# Patient Record
Sex: FEMALE | Race: WHITE | Employment: OTHER | URBAN - NONMETROPOLITAN AREA
[De-identification: names, ages, dates, MRNs, and addresses within clinical notes are randomized per-mention and may not be internally consistent; named-entity substitution may affect disease eponyms.]

---

## 2019-08-21 ENCOUNTER — OFFICE VISIT (OUTPATIENT)
Dept: INTERNAL MEDICINE | Age: 69
End: 2019-08-21
Payer: MEDICARE

## 2019-08-21 VITALS
WEIGHT: 137.4 LBS | OXYGEN SATURATION: 96 % | BODY MASS INDEX: 21.56 KG/M2 | HEART RATE: 71 BPM | SYSTOLIC BLOOD PRESSURE: 162 MMHG | DIASTOLIC BLOOD PRESSURE: 100 MMHG | HEIGHT: 67 IN

## 2019-08-21 DIAGNOSIS — M85.821 OTHER SPECIFIED DISORDERS OF BONE DENSITY AND STRUCTURE, RIGHT UPPER ARM: ICD-10-CM

## 2019-08-21 DIAGNOSIS — Z72.89 OTHER PROBLEMS RELATED TO LIFESTYLE: ICD-10-CM

## 2019-08-21 DIAGNOSIS — N63.10 BREAST MASS, RIGHT: ICD-10-CM

## 2019-08-21 DIAGNOSIS — E55.9 HYPOVITAMINOSIS D: ICD-10-CM

## 2019-08-21 DIAGNOSIS — Z12.31 BREAST CANCER SCREENING BY MAMMOGRAM: ICD-10-CM

## 2019-08-21 DIAGNOSIS — R03.0 ELEVATED BLOOD PRESSURE READING: Primary | ICD-10-CM

## 2019-08-21 DIAGNOSIS — M85.89 OSTEOPENIA OF MULTIPLE SITES: ICD-10-CM

## 2019-08-21 DIAGNOSIS — Z12.11 SCREENING FOR COLON CANCER: ICD-10-CM

## 2019-08-21 DIAGNOSIS — Z78.0 MENOPAUSE: ICD-10-CM

## 2019-08-21 DIAGNOSIS — R53.83 FATIGUE, UNSPECIFIED TYPE: ICD-10-CM

## 2019-08-21 DIAGNOSIS — I45.10 RIGHT BUNDLE BRANCH BLOCK (RBBB): ICD-10-CM

## 2019-08-21 DIAGNOSIS — D64.9 NORMOCYTIC ANEMIA: ICD-10-CM

## 2019-08-21 DIAGNOSIS — Z13.220 SCREENING FOR HYPERLIPIDEMIA: ICD-10-CM

## 2019-08-21 DIAGNOSIS — Z78.0 POST-MENOPAUSAL: ICD-10-CM

## 2019-08-21 PROCEDURE — 93000 ELECTROCARDIOGRAM COMPLETE: CPT | Performed by: INTERNAL MEDICINE

## 2019-08-21 PROCEDURE — 99203 OFFICE O/P NEW LOW 30 MIN: CPT | Performed by: INTERNAL MEDICINE

## 2019-08-21 SDOH — HEALTH STABILITY: MENTAL HEALTH: HOW OFTEN DO YOU HAVE A DRINK CONTAINING ALCOHOL?: NEVER

## 2019-08-21 ASSESSMENT — ENCOUNTER SYMPTOMS
COUGH: 0
SINUS PAIN: 0
CHEST TIGHTNESS: 0
DIARRHEA: 0
VOMITING: 0
ORTHOPNEA: 0
BLOOD IN STOOL: 0
TROUBLE SWALLOWING: 0
SHORTNESS OF BREATH: 0
RHINORRHEA: 0
ABDOMINAL PAIN: 0
BACK PAIN: 0
BLURRED VISION: 0
WHEEZING: 0
CONSTIPATION: 0

## 2019-08-21 ASSESSMENT — PATIENT HEALTH QUESTIONNAIRE - PHQ9
SUM OF ALL RESPONSES TO PHQ QUESTIONS 1-9: 0
SUM OF ALL RESPONSES TO PHQ9 QUESTIONS 1 & 2: 0
SUM OF ALL RESPONSES TO PHQ QUESTIONS 1-9: 0
2. FEELING DOWN, DEPRESSED OR HOPELESS: 0
1. LITTLE INTEREST OR PLEASURE IN DOING THINGS: 0

## 2019-08-21 NOTE — PROGRESS NOTES
unspecified type  CBC    Comprehensive Metabolic Panel    TSH WITH REFLEX TO FT4    Vitamin D 25 Hydroxy   4. Breast cancer screening by mammogram  BRUCE DIGITAL SCREEN W CAD BILATERAL    TSH WITH REFLEX TO FT4    Vitamin D 25 Hydroxy   5. Right bundle branch block (RBBB)  TSH WITH REFLEX TO FT4   EKG, did not show any LVH,  Vitamin D 25 Hydroxy   6. Menopause  TSH WITH REFLEX TO FT4    Vitamin D 25 Hydroxy   7. Other specified disorders of bone density and structure, right upper arm   Vitamin D 25 Hydroxy   8. Post-menopausal  DEXA Bone Density Axial Skeleton   9. Other problems related to lifestyle  Hepatitis C Antibody   10. Screening for hyperlipidemia  Lipid Panel   11. Screening for colon cancer  Cologuard (For External Results Only)       PLAN:        Medications Ordered:  No orders of the defined types were placed in this encounter. Other Orders Placed:  Orders Placed This Encounter   Procedures    Cologuard (For External Results Only)     This test is performed by an external laboratory and is used for result attachment only. It is required that this order requisition be faxed to: [facility] @ [phone]. See [email or web address] for further information.      Standing Status:   Future     Standing Expiration Date:   8/21/2020    BRUCE DIGITAL SCREEN W CAD BILATERAL     Standing Status:   Future     Standing Expiration Date:   2/21/2021     Order Specific Question:   Reason for exam:     Answer:   breast mass    DEXA Bone Density Axial Skeleton     Standing Status:   Future     Standing Expiration Date:   8/21/2020    CBC     Standing Status:   Future     Standing Expiration Date:   2/21/2021    Comprehensive Metabolic Panel     Standing Status:   Future     Standing Expiration Date:   2/21/2021    TSH WITH REFLEX TO FT4     Standing Status:   Future     Standing Expiration Date:   2/21/2021    Vitamin D 25 Hydroxy     Standing Status:   Future     Standing Expiration Date:   8/21/2020   [facility]

## 2019-08-22 DIAGNOSIS — R03.0 ELEVATED BLOOD PRESSURE READING: ICD-10-CM

## 2019-08-22 DIAGNOSIS — Z78.0 MENOPAUSE: ICD-10-CM

## 2019-08-22 DIAGNOSIS — Z13.220 SCREENING FOR HYPERLIPIDEMIA: ICD-10-CM

## 2019-08-22 DIAGNOSIS — M85.821 OTHER SPECIFIED DISORDERS OF BONE DENSITY AND STRUCTURE, RIGHT UPPER ARM: ICD-10-CM

## 2019-08-22 DIAGNOSIS — R53.83 FATIGUE, UNSPECIFIED TYPE: ICD-10-CM

## 2019-08-22 DIAGNOSIS — N63.10 BREAST MASS, RIGHT: ICD-10-CM

## 2019-08-22 DIAGNOSIS — Z12.31 BREAST CANCER SCREENING BY MAMMOGRAM: ICD-10-CM

## 2019-08-22 DIAGNOSIS — Z72.89 OTHER PROBLEMS RELATED TO LIFESTYLE: ICD-10-CM

## 2019-08-22 DIAGNOSIS — I45.10 RIGHT BUNDLE BRANCH BLOCK (RBBB): ICD-10-CM

## 2019-08-22 LAB
ALBUMIN SERPL-MCNC: 4.3 G/DL (ref 3.5–5.2)
ALP BLD-CCNC: 99 U/L (ref 35–104)
ALT SERPL-CCNC: 13 U/L (ref 5–33)
ANION GAP SERPL CALCULATED.3IONS-SCNC: 13 MMOL/L (ref 7–19)
AST SERPL-CCNC: 23 U/L (ref 5–32)
BILIRUB SERPL-MCNC: 0.4 MG/DL (ref 0.2–1.2)
BUN BLDV-MCNC: 14 MG/DL (ref 8–23)
CALCIUM SERPL-MCNC: 9.4 MG/DL (ref 8.8–10.2)
CHLORIDE BLD-SCNC: 106 MMOL/L (ref 98–111)
CHOLESTEROL, TOTAL: 189 MG/DL (ref 160–199)
CO2: 28 MMOL/L (ref 22–29)
CREAT SERPL-MCNC: 0.7 MG/DL (ref 0.5–0.9)
GFR NON-AFRICAN AMERICAN: >60
GLUCOSE BLD-MCNC: 93 MG/DL (ref 74–109)
HCT VFR BLD CALC: 36.7 % (ref 37–47)
HDLC SERPL-MCNC: 50 MG/DL (ref 65–121)
HEMOGLOBIN: 11.9 G/DL (ref 12–16)
HEPATITIS C ANTIBODY INTERPRETATION: NORMAL
LDL CHOLESTEROL CALCULATED: 124 MG/DL
MCH RBC QN AUTO: 32 PG (ref 27–31)
MCHC RBC AUTO-ENTMCNC: 32.4 G/DL (ref 33–37)
MCV RBC AUTO: 98.7 FL (ref 81–99)
PDW BLD-RTO: 12.8 % (ref 11.5–14.5)
PLATELET # BLD: 194 K/UL (ref 130–400)
PMV BLD AUTO: 11.1 FL (ref 9.4–12.3)
POTASSIUM SERPL-SCNC: 3.7 MMOL/L (ref 3.5–5)
RBC # BLD: 3.72 M/UL (ref 4.2–5.4)
SODIUM BLD-SCNC: 147 MMOL/L (ref 136–145)
TOTAL PROTEIN: 7.2 G/DL (ref 6.6–8.7)
TRIGL SERPL-MCNC: 75 MG/DL (ref 0–149)
TSH REFLEX FT4: 2.8 UIU/ML (ref 0.35–5.5)
VITAMIN D 25-HYDROXY: 25.9 NG/ML
WBC # BLD: 6.5 K/UL (ref 4.8–10.8)

## 2019-08-23 ENCOUNTER — HOSPITAL ENCOUNTER (OUTPATIENT)
Dept: WOMENS IMAGING | Age: 69
Discharge: HOME | End: 2019-08-23
Payer: MEDICARE

## 2019-08-23 DIAGNOSIS — N63.11 UNSPECIFIED LUMP IN THE RIGHT BREAST, UPPER OUTER QUADRANT: ICD-10-CM

## 2019-08-23 DIAGNOSIS — N63.10 BREAST MASS, RIGHT: ICD-10-CM

## 2019-08-23 DIAGNOSIS — R92.8 ABNORMAL MAMMOGRAM: ICD-10-CM

## 2019-08-23 DIAGNOSIS — Z12.31 BREAST CANCER SCREENING BY MAMMOGRAM: ICD-10-CM

## 2019-08-23 DIAGNOSIS — Z78.0 POST-MENOPAUSAL: ICD-10-CM

## 2019-08-23 PROCEDURE — 77080 DXA BONE DENSITY AXIAL: CPT

## 2019-08-23 PROCEDURE — 76642 ULTRASOUND BREAST LIMITED: CPT

## 2019-08-23 PROCEDURE — 77066 DX MAMMO INCL CAD BI: CPT

## 2019-08-24 RX ORDER — B-COMPLEX WITH VITAMIN C
1 TABLET ORAL DAILY
Qty: 360 TABLET | Refills: 0 | Status: SHIPPED | OUTPATIENT
Start: 2019-08-24 | End: 2022-05-31 | Stop reason: ALTCHOICE

## 2019-08-24 RX ORDER — ERGOCALCIFEROL 1.25 MG/1
50000 CAPSULE ORAL WEEKLY
Qty: 12 CAPSULE | Refills: 1 | Status: SHIPPED | OUTPATIENT
Start: 2019-08-24 | End: 2020-09-23 | Stop reason: ALTCHOICE

## 2019-08-24 NOTE — RESULT ENCOUNTER NOTE
Spoke to patient at great length about the large tumor and also the images suggesting of malignancy, she will undergo biopsy of her R breast and possible L breast nodule, if malignant , will be referred to Dr Zack Quintero office. She was advised to contact me if she had any questions. Will need care coordination for biopsy, follow up on staging  and possible assistance with oncology appointment. She will contact the office for any problems.

## 2019-08-26 ENCOUNTER — TELEPHONE (OUTPATIENT)
Dept: INTERNAL MEDICINE | Age: 69
End: 2019-08-26

## 2019-08-26 NOTE — TELEPHONE ENCOUNTER
----- Message from Chucky Rod MD sent at 8/24/2019  1:00 PM CDT -----  All labs are in acceptable range except:  Low Vitamin D, I will prescribe high dose Vitamin D oral , once weekly X 12 weeks, recheck Vitamin D level after 3 months  She is anemic, I have to test her occult blood, iron studies and Vitamin B12 and folate. If she has not had the cologuard, it will be a good idea to see the labs first because she may need to see GI for possible colonoscopy.

## 2019-08-27 ENCOUNTER — OFFICE VISIT (OUTPATIENT)
Dept: SURGERY | Age: 69
End: 2019-08-27
Payer: MEDICARE

## 2019-08-27 VITALS — BODY MASS INDEX: 21.21 KG/M2 | WEIGHT: 132 LBS | HEIGHT: 66 IN | TEMPERATURE: 98.6 F

## 2019-08-27 DIAGNOSIS — R59.0 AXILLARY LYMPHADENOPATHY: ICD-10-CM

## 2019-08-27 DIAGNOSIS — N63.11 MASS OF UPPER OUTER QUADRANT OF RIGHT BREAST: Primary | ICD-10-CM

## 2019-08-27 PROCEDURE — 1123F ACP DISCUSS/DSCN MKR DOCD: CPT | Performed by: PHYSICIAN ASSISTANT

## 2019-08-27 PROCEDURE — 99203 OFFICE O/P NEW LOW 30 MIN: CPT | Performed by: PHYSICIAN ASSISTANT

## 2019-08-27 PROCEDURE — G8427 DOCREV CUR MEDS BY ELIG CLIN: HCPCS | Performed by: PHYSICIAN ASSISTANT

## 2019-08-27 PROCEDURE — G8399 PT W/DXA RESULTS DOCUMENT: HCPCS | Performed by: PHYSICIAN ASSISTANT

## 2019-08-27 PROCEDURE — 1090F PRES/ABSN URINE INCON ASSESS: CPT | Performed by: PHYSICIAN ASSISTANT

## 2019-08-27 PROCEDURE — 4040F PNEUMOC VAC/ADMIN/RCVD: CPT | Performed by: PHYSICIAN ASSISTANT

## 2019-08-27 PROCEDURE — G8420 CALC BMI NORM PARAMETERS: HCPCS | Performed by: PHYSICIAN ASSISTANT

## 2019-08-27 PROCEDURE — 1036F TOBACCO NON-USER: CPT | Performed by: PHYSICIAN ASSISTANT

## 2019-08-27 PROCEDURE — 3017F COLORECTAL CA SCREEN DOC REV: CPT | Performed by: PHYSICIAN ASSISTANT

## 2019-09-05 ENCOUNTER — HOSPITAL ENCOUNTER (OUTPATIENT)
Dept: WOMENS IMAGING | Age: 69
Discharge: HOME | End: 2019-09-05
Payer: MEDICARE

## 2019-09-05 DIAGNOSIS — R59.0 AXILLARY LYMPHADENOPATHY: ICD-10-CM

## 2019-09-05 DIAGNOSIS — N63.11 MASS OF UPPER OUTER QUADRANT OF RIGHT BREAST: ICD-10-CM

## 2019-09-05 PROCEDURE — 2709999900 US BREAST BIOPSY W LOC DEVICE 1ST LESION RIGHT

## 2019-09-05 PROCEDURE — 19083 BX BREAST 1ST LESION US IMAG: CPT | Performed by: SURGERY

## 2019-09-05 PROCEDURE — 10005 FNA BX W/US GDN 1ST LES: CPT | Performed by: SURGERY

## 2019-09-05 PROCEDURE — 88374 M/PHMTRC ALYS ISHQUANT/SEMIQ: CPT

## 2019-09-05 PROCEDURE — 88173 CYTOPATH EVAL FNA REPORT: CPT

## 2019-09-05 PROCEDURE — 88361 TUMOR IMMUNOHISTOCHEM/COMPUT: CPT

## 2019-09-05 PROCEDURE — 77065 DX MAMMO INCL CAD UNI: CPT

## 2019-09-05 PROCEDURE — A4648 IMPLANTABLE TISSUE MARKER: HCPCS

## 2019-09-05 PROCEDURE — 88305 TISSUE EXAM BY PATHOLOGIST: CPT

## 2019-09-05 PROCEDURE — 88172 CYTP DX EVAL FNA 1ST EA SITE: CPT

## 2019-09-10 ENCOUNTER — TELEPHONE (OUTPATIENT)
Dept: SURGERY | Age: 69
End: 2019-09-10

## 2019-09-10 DIAGNOSIS — C50.411 MALIGNANT NEOPLASM OF UPPER-OUTER QUADRANT OF RIGHT FEMALE BREAST, UNSPECIFIED ESTROGEN RECEPTOR STATUS (HCC): Primary | ICD-10-CM

## 2019-09-12 ENCOUNTER — TELEPHONE (OUTPATIENT)
Dept: OTHER | Age: 69
End: 2019-09-12

## 2019-09-19 ENCOUNTER — HOSPITAL ENCOUNTER (OUTPATIENT)
Dept: MRI IMAGING | Age: 69
Discharge: HOME | End: 2019-09-19
Payer: MEDICARE

## 2019-09-19 DIAGNOSIS — C50.411 MALIGNANT NEOPLASM OF UPPER-OUTER QUADRANT OF RIGHT FEMALE BREAST, UNSPECIFIED ESTROGEN RECEPTOR STATUS (HCC): ICD-10-CM

## 2019-09-19 PROCEDURE — A9577 INJ MULTIHANCE: HCPCS | Performed by: PHYSICIAN ASSISTANT

## 2019-09-19 PROCEDURE — 77049 MRI BREAST C-+ W/CAD BI: CPT

## 2019-09-19 PROCEDURE — 6360000004 HC RX CONTRAST MEDICATION: Performed by: PHYSICIAN ASSISTANT

## 2019-09-19 RX ADMIN — GADOBENATE DIMEGLUMINE 13 ML: 529 INJECTION, SOLUTION INTRAVENOUS at 12:15

## 2019-09-20 PROBLEM — Z12.11 SCREENING FOR COLON CANCER: Status: RESOLVED | Noted: 2019-08-21 | Resolved: 2019-09-20

## 2019-09-23 ENCOUNTER — HOSPITAL ENCOUNTER (OUTPATIENT)
Dept: WOMENS IMAGING | Age: 69
Discharge: HOME | End: 2019-09-23
Payer: MEDICARE

## 2019-09-23 ENCOUNTER — INITIAL CONSULT (OUTPATIENT)
Dept: SURGERY | Age: 69
End: 2019-09-23
Payer: MEDICARE

## 2019-09-23 DIAGNOSIS — C50.411 MALIGNANT NEOPLASM OF UPPER-OUTER QUADRANT OF RIGHT FEMALE BREAST, UNSPECIFIED ESTROGEN RECEPTOR STATUS (HCC): Primary | ICD-10-CM

## 2019-09-23 DIAGNOSIS — Z01.818 EXAMINATION PRIOR TO CHEMOTHERAPY: ICD-10-CM

## 2019-09-23 DIAGNOSIS — C50.812 MALIGNANT NEOPLASM OF OVERLAPPING SITES OF LEFT FEMALE BREAST, UNSPECIFIED ESTROGEN RECEPTOR STATUS (HCC): ICD-10-CM

## 2019-09-23 DIAGNOSIS — C50.411 MALIGNANT NEOPLASM OF UPPER-OUTER QUADRANT OF RIGHT FEMALE BREAST, UNSPECIFIED ESTROGEN RECEPTOR STATUS (HCC): ICD-10-CM

## 2019-09-23 PROCEDURE — 1036F TOBACCO NON-USER: CPT | Performed by: SURGERY

## 2019-09-23 PROCEDURE — 3017F COLORECTAL CA SCREEN DOC REV: CPT | Performed by: SURGERY

## 2019-09-23 PROCEDURE — 99354 PR PROLONGED SVC OUTPATIENT SETTING 1ST HOUR: CPT | Performed by: SURGERY

## 2019-09-23 PROCEDURE — G8420 CALC BMI NORM PARAMETERS: HCPCS | Performed by: SURGERY

## 2019-09-23 PROCEDURE — 1123F ACP DISCUSS/DSCN MKR DOCD: CPT | Performed by: SURGERY

## 2019-09-23 PROCEDURE — G8428 CUR MEDS NOT DOCUMENT: HCPCS | Performed by: SURGERY

## 2019-09-23 PROCEDURE — 4040F PNEUMOC VAC/ADMIN/RCVD: CPT | Performed by: SURGERY

## 2019-09-23 PROCEDURE — G8399 PT W/DXA RESULTS DOCUMENT: HCPCS | Performed by: SURGERY

## 2019-09-23 PROCEDURE — 99215 OFFICE O/P EST HI 40 MIN: CPT | Performed by: SURGERY

## 2019-09-23 PROCEDURE — 1090F PRES/ABSN URINE INCON ASSESS: CPT | Performed by: SURGERY

## 2019-09-23 NOTE — Clinical Note
Static work-upGet set up for portAppointment with Flor Gen: lying in bed, NAD  resp: breathing comfortably on RA  Cardiac: RRR  GI: softly distended, minimally tender across all 4 quadrants. No rebound or guarding.   Ext: warm , moving all ext

## 2019-09-24 ENCOUNTER — OFFICE VISIT (OUTPATIENT)
Dept: GASTROENTEROLOGY | Age: 69
End: 2019-09-24
Payer: MEDICARE

## 2019-09-24 ENCOUNTER — TELEPHONE (OUTPATIENT)
Dept: GASTROENTEROLOGY | Age: 69
End: 2019-09-24

## 2019-09-24 ENCOUNTER — OFFICE VISIT (OUTPATIENT)
Dept: INTERNAL MEDICINE | Age: 69
End: 2019-09-24
Payer: MEDICARE

## 2019-09-24 ENCOUNTER — TELEPHONE (OUTPATIENT)
Dept: SURGERY | Age: 69
End: 2019-09-24

## 2019-09-24 VITALS
HEIGHT: 67 IN | SYSTOLIC BLOOD PRESSURE: 130 MMHG | OXYGEN SATURATION: 98 % | DIASTOLIC BLOOD PRESSURE: 80 MMHG | HEART RATE: 69 BPM | WEIGHT: 132 LBS | BODY MASS INDEX: 20.72 KG/M2

## 2019-09-24 VITALS
OXYGEN SATURATION: 95 % | SYSTOLIC BLOOD PRESSURE: 130 MMHG | HEIGHT: 66 IN | BODY MASS INDEX: 21.41 KG/M2 | HEART RATE: 61 BPM | DIASTOLIC BLOOD PRESSURE: 84 MMHG | WEIGHT: 133.2 LBS

## 2019-09-24 DIAGNOSIS — M85.821 OTHER SPECIFIED DISORDERS OF BONE DENSITY AND STRUCTURE, RIGHT UPPER ARM: ICD-10-CM

## 2019-09-24 DIAGNOSIS — F32.A FATIGUE DUE TO DEPRESSION: ICD-10-CM

## 2019-09-24 DIAGNOSIS — R53.83 FATIGUE DUE TO DEPRESSION: ICD-10-CM

## 2019-09-24 DIAGNOSIS — N63.10 BREAST MASS, RIGHT: ICD-10-CM

## 2019-09-24 DIAGNOSIS — D64.9 ANEMIA, UNSPECIFIED TYPE: Primary | ICD-10-CM

## 2019-09-24 DIAGNOSIS — I45.10 RIGHT BUNDLE BRANCH BLOCK (RBBB): Primary | ICD-10-CM

## 2019-09-24 DIAGNOSIS — D64.9 ANEMIA, UNSPECIFIED TYPE: ICD-10-CM

## 2019-09-24 PROBLEM — C50.812 MALIGNANT NEOPLASM OF OVERLAPPING SITES OF LEFT FEMALE BREAST (HCC): Status: ACTIVE | Noted: 2019-09-24

## 2019-09-24 LAB
FOLATE: 11 NG/ML (ref 4.8–37.3)
IRON SATURATION: 30 % (ref 14–50)
IRON: 78 UG/DL (ref 37–145)
TOTAL IRON BINDING CAPACITY: 264 UG/DL (ref 250–400)
VITAMIN B-12: 402 PG/ML (ref 211–946)

## 2019-09-24 PROCEDURE — 1036F TOBACCO NON-USER: CPT | Performed by: NURSE PRACTITIONER

## 2019-09-24 PROCEDURE — G8420 CALC BMI NORM PARAMETERS: HCPCS | Performed by: NURSE PRACTITIONER

## 2019-09-24 PROCEDURE — 1090F PRES/ABSN URINE INCON ASSESS: CPT | Performed by: NURSE PRACTITIONER

## 2019-09-24 PROCEDURE — 3017F COLORECTAL CA SCREEN DOC REV: CPT | Performed by: NURSE PRACTITIONER

## 2019-09-24 PROCEDURE — G8399 PT W/DXA RESULTS DOCUMENT: HCPCS | Performed by: NURSE PRACTITIONER

## 2019-09-24 PROCEDURE — 99203 OFFICE O/P NEW LOW 30 MIN: CPT | Performed by: NURSE PRACTITIONER

## 2019-09-24 PROCEDURE — G8427 DOCREV CUR MEDS BY ELIG CLIN: HCPCS | Performed by: NURSE PRACTITIONER

## 2019-09-24 PROCEDURE — 99213 OFFICE O/P EST LOW 20 MIN: CPT | Performed by: INTERNAL MEDICINE

## 2019-09-24 PROCEDURE — 1123F ACP DISCUSS/DSCN MKR DOCD: CPT | Performed by: NURSE PRACTITIONER

## 2019-09-24 PROCEDURE — 4040F PNEUMOC VAC/ADMIN/RCVD: CPT | Performed by: NURSE PRACTITIONER

## 2019-09-24 ASSESSMENT — ENCOUNTER SYMPTOMS
SINUS PAIN: 0
DIARRHEA: 0
ABDOMINAL PAIN: 0
SHORTNESS OF BREATH: 0
ABDOMINAL DISTENTION: 0
CONSTIPATION: 0
VOMITING: 0
VOMITING: 0
RHINORRHEA: 0
DIARRHEA: 0
ANAL BLEEDING: 0
COUGH: 0
TROUBLE SWALLOWING: 0
BACK PAIN: 0
BLOOD IN STOOL: 0
COUGH: 0
TROUBLE SWALLOWING: 0
SORE THROAT: 0
RECTAL PAIN: 0
BLOOD IN STOOL: 0
BACK PAIN: 0
WHEEZING: 0
VOICE CHANGE: 0
CONSTIPATION: 0
ABDOMINAL PAIN: 0
NAUSEA: 0
CHEST TIGHTNESS: 0
SHORTNESS OF BREATH: 0

## 2019-09-24 NOTE — PATIENT INSTRUCTIONS
You are going to have an Endoscopy and here are some basic instructions:    Nothing to eat or drink after midnight EXCEPT:  PLEASE TAKE MEDICATION(S) FOR HIGH BLOOD PRESSURE, SEIZURES, HEART, AND THYROID WITH A SIP OF WATER AT LEAST 2 HOURS PRIOR TO ARRIVAL TIME.   YOU MAY ALSO TAKE ANY INHALERS YOU ARE PRESCRIBED. You will not be able to drive for 24 hours after the procedure due to sedation. Bring a  with you the day of the procedure. No aspirin, ibuprofen, naproxen, fish oil or vitamin E for 5 days before procedure. Continue current medications. If you are on blood thinners, clearance from the prescribing physician will be obtained before your procedure is scheduled. Increased [email or web address] may be associated with discontinuation of your blood thinner and include, but not limited to, stroke, TIA, or cardiac event. If biopsies are taken during the procedure they will be sent to a pathologist for analysis. You will be notified by mail of the pathology results in 2-3 weeks. Your physician may also schedule a follow up appointment with the nurse practitioner to discuss pathology, symptoms or to check if you have had any problems related to your procedure. If you prefer not to return to the office after your procedure please discuss this with your physician on the day of your procedure. You are going to have a colonoscopy and here are some instructions: You will be given specific directions regarding restrictions to diet and bowel prep instructions including laxatives. Please read these instructions one week prior to your scheduled procedure to ensure that you are prepared. Follow prep instructions provided for bowel prep. Take all of the bowel prep as directed. If you are having problems with nausea, stop your prep for 30-45 min to allow the nausea to subside before resuming your prep.      Nothing to eat or drink after midnight the day of the procedure EXCEPT:  PLEASE TAKE

## 2019-09-24 NOTE — PROGRESS NOTES
Other Topics Concern    None   Social History Narrative    None       No Known Allergies    Current Outpatient Medications   Medication Sig Dispense Refill    IRON-FOLIC ACID PO Take 22 mg by mouth daily      vitamin D (ERGOCALCIFEROL) 00433 units CAPS capsule Take 1 capsule by mouth once a week 12 capsule 1    Calcium Carbonate-Vitamin D (OYSTER SHELL CALCIUM/D) 500-200 MG-UNIT TABS Take 1 tablet by mouth daily 360 tablet 0     No current facility-administered medications for this visit. Review of Systems   Constitutional: Negative for appetite change, fatigue, fever and unexpected weight change. HENT: Negative for sore throat, trouble swallowing and voice change. Respiratory: Negative for cough and shortness of breath. Cardiovascular: Negative for chest pain, palpitations and leg swelling. Gastrointestinal: Negative for abdominal distention, abdominal pain, anal bleeding, blood in stool, constipation, diarrhea, nausea, rectal pain and vomiting. Genitourinary: Negative for hematuria. Musculoskeletal: Negative for arthralgias, back pain and neck pain. Neurological: Negative for dizziness, weakness, light-headedness and headaches. Psychiatric/Behavioral: Negative for dysphoric mood and sleep disturbance. The patient is not nervous/anxious. All other systems reviewed and are negative. Objective:     Physical Exam   Constitutional: She is oriented to person, place, and time. She appears well-developed and well-nourished. /80   Pulse 69   Ht 5' 7\" (1.702 m)   Wt 132 lb (59.9 kg)   SpO2 98%   BMI 20.67 kg/m²    Eyes: Pupils are equal, round, and reactive to light. Conjunctivae and EOM are normal. No scleral icterus. Neck: Normal range of motion. Neck supple. No thyromegaly present. Cardiovascular: Normal rate, regular rhythm and normal heart sounds. Exam reveals no gallop and no friction rub. No murmur heard.   Pulmonary/Chest: Effort normal and breath sounds

## 2019-09-27 ENCOUNTER — HOSPITAL ENCOUNTER (OUTPATIENT)
Dept: CT IMAGING | Age: 69
Discharge: HOME | End: 2019-09-27
Payer: MEDICARE

## 2019-09-27 ENCOUNTER — HOSPITAL ENCOUNTER (OUTPATIENT)
Dept: NUCLEAR MEDICINE | Age: 69
Discharge: HOME | End: 2019-09-29
Payer: MEDICARE

## 2019-09-27 ENCOUNTER — HOSPITAL ENCOUNTER (OUTPATIENT)
Dept: NON INVASIVE DIAGNOSTICS | Age: 69
Discharge: HOME | End: 2019-09-27
Payer: MEDICARE

## 2019-09-27 DIAGNOSIS — Z01.818 EXAMINATION PRIOR TO CHEMOTHERAPY: ICD-10-CM

## 2019-09-27 DIAGNOSIS — C50.411 MALIGNANT NEOPLASM OF UPPER-OUTER QUADRANT OF RIGHT FEMALE BREAST, UNSPECIFIED ESTROGEN RECEPTOR STATUS (HCC): ICD-10-CM

## 2019-09-27 LAB
GFR NON-AFRICAN AMERICAN: >60
LV EF: 58 %
LVEF MODALITY: NORMAL
PERFORMED ON: NORMAL
POC CREATININE: 0.8 MG/DL (ref 0.3–1.3)
POC SAMPLE TYPE: NORMAL

## 2019-09-27 PROCEDURE — 74177 CT ABD & PELVIS W/CONTRAST: CPT

## 2019-09-27 PROCEDURE — A9561 TC99M OXIDRONATE: HCPCS | Performed by: SURGERY

## 2019-09-27 PROCEDURE — 6360000004 HC RX CONTRAST MEDICATION: Performed by: SURGERY

## 2019-09-27 PROCEDURE — 3430000000 HC RX DIAGNOSTIC RADIOPHARMACEUTICAL: Performed by: SURGERY

## 2019-09-27 PROCEDURE — 82565 ASSAY OF CREATININE: CPT

## 2019-09-27 PROCEDURE — 93306 TTE W/DOPPLER COMPLETE: CPT

## 2019-09-27 PROCEDURE — 78306 BONE IMAGING WHOLE BODY: CPT

## 2019-09-27 PROCEDURE — 71260 CT THORAX DX C+: CPT

## 2019-09-27 RX ADMIN — TECHNETIUM TC 99M OXIDRONATE 20 MILLICURIE: 3.15 INJECTION, POWDER, LYOPHILIZED, FOR SOLUTION INTRAVENOUS at 13:09

## 2019-09-27 RX ADMIN — IOPAMIDOL 90 ML: 755 INJECTION, SOLUTION INTRAVENOUS at 08:36

## 2019-10-01 ENCOUNTER — HOSPITAL ENCOUNTER (OUTPATIENT)
Age: 69
Setting detail: OUTPATIENT SURGERY
Discharge: HOME | End: 2019-10-01
Attending: INTERNAL MEDICINE | Admitting: INTERNAL MEDICINE
Payer: MEDICARE

## 2019-10-01 ENCOUNTER — ANESTHESIA EVENT (OUTPATIENT)
Dept: OPERATING ROOM | Age: 69
End: 2019-10-01

## 2019-10-01 ENCOUNTER — ANESTHESIA (OUTPATIENT)
Dept: OPERATING ROOM | Age: 69
End: 2019-10-01

## 2019-10-01 ENCOUNTER — HOSPITAL ENCOUNTER (OUTPATIENT)
Age: 69
Setting detail: SPECIMEN
Discharge: HOME | End: 2019-10-01
Payer: MEDICARE

## 2019-10-01 ENCOUNTER — TELEPHONE (OUTPATIENT)
Dept: GASTROENTEROLOGY | Age: 69
End: 2019-10-01

## 2019-10-01 ENCOUNTER — APPOINTMENT (OUTPATIENT)
Dept: OPERATING ROOM | Age: 69
End: 2019-10-01

## 2019-10-01 VITALS — OXYGEN SATURATION: 98 % | SYSTOLIC BLOOD PRESSURE: 119 MMHG | DIASTOLIC BLOOD PRESSURE: 67 MMHG

## 2019-10-01 VITALS
HEIGHT: 67 IN | HEART RATE: 65 BPM | RESPIRATION RATE: 18 BRPM | WEIGHT: 135 LBS | DIASTOLIC BLOOD PRESSURE: 70 MMHG | BODY MASS INDEX: 21.19 KG/M2 | OXYGEN SATURATION: 97 % | SYSTOLIC BLOOD PRESSURE: 115 MMHG

## 2019-10-01 PROCEDURE — 43239 EGD BIOPSY SINGLE/MULTIPLE: CPT

## 2019-10-01 PROCEDURE — 88305 TISSUE EXAM BY PATHOLOGIST: CPT

## 2019-10-01 PROCEDURE — 45385 COLONOSCOPY W/LESION REMOVAL: CPT | Performed by: INTERNAL MEDICINE

## 2019-10-01 PROCEDURE — G8907 PT DOC NO EVENTS ON DISCHARG: HCPCS

## 2019-10-01 PROCEDURE — G8918 PT W/O PREOP ORDER IV AB PRO: HCPCS

## 2019-10-01 PROCEDURE — 43239 EGD BIOPSY SINGLE/MULTIPLE: CPT | Performed by: INTERNAL MEDICINE

## 2019-10-01 PROCEDURE — 88342 IMHCHEM/IMCYTCHM 1ST ANTB: CPT

## 2019-10-01 PROCEDURE — 45385 COLONOSCOPY W/LESION REMOVAL: CPT

## 2019-10-01 RX ORDER — SODIUM CHLORIDE 9 MG/ML
INJECTION, SOLUTION INTRAVENOUS CONTINUOUS
Status: DISCONTINUED | OUTPATIENT
Start: 2019-10-01 | End: 2019-10-01 | Stop reason: HOSPADM

## 2019-10-01 RX ORDER — LIDOCAINE HYDROCHLORIDE 10 MG/ML
INJECTION, SOLUTION INFILTRATION; PERINEURAL PRN
Status: DISCONTINUED | OUTPATIENT
Start: 2019-10-01 | End: 2019-10-01 | Stop reason: SDUPTHER

## 2019-10-01 RX ORDER — PROPOFOL 10 MG/ML
INJECTION, EMULSION INTRAVENOUS PRN
Status: DISCONTINUED | OUTPATIENT
Start: 2019-10-01 | End: 2019-10-01 | Stop reason: SDUPTHER

## 2019-10-01 RX ADMIN — SODIUM CHLORIDE: 9 INJECTION, SOLUTION INTRAVENOUS at 10:18

## 2019-10-01 RX ADMIN — PROPOFOL 300 MG: 10 INJECTION, EMULSION INTRAVENOUS at 11:16

## 2019-10-01 RX ADMIN — LIDOCAINE HYDROCHLORIDE 30 MG: 10 INJECTION, SOLUTION INFILTRATION; PERINEURAL at 11:16

## 2019-10-03 ENCOUNTER — HOSPITAL ENCOUNTER (OUTPATIENT)
Dept: INFUSION THERAPY | Age: 69
Discharge: HOME | End: 2019-10-03
Payer: MEDICARE

## 2019-10-03 ENCOUNTER — OFFICE VISIT (OUTPATIENT)
Dept: HEMATOLOGY | Age: 69
End: 2019-10-03
Payer: MEDICARE

## 2019-10-03 VITALS
SYSTOLIC BLOOD PRESSURE: 134 MMHG | DIASTOLIC BLOOD PRESSURE: 78 MMHG | HEIGHT: 67 IN | HEART RATE: 70 BPM | BODY MASS INDEX: 20.95 KG/M2 | WEIGHT: 133.5 LBS | OXYGEN SATURATION: 98 %

## 2019-10-03 DIAGNOSIS — Z17.0 MALIGNANT NEOPLASM OF OVERLAPPING SITES OF LEFT BREAST IN FEMALE, ESTROGEN RECEPTOR POSITIVE (HCC): ICD-10-CM

## 2019-10-03 DIAGNOSIS — C50.812 MALIGNANT NEOPLASM OF OVERLAPPING SITES OF LEFT BREAST IN FEMALE, ESTROGEN RECEPTOR POSITIVE (HCC): ICD-10-CM

## 2019-10-03 DIAGNOSIS — Z71.89 CARE PLAN DISCUSSED WITH PATIENT: Primary | ICD-10-CM

## 2019-10-03 PROCEDURE — 1036F TOBACCO NON-USER: CPT | Performed by: INTERNAL MEDICINE

## 2019-10-03 PROCEDURE — G8420 CALC BMI NORM PARAMETERS: HCPCS | Performed by: INTERNAL MEDICINE

## 2019-10-03 PROCEDURE — G8399 PT W/DXA RESULTS DOCUMENT: HCPCS | Performed by: INTERNAL MEDICINE

## 2019-10-03 PROCEDURE — 1123F ACP DISCUSS/DSCN MKR DOCD: CPT | Performed by: INTERNAL MEDICINE

## 2019-10-03 PROCEDURE — 85025 COMPLETE CBC W/AUTO DIFF WBC: CPT

## 2019-10-03 PROCEDURE — 99205 OFFICE O/P NEW HI 60 MIN: CPT | Performed by: INTERNAL MEDICINE

## 2019-10-03 PROCEDURE — G8484 FLU IMMUNIZE NO ADMIN: HCPCS | Performed by: INTERNAL MEDICINE

## 2019-10-03 PROCEDURE — 3017F COLORECTAL CA SCREEN DOC REV: CPT | Performed by: INTERNAL MEDICINE

## 2019-10-03 PROCEDURE — G8427 DOCREV CUR MEDS BY ELIG CLIN: HCPCS | Performed by: INTERNAL MEDICINE

## 2019-10-03 PROCEDURE — 1090F PRES/ABSN URINE INCON ASSESS: CPT | Performed by: INTERNAL MEDICINE

## 2019-10-03 PROCEDURE — 4040F PNEUMOC VAC/ADMIN/RCVD: CPT | Performed by: INTERNAL MEDICINE

## 2019-10-04 ENCOUNTER — APPOINTMENT (OUTPATIENT)
Dept: GENERAL RADIOLOGY | Age: 69
End: 2019-10-04
Attending: SURGERY
Payer: MEDICARE

## 2019-10-04 ENCOUNTER — ANESTHESIA (OUTPATIENT)
Dept: OPERATING ROOM | Age: 69
End: 2019-10-04
Payer: MEDICARE

## 2019-10-04 ENCOUNTER — HOSPITAL ENCOUNTER (OUTPATIENT)
Age: 69
Setting detail: OUTPATIENT SURGERY
Discharge: HOME | End: 2019-10-04
Attending: SURGERY | Admitting: SURGERY
Payer: MEDICARE

## 2019-10-04 ENCOUNTER — ANESTHESIA EVENT (OUTPATIENT)
Dept: OPERATING ROOM | Age: 69
End: 2019-10-04
Payer: MEDICARE

## 2019-10-04 VITALS — DIASTOLIC BLOOD PRESSURE: 57 MMHG | OXYGEN SATURATION: 96 % | SYSTOLIC BLOOD PRESSURE: 110 MMHG

## 2019-10-04 VITALS
TEMPERATURE: 98.2 F | BODY MASS INDEX: 20.88 KG/M2 | HEART RATE: 61 BPM | DIASTOLIC BLOOD PRESSURE: 74 MMHG | WEIGHT: 133 LBS | SYSTOLIC BLOOD PRESSURE: 127 MMHG | OXYGEN SATURATION: 98 % | HEIGHT: 67 IN | RESPIRATION RATE: 18 BRPM

## 2019-10-04 DIAGNOSIS — C50.812 MALIGNANT NEOPLASM OF OVERLAPPING SITES OF LEFT FEMALE BREAST, UNSPECIFIED ESTROGEN RECEPTOR STATUS (HCC): Primary | ICD-10-CM

## 2019-10-04 PROCEDURE — 2500000003 HC RX 250 WO HCPCS: Performed by: SURGERY

## 2019-10-04 PROCEDURE — 6360000002 HC RX W HCPCS: Performed by: SURGERY

## 2019-10-04 PROCEDURE — 6360000002 HC RX W HCPCS: Performed by: NURSE ANESTHETIST, CERTIFIED REGISTERED

## 2019-10-04 PROCEDURE — 3700000000 HC ANESTHESIA ATTENDED CARE: Performed by: SURGERY

## 2019-10-04 PROCEDURE — C1788 PORT, INDWELLING, IMP: HCPCS | Performed by: SURGERY

## 2019-10-04 PROCEDURE — 3700000001 HC ADD 15 MINUTES (ANESTHESIA): Performed by: SURGERY

## 2019-10-04 PROCEDURE — 7100000000 HC PACU RECOVERY - FIRST 15 MIN: Performed by: SURGERY

## 2019-10-04 PROCEDURE — 2580000003 HC RX 258: Performed by: ANESTHESIOLOGY

## 2019-10-04 PROCEDURE — 3600000013 HC SURGERY LEVEL 3 ADDTL 15MIN: Performed by: SURGERY

## 2019-10-04 PROCEDURE — 2580000003 HC RX 258: Performed by: SURGERY

## 2019-10-04 PROCEDURE — 77001 FLUOROGUIDE FOR VEIN DEVICE: CPT | Performed by: SURGERY

## 2019-10-04 PROCEDURE — 7100000011 HC PHASE II RECOVERY - ADDTL 15 MIN: Performed by: SURGERY

## 2019-10-04 PROCEDURE — 71045 X-RAY EXAM CHEST 1 VIEW: CPT

## 2019-10-04 PROCEDURE — 7100000010 HC PHASE II RECOVERY - FIRST 15 MIN: Performed by: SURGERY

## 2019-10-04 PROCEDURE — 7100000001 HC PACU RECOVERY - ADDTL 15 MIN: Performed by: SURGERY

## 2019-10-04 PROCEDURE — 36561 INSERT TUNNELED CV CATH: CPT | Performed by: SURGERY

## 2019-10-04 PROCEDURE — 3600000003 HC SURGERY LEVEL 3 BASE: Performed by: SURGERY

## 2019-10-04 PROCEDURE — 3209999900 FLUORO FOR SURGICAL PROCEDURES

## 2019-10-04 PROCEDURE — 2709999900 HC NON-CHARGEABLE SUPPLY: Performed by: SURGERY

## 2019-10-04 DEVICE — PORT INFUS PLAS SGL LUMN W/ 9.6FR SIL CATH AIRGUARD VLV: Type: IMPLANTABLE DEVICE | Site: CHEST  WALL | Status: FUNCTIONAL

## 2019-10-04 RX ORDER — PROMETHAZINE HYDROCHLORIDE 25 MG/ML
6.25 INJECTION, SOLUTION INTRAMUSCULAR; INTRAVENOUS
Status: DISCONTINUED | OUTPATIENT
Start: 2019-10-04 | End: 2019-10-04 | Stop reason: HOSPADM

## 2019-10-04 RX ORDER — SODIUM CHLORIDE 0.9 % (FLUSH) 0.9 %
10 SYRINGE (ML) INJECTION PRN
Status: DISCONTINUED | OUTPATIENT
Start: 2019-10-04 | End: 2019-10-04 | Stop reason: HOSPADM

## 2019-10-04 RX ORDER — DIPHENHYDRAMINE HYDROCHLORIDE 50 MG/ML
12.5 INJECTION INTRAMUSCULAR; INTRAVENOUS
Status: DISCONTINUED | OUTPATIENT
Start: 2019-10-04 | End: 2019-10-04 | Stop reason: HOSPADM

## 2019-10-04 RX ORDER — MIDAZOLAM HYDROCHLORIDE 1 MG/ML
2 INJECTION INTRAMUSCULAR; INTRAVENOUS
Status: DISCONTINUED | OUTPATIENT
Start: 2019-10-04 | End: 2019-10-04 | Stop reason: HOSPADM

## 2019-10-04 RX ORDER — HEPARIN SODIUM,PORCINE 10 UNIT/ML
VIAL (ML) INTRAVENOUS PRN
Status: DISCONTINUED | OUTPATIENT
Start: 2019-10-04 | End: 2019-10-04 | Stop reason: ALTCHOICE

## 2019-10-04 RX ORDER — METOCLOPRAMIDE HYDROCHLORIDE 5 MG/ML
10 INJECTION INTRAMUSCULAR; INTRAVENOUS
Status: DISCONTINUED | OUTPATIENT
Start: 2019-10-04 | End: 2019-10-04 | Stop reason: HOSPADM

## 2019-10-04 RX ORDER — LABETALOL 20 MG/4 ML (5 MG/ML) INTRAVENOUS SYRINGE
5 EVERY 10 MIN PRN
Status: DISCONTINUED | OUTPATIENT
Start: 2019-10-04 | End: 2019-10-04 | Stop reason: HOSPADM

## 2019-10-04 RX ORDER — MORPHINE SULFATE 4 MG/ML
4 INJECTION, SOLUTION INTRAMUSCULAR; INTRAVENOUS EVERY 5 MIN PRN
Status: DISCONTINUED | OUTPATIENT
Start: 2019-10-04 | End: 2019-10-04 | Stop reason: HOSPADM

## 2019-10-04 RX ORDER — PROPOFOL 10 MG/ML
INJECTION, EMULSION INTRAVENOUS CONTINUOUS PRN
Status: DISCONTINUED | OUTPATIENT
Start: 2019-10-04 | End: 2019-10-04 | Stop reason: SDUPTHER

## 2019-10-04 RX ORDER — FENTANYL CITRATE 50 UG/ML
50 INJECTION, SOLUTION INTRAMUSCULAR; INTRAVENOUS
Status: DISCONTINUED | OUTPATIENT
Start: 2019-10-04 | End: 2019-10-04 | Stop reason: HOSPADM

## 2019-10-04 RX ORDER — HYDROCODONE BITARTRATE AND ACETAMINOPHEN 5; 325 MG/1; MG/1
1 TABLET ORAL EVERY 8 HOURS PRN
Qty: 8 TABLET | Refills: 0 | Status: SHIPPED | OUTPATIENT
Start: 2019-10-04 | End: 2020-02-25

## 2019-10-04 RX ORDER — MEPERIDINE HYDROCHLORIDE 50 MG/ML
12.5 INJECTION INTRAMUSCULAR; INTRAVENOUS; SUBCUTANEOUS EVERY 5 MIN PRN
Status: DISCONTINUED | OUTPATIENT
Start: 2019-10-04 | End: 2019-10-04 | Stop reason: HOSPADM

## 2019-10-04 RX ORDER — SCOLOPAMINE TRANSDERMAL SYSTEM 1 MG/1
1 PATCH, EXTENDED RELEASE TRANSDERMAL ONCE
Status: DISCONTINUED | OUTPATIENT
Start: 2019-10-04 | End: 2019-10-04 | Stop reason: HOSPADM

## 2019-10-04 RX ORDER — LIDOCAINE HYDROCHLORIDE 10 MG/ML
1 INJECTION, SOLUTION EPIDURAL; INFILTRATION; INTRACAUDAL; PERINEURAL
Status: DISCONTINUED | OUTPATIENT
Start: 2019-10-04 | End: 2019-10-04 | Stop reason: HOSPADM

## 2019-10-04 RX ORDER — SODIUM CHLORIDE, SODIUM LACTATE, POTASSIUM CHLORIDE, CALCIUM CHLORIDE 600; 310; 30; 20 MG/100ML; MG/100ML; MG/100ML; MG/100ML
INJECTION, SOLUTION INTRAVENOUS CONTINUOUS
Status: DISCONTINUED | OUTPATIENT
Start: 2019-10-04 | End: 2019-10-04 | Stop reason: HOSPADM

## 2019-10-04 RX ORDER — SODIUM CHLORIDE 0.9 % (FLUSH) 0.9 %
10 SYRINGE (ML) INJECTION EVERY 12 HOURS SCHEDULED
Status: DISCONTINUED | OUTPATIENT
Start: 2019-10-04 | End: 2019-10-04 | Stop reason: HOSPADM

## 2019-10-04 RX ORDER — SODIUM CHLORIDE, SODIUM LACTATE, POTASSIUM CHLORIDE, CALCIUM CHLORIDE 600; 310; 30; 20 MG/100ML; MG/100ML; MG/100ML; MG/100ML
INJECTION, SOLUTION INTRAVENOUS CONTINUOUS
Status: DISCONTINUED | OUTPATIENT
Start: 2019-10-04 | End: 2019-10-04 | Stop reason: SDUPTHER

## 2019-10-04 RX ORDER — MORPHINE SULFATE 4 MG/ML
4 INJECTION, SOLUTION INTRAMUSCULAR; INTRAVENOUS
Status: DISCONTINUED | OUTPATIENT
Start: 2019-10-04 | End: 2019-10-04 | Stop reason: HOSPADM

## 2019-10-04 RX ORDER — MORPHINE SULFATE 4 MG/ML
2 INJECTION, SOLUTION INTRAMUSCULAR; INTRAVENOUS EVERY 5 MIN PRN
Status: DISCONTINUED | OUTPATIENT
Start: 2019-10-04 | End: 2019-10-04 | Stop reason: HOSPADM

## 2019-10-04 RX ORDER — HYDRALAZINE HYDROCHLORIDE 20 MG/ML
5 INJECTION INTRAMUSCULAR; INTRAVENOUS EVERY 10 MIN PRN
Status: DISCONTINUED | OUTPATIENT
Start: 2019-10-04 | End: 2019-10-04 | Stop reason: HOSPADM

## 2019-10-04 RX ORDER — FENTANYL CITRATE 50 UG/ML
INJECTION, SOLUTION INTRAMUSCULAR; INTRAVENOUS PRN
Status: DISCONTINUED | OUTPATIENT
Start: 2019-10-04 | End: 2019-10-04 | Stop reason: SDUPTHER

## 2019-10-04 RX ADMIN — FENTANYL CITRATE 50 MCG: 50 INJECTION INTRAMUSCULAR; INTRAVENOUS at 14:16

## 2019-10-04 RX ADMIN — FENTANYL CITRATE 50 MCG: 50 INJECTION INTRAMUSCULAR; INTRAVENOUS at 14:11

## 2019-10-04 RX ADMIN — WATER 1 G: 1 INJECTION INTRAMUSCULAR; INTRAVENOUS; SUBCUTANEOUS at 14:15

## 2019-10-04 RX ADMIN — PROPOFOL 100 MCG/KG/MIN: 10 INJECTION, EMULSION INTRAVENOUS at 14:14

## 2019-10-04 RX ADMIN — SODIUM CHLORIDE, POTASSIUM CHLORIDE, SODIUM LACTATE AND CALCIUM CHLORIDE: 600; 310; 30; 20 INJECTION, SOLUTION INTRAVENOUS at 11:47

## 2019-10-04 ASSESSMENT — PAIN SCALES - GENERAL
PAINLEVEL_OUTOF10: 0

## 2019-10-04 ASSESSMENT — PAIN - FUNCTIONAL ASSESSMENT: PAIN_FUNCTIONAL_ASSESSMENT: 0-10

## 2019-10-04 ASSESSMENT — ENCOUNTER SYMPTOMS: SHORTNESS OF BREATH: 0

## 2019-10-11 ENCOUNTER — TELEPHONE (OUTPATIENT)
Dept: SURGERY | Age: 69
End: 2019-10-11

## 2019-10-16 VITALS — BODY MASS INDEX: 20.88 KG/M2 | WEIGHT: 133 LBS | HEIGHT: 67 IN

## 2019-10-16 DIAGNOSIS — C50.919 MALIGNANT NEOPLASM OF FEMALE BREAST, UNSPECIFIED ESTROGEN RECEPTOR STATUS, UNSPECIFIED LATERALITY, UNSPECIFIED SITE OF BREAST (HCC): ICD-10-CM

## 2019-10-17 ENCOUNTER — HOSPITAL ENCOUNTER (OUTPATIENT)
Dept: INFUSION THERAPY | Age: 69
End: 2019-10-17
Payer: MEDICARE

## 2019-10-17 ENCOUNTER — HOSPITAL ENCOUNTER (OUTPATIENT)
Dept: INFUSION THERAPY | Age: 69
Setting detail: INFUSION SERIES
Discharge: HOME | End: 2019-10-17
Payer: MEDICARE

## 2019-10-17 ENCOUNTER — CLINICAL DOCUMENTATION (OUTPATIENT)
Dept: HEMATOLOGY | Age: 69
End: 2019-10-17

## 2019-10-17 VITALS
TEMPERATURE: 98.2 F | WEIGHT: 133 LBS | HEIGHT: 67 IN | SYSTOLIC BLOOD PRESSURE: 132 MMHG | DIASTOLIC BLOOD PRESSURE: 83 MMHG | RESPIRATION RATE: 16 BRPM | BODY MASS INDEX: 20.88 KG/M2 | HEART RATE: 75 BPM

## 2019-10-17 PROCEDURE — 99211 OFF/OP EST MAY X REQ PHY/QHP: CPT

## 2019-10-17 NOTE — PROGRESS NOTES
Patient arrived 10/17 for her first chemotherapy treatment for breast CA. Prior to arrival, patient had discussed chemo changes with Dr. George Nye. A new regimen was built and patient decided to begin chemo on 10/18/2019. Patient's port was accessed and excellent blood return noted. Port was NS locked and capped. Anti-microbial patch and transparent dressing placed. Patient wishes for port to remain accessed until time of chem on 10/18/2019. Patient was given literature regarding chemotherapy. Education/discussion/questions answered. Patient present with her two daughters.   Electronically signed by Sky Villanueva RN on 10/17/2019 at 12:28 PM

## 2019-10-18 ENCOUNTER — HOSPITAL ENCOUNTER (OUTPATIENT)
Dept: INFUSION THERAPY | Age: 69
Setting detail: INFUSION SERIES
Discharge: HOME | End: 2019-10-18
Payer: MEDICARE

## 2019-10-18 VITALS
HEART RATE: 69 BPM | SYSTOLIC BLOOD PRESSURE: 163 MMHG | OXYGEN SATURATION: 98 % | TEMPERATURE: 97.3 F | RESPIRATION RATE: 18 BRPM | DIASTOLIC BLOOD PRESSURE: 86 MMHG

## 2019-10-18 DIAGNOSIS — C50.812 MALIGNANT NEOPLASM OF OVERLAPPING SITES OF LEFT FEMALE BREAST, UNSPECIFIED ESTROGEN RECEPTOR STATUS (HCC): Primary | ICD-10-CM

## 2019-10-18 DIAGNOSIS — Z51.11 ENCOUNTER FOR ANTINEOPLASTIC CHEMOTHERAPY: ICD-10-CM

## 2019-10-18 PROCEDURE — 2580000003 HC RX 258: Performed by: INTERNAL MEDICINE

## 2019-10-18 PROCEDURE — 6360000002 HC RX W HCPCS: Performed by: INTERNAL MEDICINE

## 2019-10-18 PROCEDURE — 96413 CHEMO IV INFUSION 1 HR: CPT

## 2019-10-18 PROCEDURE — 96415 CHEMO IV INFUSION ADDL HR: CPT

## 2019-10-18 PROCEDURE — 96377 APPLICATON ON-BODY INJECTOR: CPT

## 2019-10-18 PROCEDURE — 96417 CHEMO IV INFUS EACH ADDL SEQ: CPT

## 2019-10-18 RX ORDER — HEPARIN SODIUM (PORCINE) LOCK FLUSH IV SOLN 100 UNIT/ML 100 UNIT/ML
300 SOLUTION INTRAVENOUS PRN
Status: DISCONTINUED | OUTPATIENT
Start: 2019-10-18 | End: 2019-10-20 | Stop reason: HOSPADM

## 2019-10-18 RX ORDER — DIPHENHYDRAMINE HYDROCHLORIDE 50 MG/ML
50 INJECTION INTRAMUSCULAR; INTRAVENOUS PRN
Status: DISCONTINUED | OUTPATIENT
Start: 2019-10-18 | End: 2019-10-20 | Stop reason: HOSPADM

## 2019-10-18 RX ORDER — EPINEPHRINE 1 MG/ML
0.3 INJECTION, SOLUTION, CONCENTRATE INTRAVENOUS PRN
Status: DISCONTINUED | OUTPATIENT
Start: 2019-10-18 | End: 2019-10-20 | Stop reason: HOSPADM

## 2019-10-18 RX ORDER — PALONOSETRON 0.05 MG/ML
0.25 INJECTION, SOLUTION INTRAVENOUS ONCE
Status: COMPLETED | OUTPATIENT
Start: 2019-10-18 | End: 2019-10-18

## 2019-10-18 RX ORDER — DEXAMETHASONE 4 MG/1
TABLET ORAL
Qty: 24 TABLET | Refills: 3 | Status: SHIPPED | OUTPATIENT
Start: 2019-10-18 | End: 2020-02-25

## 2019-10-18 RX ORDER — METHYLPREDNISOLONE SODIUM SUCCINATE 125 MG/2ML
125 INJECTION, POWDER, LYOPHILIZED, FOR SOLUTION INTRAMUSCULAR; INTRAVENOUS PRN
Status: DISCONTINUED | OUTPATIENT
Start: 2019-10-18 | End: 2019-10-20 | Stop reason: HOSPADM

## 2019-10-18 RX ADMIN — DEXAMETHASONE SODIUM PHOSPHATE 20 MG: 10 INJECTION, SOLUTION INTRAMUSCULAR; INTRAVENOUS at 09:25

## 2019-10-18 RX ADMIN — CARBOPLATIN 571 MG: 10 INJECTION, SOLUTION INTRAVENOUS at 13:58

## 2019-10-18 RX ADMIN — PALONOSETRON HYDROCHLORIDE 0.25 MG: 0.05 INJECTION, SOLUTION INTRAVENOUS at 09:57

## 2019-10-18 RX ADMIN — DIPHENHYDRAMINE HYDROCHLORIDE 50 MG: 50 INJECTION, SOLUTION INTRAMUSCULAR; INTRAVENOUS at 08:51

## 2019-10-18 RX ADMIN — HEPARIN 300 UNITS: 100 SYRINGE at 16:22

## 2019-10-18 RX ADMIN — FOSAPREPITANT 150 MG: 150 INJECTION, POWDER, LYOPHILIZED, FOR SOLUTION INTRAVENOUS at 08:52

## 2019-10-18 RX ADMIN — PERTUZUMAB 840 MG: 30 INJECTION, SOLUTION, CONCENTRATE INTRAVENOUS at 09:59

## 2019-10-18 RX ADMIN — PEGFILGRASTIM 6 MG: KIT SUBCUTANEOUS at 15:15

## 2019-10-18 RX ADMIN — DOCETAXEL 128 MG: 20 INJECTION, SOLUTION, CONCENTRATE INTRAVENOUS at 12:57

## 2019-10-18 RX ADMIN — TRASTUZUMAB 483 MG: 150 INJECTION, POWDER, LYOPHILIZED, FOR SOLUTION INTRAVENOUS at 11:00

## 2019-10-18 NOTE — PROGRESS NOTES
Patient tolerated taxotere without s/s of reaction. Port flushed with NS. Brisk blood return noted. Carboplatin infusion started.  Electronically signed by Tiffany Wang RN on 10/18/2019 at 2:13 PM

## 2019-10-18 NOTE — PROGRESS NOTES
Patient tolerated perjeta very well. No s/s of reaction. Port flushed. Excellent blood return noted. Herceptin infusion started. Patient resting quietly. Daughter present.   Electronically signed by Allan Garcia RN on 10/18/2019 at 11:15 AM

## 2019-10-18 NOTE — DISCHARGE INSTR - COC
Continuity of Care Form    Patient Name: Jim Khan   :  1950  MRN:  [id number]    Admit date:  10/18/2019  Discharge date:  ***    Code Status Order: No Order   Advance Directives:     Admitting Physician:  No admitting provider for patient encounter. PCP: Kavita Villarreal MD    Discharging Nurse: [facility] Unit/Room#: No information available for this encounter. Discharging Unit Phone Number: ***    Emergency Contact:   Extended Emergency Contact Information  Primary Emergency Contact: Vesta Maurer Phone: [phone]  Mobile Phone: [phone]  Relation: Child   needed? No  Secondary Emergency Contact: Dennise Nava  Mobile Phone: [phone]  Relation: Child   needed? No    Past Surgical History:  Past Surgical History:   Procedure Laterality Date    BREAST BIOPSY Right 2019    COLONOSCOPY N/A 10/1/2019    Dr Locke Freeze, internal hemorrhoids-Grade 2 without bleeding stigmata and external hemorrhoids-TV x 1, AP x 1, 3 yr recall    PORT SURGERY N/A 10/4/2019    PORT INSERTION WITH FLUORO performed by Iris Webb MD at [address] [place] [address] N/A 10/1/2019    Dr Romina Zhu hernia, HP gastric polyps       Immunization History: There is no immunization history on file for this patient.     Active Problems:  Patient Active Problem List   Diagnosis Code    Breast mass, right N63.10    Fatigue R53.83    Right bundle branch block (RBBB) I45.10    Menopause Z78.0    Other specified disorders of bone density and structure, right upper arm  M85.821    Anemia D64.9    Malignant neoplasm of overlapping sites of left female breast ([address] C50.812    Malignant neoplasm of unspecified site of unspecified female breast ([address] C50.919    Encounter for antineoplastic chemotherapy  Z51.11       Isolation/Infection:   Isolation          No Isolation            Nurse Assessment:  Last Vital Signs: BP (!) 163/86   Pulse 69   Temp 97.3 °F (36.3 °C) (Temporal)   Resp 18   SpO2 98%     Last documented pain score (0-10 scale):    Last Weight:   Wt Readings from Last 1 Encounters:   10/17/19 133 lb (60.3 kg)     Mental Status:  {IP PT MENTAL STATUS:}    IV Access:  { DAVE IV ACCESS:468478645}    Nursing Mobility/ADLs:  Walking   {CHP DME BNKY:984848169}  Transfer  {CHP DME QUIP:833240244}  Bathing  {CHP DME UIKS:131108320}  Dressing  {CHP DME NIAI:519733696}  Toileting  {CHP DME GJJP:960186044}  Feeding  {CHP DME FCJM:989584310}  Med Admin  {CHP DME KVVK:666411062}  Med Delivery   { DAVE MED Delivery:842045150}    Wound Care Documentation and Therapy:        Elimination:  Continence:   · Bowel: {YES / IZ:29345}  · Bladder: {YES / YQ:33276}  Urinary Catheter: {Urinary Catheter:01948}   Colostomy/Ileostomy/Ileal Conduit: {YES / [place]:71868}       Date of Last BM: ***  No intake or output data in the 24 hours ending 10/18/19 1545  No intake/output data recorded.     Safety Concerns:     [address] CIQUAL Safety Concerns:118942405}    Impairments/Disabilities:      [address] [employer]QUAL Impairments/Disabilities:816907888}    Nutrition Therapy:  Current Nutrition Therapy:   508 CIQUAL Diet List:688881231}    Routes of Feeding: {P DME Other Feedings:693284833}  Liquids: {Slp liquid thickness:40225}  Daily Fluid Restriction: {CHP DME Yes amt example:777011714}  Last Modified Barium Swallow with Video (Video Swallowing Test): {Done Not Done ZFPV:943668738}    Treatments at the Time of Hospital Discharge:   Respiratory Treatments: ***  Oxygen Therapy:  {Therapy; copd oxygen:15480}  Ventilator:    { CC Vent AJIC:975818153}    Rehab Therapies: {THERAPEUTIC INTERVENTION:7910132486}  Weight Bearing Status/Restrictions: [address] Unruly  Weight Bearin}  Other Medical Equipment (for information only, NOT a DME order):  {EQUIPMENT:249170660}  Other Treatments: ***    Patient's personal belongings (please select all that are sent with patient):  {P DME Belongings:791860192}    RN SIGNATURE:  {Esignature:156763022}    CASE MANAGEMENT/SOCIAL WORK SECTION    Inpatient Status Date: ***    Readmission Risk Assessment Score:  Readmission Risk              Risk of Unplanned Readmission:        0           Discharging to Facility/ Agency   · Name:   · Address:  · Phone:  · Fax:    Dialysis Facility (if applicable)   · Name:  · Address:  · Dialysis Schedule:  · Phone:  · Fax:    / signature: {Esignature:560140752}    PHYSICIAN SECTION    Prognosis: {Prognosis:1879830006}    Condition at Discharge: [facility] Patient Condition:709750198}    Rehab Potential (if transferring to Rehab): {Prognosis:5412019178}    Recommended Labs or Other Treatments After Discharge: ***    Physician Certification: I certify the above information and transfer of Chantale Arevalo  is necessary for the continuing treatment of the diagnosis listed and that she requires {Admit to Appropriate Level of Care:51677} for {GREATER/LESS:428438308} 30 days.      Update Admission H&P: {CHP DME Changes in YKQB:637020593}    PHYSICIAN SIGNATURE:  {Esignature:016862429}

## 2019-10-18 NOTE — PROGRESS NOTES
Perjeta infusing in L subclavian port. Patient tolerating well without s/s of reaction. Patient is resting quietly, daughter present.  Electronically signed by Rohan Zaragoza RN on 10/18/2019 at 10:07 AM

## 2019-10-18 NOTE — PROGRESS NOTES
Patient present for C1D1 of chemotherapy/cold cap therapy. Patient vitals WNL. Pt denies questions. Port flushed and excellent blood return noted. Orders faxed to pharmacy.  Electronically signed by Allan Garcia RN on 10/18/2019 at 9:13 AM

## 2019-10-18 NOTE — PROGRESS NOTES
Herceptin complete at 1250. Patient tolerated very well. No s/s of reaction. Patient port flushed. Brisk blood return noted. Taxotere infusion started. Patient resting quietly, daughter present at bedside.  Electronically signed by Jose C Lorenzo RN on 10/18/2019 at 1:05 PM

## 2019-10-25 RX ORDER — PROMETHAZINE HYDROCHLORIDE 25 MG/1
25 TABLET ORAL EVERY 8 HOURS PRN
Qty: 30 TABLET | Refills: 1 | Status: ON HOLD
Start: 2019-10-25 | End: 2020-03-04 | Stop reason: HOSPADM

## 2019-10-25 RX ORDER — ONDANSETRON HYDROCHLORIDE 8 MG/1
8 TABLET, FILM COATED ORAL EVERY 8 HOURS PRN
Qty: 30 TABLET | Refills: 1 | Status: SHIPPED | OUTPATIENT
Start: 2019-10-25 | End: 2020-04-10 | Stop reason: ALTCHOICE

## 2019-10-31 ENCOUNTER — APPOINTMENT (OUTPATIENT)
Dept: INFUSION THERAPY | Age: 69
End: 2019-10-31
Payer: MEDICARE

## 2019-11-07 RX ORDER — PALONOSETRON 0.05 MG/ML
0.25 INJECTION, SOLUTION INTRAVENOUS ONCE
Status: CANCELLED | OUTPATIENT
Start: 2019-11-08

## 2019-11-07 RX ORDER — METHYLPREDNISOLONE SODIUM SUCCINATE 125 MG/2ML
125 INJECTION, POWDER, LYOPHILIZED, FOR SOLUTION INTRAMUSCULAR; INTRAVENOUS PRN
Status: CANCELLED | OUTPATIENT
Start: 2019-11-08

## 2019-11-07 RX ORDER — EPINEPHRINE 1 MG/ML
0.3 INJECTION, SOLUTION, CONCENTRATE INTRAVENOUS PRN
Status: CANCELLED | OUTPATIENT
Start: 2019-11-08

## 2019-11-07 RX ORDER — DIPHENHYDRAMINE HYDROCHLORIDE 50 MG/ML
50 INJECTION INTRAMUSCULAR; INTRAVENOUS PRN
Status: CANCELLED | OUTPATIENT
Start: 2019-11-08

## 2019-11-08 ENCOUNTER — HOSPITAL ENCOUNTER (OUTPATIENT)
Dept: INFUSION THERAPY | Age: 69
Setting detail: INFUSION SERIES
Discharge: HOME | End: 2019-11-08
Payer: MEDICARE

## 2019-11-08 ENCOUNTER — HOSPITAL ENCOUNTER (OUTPATIENT)
Dept: INFUSION THERAPY | Age: 69
Discharge: HOME | End: 2019-11-08
Payer: MEDICARE

## 2019-11-08 VITALS
RESPIRATION RATE: 18 BRPM | HEIGHT: 67 IN | BODY MASS INDEX: 21.19 KG/M2 | WEIGHT: 135 LBS | SYSTOLIC BLOOD PRESSURE: 156 MMHG | OXYGEN SATURATION: 99 % | TEMPERATURE: 98.9 F | DIASTOLIC BLOOD PRESSURE: 72 MMHG | HEART RATE: 55 BPM

## 2019-11-08 VITALS
HEIGHT: 67 IN | HEART RATE: 79 BPM | BODY MASS INDEX: 20.86 KG/M2 | SYSTOLIC BLOOD PRESSURE: 144 MMHG | WEIGHT: 132.9 LBS | OXYGEN SATURATION: 99 % | DIASTOLIC BLOOD PRESSURE: 82 MMHG

## 2019-11-08 DIAGNOSIS — Z51.11 ENCOUNTER FOR ANTINEOPLASTIC CHEMOTHERAPY: Primary | ICD-10-CM

## 2019-11-08 DIAGNOSIS — C50.812 MALIGNANT NEOPLASM OF OVERLAPPING SITES OF LEFT BREAST IN FEMALE, ESTROGEN RECEPTOR POSITIVE (HCC): Primary | ICD-10-CM

## 2019-11-08 DIAGNOSIS — Z17.0 MALIGNANT NEOPLASM OF OVERLAPPING SITES OF LEFT BREAST IN FEMALE, ESTROGEN RECEPTOR POSITIVE (HCC): ICD-10-CM

## 2019-11-08 DIAGNOSIS — C50.812 MALIGNANT NEOPLASM OF OVERLAPPING SITES OF LEFT FEMALE BREAST, UNSPECIFIED ESTROGEN RECEPTOR STATUS (HCC): ICD-10-CM

## 2019-11-08 DIAGNOSIS — Z17.0 MALIGNANT NEOPLASM OF OVERLAPPING SITES OF LEFT BREAST IN FEMALE, ESTROGEN RECEPTOR POSITIVE (HCC): Primary | ICD-10-CM

## 2019-11-08 DIAGNOSIS — C50.812 MALIGNANT NEOPLASM OF OVERLAPPING SITES OF LEFT BREAST IN FEMALE, ESTROGEN RECEPTOR POSITIVE (HCC): ICD-10-CM

## 2019-11-08 LAB
ALBUMIN SERPL-MCNC: 4.4 G/DL (ref 3.5–5.2)
ALP BLD-CCNC: 110 U/L (ref 35–104)
ALT SERPL-CCNC: 16 U/L (ref 5–33)
ANION GAP SERPL CALCULATED.3IONS-SCNC: 13 MMOL/L (ref 7–19)
AST SERPL-CCNC: 24 U/L (ref 5–32)
BASOPHILS ABSOLUTE: 0 K/UL (ref 0–0.2)
BASOPHILS RELATIVE PERCENT: 0.1 % (ref 0–1)
BILIRUB SERPL-MCNC: <0.2 MG/DL (ref 0.2–1.2)
BUN BLDV-MCNC: 15 MG/DL (ref 8–23)
CALCIUM SERPL-MCNC: 9.6 MG/DL (ref 8.8–10.2)
CHLORIDE BLD-SCNC: 103 MMOL/L (ref 98–111)
CO2: 26 MMOL/L (ref 22–29)
CREAT SERPL-MCNC: 0.6 MG/DL (ref 0.5–0.9)
EOSINOPHILS ABSOLUTE: 0 K/UL (ref 0–0.6)
EOSINOPHILS RELATIVE PERCENT: 0 % (ref 0–5)
GFR NON-AFRICAN AMERICAN: >60
GLUCOSE BLD-MCNC: 127 MG/DL (ref 74–109)
HCT VFR BLD CALC: 31.9 % (ref 37–47)
HEMOGLOBIN: 10.6 G/DL (ref 12–16)
IMMATURE GRANULOCYTES #: 0.1 K/UL
LYMPHOCYTES ABSOLUTE: 0.7 K/UL (ref 1.1–4.5)
LYMPHOCYTES RELATIVE PERCENT: 6.6 % (ref 20–40)
MCH RBC QN AUTO: 32.8 PG (ref 27–31)
MCHC RBC AUTO-ENTMCNC: 33.2 G/DL (ref 33–37)
MCV RBC AUTO: 98.8 FL (ref 81–99)
MONOCYTES ABSOLUTE: 0.1 K/UL (ref 0–0.9)
MONOCYTES RELATIVE PERCENT: 1.4 % (ref 0–10)
NEUTROPHILS ABSOLUTE: 9.1 K/UL (ref 1.5–7.5)
NEUTROPHILS RELATIVE PERCENT: 91.1 % (ref 50–65)
PDW BLD-RTO: 13.2 % (ref 11.5–14.5)
PLATELET # BLD: 259 K/UL (ref 130–400)
PMV BLD AUTO: 10.7 FL (ref 9.4–12.3)
POTASSIUM SERPL-SCNC: 3.9 MMOL/L (ref 3.5–5)
RBC # BLD: 3.23 M/UL (ref 4.2–5.4)
SODIUM BLD-SCNC: 142 MMOL/L (ref 136–145)
TOTAL PROTEIN: 7.1 G/DL (ref 6.6–8.7)
WBC # BLD: 10 K/UL (ref 4.8–10.8)

## 2019-11-08 PROCEDURE — 96377 APPLICATON ON-BODY INJECTOR: CPT

## 2019-11-08 PROCEDURE — 85025 COMPLETE CBC W/AUTO DIFF WBC: CPT

## 2019-11-08 PROCEDURE — 96417 CHEMO IV INFUS EACH ADDL SEQ: CPT

## 2019-11-08 PROCEDURE — 96413 CHEMO IV INFUSION 1 HR: CPT

## 2019-11-08 PROCEDURE — 36591 DRAW BLOOD OFF VENOUS DEVICE: CPT

## 2019-11-08 PROCEDURE — 6360000002 HC RX W HCPCS: Performed by: INTERNAL MEDICINE

## 2019-11-08 PROCEDURE — 80053 COMPREHEN METABOLIC PANEL: CPT

## 2019-11-08 PROCEDURE — 2580000003 HC RX 258: Performed by: INTERNAL MEDICINE

## 2019-11-08 RX ORDER — PALONOSETRON 0.05 MG/ML
0.25 INJECTION, SOLUTION INTRAVENOUS ONCE
Status: COMPLETED | OUTPATIENT
Start: 2019-11-08 | End: 2019-11-08

## 2019-11-08 RX ORDER — METHYLPREDNISOLONE SODIUM SUCCINATE 125 MG/2ML
125 INJECTION, POWDER, LYOPHILIZED, FOR SOLUTION INTRAMUSCULAR; INTRAVENOUS PRN
Status: DISCONTINUED | OUTPATIENT
Start: 2019-11-08 | End: 2019-11-10 | Stop reason: HOSPADM

## 2019-11-08 RX ORDER — HEPARIN SODIUM (PORCINE) LOCK FLUSH IV SOLN 100 UNIT/ML 100 UNIT/ML
300 SOLUTION INTRAVENOUS PRN
Status: DISCONTINUED | OUTPATIENT
Start: 2019-11-08 | End: 2019-11-10 | Stop reason: HOSPADM

## 2019-11-08 RX ORDER — DIPHENHYDRAMINE HYDROCHLORIDE 50 MG/ML
50 INJECTION INTRAMUSCULAR; INTRAVENOUS PRN
Status: DISCONTINUED | OUTPATIENT
Start: 2019-11-08 | End: 2019-11-10 | Stop reason: HOSPADM

## 2019-11-08 RX ORDER — EPINEPHRINE 1 MG/ML
0.3 INJECTION, SOLUTION, CONCENTRATE INTRAVENOUS PRN
Status: DISCONTINUED | OUTPATIENT
Start: 2019-11-08 | End: 2019-11-10 | Stop reason: HOSPADM

## 2019-11-08 RX ADMIN — CARBOPLATIN 648 MG: 10 INJECTION INTRAVENOUS at 12:57

## 2019-11-08 RX ADMIN — PALONOSETRON HYDROCHLORIDE 0.25 MG: 0.05 INJECTION, SOLUTION INTRAVENOUS at 11:35

## 2019-11-08 RX ADMIN — PEGFILGRASTIM 6 MG: KIT SUBCUTANEOUS at 15:29

## 2019-11-08 RX ADMIN — DOCETAXEL 128 MG: 20 INJECTION, SOLUTION, CONCENTRATE INTRAVENOUS at 11:45

## 2019-11-08 RX ADMIN — PERTUZUMAB 420 MG: 30 INJECTION, SOLUTION, CONCENTRATE INTRAVENOUS at 13:56

## 2019-11-08 RX ADMIN — HEPARIN 300 UNITS: 100 SYRINGE at 15:30

## 2019-11-08 RX ADMIN — FOSAPREPITANT 150 MG: 150 INJECTION, POWDER, LYOPHILIZED, FOR SOLUTION INTRAVENOUS at 10:32

## 2019-11-08 RX ADMIN — TRASTUZUMAB 360 MG: 150 INJECTION, POWDER, LYOPHILIZED, FOR SOLUTION INTRAVENOUS at 14:37

## 2019-11-08 RX ADMIN — DIPHENHYDRAMINE HYDROCHLORIDE 50 MG: 50 INJECTION, SOLUTION INTRAMUSCULAR; INTRAVENOUS at 11:24

## 2019-11-08 NOTE — PROGRESS NOTES
CBC and CMP complete. Labs WNL to proceed with chemo infusion.  Electronically signed by Tila Haynes RN on 11/8/2019 at 11:12 AM

## 2019-11-08 NOTE — PROGRESS NOTES
Patient L subclavian port accessed using sterile technique. Excellent blood return noted. Labs drawn. Transparent dressing with antimicrobial patch placed. Patient tolerated very well.  Robin Odell RN

## 2019-11-14 ENCOUNTER — OFFICE VISIT (OUTPATIENT)
Dept: HEMATOLOGY | Age: 69
End: 2019-11-14
Payer: MEDICARE

## 2019-11-14 ENCOUNTER — HOSPITAL ENCOUNTER (OUTPATIENT)
Dept: INFUSION THERAPY | Age: 69
Discharge: HOME | End: 2019-11-14
Payer: MEDICARE

## 2019-11-14 VITALS
DIASTOLIC BLOOD PRESSURE: 68 MMHG | OXYGEN SATURATION: 94 % | SYSTOLIC BLOOD PRESSURE: 110 MMHG | HEIGHT: 67 IN | BODY MASS INDEX: 19.54 KG/M2 | HEART RATE: 92 BPM | WEIGHT: 124.5 LBS

## 2019-11-14 DIAGNOSIS — Z71.89 CARE PLAN DISCUSSED WITH PATIENT: ICD-10-CM

## 2019-11-14 DIAGNOSIS — T45.1X5D ADVERSE EFFECT OF CHEMOTHERAPY, SUBSEQUENT ENCOUNTER: ICD-10-CM

## 2019-11-14 DIAGNOSIS — Z51.11 CHEMOTHERAPY MANAGEMENT, ENCOUNTER FOR: ICD-10-CM

## 2019-11-14 DIAGNOSIS — C50.812 MALIGNANT NEOPLASM OF OVERLAPPING SITES OF LEFT BREAST IN FEMALE, ESTROGEN RECEPTOR POSITIVE (HCC): Primary | ICD-10-CM

## 2019-11-14 DIAGNOSIS — Z17.0 MALIGNANT NEOPLASM OF OVERLAPPING SITES OF LEFT BREAST IN FEMALE, ESTROGEN RECEPTOR POSITIVE (HCC): Primary | ICD-10-CM

## 2019-11-14 DIAGNOSIS — Z51.12 ENCOUNTER FOR ANTINEOPLASTIC IMMUNOTHERAPY: ICD-10-CM

## 2019-11-14 PROCEDURE — G8399 PT W/DXA RESULTS DOCUMENT: HCPCS | Performed by: INTERNAL MEDICINE

## 2019-11-14 PROCEDURE — 85025 COMPLETE CBC W/AUTO DIFF WBC: CPT

## 2019-11-14 PROCEDURE — 1123F ACP DISCUSS/DSCN MKR DOCD: CPT | Performed by: INTERNAL MEDICINE

## 2019-11-14 PROCEDURE — 4040F PNEUMOC VAC/ADMIN/RCVD: CPT | Performed by: INTERNAL MEDICINE

## 2019-11-14 PROCEDURE — 1090F PRES/ABSN URINE INCON ASSESS: CPT | Performed by: INTERNAL MEDICINE

## 2019-11-14 PROCEDURE — 1036F TOBACCO NON-USER: CPT | Performed by: INTERNAL MEDICINE

## 2019-11-14 PROCEDURE — G8420 CALC BMI NORM PARAMETERS: HCPCS | Performed by: INTERNAL MEDICINE

## 2019-11-14 PROCEDURE — G8427 DOCREV CUR MEDS BY ELIG CLIN: HCPCS | Performed by: INTERNAL MEDICINE

## 2019-11-14 PROCEDURE — G8484 FLU IMMUNIZE NO ADMIN: HCPCS | Performed by: INTERNAL MEDICINE

## 2019-11-14 PROCEDURE — 3017F COLORECTAL CA SCREEN DOC REV: CPT | Performed by: INTERNAL MEDICINE

## 2019-11-14 PROCEDURE — 99214 OFFICE O/P EST MOD 30 MIN: CPT | Performed by: INTERNAL MEDICINE

## 2019-11-14 RX ORDER — FERROUS SULFATE 325(65) MG
325 TABLET ORAL
Status: ON HOLD | COMMUNITY
End: 2021-05-13

## 2019-12-02 ENCOUNTER — HOSPITAL ENCOUNTER (OUTPATIENT)
Dept: INFUSION THERAPY | Age: 69
Discharge: HOME | End: 2019-12-02
Payer: MEDICARE

## 2019-12-02 ENCOUNTER — HOSPITAL ENCOUNTER (OUTPATIENT)
Dept: INFUSION THERAPY | Age: 69
Setting detail: INFUSION SERIES
Discharge: HOME | End: 2019-12-02
Payer: MEDICARE

## 2019-12-02 VITALS
HEART RATE: 51 BPM | SYSTOLIC BLOOD PRESSURE: 144 MMHG | BODY MASS INDEX: 20.05 KG/M2 | RESPIRATION RATE: 18 BRPM | OXYGEN SATURATION: 99 % | WEIGHT: 128 LBS | DIASTOLIC BLOOD PRESSURE: 80 MMHG | TEMPERATURE: 98.6 F

## 2019-12-02 VITALS
HEIGHT: 67 IN | SYSTOLIC BLOOD PRESSURE: 122 MMHG | DIASTOLIC BLOOD PRESSURE: 80 MMHG | BODY MASS INDEX: 20.14 KG/M2 | OXYGEN SATURATION: 97 % | HEART RATE: 79 BPM | WEIGHT: 128.3 LBS

## 2019-12-02 DIAGNOSIS — Z51.11 ENCOUNTER FOR ANTINEOPLASTIC CHEMOTHERAPY: Primary | ICD-10-CM

## 2019-12-02 DIAGNOSIS — C50.812 MALIGNANT NEOPLASM OF OVERLAPPING SITES OF LEFT BREAST IN FEMALE, ESTROGEN RECEPTOR POSITIVE (HCC): Primary | ICD-10-CM

## 2019-12-02 DIAGNOSIS — C50.812 MALIGNANT NEOPLASM OF OVERLAPPING SITES OF LEFT FEMALE BREAST, UNSPECIFIED ESTROGEN RECEPTOR STATUS (HCC): ICD-10-CM

## 2019-12-02 DIAGNOSIS — Z17.0 MALIGNANT NEOPLASM OF OVERLAPPING SITES OF LEFT BREAST IN FEMALE, ESTROGEN RECEPTOR POSITIVE (HCC): ICD-10-CM

## 2019-12-02 DIAGNOSIS — Z17.0 MALIGNANT NEOPLASM OF OVERLAPPING SITES OF LEFT BREAST IN FEMALE, ESTROGEN RECEPTOR POSITIVE (HCC): Primary | ICD-10-CM

## 2019-12-02 DIAGNOSIS — C50.812 MALIGNANT NEOPLASM OF OVERLAPPING SITES OF LEFT BREAST IN FEMALE, ESTROGEN RECEPTOR POSITIVE (HCC): ICD-10-CM

## 2019-12-02 LAB
ALBUMIN SERPL-MCNC: 4.1 G/DL (ref 3.5–5.2)
ALP BLD-CCNC: 105 U/L (ref 35–104)
ALT SERPL-CCNC: 14 U/L (ref 5–33)
ANION GAP SERPL CALCULATED.3IONS-SCNC: 13 MMOL/L (ref 7–19)
AST SERPL-CCNC: 22 U/L (ref 5–32)
BILIRUB SERPL-MCNC: <0.2 MG/DL (ref 0.2–1.2)
BUN BLDV-MCNC: 12 MG/DL (ref 8–23)
CALCIUM SERPL-MCNC: 9.2 MG/DL (ref 8.8–10.2)
CHLORIDE BLD-SCNC: 102 MMOL/L (ref 98–111)
CO2: 25 MMOL/L (ref 22–29)
CREAT SERPL-MCNC: 0.7 MG/DL (ref 0.5–0.9)
GFR NON-AFRICAN AMERICAN: >60
GLUCOSE BLD-MCNC: 147 MG/DL (ref 74–109)
POTASSIUM SERPL-SCNC: 3.7 MMOL/L (ref 3.5–5)
SODIUM BLD-SCNC: 140 MMOL/L (ref 136–145)
TOTAL PROTEIN: 6.4 G/DL (ref 6.6–8.7)

## 2019-12-02 PROCEDURE — 96377 APPLICATON ON-BODY INJECTOR: CPT

## 2019-12-02 PROCEDURE — 96417 CHEMO IV INFUS EACH ADDL SEQ: CPT

## 2019-12-02 PROCEDURE — 96367 TX/PROPH/DG ADDL SEQ IV INF: CPT

## 2019-12-02 PROCEDURE — 85025 COMPLETE CBC W/AUTO DIFF WBC: CPT

## 2019-12-02 PROCEDURE — 2580000003 HC RX 258: Performed by: INTERNAL MEDICINE

## 2019-12-02 PROCEDURE — 6360000002 HC RX W HCPCS: Performed by: INTERNAL MEDICINE

## 2019-12-02 PROCEDURE — 96368 THER/DIAG CONCURRENT INF: CPT

## 2019-12-02 PROCEDURE — 36591 DRAW BLOOD OFF VENOUS DEVICE: CPT

## 2019-12-02 PROCEDURE — 96413 CHEMO IV INFUSION 1 HR: CPT

## 2019-12-02 PROCEDURE — 96375 TX/PRO/DX INJ NEW DRUG ADDON: CPT

## 2019-12-02 PROCEDURE — 80053 COMPREHEN METABOLIC PANEL: CPT

## 2019-12-02 PROCEDURE — 96415 CHEMO IV INFUSION ADDL HR: CPT

## 2019-12-02 RX ORDER — DIPHENHYDRAMINE HYDROCHLORIDE 50 MG/ML
50 INJECTION INTRAMUSCULAR; INTRAVENOUS ONCE
Status: COMPLETED | OUTPATIENT
Start: 2019-12-02 | End: 2019-12-02

## 2019-12-02 RX ORDER — EPINEPHRINE 1 MG/ML
0.3 INJECTION, SOLUTION, CONCENTRATE INTRAVENOUS PRN
Status: CANCELLED | OUTPATIENT
Start: 2019-12-02

## 2019-12-02 RX ORDER — DIPHENHYDRAMINE HYDROCHLORIDE 50 MG/ML
50 INJECTION INTRAMUSCULAR; INTRAVENOUS PRN
Status: CANCELLED | OUTPATIENT
Start: 2019-12-02

## 2019-12-02 RX ORDER — PALONOSETRON 0.05 MG/ML
0.25 INJECTION, SOLUTION INTRAVENOUS ONCE
Status: COMPLETED | OUTPATIENT
Start: 2019-12-02 | End: 2019-12-02

## 2019-12-02 RX ORDER — METHYLPREDNISOLONE SODIUM SUCCINATE 125 MG/2ML
125 INJECTION, POWDER, LYOPHILIZED, FOR SOLUTION INTRAMUSCULAR; INTRAVENOUS PRN
Status: DISCONTINUED | OUTPATIENT
Start: 2019-12-02 | End: 2019-12-04 | Stop reason: HOSPADM

## 2019-12-02 RX ORDER — PALONOSETRON 0.05 MG/ML
0.25 INJECTION, SOLUTION INTRAVENOUS ONCE
Status: CANCELLED | OUTPATIENT
Start: 2019-12-02

## 2019-12-02 RX ORDER — METHYLPREDNISOLONE SODIUM SUCCINATE 125 MG/2ML
125 INJECTION, POWDER, LYOPHILIZED, FOR SOLUTION INTRAMUSCULAR; INTRAVENOUS PRN
Status: CANCELLED | OUTPATIENT
Start: 2019-12-02

## 2019-12-02 RX ORDER — DIPHENHYDRAMINE HYDROCHLORIDE 50 MG/ML
50 INJECTION INTRAMUSCULAR; INTRAVENOUS PRN
Status: DISCONTINUED | OUTPATIENT
Start: 2019-12-02 | End: 2019-12-04 | Stop reason: HOSPADM

## 2019-12-02 RX ORDER — EPINEPHRINE 1 MG/ML
0.3 INJECTION, SOLUTION, CONCENTRATE INTRAVENOUS PRN
Status: DISCONTINUED | OUTPATIENT
Start: 2019-12-02 | End: 2019-12-04 | Stop reason: HOSPADM

## 2019-12-02 RX ADMIN — HEPARIN 500 UNITS: 100 SYRINGE at 17:02

## 2019-12-02 RX ADMIN — SODIUM CHLORIDE 150 MG: 900 INJECTION, SOLUTION INTRAVENOUS at 10:16

## 2019-12-02 RX ADMIN — DIPHENHYDRAMINE HYDROCHLORIDE 50 MG: 50 INJECTION, SOLUTION INTRAMUSCULAR; INTRAVENOUS at 13:15

## 2019-12-02 RX ADMIN — PALONOSETRON HYDROCHLORIDE 0.25 MG: 0.25 INJECTION, SOLUTION INTRAVENOUS at 10:14

## 2019-12-02 RX ADMIN — PERTUZUMAB 420 MG: 30 INJECTION, SOLUTION, CONCENTRATE INTRAVENOUS at 10:51

## 2019-12-02 RX ADMIN — DEXAMETHASONE SODIUM PHOSPHATE 20 MG: 10 INJECTION, SOLUTION INTRAMUSCULAR; INTRAVENOUS at 13:40

## 2019-12-02 RX ADMIN — CARBOPLATIN 558 MG: 10 INJECTION, SOLUTION INTRAVENOUS at 15:41

## 2019-12-02 RX ADMIN — TRASTUZUMAB 360 MG: 150 INJECTION, POWDER, LYOPHILIZED, FOR SOLUTION INTRAVENOUS at 11:34

## 2019-12-02 RX ADMIN — PEGFILGRASTIM 6 MG: KIT SUBCUTANEOUS at 16:35

## 2019-12-02 NOTE — PROGRESS NOTES
All Chemo complete. Patient tolerated very well after additional premedications. No s/s of reaction. Patient is alert and oriented x4. Family is present. Post infusion cold cap cooking in progress.  Electronically signed by John Horn RN on 12/2/2019 at 4:36 PM

## 2019-12-02 NOTE — PROGRESS NOTES
Spoke with Dr. Matheus Carreon. Orders received to give benadryl and decadron IV. Orders placed per telephone with readback. When decadron infusion is complete, taxotere to be administered.  Electronically signed by Tila Haynes RN on 12/2/2019 at 1:59 PM

## 2019-12-02 NOTE — PROGRESS NOTES
Patient received Perjeta followed by Herceptin per MD orders. Taxotere infusion started at 1220. Patient experienced an immediate burning/tearing chest pain and lower back pain. Infusion stopped immediately. Normal saline infusion started 250ml/hr. Patient vitals WNL. SatO2 99% on RA. Patient reports zero pain at 1240.   Awaiting callback from MD.  Electronically signed by Oliver Tellez RN on 12/2/2019 at 1:02 PM

## 2019-12-19 DIAGNOSIS — Z17.0 MALIGNANT NEOPLASM OF OVERLAPPING SITES OF LEFT BREAST IN FEMALE, ESTROGEN RECEPTOR POSITIVE (HCC): Primary | ICD-10-CM

## 2019-12-19 DIAGNOSIS — C50.812 MALIGNANT NEOPLASM OF OVERLAPPING SITES OF LEFT BREAST IN FEMALE, ESTROGEN RECEPTOR POSITIVE (HCC): Primary | ICD-10-CM

## 2019-12-19 RX ORDER — PALONOSETRON 0.05 MG/ML
0.25 INJECTION, SOLUTION INTRAVENOUS ONCE
Status: CANCELLED | OUTPATIENT
Start: 2019-12-20

## 2019-12-19 RX ORDER — DIPHENHYDRAMINE HYDROCHLORIDE 50 MG/ML
50 INJECTION INTRAMUSCULAR; INTRAVENOUS PRN
Status: CANCELLED | OUTPATIENT
Start: 2019-12-20

## 2019-12-19 RX ORDER — METHYLPREDNISOLONE SODIUM SUCCINATE 125 MG/2ML
125 INJECTION, POWDER, LYOPHILIZED, FOR SOLUTION INTRAMUSCULAR; INTRAVENOUS PRN
Status: CANCELLED | OUTPATIENT
Start: 2019-12-20

## 2019-12-19 RX ORDER — EPINEPHRINE 1 MG/ML
0.3 INJECTION, SOLUTION, CONCENTRATE INTRAVENOUS PRN
Status: CANCELLED | OUTPATIENT
Start: 2019-12-20

## 2019-12-20 ENCOUNTER — HOSPITAL ENCOUNTER (OUTPATIENT)
Dept: INFUSION THERAPY | Age: 69
Discharge: HOME | End: 2019-12-20
Payer: MEDICARE

## 2019-12-20 ENCOUNTER — OFFICE VISIT (OUTPATIENT)
Dept: HEMATOLOGY | Age: 69
End: 2019-12-20
Payer: MEDICARE

## 2019-12-20 ENCOUNTER — HOSPITAL ENCOUNTER (OUTPATIENT)
Dept: INFUSION THERAPY | Age: 69
Setting detail: INFUSION SERIES
Discharge: HOME | End: 2019-12-20
Payer: MEDICARE

## 2019-12-20 VITALS
HEART RATE: 89 BPM | DIASTOLIC BLOOD PRESSURE: 76 MMHG | OXYGEN SATURATION: 97 % | SYSTOLIC BLOOD PRESSURE: 125 MMHG | RESPIRATION RATE: 18 BRPM | TEMPERATURE: 98.6 F

## 2019-12-20 VITALS
HEIGHT: 67 IN | SYSTOLIC BLOOD PRESSURE: 122 MMHG | HEART RATE: 90 BPM | WEIGHT: 124.8 LBS | DIASTOLIC BLOOD PRESSURE: 74 MMHG | OXYGEN SATURATION: 96 % | BODY MASS INDEX: 19.59 KG/M2

## 2019-12-20 DIAGNOSIS — C50.812 MALIGNANT NEOPLASM OF OVERLAPPING SITES OF LEFT BREAST IN FEMALE, ESTROGEN RECEPTOR POSITIVE (HCC): Primary | ICD-10-CM

## 2019-12-20 DIAGNOSIS — C50.812 MALIGNANT NEOPLASM OF OVERLAPPING SITES OF LEFT BREAST IN FEMALE, ESTROGEN RECEPTOR POSITIVE (HCC): ICD-10-CM

## 2019-12-20 DIAGNOSIS — Z51.11 ENCOUNTER FOR ANTINEOPLASTIC CHEMOTHERAPY: Primary | ICD-10-CM

## 2019-12-20 DIAGNOSIS — T45.1X5D ADVERSE EFFECT OF CHEMOTHERAPY, SUBSEQUENT ENCOUNTER: ICD-10-CM

## 2019-12-20 DIAGNOSIS — C50.812 MALIGNANT NEOPLASM OF OVERLAPPING SITES OF LEFT FEMALE BREAST, UNSPECIFIED ESTROGEN RECEPTOR STATUS (HCC): ICD-10-CM

## 2019-12-20 DIAGNOSIS — Z17.0 MALIGNANT NEOPLASM OF OVERLAPPING SITES OF LEFT BREAST IN FEMALE, ESTROGEN RECEPTOR POSITIVE (HCC): Primary | ICD-10-CM

## 2019-12-20 DIAGNOSIS — Z51.11 CHEMOTHERAPY MANAGEMENT, ENCOUNTER FOR: ICD-10-CM

## 2019-12-20 DIAGNOSIS — Z51.12 ENCOUNTER FOR ANTINEOPLASTIC IMMUNOTHERAPY: ICD-10-CM

## 2019-12-20 DIAGNOSIS — Z71.89 CARE PLAN DISCUSSED WITH PATIENT: ICD-10-CM

## 2019-12-20 DIAGNOSIS — Z17.0 MALIGNANT NEOPLASM OF OVERLAPPING SITES OF LEFT BREAST IN FEMALE, ESTROGEN RECEPTOR POSITIVE (HCC): ICD-10-CM

## 2019-12-20 LAB
ALBUMIN SERPL-MCNC: 4.2 G/DL (ref 3.5–5.2)
ALP BLD-CCNC: 120 U/L (ref 35–104)
ALT SERPL-CCNC: 11 U/L (ref 5–33)
ANION GAP SERPL CALCULATED.3IONS-SCNC: 13 MMOL/L (ref 7–19)
AST SERPL-CCNC: 18 U/L (ref 5–32)
BILIRUB SERPL-MCNC: <0.2 MG/DL (ref 0.2–1.2)
BUN BLDV-MCNC: 20 MG/DL (ref 8–23)
CALCIUM SERPL-MCNC: 9.4 MG/DL (ref 8.8–10.2)
CHLORIDE BLD-SCNC: 105 MMOL/L (ref 98–111)
CO2: 26 MMOL/L (ref 22–29)
CREAT SERPL-MCNC: 0.7 MG/DL (ref 0.5–0.9)
GFR NON-AFRICAN AMERICAN: >60
GLUCOSE BLD-MCNC: 137 MG/DL (ref 74–109)
POTASSIUM SERPL-SCNC: 3.9 MMOL/L (ref 3.5–5)
SODIUM BLD-SCNC: 144 MMOL/L (ref 136–145)
TOTAL PROTEIN: 6.4 G/DL (ref 6.6–8.7)

## 2019-12-20 PROCEDURE — G8420 CALC BMI NORM PARAMETERS: HCPCS | Performed by: INTERNAL MEDICINE

## 2019-12-20 PROCEDURE — 96417 CHEMO IV INFUS EACH ADDL SEQ: CPT

## 2019-12-20 PROCEDURE — G8399 PT W/DXA RESULTS DOCUMENT: HCPCS | Performed by: INTERNAL MEDICINE

## 2019-12-20 PROCEDURE — 1123F ACP DISCUSS/DSCN MKR DOCD: CPT | Performed by: INTERNAL MEDICINE

## 2019-12-20 PROCEDURE — 6360000002 HC RX W HCPCS: Performed by: INTERNAL MEDICINE

## 2019-12-20 PROCEDURE — 1036F TOBACCO NON-USER: CPT | Performed by: INTERNAL MEDICINE

## 2019-12-20 PROCEDURE — 36591 DRAW BLOOD OFF VENOUS DEVICE: CPT

## 2019-12-20 PROCEDURE — 3017F COLORECTAL CA SCREEN DOC REV: CPT | Performed by: INTERNAL MEDICINE

## 2019-12-20 PROCEDURE — 96413 CHEMO IV INFUSION 1 HR: CPT

## 2019-12-20 PROCEDURE — 80053 COMPREHEN METABOLIC PANEL: CPT

## 2019-12-20 PROCEDURE — 99214 OFFICE O/P EST MOD 30 MIN: CPT | Performed by: INTERNAL MEDICINE

## 2019-12-20 PROCEDURE — 6360000002 HC RX W HCPCS

## 2019-12-20 PROCEDURE — G8427 DOCREV CUR MEDS BY ELIG CLIN: HCPCS | Performed by: INTERNAL MEDICINE

## 2019-12-20 PROCEDURE — 96377 APPLICATON ON-BODY INJECTOR: CPT

## 2019-12-20 PROCEDURE — 4040F PNEUMOC VAC/ADMIN/RCVD: CPT | Performed by: INTERNAL MEDICINE

## 2019-12-20 PROCEDURE — 1090F PRES/ABSN URINE INCON ASSESS: CPT | Performed by: INTERNAL MEDICINE

## 2019-12-20 PROCEDURE — 2580000003 HC RX 258: Performed by: INTERNAL MEDICINE

## 2019-12-20 PROCEDURE — G8484 FLU IMMUNIZE NO ADMIN: HCPCS | Performed by: INTERNAL MEDICINE

## 2019-12-20 PROCEDURE — 85025 COMPLETE CBC W/AUTO DIFF WBC: CPT

## 2019-12-20 PROCEDURE — 99211 OFF/OP EST MAY X REQ PHY/QHP: CPT

## 2019-12-20 RX ORDER — HEPARIN SODIUM (PORCINE) LOCK FLUSH IV SOLN 100 UNIT/ML 100 UNIT/ML
500 SOLUTION INTRAVENOUS PRN
Status: DISCONTINUED | OUTPATIENT
Start: 2019-12-20 | End: 2019-12-22 | Stop reason: HOSPADM

## 2019-12-20 RX ORDER — PALONOSETRON 0.05 MG/ML
0.25 INJECTION, SOLUTION INTRAVENOUS ONCE
Status: COMPLETED | OUTPATIENT
Start: 2019-12-20 | End: 2019-12-20

## 2019-12-20 RX ORDER — DIPHENHYDRAMINE HYDROCHLORIDE 50 MG/ML
50 INJECTION INTRAMUSCULAR; INTRAVENOUS PRN
Status: DISCONTINUED | OUTPATIENT
Start: 2019-12-20 | End: 2019-12-22 | Stop reason: HOSPADM

## 2019-12-20 RX ADMIN — PALONOSETRON HYDROCHLORIDE 0.25 MG: 0.05 INJECTION, SOLUTION INTRAVENOUS at 11:12

## 2019-12-20 RX ADMIN — SODIUM CHLORIDE 150 MG: 900 INJECTION, SOLUTION INTRAVENOUS at 10:45

## 2019-12-20 RX ADMIN — DOCETAXEL 128 MG: 20 INJECTION, SOLUTION, CONCENTRATE INTRAVENOUS at 13:18

## 2019-12-20 RX ADMIN — DIPHENHYDRAMINE HYDROCHLORIDE 50 MG: 50 INJECTION, SOLUTION INTRAMUSCULAR; INTRAVENOUS at 11:12

## 2019-12-20 RX ADMIN — HEPARIN 500 UNITS: 100 SYRINGE at 16:58

## 2019-12-20 RX ADMIN — TRASTUZUMAB 360 MG: 150 INJECTION, POWDER, LYOPHILIZED, FOR SOLUTION INTRAVENOUS at 11:30

## 2019-12-20 RX ADMIN — CARBOPLATIN 546 MG: 10 INJECTION, SOLUTION INTRAVENOUS at 14:23

## 2019-12-20 RX ADMIN — PERTUZUMAB 420 MG: 30 INJECTION, SOLUTION, CONCENTRATE INTRAVENOUS at 12:05

## 2019-12-20 RX ADMIN — PEGFILGRASTIM 6 MG: KIT SUBCUTANEOUS at 16:30

## 2019-12-20 RX ADMIN — HEPARIN SODIUM (PORCINE) LOCK FLUSH IV SOLN 100 UNIT/ML 500 UNITS: 100 SOLUTION at 16:58

## 2019-12-30 ENCOUNTER — TELEPHONE (OUTPATIENT)
Dept: SURGERY | Age: 69
End: 2019-12-30

## 2020-01-09 NOTE — PROGRESS NOTES
the right axilla with the largest measuring 2 x 1.6 cm. A small lymph node is seen along the right internal mammary chain. No suspicious left axillary adenopathy. · 9/27/2019-CT chest abdomen pelvis and bone scan showed no evidence of metastatic disease. · 10/3/2019- she was first seen by me. Recommended neoadjuvant chemotherapy approach with dose dense Adriamycin/cyclophosphamide x4 cycles followed by 12 weekly cycles of Taxol 80 mg/m² with Herceptin and Perjeta. · 10/14/2019- consultation at [facility]. Recommended Taxotere, carboplatin Herceptin Perjeta. · 10/17/2019- initiation of neoadjuvant chemotherapy with Taxotere carboplatin Herceptin Perjeta.       PAST MEDICAL HISTORY:   Past Medical History:   Diagnosis Date    Malignant neoplasm of unspecified site of unspecified female breast [facility])     breast          PAST SURGICAL HISTORY:  Past Surgical History:   Procedure Laterality Date    BREAST BIOPSY Right 09/05/2019    COLONOSCOPY N/A 10/1/2019    Dr Stanton Villa, internal hemorrhoids-Grade 2 without bleeding stigmata and external hemorrhoids-TV x 1, AP x 1, 3 yr recall    PORT SURGERY N/A 10/4/2019    PORT INSERTION WITH FLUORO performed by Ian Pierson MD at [address] N/A 10/1/2019    Dr Koki Coreas hernia, HP gastric polyps        SOCIAL HISTORY:  Social History     Socioeconomic History    Marital status:      Spouse name: None    Number of children: None    Years of education: None    Highest education level: None   Occupational History    None   Social Needs    Financial resource strain: None    Food insecurity:     Worry: None     Inability: None    Transportation needs:     Medical: None     Non-medical: None   Tobacco Use    Smoking status: Never Smoker    Smokeless tobacco: Never Used   Substance and Sexual Activity    Alcohol use: Never     Frequency: Never    Drug use: Never    Sexual activity: Yes   Lifestyle    Physical activity:     Days per week: None     Minutes per session: None    Stress: None   Relationships    Social connections:     Talks on phone: None     Gets together: None     Attends [employer] service: None     Active member of club or organization: None     Attends meetings of clubs or organizations: None     Relationship status: None    Intimate partner violence:     Fear of current or ex partner: None     Emotionally abused: None     Physically abused: None     Forced sexual activity: None   Other Topics Concern    None   Social History Narrative    None       FAMILY HISTORY:  Family History   Problem Relation Age of Onset    Breast Cancer Mother 39    Colon Cancer Neg Hx     Colon Polyps Neg Hx         Current Outpatient Medications   Medication Sig Dispense Refill    ferrous sulfate 325 (65 Fe) MG tablet Take 325 mg by mouth daily (with breakfast)      ondansetron (ZOFRAN) 8 MG tablet Take 1 tablet by mouth every 8 hours as needed for Nausea or Vomiting 30 tablet 1    promethazine (PHENERGAN) 25 MG tablet Take 1 tablet by mouth every 8 hours as needed for Nausea 30 tablet 1    dexamethasone (DECADRON) 4 MG tablet Take 2 tabs twice daily on the day before, the day of, and the day after chemo 24 tablet 3    IRON-FOLIC ACID PO Take 22 mg by mouth daily      vitamin D (ERGOCALCIFEROL) 48290 units CAPS capsule Take 1 capsule by mouth once a week 12 capsule 1    Calcium Carbonate-Vitamin D (OYSTER SHELL CALCIUM/D) 500-200 MG-UNIT TABS Take 1 tablet by mouth daily 360 tablet 0    HYDROcodone-acetaminophen (NORCO) 5-325 MG per tablet Take 1 tablet by mouth every 8 hours as needed for Pain. (Patient not taking: Reported on 12/20/2019) 8 tablet 0     No current facility-administered medications for this visit.       Facility-Administered Medications Ordered in Other Visits   Medication Dose Route Frequency Provider Last Rate Last Dose    diphenhydrAMINE (BENADRYL) in the upper outer quadrant. Interval improvement of palpable right axillary adenopathy. CARDIOVASCULAR: RRR, no murmurs. No lower extremity edema   ABDOMEN: soft non-tender, active bowel sounds, no hepatosplenomegaly. No palpable masses. EXTREMITIES: warm, Full ROM of all fours extremities. No focal weakness. SKIN: warm, dry with no rashes or lesions  LYMPH: No cervical, clavicular, axillary, or inguinal lymphadenopathy  NEUROLOGIC: follows commands, non focal.   PSYCH: mood and affect appropriate. Alert and oriented to time and place and person. Relevant Lab findings/reviewed by me:  CBC:01/09/2020  WBC-6.25  HGB-8.6  PLT-123,000  Neut-4.91    Relevant Imaging studies/reviewed by me:  No results found. ASSESSMENT    Orders Placed This Encounter   Procedures    CBC Auto Differential     5 part     Standing Status:   Future     Number of Occurrences:   1     Standing Expiration Date:   1/10/2021    Comprehensive Metabolic Panel     Standing Status:   Future     Number of Occurrences:   1     Standing Expiration Date:   1/10/2021      Palak Mendez was seen today for breast cancer. Diagnoses and all orders for this visit:    Malignant neoplasm of overlapping sites of left breast in female, estrogen receptor positive (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 She will continue to complete 6 cycles of chemotherapy to be followed by maintenance with Herceptin Perjeta. After chemotherapy she will proceed with surgery with Dr. Aby Zhang. She is tolerating treatments complains of fatigue grade 1.   She has no diarrhea. Continue cycle #5 TCH-P    Treatment related toxicity- mild diarrhea and fatigue    PLAN:  · RTC 3 weeks  · Proceed cycle #5 today. · CMP today  · CBC today  · RTC 6 weeks with MD    Follow-up:    Return in about 6 weeks (around 2/21/2020). 3 wks for RN for CBC and Orders     Murali MONTANEZ am scribing for Jett Alexandra MD. Electronically signed by Murali Zhang on 1/11/2020 at 10:00 AM.     I, Dr Aneta Ernandez, personally performed the services described in this documentation as scribed by Murali Zhang MA in my presence and is both accurate and complete. Over 50% of the total visit time of 25 minutes in face to face encounter with the patient, out of which more than 50% of the time was spent in counseling patient or family and coordination of care. Counseling included but was not limited to time spent reviewing labs, imaging studies/ treatment plan and answering questions.

## 2020-01-10 ENCOUNTER — HOSPITAL ENCOUNTER (OUTPATIENT)
Dept: INFUSION THERAPY | Age: 70
Discharge: HOME | End: 2020-01-10
Payer: MEDICARE

## 2020-01-10 ENCOUNTER — OFFICE VISIT (OUTPATIENT)
Dept: HEMATOLOGY | Age: 70
End: 2020-01-10
Payer: MEDICARE

## 2020-01-10 ENCOUNTER — HOSPITAL ENCOUNTER (OUTPATIENT)
Dept: INFUSION THERAPY | Age: 70
Setting detail: INFUSION SERIES
Discharge: HOME | End: 2020-01-10
Payer: MEDICARE

## 2020-01-10 VITALS
HEART RATE: 83 BPM | BODY MASS INDEX: 18.87 KG/M2 | SYSTOLIC BLOOD PRESSURE: 112 MMHG | DIASTOLIC BLOOD PRESSURE: 64 MMHG | OXYGEN SATURATION: 97 % | WEIGHT: 120.2 LBS | HEIGHT: 67 IN

## 2020-01-10 VITALS
TEMPERATURE: 98.7 F | SYSTOLIC BLOOD PRESSURE: 136 MMHG | RESPIRATION RATE: 18 BRPM | HEART RATE: 65 BPM | DIASTOLIC BLOOD PRESSURE: 70 MMHG | OXYGEN SATURATION: 99 %

## 2020-01-10 DIAGNOSIS — Z17.0 MALIGNANT NEOPLASM OF OVERLAPPING SITES OF LEFT BREAST IN FEMALE, ESTROGEN RECEPTOR POSITIVE (HCC): ICD-10-CM

## 2020-01-10 DIAGNOSIS — C50.812 MALIGNANT NEOPLASM OF OVERLAPPING SITES OF LEFT BREAST IN FEMALE, ESTROGEN RECEPTOR POSITIVE (HCC): ICD-10-CM

## 2020-01-10 DIAGNOSIS — Z51.11 ENCOUNTER FOR ANTINEOPLASTIC CHEMOTHERAPY: Primary | ICD-10-CM

## 2020-01-10 DIAGNOSIS — C50.812 MALIGNANT NEOPLASM OF OVERLAPPING SITES OF LEFT FEMALE BREAST, UNSPECIFIED ESTROGEN RECEPTOR STATUS (HCC): ICD-10-CM

## 2020-01-10 PROCEDURE — G8399 PT W/DXA RESULTS DOCUMENT: HCPCS | Performed by: INTERNAL MEDICINE

## 2020-01-10 PROCEDURE — 96377 APPLICATON ON-BODY INJECTOR: CPT

## 2020-01-10 PROCEDURE — G8427 DOCREV CUR MEDS BY ELIG CLIN: HCPCS | Performed by: INTERNAL MEDICINE

## 2020-01-10 PROCEDURE — 2580000003 HC RX 258: Performed by: INTERNAL MEDICINE

## 2020-01-10 PROCEDURE — 6360000002 HC RX W HCPCS

## 2020-01-10 PROCEDURE — 1036F TOBACCO NON-USER: CPT | Performed by: INTERNAL MEDICINE

## 2020-01-10 PROCEDURE — 96413 CHEMO IV INFUSION 1 HR: CPT

## 2020-01-10 PROCEDURE — 96415 CHEMO IV INFUSION ADDL HR: CPT

## 2020-01-10 PROCEDURE — 3017F COLORECTAL CA SCREEN DOC REV: CPT | Performed by: INTERNAL MEDICINE

## 2020-01-10 PROCEDURE — G8420 CALC BMI NORM PARAMETERS: HCPCS | Performed by: INTERNAL MEDICINE

## 2020-01-10 PROCEDURE — 99211 OFF/OP EST MAY X REQ PHY/QHP: CPT

## 2020-01-10 PROCEDURE — 6360000002 HC RX W HCPCS: Performed by: INTERNAL MEDICINE

## 2020-01-10 PROCEDURE — 1123F ACP DISCUSS/DSCN MKR DOCD: CPT | Performed by: INTERNAL MEDICINE

## 2020-01-10 PROCEDURE — 4040F PNEUMOC VAC/ADMIN/RCVD: CPT | Performed by: INTERNAL MEDICINE

## 2020-01-10 PROCEDURE — G8484 FLU IMMUNIZE NO ADMIN: HCPCS | Performed by: INTERNAL MEDICINE

## 2020-01-10 PROCEDURE — 99214 OFFICE O/P EST MOD 30 MIN: CPT | Performed by: INTERNAL MEDICINE

## 2020-01-10 PROCEDURE — 1090F PRES/ABSN URINE INCON ASSESS: CPT | Performed by: INTERNAL MEDICINE

## 2020-01-10 PROCEDURE — 85025 COMPLETE CBC W/AUTO DIFF WBC: CPT

## 2020-01-10 RX ORDER — METHYLPREDNISOLONE SODIUM SUCCINATE 125 MG/2ML
125 INJECTION, POWDER, LYOPHILIZED, FOR SOLUTION INTRAMUSCULAR; INTRAVENOUS PRN
Status: CANCELLED | OUTPATIENT
Start: 2020-01-10

## 2020-01-10 RX ORDER — EPINEPHRINE 1 MG/ML
0.3 INJECTION, SOLUTION, CONCENTRATE INTRAVENOUS PRN
Status: CANCELLED | OUTPATIENT
Start: 2020-01-10

## 2020-01-10 RX ORDER — DIPHENHYDRAMINE HYDROCHLORIDE 50 MG/ML
50 INJECTION INTRAMUSCULAR; INTRAVENOUS PRN
Status: CANCELLED | OUTPATIENT
Start: 2020-01-10

## 2020-01-10 RX ORDER — PALONOSETRON 0.05 MG/ML
0.25 INJECTION, SOLUTION INTRAVENOUS ONCE
Status: COMPLETED | OUTPATIENT
Start: 2020-01-10 | End: 2020-01-10

## 2020-01-10 RX ORDER — METHYLPREDNISOLONE SODIUM SUCCINATE 125 MG/2ML
INJECTION, POWDER, LYOPHILIZED, FOR SOLUTION INTRAMUSCULAR; INTRAVENOUS
Status: COMPLETED
Start: 2020-01-10 | End: 2020-01-10

## 2020-01-10 RX ORDER — DIPHENHYDRAMINE HYDROCHLORIDE 50 MG/ML
50 INJECTION INTRAMUSCULAR; INTRAVENOUS PRN
Status: DISCONTINUED | OUTPATIENT
Start: 2020-01-10 | End: 2020-01-12 | Stop reason: HOSPADM

## 2020-01-10 RX ORDER — METHYLPREDNISOLONE SODIUM SUCCINATE 125 MG/2ML
125 INJECTION, POWDER, LYOPHILIZED, FOR SOLUTION INTRAMUSCULAR; INTRAVENOUS PRN
Status: DISCONTINUED | OUTPATIENT
Start: 2020-01-10 | End: 2020-01-12 | Stop reason: HOSPADM

## 2020-01-10 RX ORDER — PALONOSETRON 0.05 MG/ML
0.25 INJECTION, SOLUTION INTRAVENOUS ONCE
Status: CANCELLED | OUTPATIENT
Start: 2020-01-10

## 2020-01-10 RX ORDER — HEPARIN SODIUM (PORCINE) LOCK FLUSH IV SOLN 100 UNIT/ML 100 UNIT/ML
500 SOLUTION INTRAVENOUS PRN
Status: DISCONTINUED | OUTPATIENT
Start: 2020-01-10 | End: 2020-01-12 | Stop reason: HOSPADM

## 2020-01-10 RX ADMIN — PALONOSETRON HYDROCHLORIDE 0.25 MG: 0.25 INJECTION, SOLUTION INTRAVENOUS at 10:20

## 2020-01-10 RX ADMIN — SODIUM CHLORIDE 150 MG: 900 INJECTION, SOLUTION INTRAVENOUS at 11:09

## 2020-01-10 RX ADMIN — DOCETAXEL 128 MG: 20 INJECTION, SOLUTION, CONCENTRATE INTRAVENOUS at 12:47

## 2020-01-10 RX ADMIN — DIPHENHYDRAMINE HYDROCHLORIDE 50 MG: 50 INJECTION, SOLUTION INTRAMUSCULAR; INTRAVENOUS at 10:20

## 2020-01-10 RX ADMIN — TRASTUZUMAB 360 MG: 150 INJECTION, POWDER, LYOPHILIZED, FOR SOLUTION INTRAVENOUS at 11:16

## 2020-01-10 RX ADMIN — PERTUZUMAB 420 MG: 30 INJECTION, SOLUTION, CONCENTRATE INTRAVENOUS at 10:43

## 2020-01-10 RX ADMIN — HEPARIN 500 UNITS: 100 SYRINGE at 15:30

## 2020-01-10 RX ADMIN — METHYLPREDNISOLONE SODIUM SUCCINATE 125 MG: 125 INJECTION, POWDER, FOR SOLUTION INTRAMUSCULAR; INTRAVENOUS at 12:59

## 2020-01-10 RX ADMIN — CARBOPLATIN 528 MG: 10 INJECTION, SOLUTION INTRAVENOUS at 14:49

## 2020-01-10 RX ADMIN — METHYLPREDNISOLONE SODIUM SUCCINATE 125 MG: 125 INJECTION, POWDER, LYOPHILIZED, FOR SOLUTION INTRAMUSCULAR; INTRAVENOUS at 12:59

## 2020-01-10 RX ADMIN — PEGFILGRASTIM 6 MG: KIT SUBCUTANEOUS at 15:31

## 2020-01-10 NOTE — FLOWSHEET NOTE
Pt removed cool cap. No complications. Pt tolerated without complaint. D/c ambulatory, stable, with family.

## 2020-01-10 NOTE — PROGRESS NOTES
Patient complained of burning in her chest approx. 3 min. After taxotere infusion started. Taxotere stopped immediately. Solumedrol given per PRN orders. Patient states pain is completely gone at this time. Will notify MD.  Patient did receive benadryl prior to infusion d/t previous reaction. Patient reports taking her pre-chemo steriod.   Electronically signed by Jesika Burns RN on 1/10/2020 at 1:06 PM

## 2020-01-10 NOTE — PROGRESS NOTES
Patient reports no chest pain. Vitals WNL. Additional premeds given per orders. Will restart infusion at a reduced rate to determine how patient tolerates. Dr. Mary Grace Jolly notified. No additional orders at this time.  Electronically signed by Jerel Peralta RN on 1/10/2020 at 1:08 PM

## 2020-01-17 ENCOUNTER — HOSPITAL ENCOUNTER (OUTPATIENT)
Dept: INFUSION THERAPY | Age: 70
Setting detail: INFUSION SERIES
Discharge: HOME | End: 2020-01-17
Payer: MEDICARE

## 2020-01-17 VITALS
HEART RATE: 76 BPM | SYSTOLIC BLOOD PRESSURE: 103 MMHG | DIASTOLIC BLOOD PRESSURE: 59 MMHG | OXYGEN SATURATION: 98 % | RESPIRATION RATE: 18 BRPM | TEMPERATURE: 98.4 F

## 2020-01-17 DIAGNOSIS — E87.6 HYPOKALEMIA: Primary | ICD-10-CM

## 2020-01-17 LAB
ALBUMIN SERPL-MCNC: 3.9 G/DL (ref 3.5–5.2)
ALP BLD-CCNC: 180 U/L (ref 35–104)
ALT SERPL-CCNC: 13 U/L (ref 5–33)
ANION GAP SERPL CALCULATED.3IONS-SCNC: 14 MMOL/L (ref 7–19)
ANISOCYTOSIS: ABNORMAL
AST SERPL-CCNC: 18 U/L (ref 5–32)
BANDED NEUTROPHILS RELATIVE PERCENT: 18 % (ref 0–5)
BASOPHILS ABSOLUTE: 0 K/UL (ref 0–0.2)
BASOPHILS RELATIVE PERCENT: 0 % (ref 0–1)
BILIRUB SERPL-MCNC: 0.5 MG/DL (ref 0.2–1.2)
BUN BLDV-MCNC: 13 MG/DL (ref 8–23)
CALCIUM SERPL-MCNC: 8.7 MG/DL (ref 8.8–10.2)
CHLORIDE BLD-SCNC: 102 MMOL/L (ref 98–111)
CO2: 27 MMOL/L (ref 22–29)
CREAT SERPL-MCNC: 0.7 MG/DL (ref 0.5–0.9)
DOHLE BODIES: ABNORMAL
EOSINOPHILS ABSOLUTE: 0 K/UL (ref 0–0.6)
EOSINOPHILS RELATIVE PERCENT: 0 % (ref 0–5)
GFR NON-AFRICAN AMERICAN: >60
GLUCOSE BLD-MCNC: 114 MG/DL (ref 74–109)
HCT VFR BLD CALC: 26.7 % (ref 37–47)
HEMOGLOBIN: 8.6 G/DL (ref 12–16)
HYPOCHROMIA: ABNORMAL
IMMATURE GRANULOCYTES #: 0.4 K/UL
LYMPHOCYTES ABSOLUTE: 0.1 K/UL (ref 1.1–4.5)
LYMPHOCYTES RELATIVE PERCENT: 1 % (ref 20–40)
MACROCYTES: ABNORMAL
MAGNESIUM: 1.4 MG/DL (ref 1.6–2.4)
MCH RBC QN AUTO: 35.8 PG (ref 27–31)
MCHC RBC AUTO-ENTMCNC: 32.2 G/DL (ref 33–37)
MCV RBC AUTO: 111.3 FL (ref 81–99)
MONOCYTES ABSOLUTE: 0.9 K/UL (ref 0–0.9)
MONOCYTES RELATIVE PERCENT: 7 % (ref 0–10)
NEUTROPHILS ABSOLUTE: 11.4 K/UL (ref 1.5–7.5)
NEUTROPHILS RELATIVE PERCENT: 74 % (ref 50–65)
PDW BLD-RTO: 16.5 % (ref 11.5–14.5)
PLATELET # BLD: 50 K/UL (ref 130–400)
PLATELET SLIDE REVIEW: ABNORMAL
PMV BLD AUTO: 12.4 FL (ref 9.4–12.3)
POTASSIUM SERPL-SCNC: 2.9 MMOL/L (ref 3.5–5)
RBC # BLD: 2.4 M/UL (ref 4.2–5.4)
SODIUM BLD-SCNC: 143 MMOL/L (ref 136–145)
TOTAL PROTEIN: 6 G/DL (ref 6.6–8.7)
TOXIC GRANULATION: ABNORMAL
WBC # BLD: 12.4 K/UL (ref 4.8–10.8)

## 2020-01-17 PROCEDURE — 96360 HYDRATION IV INFUSION INIT: CPT

## 2020-01-17 PROCEDURE — 85025 COMPLETE CBC W/AUTO DIFF WBC: CPT

## 2020-01-17 PROCEDURE — 2580000003 HC RX 258: Performed by: INTERNAL MEDICINE

## 2020-01-17 PROCEDURE — 96361 HYDRATE IV INFUSION ADD-ON: CPT

## 2020-01-17 PROCEDURE — 83735 ASSAY OF MAGNESIUM: CPT

## 2020-01-17 PROCEDURE — 80053 COMPREHEN METABOLIC PANEL: CPT

## 2020-01-17 PROCEDURE — 96366 THER/PROPH/DIAG IV INF ADDON: CPT

## 2020-01-17 PROCEDURE — 96365 THER/PROPH/DIAG IV INF INIT: CPT

## 2020-01-17 PROCEDURE — 6360000002 HC RX W HCPCS: Performed by: INTERNAL MEDICINE

## 2020-01-17 RX ORDER — POTASSIUM CHLORIDE 29.8 MG/ML
20 INJECTION INTRAVENOUS
Status: COMPLETED | OUTPATIENT
Start: 2020-01-17 | End: 2020-01-17

## 2020-01-17 RX ORDER — MAGNESIUM SULFATE IN WATER 40 MG/ML
2 INJECTION, SOLUTION INTRAVENOUS ONCE
Status: COMPLETED | OUTPATIENT
Start: 2020-01-17 | End: 2020-01-17

## 2020-01-17 RX ORDER — 0.9 % SODIUM CHLORIDE 0.9 %
500 INTRAVENOUS SOLUTION INTRAVENOUS ONCE
Status: DISCONTINUED | OUTPATIENT
Start: 2020-01-17 | End: 2020-01-17

## 2020-01-17 RX ORDER — POTASSIUM CHLORIDE 20 MEQ/1
20 TABLET, EXTENDED RELEASE ORAL 2 TIMES DAILY
Qty: 20 TABLET | Refills: 0 | Status: SHIPPED | OUTPATIENT
Start: 2020-01-17 | End: 2020-02-25

## 2020-01-17 RX ORDER — HEPARIN SODIUM (PORCINE) LOCK FLUSH IV SOLN 100 UNIT/ML 100 UNIT/ML
500 SOLUTION INTRAVENOUS PRN
Status: DISCONTINUED | OUTPATIENT
Start: 2020-01-17 | End: 2020-01-19 | Stop reason: HOSPADM

## 2020-01-17 RX ORDER — 0.9 % SODIUM CHLORIDE 0.9 %
1000 INTRAVENOUS SOLUTION INTRAVENOUS ONCE
Status: COMPLETED | OUTPATIENT
Start: 2020-01-17 | End: 2020-01-17

## 2020-01-17 RX ADMIN — POTASSIUM CHLORIDE 20 MEQ: 29.8 INJECTION, SOLUTION INTRAVENOUS at 12:49

## 2020-01-17 RX ADMIN — MAGNESIUM SULFATE HEPTAHYDRATE 2 G: 40 INJECTION, SOLUTION INTRAVENOUS at 14:20

## 2020-01-17 RX ADMIN — SODIUM CHLORIDE 1000 ML: 9 INJECTION, SOLUTION INTRAVENOUS at 10:30

## 2020-01-17 RX ADMIN — POTASSIUM CHLORIDE 20 MEQ: 29.8 INJECTION, SOLUTION INTRAVENOUS at 13:45

## 2020-01-17 RX ADMIN — HEPARIN 500 UNITS: 100 SYRINGE at 16:34

## 2020-01-17 NOTE — PROGRESS NOTES
K+ 2.9. Dr. Francisco Payton notified. Orders placed verbal with readback.   Electronically signed by Allan Garcia RN on 1/17/2020 at 12:54 PM

## 2020-01-27 ENCOUNTER — HOSPITAL ENCOUNTER (OUTPATIENT)
Dept: WOMENS IMAGING | Age: 70
Discharge: HOME | End: 2020-01-27
Payer: MEDICARE

## 2020-01-27 ENCOUNTER — OFFICE VISIT (OUTPATIENT)
Dept: SURGERY | Age: 70
End: 2020-01-27
Payer: MEDICARE

## 2020-01-27 VITALS — HEART RATE: 80 BPM | SYSTOLIC BLOOD PRESSURE: 130 MMHG | DIASTOLIC BLOOD PRESSURE: 80 MMHG

## 2020-01-27 PROCEDURE — G8428 CUR MEDS NOT DOCUMENT: HCPCS | Performed by: SURGERY

## 2020-01-27 PROCEDURE — G8420 CALC BMI NORM PARAMETERS: HCPCS | Performed by: SURGERY

## 2020-01-27 PROCEDURE — G8484 FLU IMMUNIZE NO ADMIN: HCPCS | Performed by: SURGERY

## 2020-01-27 PROCEDURE — 1090F PRES/ABSN URINE INCON ASSESS: CPT | Performed by: SURGERY

## 2020-01-27 PROCEDURE — 3017F COLORECTAL CA SCREEN DOC REV: CPT | Performed by: SURGERY

## 2020-01-27 PROCEDURE — 76642 ULTRASOUND BREAST LIMITED: CPT

## 2020-01-27 PROCEDURE — 4040F PNEUMOC VAC/ADMIN/RCVD: CPT | Performed by: SURGERY

## 2020-01-27 PROCEDURE — 99214 OFFICE O/P EST MOD 30 MIN: CPT | Performed by: SURGERY

## 2020-01-27 PROCEDURE — 1123F ACP DISCUSS/DSCN MKR DOCD: CPT | Performed by: SURGERY

## 2020-01-27 PROCEDURE — G8399 PT W/DXA RESULTS DOCUMENT: HCPCS | Performed by: SURGERY

## 2020-01-27 PROCEDURE — 1036F TOBACCO NON-USER: CPT | Performed by: SURGERY

## 2020-01-27 RX ORDER — MONTELUKAST SODIUM 10 MG/1
10 TABLET ORAL DAILY
Qty: 30 TABLET | Refills: 3 | Status: SHIPPED | OUTPATIENT
Start: 2020-01-27 | End: 2020-06-22 | Stop reason: SDUPTHER

## 2020-01-27 NOTE — PROGRESS NOTES
HISTORY OF PRESENT ILLNESS:    Ms. Merissa Tapia  is status post ultrasound guided breast biopsy  on the right which revealed a 3.5  cm high grade  invasive ductal carcinoma. Fine-needle biopsy of axillary node was cytologically conclusive for malignancy. It is ER positive at 3%. [place] is negative. HER-2 is positive at 3+. Ki-67 is high at 22%. MammaPrint demonstrates a high risk basal phenotype    She has been receiving neoadjuvant chemotherapy with Herceptin, Perjeta, Taxotere, and carboplatin. She has had an excellent clinical response. Her last treatment will be later this week. Ultrasound-1/27/2020  Narrative   FINDINGS:    In the 10:00 position approximately 6 cm from the nipple, in the area   of malignancy, there is been dramatic decrease the size of the   neoplastic mass. The mass currently measures 1.36 x 1.11 x 0.347 cm. The mass previously measured 2.78 x 1.13 x 2.1 cm. The enlarged right axillary lymph node is also decreased in size   currently measuring 1.94 x 0.748 x 1.2 cm. The lymph node previously   measured 2.56 x 1.23 x 2.12 cm. Biopsy clips in the axillary node and region of the right breast mass   incidentally noted.       Impression   1. Positive response to adjuvant chemotherapy as described above in   detail.           EXAM:    On examination, she has fibrocystic changes throughout both breasts, no dominant masses, no skin or nipple changes and no axillary adenopathy. I see nothing suspicious for breast cancer. The lesion of interest is no longer palpable     IMPRESSION:    Excellent response to chemotherapy. PLAN:    Return in 3 weeks. We will discuss lumpectomy versus mastectomy with or without reconstruction and schedule another MRI, Mammogram, ultrasound     I have seen, examined and reviewed this patient medication list, appropriate labs and imaging studies. I reviewed relevant medical records and others physicians notes. I discussed the plans of care with the patient. I answered all the questions to the patients satisfaction. I, Dr Jose Bailey, personally performed the services described in this documentation as scribed by Amado Dugan MA in my presence and is both accurate and complete. (Please note that portions of this note were completed with a voice recognition program. Efforts were made to edit the dictations but occasionally words are mis-transcribed.)  Over 50% of the total visit time of 25 minutes in face to face encounter with the patient, out of which more than 50% of the time was spent in counseling patient or family and coordination of care. Counseling included but was not limited to time spent reviewing labs, imaging studies/ treatment plan and answering questions.

## 2020-01-28 ENCOUNTER — TELEPHONE (OUTPATIENT)
Dept: SURGERY | Age: 70
End: 2020-01-28

## 2020-01-28 NOTE — TELEPHONE ENCOUNTER
Confirmed with patient/Left message    Mri scheduled at the [facility] on [email or web address]  All instructions were given to patient     BRUCE/US scheduled at [facility] on [email or web address]    Will see Dr. Jas Garza on 2/19/2020 @ 1:30Pm

## 2020-01-31 ENCOUNTER — HOSPITAL ENCOUNTER (OUTPATIENT)
Dept: INFUSION THERAPY | Age: 70
Setting detail: INFUSION SERIES
Discharge: HOME | End: 2020-01-31
Payer: MEDICARE

## 2020-01-31 ENCOUNTER — HOSPITAL ENCOUNTER (OUTPATIENT)
Dept: INFUSION THERAPY | Age: 70
Discharge: HOME | End: 2020-01-31
Payer: MEDICARE

## 2020-01-31 VITALS
BODY MASS INDEX: 19.42 KG/M2 | HEART RATE: 75 BPM | TEMPERATURE: 97.1 F | WEIGHT: 124 LBS | RESPIRATION RATE: 18 BRPM | DIASTOLIC BLOOD PRESSURE: 65 MMHG | OXYGEN SATURATION: 97 % | SYSTOLIC BLOOD PRESSURE: 125 MMHG

## 2020-01-31 VITALS
SYSTOLIC BLOOD PRESSURE: 122 MMHG | HEIGHT: 67 IN | BODY MASS INDEX: 19.49 KG/M2 | OXYGEN SATURATION: 98 % | WEIGHT: 124.2 LBS | HEART RATE: 79 BPM | DIASTOLIC BLOOD PRESSURE: 56 MMHG

## 2020-01-31 DIAGNOSIS — C50.812 MALIGNANT NEOPLASM OF OVERLAPPING SITES OF LEFT FEMALE BREAST, UNSPECIFIED ESTROGEN RECEPTOR STATUS (HCC): ICD-10-CM

## 2020-01-31 DIAGNOSIS — Z51.11 ENCOUNTER FOR ANTINEOPLASTIC CHEMOTHERAPY: Primary | ICD-10-CM

## 2020-01-31 DIAGNOSIS — C50.812 MALIGNANT NEOPLASM OF OVERLAPPING SITES OF LEFT BREAST IN FEMALE, ESTROGEN RECEPTOR POSITIVE (HCC): ICD-10-CM

## 2020-01-31 DIAGNOSIS — Z17.0 MALIGNANT NEOPLASM OF OVERLAPPING SITES OF LEFT BREAST IN FEMALE, ESTROGEN RECEPTOR POSITIVE (HCC): ICD-10-CM

## 2020-01-31 LAB
ALBUMIN SERPL-MCNC: 3.9 G/DL (ref 3.5–5.2)
ALP BLD-CCNC: 120 U/L (ref 35–104)
ALT SERPL-CCNC: 17 U/L (ref 5–33)
ANION GAP SERPL CALCULATED.3IONS-SCNC: 14 MMOL/L (ref 7–19)
AST SERPL-CCNC: 24 U/L (ref 5–32)
BILIRUB SERPL-MCNC: <0.2 MG/DL (ref 0.2–1.2)
BUN BLDV-MCNC: 20 MG/DL (ref 8–23)
CALCIUM SERPL-MCNC: 9.1 MG/DL (ref 8.8–10.2)
CHLORIDE BLD-SCNC: 103 MMOL/L (ref 98–111)
CO2: 25 MMOL/L (ref 22–29)
CREAT SERPL-MCNC: 0.6 MG/DL (ref 0.5–0.9)
FERRITIN: 304.8 NG/ML (ref 13–150)
GFR NON-AFRICAN AMERICAN: >60
GLUCOSE BLD-MCNC: 135 MG/DL (ref 74–109)
IRON SATURATION: 33 % (ref 14–50)
IRON: 80 UG/DL (ref 37–145)
MAGNESIUM: 1.5 MG/DL (ref 1.6–2.4)
PHOSPHORUS: 3 MG/DL (ref 2.5–4.5)
POTASSIUM SERPL-SCNC: 3.9 MMOL/L (ref 3.5–5)
SODIUM BLD-SCNC: 142 MMOL/L (ref 136–145)
TOTAL IRON BINDING CAPACITY: 239 UG/DL (ref 250–400)
TOTAL PROTEIN: 6.2 G/DL (ref 6.6–8.7)

## 2020-01-31 PROCEDURE — 80053 COMPREHEN METABOLIC PANEL: CPT

## 2020-01-31 PROCEDURE — 2580000003 HC RX 258: Performed by: NURSE PRACTITIONER

## 2020-01-31 PROCEDURE — 82728 ASSAY OF FERRITIN: CPT

## 2020-01-31 PROCEDURE — 6360000002 HC RX W HCPCS: Performed by: NURSE PRACTITIONER

## 2020-01-31 PROCEDURE — 96377 APPLICATON ON-BODY INJECTOR: CPT

## 2020-01-31 PROCEDURE — 96417 CHEMO IV INFUS EACH ADDL SEQ: CPT

## 2020-01-31 PROCEDURE — 83550 IRON BINDING TEST: CPT

## 2020-01-31 PROCEDURE — 85025 COMPLETE CBC W/AUTO DIFF WBC: CPT

## 2020-01-31 PROCEDURE — 84100 ASSAY OF PHOSPHORUS: CPT

## 2020-01-31 PROCEDURE — 83735 ASSAY OF MAGNESIUM: CPT

## 2020-01-31 PROCEDURE — 83540 ASSAY OF IRON: CPT

## 2020-01-31 PROCEDURE — 96413 CHEMO IV INFUSION 1 HR: CPT

## 2020-01-31 PROCEDURE — 36591 DRAW BLOOD OFF VENOUS DEVICE: CPT

## 2020-01-31 PROCEDURE — 6360000002 HC RX W HCPCS: Performed by: INTERNAL MEDICINE

## 2020-01-31 RX ORDER — METHYLPREDNISOLONE SODIUM SUCCINATE 125 MG/2ML
125 INJECTION, POWDER, LYOPHILIZED, FOR SOLUTION INTRAMUSCULAR; INTRAVENOUS PRN
Status: DISCONTINUED | OUTPATIENT
Start: 2020-01-31 | End: 2020-02-02 | Stop reason: HOSPADM

## 2020-01-31 RX ORDER — DIPHENHYDRAMINE HYDROCHLORIDE 50 MG/ML
50 INJECTION INTRAMUSCULAR; INTRAVENOUS PRN
Status: DISCONTINUED | OUTPATIENT
Start: 2020-01-31 | End: 2020-02-02 | Stop reason: HOSPADM

## 2020-01-31 RX ORDER — METHYLPREDNISOLONE SODIUM SUCCINATE 125 MG/2ML
125 INJECTION, POWDER, LYOPHILIZED, FOR SOLUTION INTRAMUSCULAR; INTRAVENOUS PRN
Status: CANCELLED | OUTPATIENT
Start: 2020-01-31

## 2020-01-31 RX ORDER — PALONOSETRON 0.05 MG/ML
0.25 INJECTION, SOLUTION INTRAVENOUS ONCE
Status: COMPLETED | OUTPATIENT
Start: 2020-01-31 | End: 2020-01-31

## 2020-01-31 RX ORDER — HEPARIN SODIUM (PORCINE) LOCK FLUSH IV SOLN 100 UNIT/ML 100 UNIT/ML
500 SOLUTION INTRAVENOUS PRN
Status: DISCONTINUED | OUTPATIENT
Start: 2020-01-31 | End: 2020-02-02 | Stop reason: HOSPADM

## 2020-01-31 RX ORDER — DIPHENHYDRAMINE HYDROCHLORIDE 50 MG/ML
50 INJECTION INTRAMUSCULAR; INTRAVENOUS PRN
Status: CANCELLED | OUTPATIENT
Start: 2020-01-31

## 2020-01-31 RX ORDER — EPINEPHRINE 1 MG/ML
0.3 INJECTION, SOLUTION, CONCENTRATE INTRAVENOUS PRN
Status: CANCELLED | OUTPATIENT
Start: 2020-01-31

## 2020-01-31 RX ORDER — PALONOSETRON 0.05 MG/ML
0.25 INJECTION, SOLUTION INTRAVENOUS ONCE
Status: CANCELLED | OUTPATIENT
Start: 2020-01-31

## 2020-01-31 RX ADMIN — PALONOSETRON HYDROCHLORIDE 0.25 MG: 0.25 INJECTION, SOLUTION INTRAVENOUS at 12:58

## 2020-01-31 RX ADMIN — FOSAPREPITANT 150 MG: 150 INJECTION, POWDER, LYOPHILIZED, FOR SOLUTION INTRAVENOUS at 10:54

## 2020-01-31 RX ADMIN — PEGFILGRASTIM 6 MG: KIT SUBCUTANEOUS at 15:24

## 2020-01-31 RX ADMIN — CARBOPLATIN 600 MG: 10 INJECTION, SOLUTION INTRAVENOUS at 14:34

## 2020-01-31 RX ADMIN — PERTUZUMAB 420 MG: 30 INJECTION, SOLUTION, CONCENTRATE INTRAVENOUS at 11:54

## 2020-01-31 RX ADMIN — DIPHENHYDRAMINE HYDROCHLORIDE 50 MG: 50 INJECTION, SOLUTION INTRAMUSCULAR; INTRAVENOUS at 13:00

## 2020-01-31 RX ADMIN — METHYLPREDNISOLONE SODIUM SUCCINATE 125 MG: 125 INJECTION, POWDER, FOR SOLUTION INTRAMUSCULAR; INTRAVENOUS at 12:58

## 2020-01-31 RX ADMIN — DOCETAXEL 128 MG: 20 INJECTION, SOLUTION, CONCENTRATE INTRAVENOUS at 13:32

## 2020-01-31 RX ADMIN — HEPARIN 500 UNITS: 100 SYRINGE at 16:56

## 2020-01-31 RX ADMIN — TRASTUZUMAB 338 MG: 150 INJECTION, POWDER, LYOPHILIZED, FOR SOLUTION INTRAVENOUS at 11:20

## 2020-02-14 ENCOUNTER — HOSPITAL ENCOUNTER (OUTPATIENT)
Dept: WOMENS IMAGING | Age: 70
Discharge: HOME | End: 2020-02-14
Payer: MEDICARE

## 2020-02-14 ENCOUNTER — HOSPITAL ENCOUNTER (OUTPATIENT)
Dept: MRI IMAGING | Age: 70
Discharge: HOME | End: 2020-02-14
Payer: MEDICARE

## 2020-02-14 PROCEDURE — A9577 INJ MULTIHANCE: HCPCS | Performed by: SURGERY

## 2020-02-14 PROCEDURE — 6360000004 HC RX CONTRAST MEDICATION: Performed by: SURGERY

## 2020-02-14 PROCEDURE — 76642 ULTRASOUND BREAST LIMITED: CPT

## 2020-02-14 PROCEDURE — 77049 MRI BREAST C-+ W/CAD BI: CPT

## 2020-02-14 PROCEDURE — G0279 TOMOSYNTHESIS, MAMMO: HCPCS

## 2020-02-14 RX ADMIN — GADOBENATE DIMEGLUMINE 15 ML: 529 INJECTION, SOLUTION INTRAVENOUS at 09:15

## 2020-02-14 NOTE — PROGRESS NOTES
HISTORY OF PRESENT ILLNESS:    Ms. Nicole Velasquez  is status post ultrasound guided breast biopsy  on the right which revealed a 3.5  cm high grade  invasive ductal carcinoma. Fine-needle biopsy of axillary node was cytologically conclusive for malignancy. It is ER positive at 3%. [place] is negative. HER-2 is positive at 3+. Ki-67 is high at 22%. MammaPrint demonstrates a high risk basal phenotype    She has been receiving neoadjuvant chemotherapy with Herceptin, Perjeta, Taxotere, and carboplatin. She has had an excellent clinical response. Her last treatment will be later this week. MRI-2/14/2020  FINDINGS:   Amount of Fibroglandular Tissue: Scattered fibroglandular tissue. Background Parenchymal Enhancement:  Minimal.   Right upper outer breast with decreased size of biopsy-proven   malignancy. No residual measurable enhancing mass. There is   susceptibility artifact at the right slightly upper outer breast,   middle to posterior third, likely a biopsy marker. No suspicious enhancement of the left breast.   Pectoralis musculature appears symmetric and nonenhancing. Right axillary lymph node measures 0.9 cm in short axis on axial   series 3, image 58. While this is not enlarged by cross-sectional size   criteria, it is abnormally rounded and asymmetric to the left side,   concerning for residual disease. There is a T1 hypointensity within   the lymph node, which was more evident on prior exam 9/19/2019,   favored to represent a biopsy marker. There is a second prominent   right axillary lymph node, just lateral to the above described noted   with biopsy marker. No enlarged internal mammary lymph node. Left axillary lymph nodes   appear symmetric. Port at the left chest wall. Cardiomegaly. Minimal right-sided pleural   fluid. Heterogeneous appearance of the liver compared to prior exam.   Consider further evaluation with CT abdomen and pelvis with contrast.       Impression   1.  Findings consistent with response to therapy. No residual   enhancement of the right breast. Normal appearance of the right   pectoralis musculature. 2. Residual prominent right axillary lymph node, which is concerning   for residual disease. There appears to be a biopsy marker in this   lymph node. There is also a lymph node slightly lateral which is also   prominent. 3. Heterogeneous appearance of the liver compared to the prior. Consider CT abdomen and pelvis with contrast for further evaluation. 4. Cardiomegaly and small right pleural fluid. BI-RADS Final Assessment Category 6: Known Biopsy-Proven Malignancy         MAMMOGRAM/ULTRASOUND 2/14/2020  FINDINGS: Biopsy marker in the right upper outer breast. The   previously identified spiculated high density right upper outer   quadrant breast mass in the far posterior third is not definitely   demonstrated on today's exam. There are some small residual   pleomorphic calcifications. Targeted physical exam of the patient's right upper outer breast. No   convincing operable abnormality. Targeted ultrasound performed of the right upper outer breast, 6 cm   from the nipple demonstrates a 1.1 x 0.9 x 0.3 cm oval, parallel,   hypoechoic mass with indistinct margins. There appear to be some   internal echogenic foci, suspect residual calcifications. There is an   adjacent biopsy marker. Right axilla demonstrates a 1.2 x 1.6 x 0.9 cm lymph node containing   biopsy marker. This previously measured 1.2 x 1.9 x 0.7 cm on   1/27/2020. Right axilla also with an adjacent 0.7 cm lymph node, difficult to   directly compare to prior due to differences in measurement technique.       Impression   1. Findings consistent with response to therapy. Small residual   hypoechoic mass with calcifications adjacent to the biopsy marker. 2. Equivocal change of the previously biopsied right axillary lymph   node.  There is a closely adjacent round right axillary lymph node,   which could also be

## 2020-02-17 ENCOUNTER — OFFICE VISIT (OUTPATIENT)
Dept: SURGERY | Age: 70
End: 2020-02-17
Payer: MEDICARE

## 2020-02-17 VITALS — SYSTOLIC BLOOD PRESSURE: 108 MMHG | DIASTOLIC BLOOD PRESSURE: 70 MMHG | HEART RATE: 72 BPM

## 2020-02-17 PROCEDURE — G8399 PT W/DXA RESULTS DOCUMENT: HCPCS | Performed by: SURGERY

## 2020-02-17 PROCEDURE — 1123F ACP DISCUSS/DSCN MKR DOCD: CPT | Performed by: SURGERY

## 2020-02-17 PROCEDURE — G8428 CUR MEDS NOT DOCUMENT: HCPCS | Performed by: SURGERY

## 2020-02-17 PROCEDURE — G8420 CALC BMI NORM PARAMETERS: HCPCS | Performed by: SURGERY

## 2020-02-17 PROCEDURE — 4040F PNEUMOC VAC/ADMIN/RCVD: CPT | Performed by: SURGERY

## 2020-02-17 PROCEDURE — 99213 OFFICE O/P EST LOW 20 MIN: CPT | Performed by: SURGERY

## 2020-02-17 PROCEDURE — 3017F COLORECTAL CA SCREEN DOC REV: CPT | Performed by: SURGERY

## 2020-02-17 PROCEDURE — G8484 FLU IMMUNIZE NO ADMIN: HCPCS | Performed by: SURGERY

## 2020-02-17 PROCEDURE — 1090F PRES/ABSN URINE INCON ASSESS: CPT | Performed by: SURGERY

## 2020-02-17 PROCEDURE — 1036F TOBACCO NON-USER: CPT | Performed by: SURGERY

## 2020-02-20 NOTE — PROGRESS NOTES
the right axilla with the largest measuring 2 x 1.6 cm. A small lymph node is seen along the right internal mammary chain. No suspicious left axillary adenopathy. · 9/27/2019-CT chest abdomen pelvis and bone scan showed no evidence of metastatic disease. · 10/3/2019- she was first seen by me. Recommended neoadjuvant chemotherapy approach with dose dense Adriamycin/cyclophosphamide x4 cycles followed by 12 weekly cycles of Taxol 80 mg/m² with Herceptin and Perjeta. · 10/14/2019- consultation at [facility]. Recommended Taxotere, carboplatin Herceptin Perjeta. · 10/17/2019- 1/31/2020 completion of 6 cycles of of neoadjuvant chemotherapy with Taxotere carboplatin Herceptin Perjeta. · 2/14/2020-MRI breast.  Showed findings consistent with response to therapy. No residual enhancement of the right breast.  Residual right axillary lymph node concerning for residual disease. · 2/21/2020- maintenance Herceptin/Perjeta to complete 1 year of treatment.       PAST MEDICAL HISTORY:   Past Medical History:   Diagnosis Date    Malignant neoplasm of unspecified site of unspecified female breast [facility])     breast          PAST SURGICAL HISTORY:  Past Surgical History:   Procedure Laterality Date    BREAST BIOPSY Right 09/05/2019    COLONOSCOPY N/A 10/1/2019    Dr Jovani Brown, internal hemorrhoids-Grade 2 without bleeding stigmata and external hemorrhoids-TV x 1, AP x 1, 3 yr recall    PORT SURGERY N/A 10/4/2019    PORT INSERTION WITH FLUORO performed by Rama Pulido MD at [address] [facility] N/A 10/1/2019    Dr Oakes Shove hernia, HP gastric polyps        SOCIAL HISTORY:  Social History     Socioeconomic History    Marital status:      Spouse name: None    Number of children: None    Years of education: None    Highest education level: None   Occupational History    None   Social Needs    Financial resource strain: None    Food insecurity: Worry: None     Inability: None    Transportation needs:     Medical: None     Non-medical: None   Tobacco Use    Smoking status: Never Smoker    Smokeless tobacco: Never Used   Substance and Sexual Activity    Alcohol use: Never     Frequency: Never    Drug use: Never    Sexual activity: Yes   Lifestyle    Physical activity:     Days per week: None     Minutes per session: None    Stress: None   Relationships    Social connections:     Talks on phone: None     Gets together: None     Attends [employer] service: None     Active member of club or organization: None     Attends meetings of clubs or organizations: None     Relationship status: None    Intimate partner violence:     Fear of current or ex partner: None     Emotionally abused: None     Physically abused: None     Forced sexual activity: None   Other Topics Concern    None   Social History Narrative    None       FAMILY HISTORY:  Family History   Problem Relation Age of Onset    Breast Cancer Mother 39    Colon Cancer Neg Hx     Colon Polyps Neg Hx         Current Outpatient Medications   Medication Sig Dispense Refill    mupirocin (BACTROBAN) 2 % ointment Apply to each nostril BID x 5 days prior to surgery. 1 Tube 0    montelukast (SINGULAIR) 10 MG tablet Take 1 tablet by mouth daily 30 tablet 3    ferrous sulfate 325 (65 Fe) MG tablet Take 325 mg by mouth daily (with breakfast)      ondansetron (ZOFRAN) 8 MG tablet Take 1 tablet by mouth every 8 hours as needed for Nausea or Vomiting 30 tablet 1    promethazine (PHENERGAN) 25 MG tablet Take 1 tablet by mouth every 8 hours as needed for Nausea 30 tablet 1    dexamethasone (DECADRON) 4 MG tablet Take 2 tabs twice daily on the day before, the day of, and the day after chemo 24 tablet 3    HYDROcodone-acetaminophen (NORCO) 5-325 MG per tablet Take 1 tablet by mouth every 8 hours as needed for Pain.  8 tablet 0    IRON-FOLIC ACID PO Take 22 mg by mouth daily      vitamin D

## 2020-02-21 ENCOUNTER — HOSPITAL ENCOUNTER (OUTPATIENT)
Dept: INFUSION THERAPY | Age: 70
Setting detail: INFUSION SERIES
Discharge: HOME | End: 2020-02-21
Payer: MEDICARE

## 2020-02-21 ENCOUNTER — HOSPITAL ENCOUNTER (OUTPATIENT)
Dept: INFUSION THERAPY | Age: 70
Discharge: HOME | End: 2020-02-21
Payer: MEDICARE

## 2020-02-21 ENCOUNTER — OFFICE VISIT (OUTPATIENT)
Dept: HEMATOLOGY | Age: 70
End: 2020-02-21
Payer: MEDICARE

## 2020-02-21 VITALS
HEART RATE: 70 BPM | BODY MASS INDEX: 18.48 KG/M2 | SYSTOLIC BLOOD PRESSURE: 126 MMHG | OXYGEN SATURATION: 98 % | WEIGHT: 118 LBS | DIASTOLIC BLOOD PRESSURE: 76 MMHG

## 2020-02-21 VITALS
BODY MASS INDEX: 18.54 KG/M2 | HEIGHT: 67 IN | DIASTOLIC BLOOD PRESSURE: 66 MMHG | SYSTOLIC BLOOD PRESSURE: 110 MMHG | HEART RATE: 84 BPM | OXYGEN SATURATION: 97 % | WEIGHT: 118.1 LBS

## 2020-02-21 DIAGNOSIS — Z51.11 ENCOUNTER FOR ANTINEOPLASTIC CHEMOTHERAPY: Primary | ICD-10-CM

## 2020-02-21 DIAGNOSIS — C50.812 MALIGNANT NEOPLASM OF OVERLAPPING SITES OF LEFT BREAST IN FEMALE, ESTROGEN RECEPTOR POSITIVE (HCC): ICD-10-CM

## 2020-02-21 DIAGNOSIS — Z17.0 MALIGNANT NEOPLASM OF OVERLAPPING SITES OF LEFT BREAST IN FEMALE, ESTROGEN RECEPTOR POSITIVE (HCC): ICD-10-CM

## 2020-02-21 DIAGNOSIS — C50.812 MALIGNANT NEOPLASM OF OVERLAPPING SITES OF LEFT FEMALE BREAST, UNSPECIFIED ESTROGEN RECEPTOR STATUS (HCC): ICD-10-CM

## 2020-02-21 PROCEDURE — 2580000003 HC RX 258: Performed by: INTERNAL MEDICINE

## 2020-02-21 PROCEDURE — 1123F ACP DISCUSS/DSCN MKR DOCD: CPT | Performed by: INTERNAL MEDICINE

## 2020-02-21 PROCEDURE — 3017F COLORECTAL CA SCREEN DOC REV: CPT | Performed by: INTERNAL MEDICINE

## 2020-02-21 PROCEDURE — 1036F TOBACCO NON-USER: CPT | Performed by: INTERNAL MEDICINE

## 2020-02-21 PROCEDURE — 99214 OFFICE O/P EST MOD 30 MIN: CPT | Performed by: INTERNAL MEDICINE

## 2020-02-21 PROCEDURE — G8484 FLU IMMUNIZE NO ADMIN: HCPCS | Performed by: INTERNAL MEDICINE

## 2020-02-21 PROCEDURE — 96417 CHEMO IV INFUS EACH ADDL SEQ: CPT

## 2020-02-21 PROCEDURE — G8399 PT W/DXA RESULTS DOCUMENT: HCPCS | Performed by: INTERNAL MEDICINE

## 2020-02-21 PROCEDURE — G8427 DOCREV CUR MEDS BY ELIG CLIN: HCPCS | Performed by: INTERNAL MEDICINE

## 2020-02-21 PROCEDURE — G8420 CALC BMI NORM PARAMETERS: HCPCS | Performed by: INTERNAL MEDICINE

## 2020-02-21 PROCEDURE — 96413 CHEMO IV INFUSION 1 HR: CPT

## 2020-02-21 PROCEDURE — 6360000002 HC RX W HCPCS: Performed by: INTERNAL MEDICINE

## 2020-02-21 PROCEDURE — 99212 OFFICE O/P EST SF 10 MIN: CPT

## 2020-02-21 PROCEDURE — 1090F PRES/ABSN URINE INCON ASSESS: CPT | Performed by: INTERNAL MEDICINE

## 2020-02-21 PROCEDURE — 85025 COMPLETE CBC W/AUTO DIFF WBC: CPT

## 2020-02-21 PROCEDURE — 4040F PNEUMOC VAC/ADMIN/RCVD: CPT | Performed by: INTERNAL MEDICINE

## 2020-02-21 RX ORDER — HEPARIN SODIUM (PORCINE) LOCK FLUSH IV SOLN 100 UNIT/ML 100 UNIT/ML
500 SOLUTION INTRAVENOUS PRN
Status: DISCONTINUED | OUTPATIENT
Start: 2020-02-21 | End: 2020-02-23 | Stop reason: HOSPADM

## 2020-02-21 RX ORDER — METHYLPREDNISOLONE SODIUM SUCCINATE 125 MG/2ML
125 INJECTION, POWDER, LYOPHILIZED, FOR SOLUTION INTRAMUSCULAR; INTRAVENOUS PRN
Status: CANCELLED | OUTPATIENT
Start: 2020-02-21

## 2020-02-21 RX ORDER — DIPHENHYDRAMINE HYDROCHLORIDE 50 MG/ML
50 INJECTION INTRAMUSCULAR; INTRAVENOUS PRN
Status: CANCELLED | OUTPATIENT
Start: 2020-02-21

## 2020-02-21 RX ORDER — EPINEPHRINE 1 MG/ML
0.3 INJECTION, SOLUTION, CONCENTRATE INTRAVENOUS PRN
Status: CANCELLED | OUTPATIENT
Start: 2020-02-21

## 2020-02-21 RX ORDER — ONDANSETRON 2 MG/ML
8 INJECTION INTRAMUSCULAR; INTRAVENOUS ONCE
Status: CANCELLED | OUTPATIENT
Start: 2020-02-21

## 2020-02-21 RX ORDER — ONDANSETRON 2 MG/ML
8 INJECTION INTRAMUSCULAR; INTRAVENOUS ONCE
Status: COMPLETED | OUTPATIENT
Start: 2020-02-21 | End: 2020-02-21

## 2020-02-21 RX ADMIN — HEPARIN 500 UNITS: 100 SYRINGE at 11:48

## 2020-02-21 RX ADMIN — PERTUZUMAB 420 MG: 30 INJECTION, SOLUTION, CONCENTRATE INTRAVENOUS at 10:23

## 2020-02-21 RX ADMIN — ONDANSETRON 8 MG: 2 INJECTION INTRAMUSCULAR; INTRAVENOUS at 09:56

## 2020-02-21 RX ADMIN — TRASTUZUMAB 321 MG: 150 INJECTION, POWDER, LYOPHILIZED, FOR SOLUTION INTRAVENOUS at 11:01

## 2020-02-25 ENCOUNTER — HOSPITAL ENCOUNTER (OUTPATIENT)
Dept: PREADMISSION TESTING | Age: 70
Discharge: HOME | End: 2020-02-29
Payer: MEDICARE

## 2020-02-25 VITALS — WEIGHT: 120 LBS | HEIGHT: 67 IN | BODY MASS INDEX: 18.83 KG/M2

## 2020-02-25 LAB
EKG P AXIS: 67 DEGREES
EKG P-R INTERVAL: 156 MS
EKG Q-T INTERVAL: 376 MS
EKG QRS DURATION: 90 MS
EKG QTC CALCULATION (BAZETT): 407 MS
EKG T AXIS: 44 DEGREES

## 2020-02-25 PROCEDURE — 93010 ELECTROCARDIOGRAM REPORT: CPT | Performed by: INTERNAL MEDICINE

## 2020-02-25 PROCEDURE — 93005 ELECTROCARDIOGRAM TRACING: CPT | Performed by: ANESTHESIOLOGY

## 2020-03-02 ENCOUNTER — HOSPITAL ENCOUNTER (OUTPATIENT)
Dept: WOMENS IMAGING | Age: 70
Discharge: HOME | End: 2020-03-02
Payer: MEDICARE

## 2020-03-02 PROCEDURE — 76642 ULTRASOUND BREAST LIMITED: CPT

## 2020-03-03 ENCOUNTER — HOSPITAL ENCOUNTER (OUTPATIENT)
Age: 70
Setting detail: OBSERVATION
Discharge: HOME | End: 2020-03-04
Attending: SURGERY | Admitting: SURGERY
Payer: MEDICARE

## 2020-03-03 ENCOUNTER — HOSPITAL ENCOUNTER (OUTPATIENT)
Dept: NUCLEAR MEDICINE | Age: 70
Discharge: HOME | End: 2020-03-05
Payer: MEDICARE

## 2020-03-03 ENCOUNTER — ANESTHESIA (OUTPATIENT)
Dept: OPERATING ROOM | Age: 70
End: 2020-03-03
Payer: MEDICARE

## 2020-03-03 ENCOUNTER — ANESTHESIA EVENT (OUTPATIENT)
Dept: OPERATING ROOM | Age: 70
End: 2020-03-03
Payer: MEDICARE

## 2020-03-03 VITALS
DIASTOLIC BLOOD PRESSURE: 80 MMHG | TEMPERATURE: 96.8 F | OXYGEN SATURATION: 88 % | RESPIRATION RATE: 7 BRPM | SYSTOLIC BLOOD PRESSURE: 141 MMHG

## 2020-03-03 PROBLEM — Z90.11 S/P RIGHT MASTECTOMY: Status: ACTIVE | Noted: 2020-03-03

## 2020-03-03 PROCEDURE — 88329 PATH CONSLTJ DRG SURG: CPT

## 2020-03-03 PROCEDURE — 88309 TISSUE EXAM BY PATHOLOGIST: CPT

## 2020-03-03 PROCEDURE — 2500000003 HC RX 250 WO HCPCS: Performed by: NURSE ANESTHETIST, CERTIFIED REGISTERED

## 2020-03-03 PROCEDURE — 3600000015 HC SURGERY LEVEL 5 ADDTL 15MIN: Performed by: SURGERY

## 2020-03-03 PROCEDURE — 7100000001 HC PACU RECOVERY - ADDTL 15 MIN: Performed by: SURGERY

## 2020-03-03 PROCEDURE — 19307 MAST MOD RAD: CPT | Performed by: PHYSICIAN ASSISTANT

## 2020-03-03 PROCEDURE — 38900 IO MAP OF SENT LYMPH NODE: CPT | Performed by: SURGERY

## 2020-03-03 PROCEDURE — 2720000010 HC SURG SUPPLY STERILE: Performed by: SURGERY

## 2020-03-03 PROCEDURE — 2580000003 HC RX 258: Performed by: SURGERY

## 2020-03-03 PROCEDURE — 3700000000 HC ANESTHESIA ATTENDED CARE: Performed by: SURGERY

## 2020-03-03 PROCEDURE — 2700000000 HC OXYGEN THERAPY PER DAY

## 2020-03-03 PROCEDURE — 7100000000 HC PACU RECOVERY - FIRST 15 MIN: Performed by: SURGERY

## 2020-03-03 PROCEDURE — 2500000003 HC RX 250 WO HCPCS: Performed by: SURGERY

## 2020-03-03 PROCEDURE — A9520 TC99 TILMANOCEPT DIAG 0.5MCI: HCPCS | Performed by: SURGERY

## 2020-03-03 PROCEDURE — G0378 HOSPITAL OBSERVATION PER HR: HCPCS

## 2020-03-03 PROCEDURE — 38900 IO MAP OF SENT LYMPH NODE: CPT | Performed by: PHYSICIAN ASSISTANT

## 2020-03-03 PROCEDURE — 6360000002 HC RX W HCPCS: Performed by: SURGERY

## 2020-03-03 PROCEDURE — 3700000001 HC ADD 15 MINUTES (ANESTHESIA): Performed by: SURGERY

## 2020-03-03 PROCEDURE — 6360000002 HC RX W HCPCS: Performed by: NURSE ANESTHETIST, CERTIFIED REGISTERED

## 2020-03-03 PROCEDURE — 93017 CV STRESS TEST TRACING ONLY: CPT

## 2020-03-03 PROCEDURE — 3430000000 HC RX DIAGNOSTIC RADIOPHARMACEUTICAL: Performed by: SURGERY

## 2020-03-03 PROCEDURE — 6370000000 HC RX 637 (ALT 250 FOR IP): Performed by: SURGERY

## 2020-03-03 PROCEDURE — 19307 MAST MOD RAD: CPT | Performed by: SURGERY

## 2020-03-03 PROCEDURE — 3600000005 HC SURGERY LEVEL 5 BASE: Performed by: SURGERY

## 2020-03-03 PROCEDURE — 2709999900 HC NON-CHARGEABLE SUPPLY: Performed by: SURGERY

## 2020-03-03 RX ORDER — ROCURONIUM BROMIDE 10 MG/ML
INJECTION, SOLUTION INTRAVENOUS PRN
Status: DISCONTINUED | OUTPATIENT
Start: 2020-03-03 | End: 2020-03-03 | Stop reason: SDUPTHER

## 2020-03-03 RX ORDER — ONDANSETRON 2 MG/ML
INJECTION INTRAMUSCULAR; INTRAVENOUS PRN
Status: DISCONTINUED | OUTPATIENT
Start: 2020-03-03 | End: 2020-03-03 | Stop reason: SDUPTHER

## 2020-03-03 RX ORDER — METOCLOPRAMIDE HYDROCHLORIDE 5 MG/ML
10 INJECTION INTRAMUSCULAR; INTRAVENOUS
Status: DISCONTINUED | OUTPATIENT
Start: 2020-03-03 | End: 2020-03-03 | Stop reason: HOSPADM

## 2020-03-03 RX ORDER — SODIUM CHLORIDE 0.9 % (FLUSH) 0.9 %
10 SYRINGE (ML) INJECTION PRN
Status: DISCONTINUED | OUTPATIENT
Start: 2020-03-03 | End: 2020-03-03 | Stop reason: HOSPADM

## 2020-03-03 RX ORDER — MORPHINE SULFATE 4 MG/ML
2 INJECTION, SOLUTION INTRAMUSCULAR; INTRAVENOUS
Status: DISCONTINUED | OUTPATIENT
Start: 2020-03-03 | End: 2020-03-04 | Stop reason: HOSPADM

## 2020-03-03 RX ORDER — ROPIVACAINE HYDROCHLORIDE 2 MG/ML
INJECTION, SOLUTION EPIDURAL; INFILTRATION; PERINEURAL PRN
Status: DISCONTINUED | OUTPATIENT
Start: 2020-03-03 | End: 2020-03-03 | Stop reason: ALTCHOICE

## 2020-03-03 RX ORDER — HYDRALAZINE HYDROCHLORIDE 20 MG/ML
5 INJECTION INTRAMUSCULAR; INTRAVENOUS EVERY 10 MIN PRN
Status: DISCONTINUED | OUTPATIENT
Start: 2020-03-03 | End: 2020-03-03 | Stop reason: HOSPADM

## 2020-03-03 RX ORDER — FENTANYL CITRATE 50 UG/ML
50 INJECTION, SOLUTION INTRAMUSCULAR; INTRAVENOUS
Status: DISCONTINUED | OUTPATIENT
Start: 2020-03-03 | End: 2020-03-03 | Stop reason: HOSPADM

## 2020-03-03 RX ORDER — SODIUM CHLORIDE, SODIUM LACTATE, POTASSIUM CHLORIDE, CALCIUM CHLORIDE 600; 310; 30; 20 MG/100ML; MG/100ML; MG/100ML; MG/100ML
INJECTION, SOLUTION INTRAVENOUS CONTINUOUS
Status: DISCONTINUED | OUTPATIENT
Start: 2020-03-03 | End: 2020-03-03

## 2020-03-03 RX ORDER — SODIUM CHLORIDE 0.9 % (FLUSH) 0.9 %
10 SYRINGE (ML) INJECTION PRN
Status: DISCONTINUED | OUTPATIENT
Start: 2020-03-03 | End: 2020-03-04 | Stop reason: HOSPADM

## 2020-03-03 RX ORDER — SUCCINYLCHOLINE CHLORIDE 20 MG/ML
INJECTION INTRAMUSCULAR; INTRAVENOUS PRN
Status: DISCONTINUED | OUTPATIENT
Start: 2020-03-03 | End: 2020-03-03 | Stop reason: SDUPTHER

## 2020-03-03 RX ORDER — CEFAZOLIN SODIUM 1 G/50ML
1 INJECTION, SOLUTION INTRAVENOUS EVERY 8 HOURS
Status: COMPLETED | OUTPATIENT
Start: 2020-03-03 | End: 2020-03-04

## 2020-03-03 RX ORDER — LABETALOL 20 MG/4 ML (5 MG/ML) INTRAVENOUS SYRINGE
5 EVERY 10 MIN PRN
Status: DISCONTINUED | OUTPATIENT
Start: 2020-03-03 | End: 2020-03-03 | Stop reason: HOSPADM

## 2020-03-03 RX ORDER — DEXAMETHASONE SODIUM PHOSPHATE 4 MG/ML
INJECTION, SOLUTION INTRA-ARTICULAR; INTRALESIONAL; INTRAMUSCULAR; INTRAVENOUS; SOFT TISSUE PRN
Status: DISCONTINUED | OUTPATIENT
Start: 2020-03-03 | End: 2020-03-03 | Stop reason: ALTCHOICE

## 2020-03-03 RX ORDER — MEPERIDINE HYDROCHLORIDE 50 MG/ML
12.5 INJECTION INTRAMUSCULAR; INTRAVENOUS; SUBCUTANEOUS EVERY 5 MIN PRN
Status: DISCONTINUED | OUTPATIENT
Start: 2020-03-03 | End: 2020-03-03 | Stop reason: HOSPADM

## 2020-03-03 RX ORDER — PROMETHAZINE HYDROCHLORIDE 25 MG/ML
6.25 INJECTION, SOLUTION INTRAMUSCULAR; INTRAVENOUS
Status: DISCONTINUED | OUTPATIENT
Start: 2020-03-03 | End: 2020-03-03 | Stop reason: HOSPADM

## 2020-03-03 RX ORDER — ISOSULFAN BLUE 50 MG/5ML
INJECTION, SOLUTION SUBCUTANEOUS PRN
Status: DISCONTINUED | OUTPATIENT
Start: 2020-03-03 | End: 2020-03-03 | Stop reason: ALTCHOICE

## 2020-03-03 RX ORDER — FENTANYL CITRATE 50 UG/ML
25 INJECTION, SOLUTION INTRAMUSCULAR; INTRAVENOUS
Status: DISCONTINUED | OUTPATIENT
Start: 2020-03-03 | End: 2020-03-03 | Stop reason: HOSPADM

## 2020-03-03 RX ORDER — DIPHENHYDRAMINE HYDROCHLORIDE 50 MG/ML
12.5 INJECTION INTRAMUSCULAR; INTRAVENOUS
Status: COMPLETED | OUTPATIENT
Start: 2020-03-03 | End: 2020-03-03

## 2020-03-03 RX ORDER — SODIUM CHLORIDE 0.9 % (FLUSH) 0.9 %
10 SYRINGE (ML) INJECTION EVERY 12 HOURS SCHEDULED
Status: DISCONTINUED | OUTPATIENT
Start: 2020-03-03 | End: 2020-03-04 | Stop reason: HOSPADM

## 2020-03-03 RX ORDER — FENTANYL CITRATE 50 UG/ML
INJECTION, SOLUTION INTRAMUSCULAR; INTRAVENOUS PRN
Status: DISCONTINUED | OUTPATIENT
Start: 2020-03-03 | End: 2020-03-03 | Stop reason: SDUPTHER

## 2020-03-03 RX ORDER — MORPHINE SULFATE 4 MG/ML
4 INJECTION, SOLUTION INTRAMUSCULAR; INTRAVENOUS
Status: DISCONTINUED | OUTPATIENT
Start: 2020-03-03 | End: 2020-03-04 | Stop reason: HOSPADM

## 2020-03-03 RX ORDER — PROMETHAZINE HYDROCHLORIDE 25 MG/1
25 TABLET ORAL EVERY 8 HOURS PRN
Status: DISCONTINUED | OUTPATIENT
Start: 2020-03-03 | End: 2020-03-04 | Stop reason: HOSPADM

## 2020-03-03 RX ORDER — SODIUM CHLORIDE, SODIUM LACTATE, POTASSIUM CHLORIDE, CALCIUM CHLORIDE 600; 310; 30; 20 MG/100ML; MG/100ML; MG/100ML; MG/100ML
INJECTION, SOLUTION INTRAVENOUS CONTINUOUS
Status: DISCONTINUED | OUTPATIENT
Start: 2020-03-03 | End: 2020-03-04 | Stop reason: HOSPADM

## 2020-03-03 RX ORDER — MONTELUKAST SODIUM 10 MG/1
10 TABLET ORAL DAILY
Status: DISCONTINUED | OUTPATIENT
Start: 2020-03-03 | End: 2020-03-04 | Stop reason: HOSPADM

## 2020-03-03 RX ORDER — SODIUM CHLORIDE 0.9 % (FLUSH) 0.9 %
10 SYRINGE (ML) INJECTION EVERY 12 HOURS SCHEDULED
Status: DISCONTINUED | OUTPATIENT
Start: 2020-03-03 | End: 2020-03-03 | Stop reason: HOSPADM

## 2020-03-03 RX ORDER — LIDOCAINE HYDROCHLORIDE 10 MG/ML
INJECTION, SOLUTION EPIDURAL; INFILTRATION; INTRACAUDAL; PERINEURAL PRN
Status: DISCONTINUED | OUTPATIENT
Start: 2020-03-03 | End: 2020-03-03 | Stop reason: SDUPTHER

## 2020-03-03 RX ORDER — SODIUM CHLORIDE 9 MG/ML
INJECTION, SOLUTION INTRAVENOUS CONTINUOUS
Status: DISCONTINUED | OUTPATIENT
Start: 2020-03-03 | End: 2020-03-03

## 2020-03-03 RX ORDER — LIDOCAINE HYDROCHLORIDE 10 MG/ML
1 INJECTION, SOLUTION EPIDURAL; INFILTRATION; INTRACAUDAL; PERINEURAL
Status: DISCONTINUED | OUTPATIENT
Start: 2020-03-03 | End: 2020-03-03 | Stop reason: HOSPADM

## 2020-03-03 RX ORDER — MORPHINE SULFATE 4 MG/ML
4 INJECTION, SOLUTION INTRAMUSCULAR; INTRAVENOUS EVERY 5 MIN PRN
Status: DISCONTINUED | OUTPATIENT
Start: 2020-03-03 | End: 2020-03-03 | Stop reason: HOSPADM

## 2020-03-03 RX ORDER — MIDAZOLAM HYDROCHLORIDE 1 MG/ML
2 INJECTION INTRAMUSCULAR; INTRAVENOUS
Status: DISCONTINUED | OUTPATIENT
Start: 2020-03-03 | End: 2020-03-03 | Stop reason: HOSPADM

## 2020-03-03 RX ORDER — ONDANSETRON 4 MG/1
8 TABLET, FILM COATED ORAL EVERY 8 HOURS PRN
Status: DISCONTINUED | OUTPATIENT
Start: 2020-03-03 | End: 2020-03-04 | Stop reason: HOSPADM

## 2020-03-03 RX ORDER — KETOROLAC TROMETHAMINE 30 MG/ML
INJECTION, SOLUTION INTRAMUSCULAR; INTRAVENOUS PRN
Status: DISCONTINUED | OUTPATIENT
Start: 2020-03-03 | End: 2020-03-03 | Stop reason: SDUPTHER

## 2020-03-03 RX ORDER — MORPHINE SULFATE 4 MG/ML
2 INJECTION, SOLUTION INTRAMUSCULAR; INTRAVENOUS EVERY 5 MIN PRN
Status: DISCONTINUED | OUTPATIENT
Start: 2020-03-03 | End: 2020-03-03 | Stop reason: HOSPADM

## 2020-03-03 RX ORDER — PROPOFOL 10 MG/ML
INJECTION, EMULSION INTRAVENOUS PRN
Status: DISCONTINUED | OUTPATIENT
Start: 2020-03-03 | End: 2020-03-03 | Stop reason: SDUPTHER

## 2020-03-03 RX ADMIN — TILMANOCEPT 0.5 MILLICURIE: KIT at 13:45

## 2020-03-03 RX ADMIN — SODIUM CHLORIDE, SODIUM LACTATE, POTASSIUM CHLORIDE, AND CALCIUM CHLORIDE: 600; 310; 30; 20 INJECTION, SOLUTION INTRAVENOUS at 13:02

## 2020-03-03 RX ADMIN — DIPHENHYDRAMINE HYDROCHLORIDE 25 MG: 50 INJECTION, SOLUTION INTRAMUSCULAR; INTRAVENOUS at 13:46

## 2020-03-03 RX ADMIN — SODIUM CHLORIDE, POTASSIUM CHLORIDE, SODIUM LACTATE AND CALCIUM CHLORIDE: 600; 310; 30; 20 INJECTION, SOLUTION INTRAVENOUS at 16:13

## 2020-03-03 RX ADMIN — WATER 1 G: 1 INJECTION INTRAMUSCULAR; INTRAVENOUS; SUBCUTANEOUS at 13:03

## 2020-03-03 RX ADMIN — KETOROLAC TROMETHAMINE 30 MG: 30 INJECTION, SOLUTION INTRAMUSCULAR; INTRAVENOUS at 13:12

## 2020-03-03 RX ADMIN — ROCURONIUM BROMIDE 10 MG: 10 SOLUTION INTRAVENOUS at 13:12

## 2020-03-03 RX ADMIN — SODIUM CHLORIDE, SODIUM LACTATE, POTASSIUM CHLORIDE, AND CALCIUM CHLORIDE: 600; 310; 30; 20 INJECTION, SOLUTION INTRAVENOUS at 10:32

## 2020-03-03 RX ADMIN — FENTANYL CITRATE 100 MCG: 50 INJECTION INTRAMUSCULAR; INTRAVENOUS at 13:12

## 2020-03-03 RX ADMIN — PROPOFOL 150 MG: 10 INJECTION, EMULSION INTRAVENOUS at 13:12

## 2020-03-03 RX ADMIN — HYDROCORTISONE SODIUM SUCCINATE 100 MG: 100 INJECTION, POWDER, FOR SOLUTION INTRAMUSCULAR; INTRAVENOUS at 13:46

## 2020-03-03 RX ADMIN — CEFAZOLIN SODIUM 1 G: 1 INJECTION, SOLUTION INTRAVENOUS at 20:49

## 2020-03-03 RX ADMIN — LIDOCAINE HYDROCHLORIDE 5 ML: 10 INJECTION, SOLUTION EPIDURAL; INFILTRATION; INTRACAUDAL; PERINEURAL at 13:12

## 2020-03-03 RX ADMIN — SUCCINYLCHOLINE CHLORIDE 120 MG: 20 INJECTION, SOLUTION INTRAMUSCULAR; INTRAVENOUS at 13:12

## 2020-03-03 RX ADMIN — MONTELUKAST SODIUM 10 MG: 10 TABLET, FILM COATED ORAL at 16:13

## 2020-03-03 RX ADMIN — FAMOTIDINE 20 MG: 10 INJECTION INTRAVENOUS at 13:53

## 2020-03-03 RX ADMIN — ONDANSETRON HYDROCHLORIDE 4 MG: 2 INJECTION, SOLUTION INTRAMUSCULAR; INTRAVENOUS at 13:12

## 2020-03-03 RX ADMIN — SODIUM CHLORIDE, POTASSIUM CHLORIDE, SODIUM LACTATE AND CALCIUM CHLORIDE: 600; 310; 30; 20 INJECTION, SOLUTION INTRAVENOUS at 20:49

## 2020-03-03 ASSESSMENT — PAIN SCALES - GENERAL
PAINLEVEL_OUTOF10: 0
PAINLEVEL_OUTOF10: 0

## 2020-03-03 ASSESSMENT — ENCOUNTER SYMPTOMS: SHORTNESS OF BREATH: 0

## 2020-03-03 NOTE — H&P
with response to therapy. No residual   enhancement of the right breast. Normal appearance of the right   pectoralis musculature. 2. Residual prominent right axillary lymph node, which is concerning   for residual disease. There appears to be a biopsy marker in this   lymph node. There is also a lymph node slightly lateral which is also   prominent. 3. Heterogeneous appearance of the liver compared to the prior. Consider CT abdomen and pelvis with contrast for further evaluation. 4. Cardiomegaly and small right pleural fluid. BI-RADS Final Assessment Category 6: Known Biopsy-Proven Malignancy         MAMMOGRAM/ULTRASOUND 2/14/2020  FINDINGS: Biopsy marker in the right upper outer breast. The   previously identified spiculated high density right upper outer   quadrant breast mass in the far posterior third is not definitely   demonstrated on today's exam. There are some small residual   pleomorphic calcifications. Targeted physical exam of the patient's right upper outer breast. No   convincing operable abnormality. Targeted ultrasound performed of the right upper outer breast, 6 cm   from the nipple demonstrates a 1.1 x 0.9 x 0.3 cm oval, parallel,   hypoechoic mass with indistinct margins. There appear to be some   internal echogenic foci, suspect residual calcifications. There is an   adjacent biopsy marker. Right axilla demonstrates a 1.2 x 1.6 x 0.9 cm lymph node containing   biopsy marker. This previously measured 1.2 x 1.9 x 0.7 cm on   1/27/2020. Right axilla also with an adjacent 0.7 cm lymph node, difficult to   directly compare to prior due to differences in measurement technique.       Impression   1. Findings consistent with response to therapy. Small residual   hypoechoic mass with calcifications adjacent to the biopsy marker. 2. Equivocal change of the previously biopsied right axillary lymph   node.  There is a closely adjacent round right axillary lymph node,   which could also be (LYMPHOSEEK) injection 0.5 millicurie  611 micro curie Subcutaneous ONCE PRN Rama Pulido MD         Allergies: Patient has no known allergies. Past Medical History:   Diagnosis Date    Malignant neoplasm of unspecified site of unspecified female breast [facility])     breast     Past Surgical History:   Procedure Laterality Date    BREAST BIOPSY Right 09/05/2019    COLONOSCOPY N/A 10/1/2019    Dr Jovani Brown, internal hemorrhoids-Grade 2 without bleeding stigmata and external hemorrhoids-TV x 1, AP x 1, 3 yr recall    PORT SURGERY N/A 10/4/2019    PORT INSERTION WITH FLUORO performed by Rama Pulido MD at [address] N/A 10/1/2019    Dr Oakes Shove hernia, HP gastric polyps     Family History   Problem Relation Age of Onset    Breast Cancer Mother 39    Colon Cancer Neg Hx     Colon Polyps Neg Hx      Social History     Tobacco Use    Smoking status: Never Smoker    Smokeless tobacco: Never Used   Substance Use Topics    Alcohol use: Never     Frequency: Never       ROS:  14 point review of systems is negative except for the above. PHYSICAL EXAM:    The patient is a 68 y.o. female  in no acute distress. She is alert oriented and cooperative. Mood and affect are appropriate. Skin is warm and dry without rashes. BP (!) 142/80   Pulse 75   Temp 97.6 °F (36.4 °C) (Tympanic)   Resp 14   Ht 5' 7\" (1.702 m)   Wt 120 lb (54.4 kg)   SpO2 100%   BMI 18.79 kg/m²       HEENT: Normocephalic and atraumatic. EOMs intact. Pupils equal and round and reactive to light and accommodation. External ears and nose are normal.  Sclera nonicteric. Conjunctiva normal  Oropharynx without masses or lesions. Neck: Neck is supple without masses or thyromegaly    Chest: Lungs are clear to auscultation. Respiratory effort normal    Cardiac: Regular rate and rhythm without rubs, murmurs, or gallops    Breasts: On examination, she has fibrocystic changes

## 2020-03-03 NOTE — ANESTHESIA PRE PROCEDURE
C50.812    Malignant neoplasm of unspecified site of unspecified female breast ([facility] [address] C50.919    Encounter for antineoplastic chemotherapy  Z51.11       Past Medical History:        Diagnosis Date    Malignant neoplasm of unspecified site of unspecified female breast [facility])     breast       Past Surgical History:        Procedure Laterality Date    BREAST BIOPSY Right 09/05/2019    COLONOSCOPY N/A 10/1/2019    Dr Maritza Pedro, internal hemorrhoids-Grade 2 without bleeding stigmata and external hemorrhoids-TV x 1, AP x 1, 3 yr recall    PORT SURGERY N/A 10/4/2019    PORT INSERTION WITH FLUORO performed by Osmin Carreon MD at [address] N/A 10/1/2019    Dr Garcia Lescarey hernia, HP gastric polyps       Social History:    Social History     Tobacco Use    Smoking status: Never Smoker    Smokeless tobacco: Never Used   Substance Use Topics    Alcohol use: Never     Frequency: Never                                Counseling given: Not Answered      Vital Signs (Current): There were no vitals filed for this visit.                                            BP Readings from Last 3 Encounters:   03/03/20 (!) 142/80   02/21/20 126/76   02/21/20 110/66       NPO Status:                                                                                 BMI:   Wt Readings from Last 3 Encounters:   03/03/20 120 lb (54.4 kg)   02/25/20 120 lb (54.4 kg)   02/21/20 118 lb (53.5 kg)     There is no height or weight on file to calculate BMI.    CBC:   Lab Results   Component Value Date    WBC 12.4 01/17/2020    RBC 2.40 01/17/2020    HGB 8.6 01/17/2020    HCT 26.7 01/17/2020    .3 01/17/2020    RDW 16.5 01/17/2020    PLT 50 01/17/2020       CMP:   Lab Results   Component Value Date     01/31/2020    K 3.9 01/31/2020     01/31/2020    CO2 25 01/31/2020    BUN 20 01/31/2020    CREATININE 0.6 01/31/2020    LABGLOM >60 01/31/2020    GLUCOSE 135 01/31/2020 Anesthesia Plan      general     ASA 3       Induction: intravenous. BIS  MIPS: Postoperative opioids intended and Prophylactic antiemetics administered. Anesthetic plan and risks discussed with patient. Use of blood products discussed with patient whom. Plan discussed with CRNA.     Attending anesthesiologist reviewed and agrees with Pre Eval content              Juli Manning DO   3/3/2020

## 2020-03-04 VITALS
WEIGHT: 120 LBS | OXYGEN SATURATION: 94 % | SYSTOLIC BLOOD PRESSURE: 114 MMHG | DIASTOLIC BLOOD PRESSURE: 69 MMHG | TEMPERATURE: 98.3 F | HEIGHT: 67 IN | BODY MASS INDEX: 18.83 KG/M2 | HEART RATE: 94 BPM | RESPIRATION RATE: 20 BRPM

## 2020-03-04 PROCEDURE — 99024 POSTOP FOLLOW-UP VISIT: CPT | Performed by: SURGERY

## 2020-03-04 PROCEDURE — 6370000000 HC RX 637 (ALT 250 FOR IP): Performed by: SURGERY

## 2020-03-04 PROCEDURE — 96372 THER/PROPH/DIAG INJ SC/IM: CPT

## 2020-03-04 PROCEDURE — G0378 HOSPITAL OBSERVATION PER HR: HCPCS

## 2020-03-04 PROCEDURE — 6360000002 HC RX W HCPCS: Performed by: SURGERY

## 2020-03-04 RX ADMIN — MONTELUKAST SODIUM 10 MG: 10 TABLET, FILM COATED ORAL at 08:44

## 2020-03-04 RX ADMIN — CEFAZOLIN SODIUM 1 G: 1 INJECTION, SOLUTION INTRAVENOUS at 04:33

## 2020-03-04 RX ADMIN — ENOXAPARIN SODIUM 40 MG: 40 INJECTION SUBCUTANEOUS at 08:44

## 2020-03-04 NOTE — PROGRESS NOTES
Pt and pts family voiced understanding of discharge instructions and fer instructions, discharged via wc per transportation

## 2020-03-04 NOTE — OP NOTE
ABHIANA [facility] JENNIE Haskins [address] [facility]                                OPERATIVE REPORT    PATIENT NAME: Effie Sandra                       :        1950  MED REC NO:   [id number]                              ROOM:       [facility]  ACCOUNT NO:   [de-identified]                           ADMIT DATE: 2020  PROVIDER:     Wilbert Martinez MD    DATE OF PROCEDURE:  2020    PREOPERATIVE DIAGNOSIS:  Right-sided breast cancer, status post  neoadjuvant chemotherapy. POSTOPERATIVE DIAGNOSIS:  Right-sided breast cancer, status post  neoadjuvant chemotherapy. OPERATION PERFORMED:  1. Injection of radionuclide. 2.  Lymphatic mapping. 3.  Right-sided pectoral block. 4.  Right modified radical mastectomy. SURGEON:  Wilbert Martinez MD    ASSISTANT:  Shannan Santiago PA-C    ANESTHESIA:  General.    INDICATIONS:  The patient is a 17-year-old lady who is status post  neoadjuvant chemotherapy for a basal-like cancer, it was positive for  HER2 _____. She received chemotherapy and did well. The lesion on her  breast improved dramatically. She still has some axillary adenopathy. We discussed the risks and benefits of lumpectomy versus mastectomy. She has relatively minimal breast tissue, so I felt a lumpectomy would  remove essentially her whole breast anyway. We discussed the risks and  benefits of mastectomy, and she understands and is agreeable. She also  understands we will try to excise the lymph nodes appropriately. OPERATIVE PROCEDURE:  Today, she was brought to the operating room,  underwent adequate general anesthesia. She then underwent injection of  radionuclide with 500 microcuries of Lymphoseek, and this was injected  on the periareolar dermis. Once this was done, we prepped with  chlorhexidine and did a right-sided pectoral block. We utilized a  solution of 50 mL of 0.2% ropivacaine, 8 mg of Decadron, and 50 mL of  saline.   This

## 2020-03-05 ENCOUNTER — TELEPHONE (OUTPATIENT)
Dept: INTERNAL MEDICINE | Age: 70
End: 2020-03-05

## 2020-03-05 NOTE — TELEPHONE ENCOUNTER
[facility] Transitions Initial Follow Up Call    Outreach made within 2 business days of discharge: Yes    Patient: Ruchi Garcia Patient : 1950   MRN: [id number]  Reason for Admission: Patient was admitted on 2020 right-sided breast cancer, status post neoadjuvant chemotherapy. PREOPERATIVE DIAGNOSIS:  Right-sided breast cancer, status post  neoadjuvant chemotherapy.     POSTOPERATIVE DIAGNOSIS:  Right-sided breast cancer, status post  neoadjuvant chemotherapy.     OPERATION PERFORMED:  1. Injection of radionuclide. 2.  Lymphatic mapping. 3.  Right-sided pectoral block. 4.  Right modified radical mastectomy. Discharge Date: 3/4/20       Spoke with: patient    Discharge department/facility: [address] Interactive Patient Contact:  Was patient able to fill all prescriptions: No: No new Rx given  Was patient instructed to bring all medications to the follow-up visit: Yes  Is patient taking all medications as directed in the discharge summary? Yes  Does patient understand their discharge instructions: Yes  Does patient have questions or concerns that need addressed prior to 7-14 day follow up office visit: Per patient she states she is doing pretty well. She states she is a little weak but has no pain. Dressings intact. Patient has two drains that are working with less serosanguineous drainage today. Bowels and bladder working OK. Appetite is OK. Sleeping OK.   Patient to bring all medications to HFU and will contact office with any

## 2020-03-06 NOTE — DISCHARGE SUMMARY
Physician Discharge Summary         Patient ID:  Moose Gao  [id number]  68 y.o. Admit date: 3/3/2020    Discharge date and time: 3/4/2020 10:53 AM     Admitting Physician: Roxana Aranda MD     Discharge Diagnoses:    1.  Primary tumor site identified with extensive fibrosis and nests of  residual high-grade carcinoma present in lymphatic spaces.   2.  Tumor is present in a lymphatic space at the deep surgical excision  margin.   3.  Sections of nipple identified, negative for evidence of malignancy.   4.  Sections of benign skin with benign seborrheic keratoses.   5.  3 out of 11 lymph nodes, positive for metastatic carcinoma.   6.  Multiple discrete nest of cells identified within the nodes.   7.  Largest focus of metastasis measures 0.5 cm in greatest dimension.   8.  Negative for extracapsular spread. Patient Active Problem List   Diagnosis    Breast mass, right    Fatigue    Right bundle branch block (RBBB)    Menopause    Other specified disorders of bone density and structure, right upper arm     Anemia    Malignant neoplasm of overlapping sites of left female breast ([address]    Malignant neoplasm of unspecified site of unspecified female breast ([address]    Encounter for antineoplastic chemotherapy     S/P right mastectomy       Procedure:  1. Injection of radionuclide. 2.  Lymphatic mapping. 3.  Right-sided pectoral block. 4.  Right modified radical mastectomy. Discharged Condition: good    Hospital Course:   Per hospital chart    Consults: none      Disposition: home    Patient Instructions:    Activity and wound care: per mastectomy instruction  Diet: regular diet    Follow-up:  Sweetie Saunders MD  [id number] [address]    Go on 3/10/2020  Hospital Follow up  @ 11:00 am     [address] [facility]  [phone]    Go on 3/11/2020  at 1:00 pm for hospital follow up         Medication List CONTINUE taking these medications    ferrous sulfate 325 (65 Fe) MG tablet     montelukast 10 MG tablet  Commonly known as:  SINGULAIR  Take 1 tablet by mouth daily     ondansetron 8 MG tablet  Commonly known as:  ZOFRAN  Take 1 tablet by mouth every 8 hours as needed for Nausea or Vomiting     Oyster Shell Calcium/D 500-200 MG-UNIT Tabs  Take 1 tablet by mouth daily     vitamin D 1.25 MG (92190 UT) Caps capsule  Commonly known as:  ERGOCALCIFEROL  Take 1 capsule by mouth once a week        STOP taking these medications    promethazine 25 MG tablet  Commonly known as:  PHENERGAN            Signed:  Electronically signed by Alan Esquivel PA-C on 3/6/20 at 9:36 AM

## 2020-03-09 ENCOUNTER — TELEPHONE (OUTPATIENT)
Dept: SURGERY | Age: 70
End: 2020-03-09

## 2020-03-09 NOTE — TELEPHONE ENCOUNTER
Spoke with pt daughter and answered her questions. Offered to see her tomorrow, but they will wait until Wednesday unless something changes.

## 2020-03-10 NOTE — PROGRESS NOTES
with response to therapy. No residual   enhancement of the right breast. Normal appearance of the right   pectoralis musculature. 2. Residual prominent right axillary lymph node, which is concerning   for residual disease. There appears to be a biopsy marker in this   lymph node. There is also a lymph node slightly lateral which is also   prominent. 3. Heterogeneous appearance of the liver compared to the prior. Consider CT abdomen and pelvis with contrast for further evaluation. 4. Cardiomegaly and small right pleural fluid. BI-RADS Final Assessment Category 6: Known Biopsy-Proven Malignancy         MAMMOGRAM/ULTRASOUND 2/14/2020  FINDINGS: Biopsy marker in the right upper outer breast. The   previously identified spiculated high density right upper outer   quadrant breast mass in the far posterior third is not definitely   demonstrated on today's exam. There are some small residual   pleomorphic calcifications. Targeted physical exam of the patient's right upper outer breast. No   convincing operable abnormality. Targeted ultrasound performed of the right upper outer breast, 6 cm   from the nipple demonstrates a 1.1 x 0.9 x 0.3 cm oval, parallel,   hypoechoic mass with indistinct margins. There appear to be some   internal echogenic foci, suspect residual calcifications. There is an   adjacent biopsy marker. Right axilla demonstrates a 1.2 x 1.6 x 0.9 cm lymph node containing   biopsy marker. This previously measured 1.2 x 1.9 x 0.7 cm on   1/27/2020. Right axilla also with an adjacent 0.7 cm lymph node, difficult to   directly compare to prior due to differences in measurement technique.       Impression   1. Findings consistent with response to therapy. Small residual   hypoechoic mass with calcifications adjacent to the biopsy marker. 2. Equivocal change of the previously biopsied right axillary lymph   node.  There is a closely adjacent round right axillary lymph node,   which could also be evaluated at time of surgery. Results and recommendations discussed with the patient and her   daughter in person at the conclusion of the exam.  A result letter   will be sent by mail. BI-RADS CATEGORY 6: KNOWN BIOPSY WITH PROVEN MALIGNANCY. She is now status post Right modified radical mastectomy       PATHOLOGY REVEALS:  FINAL DIAGNOSIS:    Breast, right modified radical mastectomy:   1.  Primary tumor site identified with extensive fibrosis and nests of  residual high-grade carcinoma present in lymphatic spaces.   2.  Tumor is present in a lymphatic space at the deep surgical excision  margin.   3.  Sections of nipple identified, negative for evidence of malignancy.   4.  Sections of benign skin with benign seborrheic keratoses.   5.  3 out of 11 lymph nodes, positive for metastatic carcinoma.   6.  Multiple discrete nest of cells identified within the nodes.   7.  Largest focus of metastasis measures 0.5 cm in greatest dimension.   8.  Negative for extracapsular spread. AJCC STAGE: ypT0, pN1a, pMx  INVASIVE CARCINOMA OF THE BREAST      PHYSICAL EXAM:  The  wounds look good with no evidence of infection, fluid accumulation, or skin necrosis. DENA drain was removed without incident and one DENA Drain remains. IMPRESSION:    Doing well s/p right mastectomy and axillary node dissection 3/3/2020    PLAN: See Fransisca More in one week to pull additional drain if ready. I will see her back in 1 month. She will call if anything changes. I have seen, examined and reviewed this patient medication list, appropriate labs and imaging studies. I reviewed relevant medical records and others physicians notes. I discussed the plans of care with the patient. I answered all the questions to the patients satisfaction. I, Dr Marco Gandara, personally performed the services described in this documentation as scribed by Trevor Blizzard, MA in my presence and is both accurate and complete.      (Please note that portions of this note were completed with a voice recognition program. Efforts were made to edit the dictations but occasionally words are mis-transcribed.)  Over 50% of the total visit time of 15 minutes in face to face encounter with the patient, out of which more than 50% of the time was spent in counseling patient or family and coordination of care. Counseling included but was not limited to time spent reviewing labs, imaging studies/ treatment plan and answering questions.

## 2020-03-11 ENCOUNTER — OFFICE VISIT (OUTPATIENT)
Dept: SURGERY | Age: 70
End: 2020-03-11

## 2020-03-11 VITALS — HEART RATE: 80 BPM | SYSTOLIC BLOOD PRESSURE: 110 MMHG | DIASTOLIC BLOOD PRESSURE: 80 MMHG

## 2020-03-11 PROCEDURE — 99024 POSTOP FOLLOW-UP VISIT: CPT | Performed by: SURGERY

## 2020-03-12 NOTE — PROGRESS NOTES
Errol Mora   1950   3/13/2020       INTERVAL HISTORY/HISTORY OF PRESENT ILLNESS:    Diagnosis   · HER-2 IDC right breast, 2019  · [id number]  · ER 3%, MS 0%, HER-2/lea IHC 3+  · Mammaprint high risk Basal-like  · HER-2 FISH Positive    Treatment summary  · 10/18/2019 through  2020- 6 cycles of neoadjuvant Taxotere, Carboplatin and Herceptin and Perjeta  · 3/3/2020- right mastectomy/ lymph node dissection  · 3/20/2020- anticipated 14 cycles of adjuvant TDM 1  · Anticipated adjuvant radiation  · Anticipated adjuvant endocrine therapy      The patient is a pleasant 68years old female who has been diagnosed with locally advanced HER-2 positive breast cancer. She has received neoadjuvant chemotherapy with Taxotere, carboplatin Herceptin Perjeta. She underwent surgery by Dr. Rk Trivedi earlier this month revealing residual lorena disease. She had a complete pathologic response in the breast.  She is here to discuss further treatment recommendations. She is recovering well from surgery. Cancer history  Ms Phoebe Meehan was first seen by me on 10/03/2019. She felt a mass in the right upper breast and therefore contact her primary care provider. A diagnostic mammogram was performed. Her mother  of breast cancer at the age of 50. She has no other history of breast cancer or any other cancer in her family. · 2019- diagnostic mammogram showed a suspicious 2.2 cm hypoechoic spiculated right breast mass in the axillary tail within the internal calcifications had suggest malignancy. Ultrasound of the breast showed the lesion as well as a pathologic appearing right axillary adenopathy. · 2019-core needle biopsy consistent with high grade invasive ductal carcinoma. MammaPrint high risk basal type. ER 3%, MS 0.2%, HER-2/lea IHC 3+ Ki-67 22%.   HER-2/lea FISH positive  · 2019- MRI breast showed a large right breast mass compatible with primary malignancy measuring 2.2 x 1.2 x 3.5 cm in size. Abnormal enhancement in the pectoralis major muscle suggesting extension into the muscle. Multiple abnormal lymph nodes are seen in the right axilla with the largest measuring 2 x 1.6 cm. A small lymph node is seen along the right internal mammary chain. No suspicious left axillary adenopathy. · 9/27/2019-CT chest abdomen pelvis and bone scan showed no evidence of metastatic disease. · 10/3/2019- she was first seen by me. Recommended neoadjuvant chemotherapy approach with dose dense Adriamycin/cyclophosphamide x4 cycles followed by 12 weekly cycles of Taxol 80 mg/m² with Herceptin and Perjeta. · 10/14/2019- consultation at [facility]. Recommended Taxotere, carboplatin Herceptin Perjeta. · 10/17/2019- 1/31/2020 completion of 6 cycles of of neoadjuvant chemotherapy with Taxotere carboplatin Herceptin Perjeta. · 2/14/2020-MRI breast.  Showed findings consistent with response to therapy. No residual enhancement of the right breast.  Residual right axillary lymph node concerning for residual disease. · 2/21/2020- maintenance Herceptin/Perjeta to complete 1 year of treatment. · 3/03/2020-she underwent a right mastectomy/axillary dissection by Dr. Nazia Thompson at [facility].  Primary tumor site identified with extensive fibrosis and nests of residual high-grade carcinoma present in lymphatic spaces. Tumor is present in a lymphatic space at the deep surgical excision margin. 3 out of 11 lymph nodes, positive for metastatic carcinoma, at least 1 metastasis greater than 2.0 mm . Largest focus of metastasis measures 0.5 cm in greatest dimension.  AJCC STAGE: ypT0, pN1a, pMx      PAST MEDICAL HISTORY:   Past Medical History:   Diagnosis Date    Malignant neoplasm of unspecified site of unspecified female breast ([address]     breast          PAST SURGICAL HISTORY:  Past Surgical History:   Procedure Laterality Date    BREAST BIOPSY Right 09/05/2019    COLONOSCOPY N/A 10/1/2019    Dr Barbie Reynaga, internal hemorrhoids-Grade 2 without bleeding stigmata and external hemorrhoids-TV x 1, AP x 1, 3 yr recall    MASTECTOMY Right 3/3/2020    RIGHT SIMPLE MASTECTOMY WITH SENTINEL NODE BIOPSY, FLAPS, PEC BLOCK performed by Ana Stearns MD at [address] [place] [address] N/A 10/4/2019    PORT INSERTION WITH FLUORO performed by Ana Stearns MD at [facility] N/A 10/1/2019    Dr Mai Escudero hernia, HP gastric polyps        SOCIAL HISTORY:  Social History     Socioeconomic History    Marital status:      Spouse name: None    Number of children: None    Years of education: None    Highest education level: None   Occupational History    None   Social Needs    Financial resource strain: None    Food insecurity     Worry: None     Inability: None    Transportation needs     Medical: None     Non-medical: None   Tobacco Use    Smoking status: Never Smoker    Smokeless tobacco: Never Used   Substance and Sexual Activity    Alcohol use: Never     Frequency: Never    Drug use: Never    Sexual activity: Yes   Lifestyle    Physical activity     Days per week: None     Minutes per session: None    Stress: None   Relationships    Social connections     Talks on phone: None     Gets together: None     Attends Temple service: None     Active member of club or organization: None     Attends meetings of clubs or organizations: None     Relationship status: None    Intimate partner violence     Fear of current or ex partner: None     Emotionally abused: None     Physically abused: None     Forced sexual activity: None   Other Topics Concern    None   Social History Narrative    None       FAMILY HISTORY:  Family History   Problem Relation Age of Onset    Breast Cancer Mother 39    Colon Cancer Neg Hx     Colon Polyps Neg Hx         Current Outpatient Medications   Medication Sig Dispense Refill    montelukast (SINGULAIR) 10 MG tablet Take 1 tablet by mouth daily 30 tablet 3    ferrous sulfate 325 (65 Fe) MG tablet Take 325 mg by mouth daily (with breakfast)      ondansetron (ZOFRAN) 8 MG tablet Take 1 tablet by mouth every 8 hours as needed for Nausea or Vomiting 30 tablet 1    vitamin D (ERGOCALCIFEROL) 91260 units CAPS capsule Take 1 capsule by mouth once a week 12 capsule 1    Calcium Carbonate-Vitamin D (OYSTER SHELL CALCIUM/D) 500-200 MG-UNIT TABS Take 1 tablet by mouth daily 360 tablet 0     No current facility-administered medications for this visit. REVIEW OF SYSTEMS:    Constitutional: no fever, no night sweats,  fatigue;   HEENT: no blurring of vision, no double vision, no hearing difficulty, no tinnitus,no ulceration, no dysphagia  Lungs: no cough, no shortness of breath, no wheeze;   CVS: no palpitation, no chest pain, no shortness of breath;  GI: no abdominal pain, no nausea , no vomiting, no constipation; intermittent diarrhea after treatment relieved by Imodium   Breast: Right breast mass  BRYNN: no dysuria, frequency and urgency, no hematuria, no kidney stones;   Musculoskeletal: no joint pain, swelling , stiffness;   Endocrine: no polyuria, polydypsia, no cold or heat intolerence; Hematology/lymphatic: no easy brusing or bleeding, no hx of clotting disorder; no peripheral adenopathy. Dermatology: no skin rash, no eczema, no pruritis; mild alopecia  Psychiatry: no depression, no anxiety,no panic attacks, no suicide ideation; Neurology: no syncope, no seizures, no numbness or tingling of hands, no numbness or tingling of feet, no paresis;     PHYSICAL EXAM:    Vitals signs:  /62   Pulse 98   Ht 5' 7\" (1.702 m)   Wt 122 lb 14.4 oz (55.7 kg)   SpO2 98%   BMI 19.25 kg/m²    Pain scale:  Pain Score:   0 - No pain     CONSTITUTIONAL: Alert, appropriate, no acute distress,   EYES: Non icteric, EOM intact, pupils equal round and reactive to light and accommodation.     ENT: Oral mucus

## 2020-03-13 ENCOUNTER — OFFICE VISIT (OUTPATIENT)
Dept: HEMATOLOGY | Age: 70
End: 2020-03-13
Payer: MEDICARE

## 2020-03-13 ENCOUNTER — HOSPITAL ENCOUNTER (OUTPATIENT)
Dept: INFUSION THERAPY | Age: 70
Discharge: HOME | End: 2020-03-13
Payer: MEDICARE

## 2020-03-13 ENCOUNTER — APPOINTMENT (OUTPATIENT)
Dept: INFUSION THERAPY | Age: 70
End: 2020-03-13
Payer: MEDICARE

## 2020-03-13 VITALS
HEIGHT: 67 IN | OXYGEN SATURATION: 98 % | SYSTOLIC BLOOD PRESSURE: 100 MMHG | HEART RATE: 98 BPM | DIASTOLIC BLOOD PRESSURE: 62 MMHG | WEIGHT: 122.9 LBS | BODY MASS INDEX: 19.29 KG/M2

## 2020-03-13 DIAGNOSIS — C50.812 MALIGNANT NEOPLASM OF OVERLAPPING SITES OF LEFT BREAST IN FEMALE, ESTROGEN RECEPTOR POSITIVE (HCC): ICD-10-CM

## 2020-03-13 DIAGNOSIS — Z17.0 MALIGNANT NEOPLASM OF OVERLAPPING SITES OF LEFT BREAST IN FEMALE, ESTROGEN RECEPTOR POSITIVE (HCC): ICD-10-CM

## 2020-03-13 PROCEDURE — 1123F ACP DISCUSS/DSCN MKR DOCD: CPT | Performed by: INTERNAL MEDICINE

## 2020-03-13 PROCEDURE — 99213 OFFICE O/P EST LOW 20 MIN: CPT

## 2020-03-13 PROCEDURE — 3017F COLORECTAL CA SCREEN DOC REV: CPT | Performed by: INTERNAL MEDICINE

## 2020-03-13 PROCEDURE — G8399 PT W/DXA RESULTS DOCUMENT: HCPCS | Performed by: INTERNAL MEDICINE

## 2020-03-13 PROCEDURE — G8420 CALC BMI NORM PARAMETERS: HCPCS | Performed by: INTERNAL MEDICINE

## 2020-03-13 PROCEDURE — G8427 DOCREV CUR MEDS BY ELIG CLIN: HCPCS | Performed by: INTERNAL MEDICINE

## 2020-03-13 PROCEDURE — 1036F TOBACCO NON-USER: CPT | Performed by: INTERNAL MEDICINE

## 2020-03-13 PROCEDURE — 99214 OFFICE O/P EST MOD 30 MIN: CPT | Performed by: INTERNAL MEDICINE

## 2020-03-13 PROCEDURE — G8484 FLU IMMUNIZE NO ADMIN: HCPCS | Performed by: INTERNAL MEDICINE

## 2020-03-13 PROCEDURE — 1090F PRES/ABSN URINE INCON ASSESS: CPT | Performed by: INTERNAL MEDICINE

## 2020-03-13 PROCEDURE — 85025 COMPLETE CBC W/AUTO DIFF WBC: CPT

## 2020-03-13 PROCEDURE — 4040F PNEUMOC VAC/ADMIN/RCVD: CPT | Performed by: INTERNAL MEDICINE

## 2020-03-16 ENCOUNTER — HOSPITAL ENCOUNTER (OUTPATIENT)
Dept: NON INVASIVE DIAGNOSTICS | Age: 70
Discharge: HOME | End: 2020-03-16
Payer: MEDICARE

## 2020-03-16 LAB
LV EF: 60 %
LVEF MODALITY: NORMAL

## 2020-03-16 PROCEDURE — 93308 TTE F-UP OR LMTD: CPT

## 2020-03-17 PROBLEM — Z17.0 MALIGNANT NEOPLASM OF UPPER-OUTER QUADRANT OF RIGHT BREAST IN FEMALE, ESTROGEN RECEPTOR POSITIVE (HCC): Status: ACTIVE | Noted: 2020-03-17

## 2020-03-17 PROBLEM — C50.919 MALIGNANT NEOPLASM OF UNSPECIFIED SITE OF UNSPECIFIED FEMALE BREAST (HCC): Status: ACTIVE | Noted: 2020-03-17

## 2020-03-17 PROBLEM — C50.411 MALIGNANT NEOPLASM OF UPPER-OUTER QUADRANT OF RIGHT BREAST IN FEMALE, ESTROGEN RECEPTOR POSITIVE (HCC): Status: ACTIVE | Noted: 2020-03-17

## 2020-03-17 RX ORDER — ERGOCALCIFEROL 1.25 MG/1
50000 CAPSULE ORAL WEEKLY
Status: ON HOLD | COMMUNITY
Start: 2019-08-24 | End: 2021-06-29

## 2020-03-17 RX ORDER — MONTELUKAST SODIUM 10 MG/1
10 TABLET ORAL DAILY
Status: ON HOLD | COMMUNITY
Start: 2020-01-27 | End: 2021-06-29

## 2020-03-17 RX ORDER — ONDANSETRON HYDROCHLORIDE 8 MG/1
8 TABLET, FILM COATED ORAL EVERY 8 HOURS PRN
COMMUNITY
Start: 2019-10-25

## 2020-03-17 RX ORDER — FERROUS SULFATE 325(65) MG
325 TABLET ORAL DAILY
Status: ON HOLD | COMMUNITY
End: 2021-06-29

## 2020-03-18 ENCOUNTER — OFFICE VISIT (OUTPATIENT)
Dept: SURGERY | Age: 70
End: 2020-03-18

## 2020-03-18 VITALS
BODY MASS INDEX: 19.15 KG/M2 | WEIGHT: 122 LBS | OXYGEN SATURATION: 98 % | HEART RATE: 90 BPM | TEMPERATURE: 98.4 F | HEIGHT: 67 IN

## 2020-03-18 PROBLEM — Z90.11 S/P MASTECTOMY, RIGHT: Status: ACTIVE | Noted: 2020-03-18

## 2020-03-18 PROBLEM — Z98.890 S/P LYMPH NODE BIOPSY: Status: ACTIVE | Noted: 2020-03-18

## 2020-03-18 PROCEDURE — 99024 POSTOP FOLLOW-UP VISIT: CPT | Performed by: PHYSICIAN ASSISTANT

## 2020-03-19 ENCOUNTER — CONSULT (OUTPATIENT)
Dept: RADIATION ONCOLOGY | Facility: HOSPITAL | Age: 70
End: 2020-03-19

## 2020-03-19 ENCOUNTER — HOSPITAL ENCOUNTER (OUTPATIENT)
Dept: RADIATION ONCOLOGY | Facility: HOSPITAL | Age: 70
Setting detail: RADIATION/ONCOLOGY SERIES
End: 2020-03-19

## 2020-03-19 VITALS
SYSTOLIC BLOOD PRESSURE: 117 MMHG | HEIGHT: 67 IN | WEIGHT: 124 LBS | DIASTOLIC BLOOD PRESSURE: 56 MMHG | BODY MASS INDEX: 19.46 KG/M2

## 2020-03-19 DIAGNOSIS — Z51.11 MAINTENANCE CHEMOTHERAPY: ICD-10-CM

## 2020-03-19 DIAGNOSIS — Z98.890 S/P LYMPH NODE BIOPSY: ICD-10-CM

## 2020-03-19 DIAGNOSIS — Z90.11 S/P MASTECTOMY, RIGHT: ICD-10-CM

## 2020-03-19 DIAGNOSIS — C50.911 HER2-POSITIVE CARCINOMA OF RIGHT BREAST (HCC): Primary | ICD-10-CM

## 2020-03-19 PROBLEM — C50.812 MALIGNANT NEOPLASM OF OVERLAPPING SITES OF LEFT FEMALE BREAST (HCC): Status: ACTIVE | Noted: 2019-09-24

## 2020-03-19 PROBLEM — C50.611 MALIGNANT NEOPLASM OF AXILLARY TAIL OF RIGHT FEMALE BREAST (HCC): Status: ACTIVE | Noted: 2019-09-05

## 2020-03-19 PROCEDURE — G0463 HOSPITAL OUTPT CLINIC VISIT: HCPCS | Performed by: RADIOLOGY

## 2020-03-20 ENCOUNTER — HOSPITAL ENCOUNTER (OUTPATIENT)
Dept: INFUSION THERAPY | Age: 70
Setting detail: INFUSION SERIES
Discharge: HOME | End: 2020-03-20
Payer: MEDICARE

## 2020-03-20 VITALS
TEMPERATURE: 97.9 F | OXYGEN SATURATION: 99 % | HEART RATE: 72 BPM | DIASTOLIC BLOOD PRESSURE: 70 MMHG | SYSTOLIC BLOOD PRESSURE: 119 MMHG | RESPIRATION RATE: 17 BRPM

## 2020-03-20 DIAGNOSIS — C50.812 MALIGNANT NEOPLASM OF OVERLAPPING SITES OF LEFT FEMALE BREAST, UNSPECIFIED ESTROGEN RECEPTOR STATUS (HCC): ICD-10-CM

## 2020-03-20 DIAGNOSIS — C50.911 MALIGNANT NEOPLASM OF RIGHT FEMALE BREAST, UNSPECIFIED ESTROGEN RECEPTOR STATUS, UNSPECIFIED SITE OF BREAST (HCC): ICD-10-CM

## 2020-03-20 DIAGNOSIS — Z51.11 ENCOUNTER FOR ANTINEOPLASTIC CHEMOTHERAPY: Primary | ICD-10-CM

## 2020-03-20 LAB
ALBUMIN SERPL-MCNC: 3.8 G/DL (ref 3.5–5.2)
ALP BLD-CCNC: 84 U/L (ref 35–104)
ALT SERPL-CCNC: 16 U/L (ref 5–33)
ANION GAP SERPL CALCULATED.3IONS-SCNC: 10 MMOL/L (ref 7–19)
AST SERPL-CCNC: 24 U/L (ref 5–32)
BASOPHILS ABSOLUTE: 0 K/UL (ref 0–0.2)
BASOPHILS RELATIVE PERCENT: 0.3 % (ref 0–1)
BILIRUB SERPL-MCNC: <0.2 MG/DL (ref 0.2–1.2)
BUN BLDV-MCNC: 25 MG/DL (ref 8–23)
CALCIUM SERPL-MCNC: 8.8 MG/DL (ref 8.8–10.2)
CHLORIDE BLD-SCNC: 108 MMOL/L (ref 98–111)
CO2: 27 MMOL/L (ref 22–29)
CREAT SERPL-MCNC: 0.6 MG/DL (ref 0.5–0.9)
EOSINOPHILS ABSOLUTE: 0.1 K/UL (ref 0–0.6)
EOSINOPHILS RELATIVE PERCENT: 1.8 % (ref 0–5)
GFR NON-AFRICAN AMERICAN: >60
GLUCOSE BLD-MCNC: 107 MG/DL (ref 74–109)
HCT VFR BLD CALC: 26.2 % (ref 37–47)
HEMOGLOBIN: 8.3 G/DL (ref 12–16)
IMMATURE GRANULOCYTES #: 0 K/UL
LYMPHOCYTES ABSOLUTE: 1.2 K/UL (ref 1.1–4.5)
LYMPHOCYTES RELATIVE PERCENT: 15.8 % (ref 20–40)
MCH RBC QN AUTO: 38.2 PG (ref 27–31)
MCHC RBC AUTO-ENTMCNC: 31.7 G/DL (ref 33–37)
MCV RBC AUTO: 120.7 FL (ref 81–99)
MONOCYTES ABSOLUTE: 0.4 K/UL (ref 0–0.9)
MONOCYTES RELATIVE PERCENT: 4.8 % (ref 0–10)
NEUTROPHILS ABSOLUTE: 5.7 K/UL (ref 1.5–7.5)
NEUTROPHILS RELATIVE PERCENT: 77 % (ref 50–65)
PDW BLD-RTO: 14.5 % (ref 11.5–14.5)
PLATELET # BLD: 140 K/UL (ref 130–400)
PMV BLD AUTO: 10.9 FL (ref 9.4–12.3)
POTASSIUM SERPL-SCNC: 3.5 MMOL/L (ref 3.5–5)
RBC # BLD: 2.17 M/UL (ref 4.2–5.4)
SODIUM BLD-SCNC: 145 MMOL/L (ref 136–145)
TOTAL PROTEIN: 6 G/DL (ref 6.6–8.7)
WBC # BLD: 7.4 K/UL (ref 4.8–10.8)

## 2020-03-20 PROCEDURE — 6360000002 HC RX W HCPCS: Performed by: INTERNAL MEDICINE

## 2020-03-20 PROCEDURE — 36591 DRAW BLOOD OFF VENOUS DEVICE: CPT

## 2020-03-20 PROCEDURE — 96413 CHEMO IV INFUSION 1 HR: CPT

## 2020-03-20 PROCEDURE — 96415 CHEMO IV INFUSION ADDL HR: CPT

## 2020-03-20 PROCEDURE — 2580000003 HC RX 258: Performed by: INTERNAL MEDICINE

## 2020-03-20 PROCEDURE — 80053 COMPREHEN METABOLIC PANEL: CPT

## 2020-03-20 PROCEDURE — 85025 COMPLETE CBC W/AUTO DIFF WBC: CPT

## 2020-03-20 RX ORDER — HEPARIN SODIUM (PORCINE) LOCK FLUSH IV SOLN 100 UNIT/ML 100 UNIT/ML
500 SOLUTION INTRAVENOUS PRN
Status: DISCONTINUED | OUTPATIENT
Start: 2020-03-20 | End: 2020-03-21 | Stop reason: HOSPADM

## 2020-03-20 RX ORDER — SODIUM CHLORIDE 9 MG/ML
20 INJECTION, SOLUTION INTRAVENOUS ONCE
Status: CANCELLED | OUTPATIENT
Start: 2020-03-20

## 2020-03-20 RX ORDER — METHYLPREDNISOLONE SODIUM SUCCINATE 125 MG/2ML
125 INJECTION, POWDER, LYOPHILIZED, FOR SOLUTION INTRAMUSCULAR; INTRAVENOUS ONCE
Status: CANCELLED | OUTPATIENT
Start: 2020-03-20

## 2020-03-20 RX ORDER — DEXAMETHASONE SODIUM PHOSPHATE 10 MG/ML
10 INJECTION, SOLUTION INTRAMUSCULAR; INTRAVENOUS ONCE
Status: COMPLETED | OUTPATIENT
Start: 2020-03-20 | End: 2020-03-20

## 2020-03-20 RX ORDER — MEPERIDINE HYDROCHLORIDE 50 MG/ML
12.5 INJECTION INTRAMUSCULAR; INTRAVENOUS; SUBCUTANEOUS ONCE
Status: CANCELLED | OUTPATIENT
Start: 2020-03-20

## 2020-03-20 RX ORDER — SODIUM CHLORIDE 0.9 % (FLUSH) 0.9 %
10 SYRINGE (ML) INJECTION PRN
Status: CANCELLED | OUTPATIENT
Start: 2020-03-20

## 2020-03-20 RX ORDER — SODIUM CHLORIDE 9 MG/ML
INJECTION, SOLUTION INTRAVENOUS CONTINUOUS
Status: CANCELLED | OUTPATIENT
Start: 2020-03-20

## 2020-03-20 RX ORDER — SODIUM CHLORIDE 0.9 % (FLUSH) 0.9 %
5 SYRINGE (ML) INJECTION PRN
Status: CANCELLED | OUTPATIENT
Start: 2020-03-20

## 2020-03-20 RX ORDER — HEPARIN SODIUM (PORCINE) LOCK FLUSH IV SOLN 100 UNIT/ML 100 UNIT/ML
500 SOLUTION INTRAVENOUS PRN
Status: CANCELLED | OUTPATIENT
Start: 2020-03-20

## 2020-03-20 RX ORDER — SODIUM CHLORIDE 0.9 % (FLUSH) 0.9 %
10 SYRINGE (ML) INJECTION PRN
Status: DISCONTINUED | OUTPATIENT
Start: 2020-03-20 | End: 2020-03-21 | Stop reason: HOSPADM

## 2020-03-20 RX ORDER — EPINEPHRINE 1 MG/ML
0.3 INJECTION, SOLUTION, CONCENTRATE INTRAVENOUS PRN
Status: CANCELLED | OUTPATIENT
Start: 2020-03-20

## 2020-03-20 RX ORDER — SODIUM CHLORIDE 9 MG/ML
20 INJECTION, SOLUTION INTRAVENOUS ONCE
Status: COMPLETED | OUTPATIENT
Start: 2020-03-20 | End: 2020-03-21

## 2020-03-20 RX ORDER — DIPHENHYDRAMINE HYDROCHLORIDE 50 MG/ML
50 INJECTION INTRAMUSCULAR; INTRAVENOUS ONCE
Status: CANCELLED | OUTPATIENT
Start: 2020-03-20

## 2020-03-20 RX ADMIN — DEXAMETHASONE SODIUM PHOSPHATE 10 MG: 10 INJECTION, SOLUTION INTRAMUSCULAR; INTRAVENOUS at 09:50

## 2020-03-20 RX ADMIN — HEPARIN 500 UNITS: 100 SYRINGE at 12:27

## 2020-03-20 RX ADMIN — ADO-TRASTUZUMAB EMTANSINE 200.6 MG: 20 INJECTION, POWDER, LYOPHILIZED, FOR SOLUTION INTRAVENOUS at 10:32

## 2020-03-20 RX ADMIN — SODIUM CHLORIDE 20 ML/HR: 9 INJECTION, SOLUTION INTRAVENOUS at 09:46

## 2020-03-23 PROBLEM — C50.811 MALIGNANT NEOPLASM OF OVERLAPPING SITES OF RIGHT FEMALE BREAST (HCC): Status: ACTIVE | Noted: 2019-09-24

## 2020-03-25 ENCOUNTER — OFFICE VISIT (OUTPATIENT)
Dept: SURGERY | Age: 70
End: 2020-03-25

## 2020-03-25 ENCOUNTER — TELEMEDICINE (OUTPATIENT)
Dept: INTERNAL MEDICINE | Age: 70
End: 2020-03-25
Payer: MEDICARE

## 2020-03-25 VITALS
HEART RATE: 83 BPM | BODY MASS INDEX: 18.99 KG/M2 | TEMPERATURE: 98.1 F | OXYGEN SATURATION: 98 % | WEIGHT: 121 LBS | HEIGHT: 67 IN

## 2020-03-25 PROBLEM — D53.9 MACROCYTIC ANEMIA: Status: ACTIVE | Noted: 2019-09-24

## 2020-03-25 PROBLEM — E55.9 HYPOVITAMINOSIS D: Status: ACTIVE | Noted: 2020-03-25

## 2020-03-25 PROBLEM — N63.10 BREAST MASS, RIGHT: Status: RESOLVED | Noted: 2019-08-21 | Resolved: 2020-03-25

## 2020-03-25 PROCEDURE — G8399 PT W/DXA RESULTS DOCUMENT: HCPCS | Performed by: INTERNAL MEDICINE

## 2020-03-25 PROCEDURE — 1090F PRES/ABSN URINE INCON ASSESS: CPT | Performed by: INTERNAL MEDICINE

## 2020-03-25 PROCEDURE — G8484 FLU IMMUNIZE NO ADMIN: HCPCS | Performed by: INTERNAL MEDICINE

## 2020-03-25 PROCEDURE — 1036F TOBACCO NON-USER: CPT | Performed by: INTERNAL MEDICINE

## 2020-03-25 PROCEDURE — 4040F PNEUMOC VAC/ADMIN/RCVD: CPT | Performed by: INTERNAL MEDICINE

## 2020-03-25 PROCEDURE — G8420 CALC BMI NORM PARAMETERS: HCPCS | Performed by: INTERNAL MEDICINE

## 2020-03-25 PROCEDURE — 3017F COLORECTAL CA SCREEN DOC REV: CPT | Performed by: INTERNAL MEDICINE

## 2020-03-25 PROCEDURE — 99214 OFFICE O/P EST MOD 30 MIN: CPT | Performed by: INTERNAL MEDICINE

## 2020-03-25 PROCEDURE — 99024 POSTOP FOLLOW-UP VISIT: CPT | Performed by: PHYSICIAN ASSISTANT

## 2020-03-25 PROCEDURE — 1123F ACP DISCUSS/DSCN MKR DOCD: CPT | Performed by: INTERNAL MEDICINE

## 2020-03-25 PROCEDURE — G8427 DOCREV CUR MEDS BY ELIG CLIN: HCPCS | Performed by: INTERNAL MEDICINE

## 2020-03-25 ASSESSMENT — ENCOUNTER SYMPTOMS
SHORTNESS OF BREATH: 0
SINUS PAIN: 0
BLOOD IN STOOL: 0
DIARRHEA: 0
RHINORRHEA: 0
BACK PAIN: 0
WHEEZING: 0
VOMITING: 0
CHEST TIGHTNESS: 0
COUGH: 0
CONSTIPATION: 0
ABDOMINAL PAIN: 0
TROUBLE SWALLOWING: 0

## 2020-03-25 NOTE — PROGRESS NOTES
Subjective:      Patient ID: Crista Kumar is a 68 y.o. female. HPI  Crista Kumar presents today in follow-up from right modified radical mastectomy. She is doing well. Patient Active Problem List    Diagnosis Date Noted    Hypovitaminosis D 03/25/2020    S/P right mastectomy and AND 3/3/2020 03/03/2020    Encounter for antineoplastic chemotherapy      Malignant neoplasm of unspecified site of unspecified female breast ([facility] [address]     Macrocytic anemia 09/24/2019    Malignant neoplasm of overlapping sites of right female breast ([facility] [address] 09/24/2019    Fatigue 08/21/2019    Right bundle branch block (RBBB) 08/21/2019    Menopause 08/21/2019    Other specified disorders of bone density and structure, right upper arm  08/21/2019     Current Outpatient Medications   Medication Sig Dispense Refill    montelukast (SINGULAIR) 10 MG tablet Take 1 tablet by mouth daily 30 tablet 3    ferrous sulfate 325 (65 Fe) MG tablet Take 325 mg by mouth daily (with breakfast)      ondansetron (ZOFRAN) 8 MG tablet Take 1 tablet by mouth every 8 hours as needed for Nausea or Vomiting 30 tablet 1    vitamin D (ERGOCALCIFEROL) 85092 units CAPS capsule Take 1 capsule by mouth once a week 12 capsule 1    Calcium Carbonate-Vitamin D (OYSTER SHELL CALCIUM/D) 500-200 MG-UNIT TABS Take 1 tablet by mouth daily 360 tablet 0     No current facility-administered medications for this visit. Allergies: Patient has no known allergies.      Past Medical History:   Diagnosis Date    Malignant neoplasm of unspecified site of unspecified female breast [facility])     breast     Past Surgical History:   Procedure Laterality Date    BREAST BIOPSY Right 09/05/2019    COLONOSCOPY N/A 10/1/2019    Dr Flaco Lizarraga, internal hemorrhoids-Grade 2 without bleeding stigmata and external hemorrhoids-TV x 1, AP x 1, 3 yr recall    MASTECTOMY Right 3/3/2020    RIGHT SIMPLE MASTECTOMY WITH SENTINEL NODE BIOPSY, FLAPS, PEC BLOCK performed by

## 2020-03-25 NOTE — PROGRESS NOTES
3/25/2020    TELEHEALTH EVALUATION -- Audio/Visual (During - public health emergency)    HPI:  71year-old female FUV  Elevated BP, now controlled  R Breast Cancer, ff Claudino, will be getting RT  She has chronic fatigue, sleeps well  No fever  She does not leave her house      Right breast, 11:00, core biopsies:  Invasive ductal carcinoma, grade 3. Largest contiguous area of tumor measures 12 mm. High-grade ductal carcinoma in situ, solid type. Héctor Matthew (:  1950) has requested an audio/video evaluation for the following concern(s):  FUV      Review of Systems   Constitutional: Positive for fatigue. Negative for activity change, chills and fever. HENT: Negative for hearing loss, postnasal drip, rhinorrhea, sinus pain and trouble swallowing. Eyes: Negative for visual disturbance. Respiratory: Negative for cough, chest tightness, shortness of breath and wheezing. Cardiovascular: Negative for chest pain, palpitations and leg swelling. Gastrointestinal: Negative for abdominal pain, blood in stool, constipation, diarrhea and vomiting. Endocrine: Negative for cold intolerance. Genitourinary: Negative for frequency and urgency. Musculoskeletal: Negative for arthralgias, back pain and myalgias. Allergic/Immunologic: Negative for environmental allergies. Neurological: Negative for light-headedness, numbness and headaches. Psychiatric/Behavioral: Negative for sleep disturbance. Prior to Visit Medications    Medication Sig Taking?  Authorizing Provider   montelukast (SINGULAIR) 10 MG tablet Take 1 tablet by mouth daily  Eduarda Rutledge MD   ferrous sulfate 325 (65 Fe) MG tablet Take 325 mg by mouth daily (with breakfast)  Historical Provider, MD   ondansetron (ZOFRAN) 8 MG tablet Take 1 tablet by mouth every 8 hours as needed for Nausea or Vomiting  Maria De Jesus Her MD   vitamin D (ERGOCALCIFEROL) 60270 units CAPS capsule Take 1 capsule by mouth once a week  Ekaterina Hi Dr. Lackey, I saw this patient yesterday for skin check. He has a history of melanoma in situ.  I noted Imuran on his med list (which increases risk of skin cancers) and he denies that he has taken it for a long time.  Wanted to make you aware.  He says he is only on Humira for his Crohn's at this time.  Thanks, Makenna Barragan (derm) MD Mouna   Calcium Carbonate-Vitamin D (OYSTER SHELL CALCIUM/D) 500-200 MG-UNIT TABS Take 1 tablet by mouth daily  Nolvia Zaidi MD       Social History     Tobacco Use    Smoking status: Never Smoker    Smokeless tobacco: Never Used   Substance Use Topics    Alcohol use: Never     Frequency: Never    Drug use: Never        No Known Allergies,   Past Medical History:   Diagnosis Date    Malignant neoplasm of unspecified site of unspecified female breast ([facility] [address]     breast   ,   Past Surgical History:   Procedure Laterality Date    BREAST BIOPSY Right 09/05/2019    COLONOSCOPY N/A 10/1/2019    Dr Denia Mclaughlin, internal hemorrhoids-Grade 2 without bleeding stigmata and external hemorrhoids-TV x 1, AP x 1, 3 yr recall    MASTECTOMY Right 3/3/2020    RIGHT SIMPLE MASTECTOMY WITH SENTINEL NODE BIOPSY, FLAPS, PEC BLOCK performed by Mariluz Sanderson MD at [address] [place] [address] N/A 10/4/2019    PORT INSERTION WITH FLUORO performed by Mariluz Sanderson MD at [facility] N/A 10/1/2019    Dr Benavidez Grain hernia, HP gastric polyps   ,   Social History     Tobacco Use    Smoking status: Never Smoker    Smokeless tobacco: Never Used   Substance Use Topics    Alcohol use: Never     Frequency: Never    Drug use: Never   ,   Family History   Problem Relation Age of Onset    Breast Cancer Mother 39    Colon Cancer Neg Hx     Colon Polyps Neg Hx    ,   There is no immunization history on file for this patient.,   Health Maintenance   Topic Date Due    Pneumococcal 65+ yrs at Risk Vaccine (1 of 2 - PCV13) 07/22/2013    Annual Wellness Visit (AWV)  08/21/2019    DTaP/Tdap/Td vaccine (1 - Tdap) 08/21/2020 (Originally 7/22/1969)    Shingles Vaccine (1 of 2) 08/21/2020 (Originally 7/22/1998)    Flu vaccine (1) 09/24/2020 (Originally 9/1/2019)    Breast cancer screen  02/14/2021    Colon cancer screen colonoscopy  10/01/2022    Lipid screen 08/22/2024    DEXA (modify frequency per FRAX score)  Completed    Hepatitis C screen  Completed    Hepatitis A vaccine  Aged Out    Hepatitis B vaccine  Aged Out    Hib vaccine  Aged Out    Meningococcal (ACWY) vaccine  Aged Out         Lab Results   Component Value Date    WBC 7.4 03/20/2020    HGB 8.3 (L) 03/20/2020    HCT 26.2 (L) 03/20/2020    .7 (H) 03/20/2020     03/20/2020     Lab Results   Component Value Date     03/20/2020    K 3.5 03/20/2020     03/20/2020    CO2 27 03/20/2020    BUN 25 (H) 03/20/2020    CREATININE 0.6 03/20/2020    GLUCOSE 107 03/20/2020    CALCIUM 8.8 03/20/2020    PROT 6.0 (L) 03/20/2020    LABALBU 3.8 03/20/2020    BILITOT <0.2 03/20/2020    ALKPHOS 84 03/20/2020    AST 24 03/20/2020    ALT 16 03/20/2020    LABGLOM >60 03/20/2020     Lab Results   Component Value Date    CHOL 189 08/22/2019     Lab Results   Component Value Date    TRIG 75 08/22/2019     Lab Results   Component Value Date    HDL 50 (L) 08/22/2019     Lab Results   Component Value Date    LDLCALC 124 08/22/2019     No results found for: LABVLDL, VLDL  No results found for: CHOLHDLRATIO      Due to this being a TeleHealth encounter, evaluation of the following organ systems is limited: Vitals/Constitutional/EENT/Resp/CV/GI//MS/Neuro/Skin/Heme-Lymph-Imm. ASSESSMENT/PLAN:  1. Malignant neoplasm of overlapping sites of right breast in female, estrogen receptor positive ([facility] [address]  Managed by Dr. Rocio PEREZ no    2. Macrocytic anemia  Patient is currently on iron replacement therapy will be monitoring her blood count    3. Fatigue due to patient may be having fatigue due to anemia plus chemo plus radiation patient will not have any lab work since we will be monitoring lab work has been ordered by her oncologist      4. Other specified disorders of bone density and structure, right upper arm       5. Right bundle branch block (RBBB)        Return in about 6 months (around 9/25/2020).     An

## 2020-03-26 NOTE — PROGRESS NOTES
Mrs. Thi Cook is a pleasant 69 y/o, w/f that presents s/p right modified radical mastectomy for breast CA. She has been managing her JPs that have been draining about 60 ml per 24 hrs. She denies any specific problems or issues. PE: Her incisional site appears to be healing well. Her JPs are noted to drain bloody fluid that appears appropriate. x 2. After review of her DENA output sheet, she is noted with > 60 ml per DENA for 24 hrs. IMPRESSION: Excessive DENA output s/p mastectomy     PLAN: Will leave the JPs in place for another week until her output has decreased to < 30 ml for 24 hrs. She was reminded to call if she has any questions or problems.        Electronically signed by Sapna Beckett PA-C on 3/26/20 at 2:59 PM CDT

## 2020-04-01 ENCOUNTER — TELEPHONE (OUTPATIENT)
Dept: SURGERY | Age: 70
End: 2020-04-01

## 2020-04-01 ENCOUNTER — TELEMEDICINE (OUTPATIENT)
Dept: SURGERY | Age: 70
End: 2020-04-01

## 2020-04-01 VITALS — WEIGHT: 122 LBS | BODY MASS INDEX: 19.15 KG/M2 | HEIGHT: 67 IN

## 2020-04-01 PROCEDURE — 99024 POSTOP FOLLOW-UP VISIT: CPT | Performed by: PHYSICIAN ASSISTANT

## 2020-04-02 NOTE — PROGRESS NOTES
This is a video visit for Johnie Lagos while patient was at home in [facility]. Subjective:      Patient ID: Johnie Lagos is a 68 y.o. female. HPI   Patient is currently status post right mastectomy with axillary node dissection. She is doing well. She reports no fever or fluid accumulation    Of note:  Ms. Alba Virgenpool  is status post ultrasound guided breast biopsy  on the right which revealed a 3.5  cm high grade  invasive ductal carcinoma. Fine-needle biopsy of axillary node was cytologically conclusive for malignancy. It is ER positive at 3%. NJ is negative. HER-2 is positive at 3+. Ki-67 is high at 22%.     MammaPrint demonstrates a high risk basal phenotype     She has been receiving neoadjuvant chemotherapy with Herceptin, Perjeta, Taxotere, and carboplatin. She has had an excellent clinical response. Review of Systems  Denies fevers or chills    Objective:   Physical Exam  Ms. Afsaneh Casas appears to be doing well she is in no acute distress. Her breathing appears nonlabored. There are no appreciable acute findings. Assessment:      Status post right modified radical, doing well      Plan:      She is scheduled to see her medical oncologist and Dr. Tarah Ortiz back within the month. She will call us if she has any problems.         Rabia Chamorro PA-C

## 2020-04-07 PROBLEM — Z78.9 NON-SMOKER: Status: ACTIVE | Noted: 2020-04-07

## 2020-04-08 NOTE — PROGRESS NOTES
[facility] Dolores Ambrocio, DIMAN, PA-C  _______________________________________________  [facility]  Department of Radiation Oncology  [address] [place] [id number]  Office: [phone]  Fax: [phone]    DATE:  04/09/2020  PATIENT: Maria Isabel Real  1950                         MEDICAL RECORD #:  [id number]                                                       Reason for Visit: Maria Isabel Real is a very pleasant 69 y.o. 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 Patient was seen in clinic for consultation on 03/19/2020 where radiation therapy was recommended to the right breast. Patient had undergone right breast mastectomy on 03/03/2020 per . KB drain remained in place and she was asked to return for CT simulation in 3 weeks if healed from recent surgery. Upon exam patient reports KB drain was removed, stitches intact, she is doing well.     HISTORY OF PRESENT ILLNESS  Diagnosed in September 2019 with Stage IIB ([id number]) HER-2 IDC right breast, ER 3%, IL 0%, HER-2/brielle IHC 3+/FISH positive. Mammaprint high risk Basal-like. Treated with neoadjuvant chemotherapy 10/18/2019 through March 2020- 6 cycles of neoadjuvant Taxotere, Carboplatin and Herceptin and Perjeta, Underwent right mastectomy/ lymph node dissection 3/3/2020, pathology revealed primary tumor site identified with extensive fibrosis and nests of residual high-grade carcinoma present in lymphatic spaces. Tumor is present in a lymphatic space at the deep margin, 3 out of 11 lymph nodes, positive for metastatic carcinoma with multiple discrete nest of cells identified within the nodes, largest focus 0.5 cm. Negative for extracapsular spread. AJCC STAGE: ypT0, pN1a, pMx.  Follows Dr. Forte who has discontinued Herceptin/Perjeta due to residual disease on  03/20/20 and anticipates 14 cycles of adjuvant TDM 1, adjuvant radiation followed by adjuvant endocrine therapy.     08/23/2019 - Bilateral Diagnostic Mammogram d/t palatable right breast nodule:  • Suspicious 2.2 cm hyperechoic spiculated right breast mass in the axillary tail with internal calcifications highly suggestive of malignancy. Ultrasound of the right breast demonstrates this lesion as well as pathologic-appearing right axillary lymphadenopathy.  • 4 mm nodule in the superior left breast, anterior depth, corresponds to a benign cyst on ultrasound. No suspicious mammographic abnormality of the left breast.  • BI-RADS CATEGORY 5: HIGHLY SUGGESTIVE OF MALIGNANCY. APPROPRIATE ACTION SHOULD BE TAKEN.    08/23/2019 - Left Breast Ultrasound:  • In the left breast at the 12 to 1:00 position, 2 cm from the nipple, there is benign 5 x 5 x 4 mm cyst which corresponds to the finding on the mammogram. This is consistent with a benign process.    08/23/2019 - Right Breast Ultrasound:  • 2.6 x 1.1 x 2.0 cm hypoechoic mass within the upper outer right breast at 10-11 o'clock, 6 cm from the nipple containing internal calcification is highly suggestive of malignancy.  • Enlarged right axillary lymph node with measurements provided above suggestive for axillary lymphatic metastasis.  • BI-RADS CATEGORY 5: HIGHLY SUGGESTIVE OF MALIGNANCY. APPROPRIATE ACTION SHOULD BE TAKEN.    09/05/2019 - Right breast, 11:00, core biopsies:  • Invasive ductal carcinoma, grade 3.  • Largest contiguous area of tumor measures 12 mm.  • High-grade ductal carcinoma in situ, solid type.  • ER 3%, CA 0%, Her-2/brielle IHC 3+  • Mammaprint high risk basal-like  • Her-2 FISH positive    09/19/2019 - MRI Bilateral Breasts:  • Large right breast mass compatible with known primary malignancy. Abnormal enhancement appears to extend posteriorly to the pectoralis major muscle, suggesting direct extension into/involvement of the muscle.  • Suspicious right  axillary lymphadenopathy. Small right internal mammary lymph node also noted.  • No MRI evidence of left breast malignancy.  • BI-RADS Category 6-known biopsy-proven malignancy.    09/27/2019 - CT Chest:  • Right breast mass and right axillary lymphadenopathy.  • No evidence of metastatic disease within the lungs are mediastinum.  • Small lucent foci within the lower cervical and upper thoracic vertebra are nonspecific. Close follow up of these findings will be Necessary.    09/27/2019 - CT Abdomen/Pelvis:  • No evidence of soft tissue metastases within the abdomen or pelvis. Indeterminant small bony lucencies within the lower thoracic and upper lumbar spine will require close follow-up. A bone scan is scheduled for later today though since these are small lucent lesions even if there is no bone scan abnormality continued close follow-up will be needed.    09/27/2019 - Bone Scan:  • No evidence of bony metastatic disease.  • The small lucent lesions within the spine noted on CT will require CT follow-up.    10/03/2019 - Consult with :  • Recommended neoadjuvant chemotherapy approach with dose dense Adriamycin/cyclophosphamide x4 cycles followed by 12 weekly cycles of Taxol 80 mg/m² with Herceptin and Perjeta.    10/04/2019 - Port placement per .    10/08/2019 - Bilateral Diagnostic Mammogram - [facility]:  • Focal asymmetry in the right breast at 11 o'clock, axillary tail located 5 centimeters from the nipple is known biopsy-proven malignancy. Ultrasound is recommended for staging.  • Axillary adenopathy and post biopsy clip in the right breast are known biopsy-proven malignancy. Ultrasound is recommended for staging.  • Mass in the left breast upper outer quadrant at 1 to 2 o'clock located 2 centimeters from the nipple is benign.  • BI-RADS Category 6: Known Biopsy Proven Malignancy    10/08/2019 - Right Breast Ultrasound:  • Irregular mass with associated corkscrew-shaped clip in the right  breast at the 10-11 o'clock represents the index breast cancer. Mass abuts the skin and the pectoralis muscle. Associated right axillary adenopathy with index node containing a corkscrew-shaped clip. Correlation with breast MRI is recommended to evaluate the underlying chest wall and to better delineate the extent of disease given the discrepancy between ultrasound and sonographic measurements. BI-RADS 6  • Indeterminate subcentimeter mass in the right breast 12 o'clock. Ultrasound-guided biopsy with clip placement performed today. BI-RADS 4    10/09/2019 - Consultation at [facility] [place]:   ASSESSMENT:  • Maria Isabel Real is a 69 y.o. female who presents with right ER/MS negative Her2 positive IDC in the axillary tail with likely node positive disease for evaluation of treatment plan and recommendations  PLAN:  • We discussed referral to genetic counseling given her family history of breast cancer. She would be interested in pursuing this. She will do this at home  • Will follow up pathology results of breast biopsy of mass 2 cm form known malignancy  • Will request pathology slides of outside LN biopsy to confirm LN disease  • We reviewed with her and her family her treatment plan. Given her Her2 positive disease, we agree that neoadjuvant therapy is appropriate. She will be seeing medical oncology to discuss this further today. We discussed mastectomy and lumpectomy as treatment options. She states she has discussed lumpectomy with her surgeon at home and is comfortable with this plan. She describes that her surgeon has discussed sentinel lymph node biopsy for axillary staging after neoadjuvant therapy. Dr. James would like to speak to the surgeon once her chemotherapy has been completed to discuss details of surgical approach to the axilla given that there is a clip in the lymph node which was read outside as being positive. She will discuss adjuvant therapy with medical oncology, and she has a plan to  see radiation oncology postoperatively.   • She will follow up as needed    10/18/2019 - 03/2020 - Chemotherapy course:  · 6 cycles of neoadjuvant Taxotere, Carboplatin and Herceptin and Perjeta    01/27/2020 - Right Breast Ultrasound:  • In the 10:00 position approximately 6 cm from the nipple, in the area of malignancy, there is been dramatic decrease the size of the neoplastic mass. The mass currently measures 1.36 x 1.11 x 0.347 cm. The mass previously measured 2.78 x 1.13 x 2.1 cm.  • The enlarged right axillary lymph node is also decreased in size currently measuring 1.94 x 0.748 x 1.2 cm. The lymph node previously measured 2.56 x 1.23 x 2.12 cm.  • Biopsy clips in the axillary node and region of the right breast mass incidentally noted.    02/14/2020 - MRI Bilateral Breasts:  • Right upper outer breast with decreased size of biopsy-proven malignancy.   • No residual measurable enhancing mass.   • There is susceptibility artifact at the right slightly upper outer breast, middle to posterior third, likely a biopsy marker.   • No suspicious enhancement of the left breast.  • Pectoralis musculature appears symmetric and nonenhancing.  • Right axillary lymph node measures 0.9 cm in short axis on axial series 3, image 58.   • While this is not enlarged by cross-sectional size criteria, it is abnormally rounded and asymmetric to the left side, concerning for residual disease.   • There is a T1 hypointensity within the lymph node, which was more evident on prior exam 9/19/2019, favored to represent a biopsy marker.   • There is a second prominent right axillary lymph node, just lateral to the above described noted with biopsy marker.  • No enlarged internal mammary lymph node. Left axillary lymph nodes appear symmetric.  • Port at the left chest wall.   • Cardiomegaly.   • Minimal right-sided pleural fluid.   • Heterogeneous appearance of the liver compared to prior exam.  • Consider further evaluation with CT  abdomen and pelvis with contrast.    02/14/2020 - Right Breast Mammogram:  • Findings consistent with response to therapy. Small residual hypoechoic mass with calcifications adjacent to the biopsy marker.  • Equivocal change of the previously biopsied right axillary lymph node. There is a closely adjacent round right axillary lymph node, which could also be evaluated at time of surgery. Results and recommendations discussed with the patient and her daughter in person at the conclusion of the exam.  A result letter will be sent by mail.   • BI-RADS CATEGORY 6: KNOWN BIOPSY WITH PROVEN MALIGNANCY. Management Recommendation: Surgical excision when clinically appropriate.    02/14/2020 - US Right breast:  • Targeted ultrasound performed of the right upper outer breast, 6 cm from the nipple demonstrates a 1.1 x 0.9 x 0.3 cm oval, parallel, hypoechoic mass with indistinct margins.   • There appear to be some internal echogenic foci, suspect residual calcifications.   • There is an adjacent biopsy marker.  • Right axilla demonstrates a 1.2 x 1.6 x 0.9 cm lymph node containing biopsy marker. This previously measured 1.2 x 1.9 x 0.7 cm on 1/27/2020.  • Right axilla also with an adjacent 0.7 cm lymph node, difficult to directly compare to prior due to differences in measurement technique.    03/03/2020 - Breast, right modified radical mastectomy per :  • Primary tumor site identified with extensive fibrosis and nests of  • residual high-grade carcinoma present in lymphatic spaces.  • Tumor is present in a lymphatic space at the deep surgical excision  • margin.  • Sections of nipple identified, negative for evidence of malignancy.  • Sections of benign skin with benign seborrheic keratoses.  • 3 out of 11 lymph nodes, positive for metastatic carcinoma.  • Multiple discrete nest of cells identified within the nodes.  • Largest focus of metastasis measures 0.5 cm in greatest dimension.  • Negative for extracapsular  spread.  • AJCC STAGE: ypT0, pN1a, pMx    INVASIVE CARCINOMA OF THE BREAST:  SPECIMEN:  • Procedure: Total mastectomy (including nipple and skin)  • Lymph Node Sampling: Axillary dissection (partial or complete dissection)  • Specimen Laterality: Right  • Tumor Site: Invasive Carcinoma: Not specified  TUMOR:  • Ductal Carcinoma In Situ (DCIS): No DCIS is present.  • Lobular Carcinoma In Situ (LCIS): Not identified  • Presence of Invasive Carcinoma:  o Histologic Type of Invasive Carcinoma: Invasive ductal carcinoma (no special type or not otherwise specified).  • Histologic Grade: [place] Histologic Score:  o Glandular (Acinar) / Tubular Differentiation:  Score 3: < 10% of tumor area forming glandular / tubular structures.  o Nuclear Pleomorphism:  Score 3: Vesicular nuclei, often with prominent nucleoli, exhibiting marked variation in size and shape, occasionally with very large and bizarre forms.  o Mitotic Rate: Score cannot be determined  o Overall Grade: Score cannot be determined  EXTENT:  • Tumor Size / Focality: No residual invasive carcinoma after presurgical (neoadjuvant) therapy.  • Macroscopic and Microscopic Extent of Tumor:  • Skin Invasion: Invasive carcinoma does not invade into the dermis or epidermis.  • Satellite foci not identified  • Nipple DCIS: DCIS does not involve the nipple epidermis.  • Skeletal Muscle: No skeletal muscle is present.  MARGINS:  • Posterior:  Focal  • Ductal Carcinoma In Situ (DCIS)  • DCIS not present in specimen  ACCESSORY FINDINGS:  • Lymph-Vascular Invasion:  Present  • Dermal Lymph-Vascular Invasion:  Not identified  • Treatment Effect: Response to Presurgical (Neoadjuvant) Therapy:  o In the Breast: No residual invasive carcinoma is present in the breast after presurgical therapy  o In the Lymph Nodes: Probable or definite response to presurgical therapy in metastatic carcinoma  LYMPH NODES:  • [place] and Non-[place]mlay Nodes:  o Total Number of Lymph Nodes  Examined (sentinel and nonsentinel):  11.  o Number of Lymph Nodes without Tumor Cells Identified:  8 .  o Number of Lymph Nodes with Isolated Tumor Cells (<= 0.2 mm and <= 200 cells):  0.  • Micro / Macro Metastases: Present  • Number of Lymph Nodes with Macrometastases (> 2 mm):  3.  • Number of Lymph Nodes with Micrometastases (> 0.2 mm to 2 mm and / or > 200 cells): 0.  • Size of Largest Metastatic Deposit:  (cm) 0.5  • Extranodal Extension: Not identified  • [place] Nodes:  o [place] lymph node biopsy not performed.    03/11/2020 - Appointment with :  • Follow up in one month     03/13/2020 - Appointment with :  • HER-2 positive, node positive invasive ductal carcinoma right breast, MammaPrint basal like type  • She had regional residual disease  • [id number]  • Recommended adjuvant TDM 1-due to residual disease. As per NCCN guidelines.  • Recommended adjuvant radiation  • The patient was counseled today about diagnosis, staging, prognosis, diagnostic tests, medications, side effects and disease management. The method of counseling included verbal explanation. The patient verbalized understanding.  • Treatment related toxicity- mild diarrhea and fatigue  PLAN:  • RTC 4 weeks  • Discontinue Herceptin/Perjeta  • Start TDM 1×14 cycles every 3 weeks  • CBC today  • Referral lymphedema clinic  • Referral to radiation oncology  Follow-up:  • Return in about 4 weeks (around 4/10/2020) for an appointment with Dr. Forte before day of cycle 2 of tx.   • Dr. Ramires referral  • Echo to be scheduled next available     03/19/2020 - Consult with :  • I have seen, examined and reviewed this patient's medication list, appropriate labs and imaging studies as well as other physician notes. We discussed the goals and plans of care with the patient and family and answered all questions.   • The patient has verbalized understanding of the risk of therapy and agrees to the treatment recommendations  for postoperative radiation therapy to the right chest wall and nodes.    • Surgery was 2 weeks ago, she still has KB drain in.   • Will see her back in 3 weeks and if healed, will simulate treatment fields with dose and final number of treatment fractions to be determined during the treatment planning process, I anticipate a dose of 5040 cGy with 1000 cGy boost to the tumor bed for a total of 6040 cGy/33 fractions with plans to start in the near future.    History obtained from  PATIENT and CHART    PAST MEDICAL HISTORY  History reviewed. No pertinent past medical history.     PAST SURGICAL HISTORY  Past Surgical History:   Procedure Laterality Date   • BREAST BIOPSY Right 09/05/2019   • COLONOSCOPY  10/01/2019   • ENDOSCOPY  10/01/2019   • MEDIPORT INSERTION, SINGLE  10/04/2019   • SIMPLE MASTECTOMY W/ SENTINEL NODE BIOPSY Right       FAMILY HISTORY  family history includes Breast cancer in her mother.     SOCIAL HISTORY  Social History     Tobacco Use   • Smoking status: Never Smoker   • Smokeless tobacco: Never Used   Substance Use Topics   • Alcohol use: Never     Frequency: Never   • Drug use: Never      ALLERGIES  Patient has no known allergies.     MEDICATIONS  Current Outpatient Medications   Medication Sig Dispense Refill   • Calcium Carbonate-Vitamin D (CALCIUM-VITAMIN D) 500-200 MG-UNIT tablet per tablet Take 1 tablet by mouth Daily.     • ferrous sulfate 325 (65 FE) MG tablet Take 325 mg by mouth Daily.     • montelukast (SINGULAIR) 10 MG tablet Take 10 mg by mouth Daily.     • ondansetron (ZOFRAN) 8 MG tablet Take 8 mg by mouth Every 8 (Eight) Hours As Needed.     • vitamin D (ERGOCALCIFEROL) 1.25 MG (11439 UT) capsule capsule Take 50,000 Units by mouth 1 (One) Time Per Week.       No current facility-administered medications for this visit.       The following portions of the patient's history were reviewed and updated as appropriate: allergies, current medications, past family history, past medical  "history, past social history, past surgical history and problem list.    REVIEW OF SYSTEMS  Review of Systems   Constitutional: Positive for fatigue. Negative for appetite change and unexpected weight change.   HENT: Negative.    Eyes:        Glasses   Respiratory: Negative.    Cardiovascular: Negative.    Gastrointestinal: Negative.    Endocrine: Negative.    Genitourinary: Negative.    Musculoskeletal: Negative.    Skin: Positive for rash (patient states she \"broke out\" on right chest x 3 days ago, rash improved per patient).   Allergic/Immunologic: Negative.    Neurological: Negative.    Hematological: Negative.    Psychiatric/Behavioral: Negative.         PHYSICAL EXAM  VITAL SIGNS:   Vitals:    04/09/20 0920   BP: 137/86   Weight: 54.4 kg (120 lb)   Height: 170.2 cm (67\")   PainSc: 0-No pain     General:  Alert and oriented, Vitals reviewed.  Head:  Normocephalic, without obvious abnormality    Nose/Sinuses:  Nares normal externally, no sinus tenderness.  Mouth/Throat:  Mucosa moist, pharynx without erythema  Neck:  supple, No evidence of adenopathy in the cervical or supraclavicular areas.  Eyes: No gross abnormalities   Ears: Ears intact with no external abnormalities noted  Chest:  Respiratory efforts are normal and unlabored  Cardiovascular: Regular rate and rhythm without murmurs, rubs, or gallops.   Abdomen:  Soft, non-tender, normal bowel sounds; no CVA tenderness   Breast: left breast normal without mass, skin or nipple changes or axillary nodes, post-mastectomy site healing - surgery 2 weeks ago - KB drain has been removed, sutures in place. surgical scars noted left chest wall, and bruising has improved  Extremities:  BRUNO well, warm to touch, no evidence of cyanosis or edema.  Skin: No suspicious lesions or rashes of concern  Neurologic:  Alert and oriented, non focal exam, strength and sensation grossly normal  Psych: Mood and affect are appropriate    Performance Status: ECOG (1) Restricted in " physically strenuous activity, ambulatory and able to do work of light nature    Clinical Quality Measures  -Pain Documented by Standardized Tool, FPS Maria Isabel Real reports a pain score of 0/10.  Given her pain assessment as noted, treatment options were discussed and the following options were decided upon as a follow-up plan to address the patient's pain: continuation of current treatment plan for pain and no pain, no plan given.   -Advanced Care Planning Advance Care Planning    ACP discussion was held with the patient during this visit. Patient does not have an advance directive, information provided.  -Body Mass Index Screening and Follow-Up Plan Patient's Body mass index is 19.42 kg/m². BMI is within normal parameters. No follow-up required..  -Tobacco Use: Screening and Cessation Intervention Social History    Tobacco Use      Smoking status: Never Smoker      Smokeless tobacco: Never Used    -HER-2 nue Results: stain 3+ by IHC    ASSESSMENT AND PLAN  1. HER2-positive carcinoma of right breast (CMS/HCC)    2. Regional lymph node metastasis present (CMS/HCC)    3. Maintenance chemotherapy    4. S/P right mastectomy    5. S/P lymph node biopsy    6. Non-smoker      RECOMMENDATIONS:   Maria Isabel Real returns to the clinic today for possible CT simulation regarding Stage IIB ([id number]) HER-2 IDC right breast, ER 3%, IL 0%, HER-2/brielle IHC 3+/FISH positive. Patient was seen in clinic for consultation on 03/19/2020 where radiation therapy was recommended to the right breast. Patient had undergone right breast mastectomy on 03/03/2020 per . KB drain remained in place and she was asked to return for CT simulation in 3 weeks if healed from recent surgery.     Since the patient is at risk for lymphedema post surgery and radiation, I will ask physical therapy for lymphedema baseline evaluation.     The patient has verbalized understanding of the risk of therapy and agrees to the treatment recommendations for  postoperative radiation therapy to the right breast and nodes.  We will simulate treatment fields today, I anticipate a dose of 5040 cGy with 1000 cGy boost to the tumor bed for a total of 6040 cGy/33 fractions. All questions answered. We will notify her when plan is complete.    Todays appointment time was spent in counseling, coordination of care and surveillance related to patients diagnosis as well as radiation therapy possible and probable after effects.   Hussain Ramires III, MD  04/09/2020

## 2020-04-09 ENCOUNTER — HOSPITAL ENCOUNTER (OUTPATIENT)
Dept: RADIATION ONCOLOGY | Facility: HOSPITAL | Age: 70
Setting detail: RADIATION/ONCOLOGY SERIES
End: 2020-04-09

## 2020-04-09 ENCOUNTER — HOSPITAL ENCOUNTER (OUTPATIENT)
Dept: RADIATION ONCOLOGY | Facility: HOSPITAL | Age: 70
Setting detail: RADIATION/ONCOLOGY SERIES
Discharge: HOME | End: 2020-04-09

## 2020-04-09 ENCOUNTER — OFFICE VISIT (OUTPATIENT)
Dept: RADIATION ONCOLOGY | Facility: HOSPITAL | Age: 70
End: 2020-04-09

## 2020-04-09 VITALS
HEIGHT: 67 IN | BODY MASS INDEX: 18.83 KG/M2 | WEIGHT: 120 LBS | SYSTOLIC BLOOD PRESSURE: 137 MMHG | DIASTOLIC BLOOD PRESSURE: 86 MMHG

## 2020-04-09 DIAGNOSIS — C50.911 HER2-POSITIVE CARCINOMA OF RIGHT BREAST (HCC): Primary | ICD-10-CM

## 2020-04-09 DIAGNOSIS — Z98.890 S/P LYMPH NODE BIOPSY: ICD-10-CM

## 2020-04-09 DIAGNOSIS — C77.9 REGIONAL LYMPH NODE METASTASIS PRESENT (HCC): ICD-10-CM

## 2020-04-09 DIAGNOSIS — Z78.9 NON-SMOKER: ICD-10-CM

## 2020-04-09 DIAGNOSIS — Z90.11 S/P RIGHT MASTECTOMY: ICD-10-CM

## 2020-04-09 DIAGNOSIS — Z51.11 MAINTENANCE CHEMOTHERAPY: ICD-10-CM

## 2020-04-09 PROBLEM — C50.811 MALIGNANT NEOPLASM OF OVERLAPPING SITES OF RIGHT FEMALE BREAST (HCC): Status: ACTIVE | Noted: 2019-09-24

## 2020-04-09 PROCEDURE — 77334 RADIATION TREATMENT AID(S): CPT | Performed by: RADIOLOGY

## 2020-04-09 PROCEDURE — 77290 THER RAD SIMULAJ FIELD CPLX: CPT | Performed by: RADIOLOGY

## 2020-04-09 NOTE — PROGRESS NOTES
Melburn Kanner   1950   4/10/2020     INTERVAL HISTORY/HISTORY OF PRESENT ILLNESS:    Diagnosis   · HER-2 IDC right breast, 2019  · [id number]  · ER 3%, [place] 0%, HER-2/lea IHC 3+  · Mammaprint high risk Basal-like  · HER-2 FISH Positive    Treatment summary  · 10/18/2019 through  2020- 6 cycles of neoadjuvant Taxotere, Carboplatin and Herceptin and Perjeta  · 3/3/2020- right mastectomy/ lymph node dissection  · 3/20/2020- anticipated 14 cycles of adjuvant TDM-1  · Anticipated adjuvant radiation  · 4/10/2020-adjuvant endocrine therapy with letrozole x10 years. The patient is a pleasant 68years old female who has been diagnosed with locally advanced HER-2 positive breast cancer. She has received neoadjuvant chemotherapy with Taxotere, carboplatin Herceptin Perjeta. She underwent surgery by Dr. Sedrick Vidales revealing residual lorena disease. She had a complete pathologic response in the breast.  She was recommended to switch her adjuvant treatment from Herceptin/Perjeta to TDM 1. She received first cycle TDM 1 with complaints of mild sensorial neuropathy. This has now resolved. She also had a repeat 2D echo that showed preserved EF. Cancer history  Ms Manohar Massey was first seen by me on 10/03/2019. She felt a mass in the right upper breast and therefore contact her primary care provider. A diagnostic mammogram was performed. Her mother  of breast cancer at the age of 50. She has no other history of breast cancer or any other cancer in her family. · 2019- diagnostic mammogram showed a suspicious 2.2 cm hypoechoic spiculated right breast mass in the axillary tail within the internal calcifications had suggest malignancy. Ultrasound of the breast showed the lesion as well as a pathologic appearing right axillary adenopathy. · 2019-core needle biopsy consistent with high grade invasive ductal carcinoma. MammaPrint high risk basal type.   ER 3%, [place] 0.2%, HER-2/lea of Onset    Breast Cancer Mother 39    Colon Cancer Neg Hx     Colon Polyps Neg Hx         Current Outpatient Medications   Medication Sig Dispense Refill    letrozole (FEMARA) 2.5 MG tablet Take 1 tablet by mouth daily 30 tablet 3    montelukast (SINGULAIR) 10 MG tablet Take 1 tablet by mouth daily 30 tablet 3    ferrous sulfate 325 (65 Fe) MG tablet Take 325 mg by mouth daily (with breakfast)      vitamin D (ERGOCALCIFEROL) 64700 units CAPS capsule Take 1 capsule by mouth once a week 12 capsule 1    Calcium Carbonate-Vitamin D (OYSTER SHELL CALCIUM/D) 500-200 MG-UNIT TABS Take 1 tablet by mouth daily 360 tablet 0     No current facility-administered medications for this visit. REVIEW OF SYSTEMS:    Constitutional: no fever, no night sweats,  fatigue;   HEENT: no blurring of vision, no double vision, no hearing difficulty, no tinnitus,no ulceration, no dysphagia  Lungs: no cough, no shortness of breath, no wheeze;   CVS: no palpitation, no chest pain, no shortness of breath;  GI: no abdominal pain, no nausea , no vomiting, no constipation;   BRYNN: no dysuria, frequency and urgency, no hematuria, no kidney stones;   Musculoskeletal: no joint pain, swelling , stiffness;   Endocrine: no polyuria, polydypsia, no cold or heat intolerence; Hematology/lymphatic: no easy brusing or bleeding, no hx of clotting disorder; no peripheral adenopathy. Dermatology: no skin rash, no eczema, no pruritis; mild alopecia  Psychiatry: no depression, no anxiety,no panic attacks, no suicide ideation;    Neurology: no syncope, no seizures, no numbness or tingling of hands, no numbness or tingling of feet, no paresis; mild sensory neuropathy    PHYSICAL EXAM:    Vitals signs:  /74   Pulse 79   Temp 98.7 °F (37.1 °C)   Ht 5' 7\" (1.702 m)   Wt 121 lb 9.6 oz (55.2 kg)   SpO2 97%   BMI 19.05 kg/m²    Pain scale:  Pain Score:   0 - No pain     CONSTITUTIONAL: Alert, appropriate, no acute distress,   EYES: Non icteric, EOM intact, pupils equal round and reactive to light and accommodation. ENT: Oral mucus membranes moist, no oral pharyngeal lesions. External inspection of ears and nose are normal.   NECK: Supple, no masses. No palpable thyroid mass    CHEST/LUNGS: CTA bilaterally, normal respiratory effort   CARDIOVASCULAR: RRR, no murmurs. No lower extremity edema   ABDOMEN: soft non-tender, active bowel sounds, no hepatosplenomegaly. No palpable masses. EXTREMITIES: warm, Full ROM of all fours extremities. No focal weakness. SKIN: warm, dry with no rashes or lesions  LYMPH: No cervical, clavicular, axillary, or inguinal lymphadenopathy  NEUROLOGIC: follows commands, non focal.   PSYCH: mood and affect appropriate. Alert and oriented to time and place and person. Relevant Lab findings/reviewed by me:  CBC:  WBC- CBC 6.19  HGB-10.9  PLT-118,000  Neut-3.9      Relevant Imaging studies/reviewed by me:  3/16/2020-2D echo  Summary   Limited echo study. LV appears normal in size with preserved LV systolic function. LV ejection   fraction estimated At 60%. Mild Concentric LVH. IVC Appears Dilated. Aortic Valve Is Trileaflet with Normal Leaflet Mobility. Letter sent here  ASSESSMENT    No orders of the defined types were placed in this encounter. Trinidad was seen today for breast cancer. Diagnoses and all orders for this visit:    Malignant neoplasm of overlapping sites of right breast in female, estrogen receptor positive ([facility] [address]  -     letrozole ([address] [facility]) 2.5 MG tablet;  Take 1 tablet by mouth daily    Chemotherapy management, encounter for    Care plan discussed with patient    At risk for cardiomyopathy    Encounter for monitoring aromatase inhibitor therapy    Encounter for antineoplastic immunotherapy       HER-2 positive, node positive invasive ductal carcinoma right breast, MammaPrint basal like type  She had regional residual disease  [id number]  Recommended adjuvant TDM 1-due to residual

## 2020-04-10 ENCOUNTER — HOSPITAL ENCOUNTER (OUTPATIENT)
Dept: INFUSION THERAPY | Age: 70
Discharge: HOME | End: 2020-04-10
Payer: MEDICARE

## 2020-04-10 ENCOUNTER — HOSPITAL ENCOUNTER (OUTPATIENT)
Dept: INFUSION THERAPY | Age: 70
Setting detail: INFUSION SERIES
Discharge: HOME | End: 2020-04-10
Payer: MEDICARE

## 2020-04-10 ENCOUNTER — OFFICE VISIT (OUTPATIENT)
Dept: HEMATOLOGY | Age: 70
End: 2020-04-10
Payer: MEDICARE

## 2020-04-10 VITALS
WEIGHT: 121.6 LBS | HEART RATE: 79 BPM | OXYGEN SATURATION: 97 % | DIASTOLIC BLOOD PRESSURE: 74 MMHG | SYSTOLIC BLOOD PRESSURE: 120 MMHG | TEMPERATURE: 98.7 F | HEIGHT: 67 IN | BODY MASS INDEX: 19.09 KG/M2

## 2020-04-10 VITALS
DIASTOLIC BLOOD PRESSURE: 64 MMHG | RESPIRATION RATE: 16 BRPM | SYSTOLIC BLOOD PRESSURE: 96 MMHG | HEART RATE: 82 BPM | TEMPERATURE: 99 F | OXYGEN SATURATION: 96 %

## 2020-04-10 DIAGNOSIS — C50.911 MALIGNANT NEOPLASM OF RIGHT FEMALE BREAST, UNSPECIFIED ESTROGEN RECEPTOR STATUS, UNSPECIFIED SITE OF BREAST (HCC): ICD-10-CM

## 2020-04-10 DIAGNOSIS — C50.811 MALIGNANT NEOPLASM OF OVERLAPPING SITES OF RIGHT BREAST IN FEMALE, ESTROGEN RECEPTOR POSITIVE (HCC): ICD-10-CM

## 2020-04-10 DIAGNOSIS — Z51.11 ENCOUNTER FOR ANTINEOPLASTIC CHEMOTHERAPY: Primary | ICD-10-CM

## 2020-04-10 DIAGNOSIS — Z17.0 MALIGNANT NEOPLASM OF OVERLAPPING SITES OF RIGHT BREAST IN FEMALE, ESTROGEN RECEPTOR POSITIVE (HCC): ICD-10-CM

## 2020-04-10 PROCEDURE — G8420 CALC BMI NORM PARAMETERS: HCPCS | Performed by: INTERNAL MEDICINE

## 2020-04-10 PROCEDURE — 6360000002 HC RX W HCPCS: Performed by: INTERNAL MEDICINE

## 2020-04-10 PROCEDURE — 1123F ACP DISCUSS/DSCN MKR DOCD: CPT | Performed by: INTERNAL MEDICINE

## 2020-04-10 PROCEDURE — 1090F PRES/ABSN URINE INCON ASSESS: CPT | Performed by: INTERNAL MEDICINE

## 2020-04-10 PROCEDURE — 96413 CHEMO IV INFUSION 1 HR: CPT

## 2020-04-10 PROCEDURE — G8427 DOCREV CUR MEDS BY ELIG CLIN: HCPCS | Performed by: INTERNAL MEDICINE

## 2020-04-10 PROCEDURE — 1036F TOBACCO NON-USER: CPT | Performed by: INTERNAL MEDICINE

## 2020-04-10 PROCEDURE — G8399 PT W/DXA RESULTS DOCUMENT: HCPCS | Performed by: INTERNAL MEDICINE

## 2020-04-10 PROCEDURE — 3017F COLORECTAL CA SCREEN DOC REV: CPT | Performed by: INTERNAL MEDICINE

## 2020-04-10 PROCEDURE — 2580000003 HC RX 258: Performed by: INTERNAL MEDICINE

## 2020-04-10 PROCEDURE — 99212 OFFICE O/P EST SF 10 MIN: CPT

## 2020-04-10 PROCEDURE — 99214 OFFICE O/P EST MOD 30 MIN: CPT | Performed by: INTERNAL MEDICINE

## 2020-04-10 PROCEDURE — 4040F PNEUMOC VAC/ADMIN/RCVD: CPT | Performed by: INTERNAL MEDICINE

## 2020-04-10 PROCEDURE — 85025 COMPLETE CBC W/AUTO DIFF WBC: CPT

## 2020-04-10 RX ORDER — SODIUM CHLORIDE 9 MG/ML
20 INJECTION, SOLUTION INTRAVENOUS ONCE
Status: COMPLETED | OUTPATIENT
Start: 2020-04-10 | End: 2020-04-10

## 2020-04-10 RX ORDER — DIPHENHYDRAMINE HYDROCHLORIDE 50 MG/ML
50 INJECTION INTRAMUSCULAR; INTRAVENOUS ONCE
Status: CANCELLED | OUTPATIENT
Start: 2020-04-10

## 2020-04-10 RX ORDER — DEXAMETHASONE SODIUM PHOSPHATE 10 MG/ML
10 INJECTION, SOLUTION INTRAMUSCULAR; INTRAVENOUS ONCE
Status: COMPLETED | OUTPATIENT
Start: 2020-04-10 | End: 2020-04-10

## 2020-04-10 RX ORDER — SODIUM CHLORIDE 0.9 % (FLUSH) 0.9 %
5 SYRINGE (ML) INJECTION PRN
Status: CANCELLED | OUTPATIENT
Start: 2020-04-10

## 2020-04-10 RX ORDER — SODIUM CHLORIDE 0.9 % (FLUSH) 0.9 %
10 SYRINGE (ML) INJECTION PRN
Status: DISCONTINUED | OUTPATIENT
Start: 2020-04-10 | End: 2020-04-11 | Stop reason: HOSPADM

## 2020-04-10 RX ORDER — MEPERIDINE HYDROCHLORIDE 50 MG/ML
12.5 INJECTION INTRAMUSCULAR; INTRAVENOUS; SUBCUTANEOUS ONCE
Status: CANCELLED | OUTPATIENT
Start: 2020-04-10

## 2020-04-10 RX ORDER — HEPARIN SODIUM (PORCINE) LOCK FLUSH IV SOLN 100 UNIT/ML 100 UNIT/ML
500 SOLUTION INTRAVENOUS PRN
Status: CANCELLED | OUTPATIENT
Start: 2020-04-10

## 2020-04-10 RX ORDER — HEPARIN SODIUM (PORCINE) LOCK FLUSH IV SOLN 100 UNIT/ML 100 UNIT/ML
500 SOLUTION INTRAVENOUS PRN
Status: DISCONTINUED | OUTPATIENT
Start: 2020-04-10 | End: 2020-04-11 | Stop reason: HOSPADM

## 2020-04-10 RX ORDER — EPINEPHRINE 1 MG/ML
0.3 INJECTION, SOLUTION, CONCENTRATE INTRAVENOUS PRN
Status: CANCELLED | OUTPATIENT
Start: 2020-04-10

## 2020-04-10 RX ORDER — LETROZOLE 2.5 MG/1
2.5 TABLET, FILM COATED ORAL DAILY
Qty: 30 TABLET | Refills: 3 | Status: SHIPPED | OUTPATIENT
Start: 2020-04-10 | End: 2020-08-28 | Stop reason: SDUPTHER

## 2020-04-10 RX ORDER — SODIUM CHLORIDE 0.9 % (FLUSH) 0.9 %
10 SYRINGE (ML) INJECTION PRN
Status: CANCELLED | OUTPATIENT
Start: 2020-04-10

## 2020-04-10 RX ORDER — SODIUM CHLORIDE 9 MG/ML
20 INJECTION, SOLUTION INTRAVENOUS ONCE
Status: CANCELLED | OUTPATIENT
Start: 2020-04-10

## 2020-04-10 RX ORDER — METHYLPREDNISOLONE SODIUM SUCCINATE 125 MG/2ML
125 INJECTION, POWDER, LYOPHILIZED, FOR SOLUTION INTRAMUSCULAR; INTRAVENOUS ONCE
Status: CANCELLED | OUTPATIENT
Start: 2020-04-10

## 2020-04-10 RX ORDER — SODIUM CHLORIDE 9 MG/ML
INJECTION, SOLUTION INTRAVENOUS CONTINUOUS
Status: CANCELLED | OUTPATIENT
Start: 2020-04-10

## 2020-04-10 RX ADMIN — ADO-TRASTUZUMAB EMTANSINE 200 MG: 20 INJECTION, POWDER, LYOPHILIZED, FOR SOLUTION INTRAVENOUS at 10:21

## 2020-04-10 RX ADMIN — SODIUM CHLORIDE 20 ML/HR: 9 INJECTION, SOLUTION INTRAVENOUS at 09:48

## 2020-04-10 RX ADMIN — DEXAMETHASONE SODIUM PHOSPHATE 10 MG: 10 INJECTION, SOLUTION INTRAMUSCULAR; INTRAVENOUS at 09:48

## 2020-04-16 PROCEDURE — 77295 3-D RADIOTHERAPY PLAN: CPT | Performed by: RADIOLOGY

## 2020-04-16 PROCEDURE — 77334 RADIATION TREATMENT AID(S): CPT | Performed by: RADIOLOGY

## 2020-04-16 PROCEDURE — 77300 RADIATION THERAPY DOSE PLAN: CPT | Performed by: RADIOLOGY

## 2020-04-20 ENCOUNTER — HOSPITAL ENCOUNTER (OUTPATIENT)
Dept: RADIATION ONCOLOGY | Facility: HOSPITAL | Age: 70
Setting detail: RADIATION/ONCOLOGY SERIES
Discharge: HOME | End: 2020-04-20

## 2020-04-20 PROCEDURE — 77417 THER RADIOLOGY PORT IMAGE(S): CPT | Performed by: RADIOLOGY

## 2020-04-20 PROCEDURE — 77412 RADIATION TX DELIVERY LVL 3: CPT | Performed by: RADIOLOGY

## 2020-04-20 PROCEDURE — 77332 RADIATION TREATMENT AID(S): CPT | Performed by: RADIOLOGY

## 2020-04-20 NOTE — PROGRESS NOTES
with the patient. I answered all the questions to the patients satisfaction. I, Dr Hyacinth Art, personally performed the services described in this documentation as scribed by Kiah Carr MA in my presence and is both accurate and complete. (Please note that portions of this note were completed with a voice recognition program. Efforts were made to edit the dictations but occasionally words are mis-transcribed.)  Over 50% of the total visit time of 15 minutes in face to face encounter with the patient, out of which more than 50% of the time was spent in counseling patient or family and coordination of care. Counseling included but was not limited to time spent reviewing labs, imaging studies/ treatment plan and answering questions.

## 2020-04-21 ENCOUNTER — HOSPITAL ENCOUNTER (OUTPATIENT)
Dept: RADIATION ONCOLOGY | Facility: HOSPITAL | Age: 70
Setting detail: RADIATION/ONCOLOGY SERIES
Discharge: HOME | End: 2020-04-21

## 2020-04-21 PROCEDURE — 77417 THER RADIOLOGY PORT IMAGE(S): CPT | Performed by: RADIOLOGY

## 2020-04-21 PROCEDURE — 77412 RADIATION TX DELIVERY LVL 3: CPT | Performed by: RADIOLOGY

## 2020-04-22 ENCOUNTER — HOSPITAL ENCOUNTER (OUTPATIENT)
Dept: RADIATION ONCOLOGY | Facility: HOSPITAL | Age: 70
Setting detail: RADIATION/ONCOLOGY SERIES
Discharge: HOME | End: 2020-04-22

## 2020-04-22 ENCOUNTER — OFFICE VISIT (OUTPATIENT)
Dept: SURGERY | Age: 70
End: 2020-04-22

## 2020-04-22 VITALS — DIASTOLIC BLOOD PRESSURE: 80 MMHG | SYSTOLIC BLOOD PRESSURE: 120 MMHG | HEART RATE: 80 BPM

## 2020-04-22 PROCEDURE — 99024 POSTOP FOLLOW-UP VISIT: CPT | Performed by: SURGERY

## 2020-04-22 PROCEDURE — 77412 RADIATION TX DELIVERY LVL 3: CPT | Performed by: RADIOLOGY

## 2020-04-23 ENCOUNTER — VIRTUAL VISIT (OUTPATIENT)
Dept: INTERNAL MEDICINE | Age: 70
End: 2020-04-23
Payer: MEDICARE

## 2020-04-23 ENCOUNTER — HOSPITAL ENCOUNTER (OUTPATIENT)
Dept: RADIATION ONCOLOGY | Facility: HOSPITAL | Age: 70
Setting detail: RADIATION/ONCOLOGY SERIES
Discharge: HOME | End: 2020-04-23

## 2020-04-23 PROCEDURE — 77412 RADIATION TX DELIVERY LVL 3: CPT | Performed by: RADIOLOGY

## 2020-04-23 PROCEDURE — 77336 RADIATION PHYSICS CONSULT: CPT | Performed by: RADIOLOGY

## 2020-04-23 PROCEDURE — 3017F COLORECTAL CA SCREEN DOC REV: CPT | Performed by: INTERNAL MEDICINE

## 2020-04-23 PROCEDURE — 1123F ACP DISCUSS/DSCN MKR DOCD: CPT | Performed by: INTERNAL MEDICINE

## 2020-04-23 PROCEDURE — 4040F PNEUMOC VAC/ADMIN/RCVD: CPT | Performed by: INTERNAL MEDICINE

## 2020-04-23 PROCEDURE — G0438 PPPS, INITIAL VISIT: HCPCS | Performed by: INTERNAL MEDICINE

## 2020-04-23 ASSESSMENT — PATIENT HEALTH QUESTIONNAIRE - PHQ9
SUM OF ALL RESPONSES TO PHQ QUESTIONS 1-9: 0
SUM OF ALL RESPONSES TO PHQ QUESTIONS 1-9: 0

## 2020-04-23 ASSESSMENT — LIFESTYLE VARIABLES: HOW OFTEN DO YOU HAVE A DRINK CONTAINING ALCOHOL: 0

## 2020-04-23 NOTE — PATIENT INSTRUCTIONS
Personalized Preventive Plan for Luz Elena Dave - 4/23/2020  Medicare offers a range of preventive health benefits. Some of the tests and screenings are paid in full while other may be subject to a deductible, co-insurance, and/or copay. Some of these benefits include a comprehensive review of your medical history including lifestyle, illnesses that may run in your family, and various assessments and screenings as appropriate. After reviewing your medical record and screening and assessments performed today your provider may have ordered immunizations, labs, imaging, and/or referrals for you. A list of these orders (if applicable) as well as your Preventive Care list are included within your After Visit Summary for your review. Other Preventive Recommendations:    · A preventive eye exam performed by an eye specialist is recommended every 1-2 years to screen for glaucoma; cataracts, macular degeneration, and other eye disorders. · A preventive dental visit is recommended every 6 months. · Try to get at least 150 minutes of exercise per week or 10,000 steps per day on a pedometer . · Order or download the FREE \"Exercise & Physical Activity: Your Everyday Guide\" from [employer]. Call 6-848.891.9522 or search [employer] online. · You need 3529-6328 mg of calcium and 5198-8192 IU of vitamin D per day. It is possible to meet your calcium requirement with diet alone, but a vitamin D supplement is usually necessary to meet this goal.  · When exposed to the sun, use a sunscreen that protects against both UVA and UVB radiation with an SPF of 30 or greater. Reapply every 2 to 3 hours or after sweating, drying off with a towel, or swimming. · Always wear a seat belt when traveling in a car. Always wear a helmet when riding a bicycle or motorcycle.

## 2020-04-24 ENCOUNTER — HOSPITAL ENCOUNTER (OUTPATIENT)
Dept: RADIATION ONCOLOGY | Facility: HOSPITAL | Age: 70
Setting detail: RADIATION/ONCOLOGY SERIES
Discharge: HOME | End: 2020-04-24

## 2020-04-24 PROCEDURE — 77412 RADIATION TX DELIVERY LVL 3: CPT | Performed by: RADIOLOGY

## 2020-04-27 ENCOUNTER — HOSPITAL ENCOUNTER (OUTPATIENT)
Dept: RADIATION ONCOLOGY | Facility: HOSPITAL | Age: 70
Setting detail: RADIATION/ONCOLOGY SERIES
Discharge: HOME | End: 2020-04-27

## 2020-04-27 ENCOUNTER — APPOINTMENT (OUTPATIENT)
Dept: RADIATION ONCOLOGY | Facility: HOSPITAL | Age: 70
End: 2020-04-27

## 2020-04-27 PROCEDURE — 77290 THER RAD SIMULAJ FIELD CPLX: CPT | Performed by: RADIOLOGY

## 2020-04-27 PROCEDURE — 77412 RADIATION TX DELIVERY LVL 3: CPT | Performed by: RADIOLOGY

## 2020-04-28 ENCOUNTER — HOSPITAL ENCOUNTER (OUTPATIENT)
Dept: RADIATION ONCOLOGY | Facility: HOSPITAL | Age: 70
Setting detail: RADIATION/ONCOLOGY SERIES
Discharge: HOME | End: 2020-04-28

## 2020-04-28 PROCEDURE — 77412 RADIATION TX DELIVERY LVL 3: CPT | Performed by: RADIOLOGY

## 2020-04-29 ENCOUNTER — HOSPITAL ENCOUNTER (OUTPATIENT)
Dept: RADIATION ONCOLOGY | Facility: HOSPITAL | Age: 70
Setting detail: RADIATION/ONCOLOGY SERIES
Discharge: HOME | End: 2020-04-29

## 2020-04-29 PROCEDURE — 77412 RADIATION TX DELIVERY LVL 3: CPT | Performed by: RADIOLOGY

## 2020-04-30 ENCOUNTER — HOSPITAL ENCOUNTER (OUTPATIENT)
Dept: RADIATION ONCOLOGY | Facility: HOSPITAL | Age: 70
Setting detail: RADIATION/ONCOLOGY SERIES
Discharge: HOME | End: 2020-04-30

## 2020-04-30 PROCEDURE — 77336 RADIATION PHYSICS CONSULT: CPT | Performed by: RADIOLOGY

## 2020-04-30 PROCEDURE — 77412 RADIATION TX DELIVERY LVL 3: CPT | Performed by: RADIOLOGY

## 2020-05-01 ENCOUNTER — HOSPITAL ENCOUNTER (OUTPATIENT)
Dept: RADIATION ONCOLOGY | Facility: HOSPITAL | Age: 70
Setting detail: RADIATION/ONCOLOGY SERIES
Discharge: HOME | End: 2020-05-01

## 2020-05-01 ENCOUNTER — HOSPITAL ENCOUNTER (OUTPATIENT)
Dept: INFUSION THERAPY | Age: 70
Discharge: HOME | End: 2020-05-01
Payer: MEDICARE

## 2020-05-01 ENCOUNTER — HOSPITAL ENCOUNTER (OUTPATIENT)
Dept: RADIATION ONCOLOGY | Facility: HOSPITAL | Age: 70
Setting detail: RADIATION/ONCOLOGY SERIES
End: 2020-05-01

## 2020-05-01 ENCOUNTER — HOSPITAL ENCOUNTER (OUTPATIENT)
Dept: INFUSION THERAPY | Age: 70
Setting detail: INFUSION SERIES
Discharge: HOME | End: 2020-05-01
Payer: MEDICARE

## 2020-05-01 VITALS
SYSTOLIC BLOOD PRESSURE: 120 MMHG | BODY MASS INDEX: 19.42 KG/M2 | TEMPERATURE: 98.2 F | WEIGHT: 123.7 LBS | OXYGEN SATURATION: 97 % | HEIGHT: 67 IN | DIASTOLIC BLOOD PRESSURE: 80 MMHG | HEART RATE: 92 BPM

## 2020-05-01 VITALS
TEMPERATURE: 97.8 F | OXYGEN SATURATION: 96 % | SYSTOLIC BLOOD PRESSURE: 127 MMHG | RESPIRATION RATE: 16 BRPM | DIASTOLIC BLOOD PRESSURE: 80 MMHG | HEART RATE: 85 BPM

## 2020-05-01 DIAGNOSIS — C50.911 MALIGNANT NEOPLASM OF RIGHT FEMALE BREAST, UNSPECIFIED ESTROGEN RECEPTOR STATUS, UNSPECIFIED SITE OF BREAST (HCC): ICD-10-CM

## 2020-05-01 DIAGNOSIS — C50.811 MALIGNANT NEOPLASM OF OVERLAPPING SITES OF RIGHT BREAST IN FEMALE, ESTROGEN RECEPTOR POSITIVE (HCC): ICD-10-CM

## 2020-05-01 DIAGNOSIS — C50.812 MALIGNANT NEOPLASM OF OVERLAPPING SITES OF LEFT BREAST IN FEMALE, ESTROGEN RECEPTOR POSITIVE (HCC): ICD-10-CM

## 2020-05-01 DIAGNOSIS — Z51.11 ENCOUNTER FOR ANTINEOPLASTIC CHEMOTHERAPY: Primary | ICD-10-CM

## 2020-05-01 DIAGNOSIS — Z17.0 MALIGNANT NEOPLASM OF OVERLAPPING SITES OF LEFT BREAST IN FEMALE, ESTROGEN RECEPTOR POSITIVE (HCC): ICD-10-CM

## 2020-05-01 DIAGNOSIS — Z17.0 MALIGNANT NEOPLASM OF OVERLAPPING SITES OF RIGHT BREAST IN FEMALE, ESTROGEN RECEPTOR POSITIVE (HCC): ICD-10-CM

## 2020-05-01 LAB
ALBUMIN SERPL-MCNC: 3.9 G/DL (ref 3.5–5.2)
ALP BLD-CCNC: 93 U/L (ref 35–104)
ALT SERPL-CCNC: 19 U/L (ref 5–33)
ANION GAP SERPL CALCULATED.3IONS-SCNC: 10 MMOL/L (ref 7–19)
AST SERPL-CCNC: 31 U/L (ref 5–32)
BILIRUB SERPL-MCNC: <0.2 MG/DL (ref 0.2–1.2)
BUN BLDV-MCNC: 24 MG/DL (ref 8–23)
CALCIUM SERPL-MCNC: 8.9 MG/DL (ref 8.8–10.2)
CHLORIDE BLD-SCNC: 106 MMOL/L (ref 98–111)
CO2: 27 MMOL/L (ref 22–29)
CREAT SERPL-MCNC: 0.7 MG/DL (ref 0.5–0.9)
GFR NON-AFRICAN AMERICAN: >60
GLUCOSE BLD-MCNC: 92 MG/DL (ref 74–109)
POTASSIUM SERPL-SCNC: 3.6 MMOL/L (ref 3.5–5)
SODIUM BLD-SCNC: 143 MMOL/L (ref 136–145)
TOTAL PROTEIN: 6.3 G/DL (ref 6.6–8.7)

## 2020-05-01 PROCEDURE — 96375 TX/PRO/DX INJ NEW DRUG ADDON: CPT

## 2020-05-01 PROCEDURE — 6360000002 HC RX W HCPCS: Performed by: INTERNAL MEDICINE

## 2020-05-01 PROCEDURE — 80053 COMPREHEN METABOLIC PANEL: CPT

## 2020-05-01 PROCEDURE — 96413 CHEMO IV INFUSION 1 HR: CPT

## 2020-05-01 PROCEDURE — 96523 IRRIG DRUG DELIVERY DEVICE: CPT

## 2020-05-01 PROCEDURE — 2580000003 HC RX 258: Performed by: INTERNAL MEDICINE

## 2020-05-01 PROCEDURE — 96374 THER/PROPH/DIAG INJ IV PUSH: CPT

## 2020-05-01 PROCEDURE — 85025 COMPLETE CBC W/AUTO DIFF WBC: CPT

## 2020-05-01 PROCEDURE — 77412 RADIATION TX DELIVERY LVL 3: CPT | Performed by: RADIOLOGY

## 2020-05-01 RX ORDER — SODIUM CHLORIDE 0.9 % (FLUSH) 0.9 %
10 SYRINGE (ML) INJECTION PRN
Status: DISCONTINUED | OUTPATIENT
Start: 2020-05-01 | End: 2020-05-02 | Stop reason: HOSPADM

## 2020-05-01 RX ORDER — SODIUM CHLORIDE 0.9 % (FLUSH) 0.9 %
10 SYRINGE (ML) INJECTION PRN
Status: CANCELLED | OUTPATIENT
Start: 2020-05-01

## 2020-05-01 RX ORDER — DEXAMETHASONE SODIUM PHOSPHATE 10 MG/ML
10 INJECTION, SOLUTION INTRAMUSCULAR; INTRAVENOUS ONCE
Status: COMPLETED | OUTPATIENT
Start: 2020-05-01 | End: 2020-05-01

## 2020-05-01 RX ORDER — SODIUM CHLORIDE 9 MG/ML
20 INJECTION, SOLUTION INTRAVENOUS ONCE
Status: COMPLETED | OUTPATIENT
Start: 2020-05-01 | End: 2020-05-02

## 2020-05-01 RX ORDER — EPINEPHRINE 1 MG/ML
0.3 INJECTION, SOLUTION, CONCENTRATE INTRAVENOUS PRN
Status: CANCELLED | OUTPATIENT
Start: 2020-05-01

## 2020-05-01 RX ORDER — MEPERIDINE HYDROCHLORIDE 50 MG/ML
12.5 INJECTION INTRAMUSCULAR; INTRAVENOUS; SUBCUTANEOUS ONCE
Status: CANCELLED | OUTPATIENT
Start: 2020-05-01

## 2020-05-01 RX ORDER — DIPHENHYDRAMINE HYDROCHLORIDE 50 MG/ML
50 INJECTION INTRAMUSCULAR; INTRAVENOUS ONCE
Status: CANCELLED | OUTPATIENT
Start: 2020-05-01

## 2020-05-01 RX ORDER — SODIUM CHLORIDE 0.9 % (FLUSH) 0.9 %
5 SYRINGE (ML) INJECTION PRN
Status: CANCELLED | OUTPATIENT
Start: 2020-05-01

## 2020-05-01 RX ORDER — HEPARIN SODIUM (PORCINE) LOCK FLUSH IV SOLN 100 UNIT/ML 100 UNIT/ML
500 SOLUTION INTRAVENOUS PRN
Status: DISCONTINUED | OUTPATIENT
Start: 2020-05-01 | End: 2020-05-02 | Stop reason: HOSPADM

## 2020-05-01 RX ORDER — SODIUM CHLORIDE 9 MG/ML
20 INJECTION, SOLUTION INTRAVENOUS ONCE
Status: CANCELLED | OUTPATIENT
Start: 2020-05-01

## 2020-05-01 RX ORDER — METHYLPREDNISOLONE SODIUM SUCCINATE 125 MG/2ML
125 INJECTION, POWDER, LYOPHILIZED, FOR SOLUTION INTRAMUSCULAR; INTRAVENOUS ONCE
Status: CANCELLED | OUTPATIENT
Start: 2020-05-01

## 2020-05-01 RX ORDER — HEPARIN SODIUM (PORCINE) LOCK FLUSH IV SOLN 100 UNIT/ML 100 UNIT/ML
500 SOLUTION INTRAVENOUS PRN
Status: CANCELLED | OUTPATIENT
Start: 2020-05-01

## 2020-05-01 RX ORDER — SODIUM CHLORIDE 9 MG/ML
INJECTION, SOLUTION INTRAVENOUS CONTINUOUS
Status: CANCELLED | OUTPATIENT
Start: 2020-05-01

## 2020-05-01 RX ADMIN — SODIUM CHLORIDE 20 ML/HR: 9 INJECTION, SOLUTION INTRAVENOUS at 09:42

## 2020-05-01 RX ADMIN — DEXAMETHASONE SODIUM PHOSPHATE 10 MG: 10 INJECTION, SOLUTION INTRAMUSCULAR; INTRAVENOUS at 09:44

## 2020-05-01 RX ADMIN — ADO-TRASTUZUMAB EMTANSINE 200 MG: 20 INJECTION, POWDER, LYOPHILIZED, FOR SOLUTION INTRAVENOUS at 10:35

## 2020-05-04 ENCOUNTER — HOSPITAL ENCOUNTER (OUTPATIENT)
Dept: RADIATION ONCOLOGY | Facility: HOSPITAL | Age: 70
Setting detail: RADIATION/ONCOLOGY SERIES
Discharge: HOME | End: 2020-05-04

## 2020-05-04 PROCEDURE — 77417 THER RADIOLOGY PORT IMAGE(S): CPT | Performed by: RADIOLOGY

## 2020-05-04 PROCEDURE — 77412 RADIATION TX DELIVERY LVL 3: CPT | Performed by: RADIOLOGY

## 2020-05-05 ENCOUNTER — HOSPITAL ENCOUNTER (OUTPATIENT)
Dept: RADIATION ONCOLOGY | Facility: HOSPITAL | Age: 70
Setting detail: RADIATION/ONCOLOGY SERIES
Discharge: HOME | End: 2020-05-05

## 2020-05-05 PROCEDURE — 77412 RADIATION TX DELIVERY LVL 3: CPT | Performed by: RADIOLOGY

## 2020-05-06 ENCOUNTER — HOSPITAL ENCOUNTER (OUTPATIENT)
Dept: RADIATION ONCOLOGY | Facility: HOSPITAL | Age: 70
Setting detail: RADIATION/ONCOLOGY SERIES
Discharge: HOME | End: 2020-05-06

## 2020-05-06 PROCEDURE — 77336 RADIATION PHYSICS CONSULT: CPT | Performed by: RADIOLOGY

## 2020-05-06 PROCEDURE — 77412 RADIATION TX DELIVERY LVL 3: CPT | Performed by: RADIOLOGY

## 2020-05-07 ENCOUNTER — HOSPITAL ENCOUNTER (OUTPATIENT)
Dept: RADIATION ONCOLOGY | Facility: HOSPITAL | Age: 70
Setting detail: RADIATION/ONCOLOGY SERIES
Discharge: HOME | End: 2020-05-07

## 2020-05-07 PROCEDURE — 77412 RADIATION TX DELIVERY LVL 3: CPT | Performed by: RADIOLOGY

## 2020-05-07 PROCEDURE — 77334 RADIATION TREATMENT AID(S): CPT | Performed by: RADIOLOGY

## 2020-05-07 PROCEDURE — 77307 TELETHX ISODOSE PLAN CPLX: CPT | Performed by: RADIOLOGY

## 2020-05-08 ENCOUNTER — HOSPITAL ENCOUNTER (OUTPATIENT)
Dept: RADIATION ONCOLOGY | Facility: HOSPITAL | Age: 70
Setting detail: RADIATION/ONCOLOGY SERIES
Discharge: HOME | End: 2020-05-08

## 2020-05-08 PROCEDURE — 77412 RADIATION TX DELIVERY LVL 3: CPT | Performed by: RADIOLOGY

## 2020-05-11 ENCOUNTER — HOSPITAL ENCOUNTER (OUTPATIENT)
Dept: RADIATION ONCOLOGY | Facility: HOSPITAL | Age: 70
Setting detail: RADIATION/ONCOLOGY SERIES
Discharge: HOME | End: 2020-05-11

## 2020-05-11 PROCEDURE — 77417 THER RADIOLOGY PORT IMAGE(S): CPT | Performed by: RADIOLOGY

## 2020-05-11 PROCEDURE — 77412 RADIATION TX DELIVERY LVL 3: CPT | Performed by: RADIOLOGY

## 2020-05-12 ENCOUNTER — HOSPITAL ENCOUNTER (OUTPATIENT)
Dept: RADIATION ONCOLOGY | Facility: HOSPITAL | Age: 70
Setting detail: RADIATION/ONCOLOGY SERIES
Discharge: HOME | End: 2020-05-12

## 2020-05-12 PROCEDURE — 77412 RADIATION TX DELIVERY LVL 3: CPT | Performed by: RADIOLOGY

## 2020-05-13 ENCOUNTER — HOSPITAL ENCOUNTER (OUTPATIENT)
Dept: RADIATION ONCOLOGY | Facility: HOSPITAL | Age: 70
Setting detail: RADIATION/ONCOLOGY SERIES
Discharge: HOME | End: 2020-05-13

## 2020-05-13 PROCEDURE — 77336 RADIATION PHYSICS CONSULT: CPT | Performed by: RADIOLOGY

## 2020-05-13 PROCEDURE — 77412 RADIATION TX DELIVERY LVL 3: CPT | Performed by: RADIOLOGY

## 2020-05-14 ENCOUNTER — HOSPITAL ENCOUNTER (OUTPATIENT)
Dept: RADIATION ONCOLOGY | Facility: HOSPITAL | Age: 70
Setting detail: RADIATION/ONCOLOGY SERIES
Discharge: HOME | End: 2020-05-14

## 2020-05-14 PROCEDURE — 77412 RADIATION TX DELIVERY LVL 3: CPT | Performed by: RADIOLOGY

## 2020-05-15 ENCOUNTER — HOSPITAL ENCOUNTER (OUTPATIENT)
Dept: RADIATION ONCOLOGY | Facility: HOSPITAL | Age: 70
Setting detail: RADIATION/ONCOLOGY SERIES
Discharge: HOME | End: 2020-05-15

## 2020-05-15 PROCEDURE — 77412 RADIATION TX DELIVERY LVL 3: CPT | Performed by: RADIOLOGY

## 2020-05-18 ENCOUNTER — HOSPITAL ENCOUNTER (OUTPATIENT)
Dept: RADIATION ONCOLOGY | Facility: HOSPITAL | Age: 70
Setting detail: RADIATION/ONCOLOGY SERIES
Discharge: HOME | End: 2020-05-18

## 2020-05-18 PROCEDURE — 77417 THER RADIOLOGY PORT IMAGE(S): CPT | Performed by: RADIOLOGY

## 2020-05-18 PROCEDURE — 77412 RADIATION TX DELIVERY LVL 3: CPT | Performed by: RADIOLOGY

## 2020-05-19 ENCOUNTER — HOSPITAL ENCOUNTER (OUTPATIENT)
Dept: RADIATION ONCOLOGY | Facility: HOSPITAL | Age: 70
Setting detail: RADIATION/ONCOLOGY SERIES
Discharge: HOME | End: 2020-05-19

## 2020-05-19 PROCEDURE — 77412 RADIATION TX DELIVERY LVL 3: CPT | Performed by: RADIOLOGY

## 2020-05-20 ENCOUNTER — HOSPITAL ENCOUNTER (OUTPATIENT)
Dept: RADIATION ONCOLOGY | Facility: HOSPITAL | Age: 70
Setting detail: RADIATION/ONCOLOGY SERIES
Discharge: HOME | End: 2020-05-20

## 2020-05-20 PROCEDURE — 77336 RADIATION PHYSICS CONSULT: CPT | Performed by: RADIOLOGY

## 2020-05-20 PROCEDURE — 77412 RADIATION TX DELIVERY LVL 3: CPT | Performed by: RADIOLOGY

## 2020-05-21 ENCOUNTER — HOSPITAL ENCOUNTER (OUTPATIENT)
Dept: RADIATION ONCOLOGY | Facility: HOSPITAL | Age: 70
Setting detail: RADIATION/ONCOLOGY SERIES
Discharge: HOME | End: 2020-05-21

## 2020-05-21 PROCEDURE — 77412 RADIATION TX DELIVERY LVL 3: CPT | Performed by: RADIOLOGY

## 2020-05-21 NOTE — PROGRESS NOTES
FAMILY HISTORY:  Family History   Problem Relation Age of Onset    Breast Cancer Mother 39    Colon Cancer Neg Hx     Colon Polyps Neg Hx         Current Outpatient Medications   Medication Sig Dispense Refill    ibandronate (BONIVA) 150 MG tablet Take 1 tablet by mouth every 30 days Take one (1) tablet once per month in the morning with a full glass of water, on an empty stomach, and do not take anything else by mouth or lie down for the next 30 minutes. 30 tablet 6    ferrous sulfate 325 (65 Fe) MG tablet Take 325 mg by mouth daily (with breakfast)      vitamin D (ERGOCALCIFEROL) 12094 units CAPS capsule Take 1 capsule by mouth once a week 12 capsule 1    Calcium Carbonate-Vitamin D (OYSTER SHELL CALCIUM/D) 500-200 MG-UNIT TABS Take 1 tablet by mouth daily 360 tablet 0    letrozole (FEMARA) 2.5 MG tablet Take 1 tablet by mouth daily (Patient not taking: Reported on 4/22/2020) 30 tablet 3    montelukast (SINGULAIR) 10 MG tablet Take 1 tablet by mouth daily (Patient not taking: Reported on 5/22/2020) 30 tablet 3     No current facility-administered medications for this visit. REVIEW OF SYSTEMS:    Constitutional: no fever, no night sweats,  fatigue;   HEENT: no blurring of vision, no double vision, no hearing difficulty, no tinnitus,no ulceration, no dysphagia  Lungs: no cough, no shortness of breath, no wheeze;   CVS: no palpitation, no chest pain, no shortness of breath;  GI: no abdominal pain, no nausea , no vomiting, no constipation;   BRYNN: no dysuria, frequency and urgency, no hematuria, no kidney stones;   Musculoskeletal: no joint pain, swelling , stiffness;   Endocrine: no polyuria, polydypsia, no cold or heat intolerence; Hematology/lymphatic: no easy brusing or bleeding, no hx of clotting disorder; no peripheral adenopathy. Dermatology: no skin rash, no eczema, no pruritis; mild alopecia  Psychiatry: no depression, no anxiety,no panic attacks, no suicide ideation;    Neurology: no (BONIVA) 150 MG tablet; Take 1 tablet by mouth every 30 days Take one (1) tablet once per month in the morning with a full glass of water, on an empty stomach, and do not take anything else by mouth or lie down for the next 30 minutes. Chemotherapy management, encounter for    Care plan discussed with patient    Encounter for monitoring aromatase inhibitor therapy    Adverse effect of chemotherapy, subsequent encounter       HER-2 positive, node positive invasive ductal carcinoma right breast, MammaPrint basal like type  She had regional residual disease  [id number]  Recommended adjuvant TDM 1-due to residual disease. As per NCCN guidelines. Recommended adjuvant radiation  The patient was counseled today about diagnosis, staging, prognosis, diagnostic tests, medications, side effects and disease management. The method of counseling included verbal explanation. The patient verbalized understanding. Proceed cycle 5 out of 14 TDM 1 adjuvant setting. Treatment related toxicity- mild intermittent neuropathy    PLAN:  · RTC every 3 weeks for treatment  · RTC MD 6 weeks  · Proceed cycle #5/14  TDM 1 every 3 weeks      Follow-up:    Return in about 6 weeks (around 7/3/2020) for an appointment with Dr. Klarissa Villela before tx at [facility],  tx at [facility] every 3 weeks. Data Hang Lance MA am scribing for Claudio Bedolla MD. Electronically sign Mila Arias MA on 5/23/2020 at 10:40 AM    I, Dr Kenn Casey, personally performed the services described in this documentation as scribed by Mila Arias MA in my presence and is both accurate and complete. Over 50% of the total visit time of 25 minutes in face to face encounter with the patient, out of which more than 50% of the time was spent in counseling patient or family and coordination of care. Counseling included but was not limited to time spent reviewing labs, imaging studies/ treatment plan and answering questions.

## 2020-05-22 ENCOUNTER — HOSPITAL ENCOUNTER (OUTPATIENT)
Dept: INFUSION THERAPY | Age: 70
Discharge: HOME | End: 2020-05-22
Payer: MEDICARE

## 2020-05-22 ENCOUNTER — OFFICE VISIT (OUTPATIENT)
Dept: HEMATOLOGY | Age: 70
End: 2020-05-22
Payer: MEDICARE

## 2020-05-22 ENCOUNTER — HOSPITAL ENCOUNTER (OUTPATIENT)
Dept: RADIATION ONCOLOGY | Facility: HOSPITAL | Age: 70
Setting detail: RADIATION/ONCOLOGY SERIES
Discharge: HOME | End: 2020-05-22

## 2020-05-22 ENCOUNTER — HOSPITAL ENCOUNTER (OUTPATIENT)
Dept: INFUSION THERAPY | Age: 70
Setting detail: INFUSION SERIES
Discharge: HOME | End: 2020-05-22
Payer: MEDICARE

## 2020-05-22 VITALS
TEMPERATURE: 99 F | DIASTOLIC BLOOD PRESSURE: 70 MMHG | HEART RATE: 87 BPM | SYSTOLIC BLOOD PRESSURE: 126 MMHG | BODY MASS INDEX: 18.83 KG/M2 | WEIGHT: 120 LBS | HEIGHT: 67 IN | OXYGEN SATURATION: 95 %

## 2020-05-22 VITALS
SYSTOLIC BLOOD PRESSURE: 122 MMHG | TEMPERATURE: 98.8 F | RESPIRATION RATE: 17 BRPM | OXYGEN SATURATION: 96 % | HEART RATE: 84 BPM | DIASTOLIC BLOOD PRESSURE: 68 MMHG

## 2020-05-22 DIAGNOSIS — Z17.0 MALIGNANT NEOPLASM OF OVERLAPPING SITES OF RIGHT BREAST IN FEMALE, ESTROGEN RECEPTOR POSITIVE (HCC): ICD-10-CM

## 2020-05-22 DIAGNOSIS — C50.811 MALIGNANT NEOPLASM OF OVERLAPPING SITES OF RIGHT BREAST IN FEMALE, ESTROGEN RECEPTOR POSITIVE (HCC): ICD-10-CM

## 2020-05-22 DIAGNOSIS — Z51.11 ENCOUNTER FOR ANTINEOPLASTIC CHEMOTHERAPY: Primary | ICD-10-CM

## 2020-05-22 DIAGNOSIS — C50.911 MALIGNANT NEOPLASM OF RIGHT FEMALE BREAST, UNSPECIFIED ESTROGEN RECEPTOR STATUS, UNSPECIFIED SITE OF BREAST (HCC): ICD-10-CM

## 2020-05-22 LAB
ALBUMIN SERPL-MCNC: 3.7 G/DL (ref 3.5–5.2)
ALP BLD-CCNC: 101 U/L (ref 35–104)
ALT SERPL-CCNC: 19 U/L (ref 5–33)
ANION GAP SERPL CALCULATED.3IONS-SCNC: 11 MMOL/L (ref 7–19)
AST SERPL-CCNC: 32 U/L (ref 5–32)
BASOPHILS ABSOLUTE: 0 K/UL (ref 0–0.2)
BASOPHILS ABSOLUTE: 0.18 K/UL (ref 0.01–0.08)
BASOPHILS RELATIVE PERCENT: 0.4 % (ref 0–1)
BASOPHILS RELATIVE PERCENT: 2.3 % (ref 0.1–1.2)
BILIRUB SERPL-MCNC: 0.3 MG/DL (ref 0.2–1.2)
BUN BLDV-MCNC: 28 MG/DL (ref 8–23)
CALCIUM SERPL-MCNC: 9.4 MG/DL (ref 8.8–10.2)
CHLORIDE BLD-SCNC: 103 MMOL/L (ref 98–111)
CO2: 28 MMOL/L (ref 22–29)
CREAT SERPL-MCNC: 1 MG/DL (ref 0.5–0.9)
EOSINOPHILS ABSOLUTE: 0.1 K/UL (ref 0–0.6)
EOSINOPHILS ABSOLUTE: 0.12 K/UL (ref 0.04–0.54)
EOSINOPHILS RELATIVE PERCENT: 1.5 % (ref 0.7–7)
EOSINOPHILS RELATIVE PERCENT: 1.7 % (ref 0–5)
GFR NON-AFRICAN AMERICAN: 55
GLUCOSE BLD-MCNC: 130 MG/DL (ref 74–109)
HCT VFR BLD CALC: 28.7 % (ref 34.1–44.9)
HCT VFR BLD CALC: 30.6 % (ref 37–47)
HEMOGLOBIN: 10.1 G/DL (ref 11.2–15.7)
HEMOGLOBIN: 9.9 G/DL (ref 12–16)
IMMATURE GRANULOCYTES #: 0 K/UL
LYMPHOCYTES ABSOLUTE: 0.49 K/UL (ref 1.18–3.74)
LYMPHOCYTES ABSOLUTE: 0.6 K/UL (ref 1.1–4.5)
LYMPHOCYTES RELATIVE PERCENT: 6.2 % (ref 19.3–53.1)
LYMPHOCYTES RELATIVE PERCENT: 7.6 % (ref 20–40)
MCH RBC QN AUTO: 34.5 PG (ref 27–31)
MCH RBC QN AUTO: 40.6 PG (ref 25.6–32.2)
MCHC RBC AUTO-ENTMCNC: 32.4 G/DL (ref 33–37)
MCHC RBC AUTO-ENTMCNC: 35.2 G/DL (ref 32.3–35.5)
MCV RBC AUTO: 106.6 FL (ref 81–99)
MCV RBC AUTO: 115.3 FL (ref 79.4–94.8)
MONOCYTES ABSOLUTE: 0.5 K/UL (ref 0–0.9)
MONOCYTES ABSOLUTE: 0.61 K/UL (ref 0.24–0.82)
MONOCYTES RELATIVE PERCENT: 6.5 % (ref 0–10)
MONOCYTES RELATIVE PERCENT: 7.7 % (ref 4.7–12.5)
NEUTROPHILS ABSOLUTE: 6.1 K/UL (ref 1.5–7.5)
NEUTROPHILS ABSOLUTE: 6.5 K/UL (ref 1.56–6.13)
NEUTROPHILS RELATIVE PERCENT: 82.3 % (ref 34–71.1)
NEUTROPHILS RELATIVE PERCENT: 83.5 % (ref 50–65)
PDW BLD-RTO: 13.5 % (ref 11.5–14.5)
PDW BLD-RTO: 20.9 % (ref 11.7–14.4)
PLATELET # BLD: 132 K/UL (ref 130–400)
PLATELET # BLD: 30 K/UL (ref 182–369)
PMV BLD AUTO: 10.7 FL (ref 9.4–12.3)
PMV BLD AUTO: 11.8 FL (ref 7.4–10.4)
POTASSIUM SERPL-SCNC: 3.7 MMOL/L (ref 3.5–5)
RBC # BLD: 2.49 M/UL (ref 3.93–5.22)
RBC # BLD: 2.87 M/UL (ref 4.2–5.4)
SODIUM BLD-SCNC: 142 MMOL/L (ref 136–145)
TOTAL PROTEIN: 6.5 G/DL (ref 6.6–8.7)
WBC # BLD: 7.3 K/UL (ref 4.8–10.8)
WBC # BLD: 7.9 K/UL (ref 3.98–10.04)

## 2020-05-22 PROCEDURE — 2580000003 HC RX 258: Performed by: INTERNAL MEDICINE

## 2020-05-22 PROCEDURE — 1090F PRES/ABSN URINE INCON ASSESS: CPT | Performed by: INTERNAL MEDICINE

## 2020-05-22 PROCEDURE — 6360000002 HC RX W HCPCS: Performed by: INTERNAL MEDICINE

## 2020-05-22 PROCEDURE — 4040F PNEUMOC VAC/ADMIN/RCVD: CPT | Performed by: INTERNAL MEDICINE

## 2020-05-22 PROCEDURE — 36591 DRAW BLOOD OFF VENOUS DEVICE: CPT

## 2020-05-22 PROCEDURE — 96413 CHEMO IV INFUSION 1 HR: CPT

## 2020-05-22 PROCEDURE — 1036F TOBACCO NON-USER: CPT | Performed by: INTERNAL MEDICINE

## 2020-05-22 PROCEDURE — 85025 COMPLETE CBC W/AUTO DIFF WBC: CPT

## 2020-05-22 PROCEDURE — 80053 COMPREHEN METABOLIC PANEL: CPT

## 2020-05-22 PROCEDURE — 99214 OFFICE O/P EST MOD 30 MIN: CPT | Performed by: INTERNAL MEDICINE

## 2020-05-22 PROCEDURE — 99212 OFFICE O/P EST SF 10 MIN: CPT

## 2020-05-22 PROCEDURE — 3017F COLORECTAL CA SCREEN DOC REV: CPT | Performed by: INTERNAL MEDICINE

## 2020-05-22 PROCEDURE — G8420 CALC BMI NORM PARAMETERS: HCPCS | Performed by: INTERNAL MEDICINE

## 2020-05-22 PROCEDURE — 77412 RADIATION TX DELIVERY LVL 3: CPT | Performed by: RADIOLOGY

## 2020-05-22 PROCEDURE — 1123F ACP DISCUSS/DSCN MKR DOCD: CPT | Performed by: INTERNAL MEDICINE

## 2020-05-22 PROCEDURE — G8399 PT W/DXA RESULTS DOCUMENT: HCPCS | Performed by: INTERNAL MEDICINE

## 2020-05-22 PROCEDURE — G8427 DOCREV CUR MEDS BY ELIG CLIN: HCPCS | Performed by: INTERNAL MEDICINE

## 2020-05-22 RX ORDER — IBANDRONATE SODIUM 150 MG/1
150 TABLET, FILM COATED ORAL
Qty: 30 TABLET | Refills: 6 | Status: SHIPPED | OUTPATIENT
Start: 2020-05-22 | End: 2021-06-14 | Stop reason: SDUPTHER

## 2020-05-22 RX ORDER — DEXAMETHASONE SODIUM PHOSPHATE 10 MG/ML
10 INJECTION, SOLUTION INTRAMUSCULAR; INTRAVENOUS ONCE
Status: COMPLETED | OUTPATIENT
Start: 2020-05-22 | End: 2020-05-22

## 2020-05-22 RX ORDER — SODIUM CHLORIDE 0.9 % (FLUSH) 0.9 %
10 SYRINGE (ML) INJECTION PRN
Status: CANCELLED | OUTPATIENT
Start: 2020-05-22

## 2020-05-22 RX ORDER — MEPERIDINE HYDROCHLORIDE 50 MG/ML
12.5 INJECTION INTRAMUSCULAR; INTRAVENOUS; SUBCUTANEOUS ONCE
Status: CANCELLED | OUTPATIENT
Start: 2020-05-22

## 2020-05-22 RX ORDER — DIPHENHYDRAMINE HYDROCHLORIDE 50 MG/ML
50 INJECTION INTRAMUSCULAR; INTRAVENOUS ONCE
Status: CANCELLED | OUTPATIENT
Start: 2020-05-22

## 2020-05-22 RX ORDER — SODIUM CHLORIDE 0.9 % (FLUSH) 0.9 %
10 SYRINGE (ML) INJECTION PRN
Status: DISCONTINUED | OUTPATIENT
Start: 2020-05-22 | End: 2020-05-23 | Stop reason: HOSPADM

## 2020-05-22 RX ORDER — EPINEPHRINE 1 MG/ML
0.3 INJECTION, SOLUTION, CONCENTRATE INTRAVENOUS PRN
Status: CANCELLED | OUTPATIENT
Start: 2020-05-22

## 2020-05-22 RX ORDER — SODIUM CHLORIDE 9 MG/ML
INJECTION, SOLUTION INTRAVENOUS CONTINUOUS
Status: CANCELLED | OUTPATIENT
Start: 2020-05-22

## 2020-05-22 RX ORDER — SODIUM CHLORIDE 0.9 % (FLUSH) 0.9 %
5 SYRINGE (ML) INJECTION PRN
Status: CANCELLED | OUTPATIENT
Start: 2020-05-22

## 2020-05-22 RX ORDER — SODIUM CHLORIDE 9 MG/ML
20 INJECTION, SOLUTION INTRAVENOUS ONCE
Status: COMPLETED | OUTPATIENT
Start: 2020-05-22 | End: 2020-05-22

## 2020-05-22 RX ORDER — HEPARIN SODIUM (PORCINE) LOCK FLUSH IV SOLN 100 UNIT/ML 100 UNIT/ML
500 SOLUTION INTRAVENOUS PRN
Status: CANCELLED | OUTPATIENT
Start: 2020-05-22

## 2020-05-22 RX ORDER — HEPARIN SODIUM (PORCINE) LOCK FLUSH IV SOLN 100 UNIT/ML 100 UNIT/ML
500 SOLUTION INTRAVENOUS PRN
Status: DISCONTINUED | OUTPATIENT
Start: 2020-05-22 | End: 2020-05-23 | Stop reason: HOSPADM

## 2020-05-22 RX ORDER — SODIUM CHLORIDE 9 MG/ML
20 INJECTION, SOLUTION INTRAVENOUS ONCE
Status: CANCELLED | OUTPATIENT
Start: 2020-05-22

## 2020-05-22 RX ORDER — METHYLPREDNISOLONE SODIUM SUCCINATE 125 MG/2ML
125 INJECTION, POWDER, LYOPHILIZED, FOR SOLUTION INTRAMUSCULAR; INTRAVENOUS ONCE
Status: CANCELLED | OUTPATIENT
Start: 2020-05-22

## 2020-05-22 RX ADMIN — SODIUM CHLORIDE 20 ML/HR: 9 INJECTION, SOLUTION INTRAVENOUS at 12:36

## 2020-05-22 RX ADMIN — DEXAMETHASONE SODIUM PHOSPHATE 10 MG: 10 INJECTION, SOLUTION INTRAMUSCULAR; INTRAVENOUS at 12:36

## 2020-05-22 RX ADMIN — Medication 10 ML: at 14:06

## 2020-05-22 RX ADMIN — HEPARIN 300 UNITS: 100 SYRINGE at 14:06

## 2020-05-22 RX ADMIN — ADO-TRASTUZUMAB EMTANSINE 200 MG: 20 INJECTION, POWDER, LYOPHILIZED, FOR SOLUTION INTRAVENOUS at 13:04

## 2020-05-26 ENCOUNTER — HOSPITAL ENCOUNTER (OUTPATIENT)
Dept: RADIATION ONCOLOGY | Facility: HOSPITAL | Age: 70
Setting detail: RADIATION/ONCOLOGY SERIES
Discharge: HOME | End: 2020-05-26

## 2020-05-26 PROCEDURE — 77412 RADIATION TX DELIVERY LVL 3: CPT | Performed by: RADIOLOGY

## 2020-05-26 PROCEDURE — 77417 THER RADIOLOGY PORT IMAGE(S): CPT | Performed by: RADIOLOGY

## 2020-05-27 ENCOUNTER — HOSPITAL ENCOUNTER (OUTPATIENT)
Dept: RADIATION ONCOLOGY | Facility: HOSPITAL | Age: 70
Setting detail: RADIATION/ONCOLOGY SERIES
Discharge: HOME | End: 2020-05-27

## 2020-05-27 PROCEDURE — 77412 RADIATION TX DELIVERY LVL 3: CPT | Performed by: RADIOLOGY

## 2020-05-28 ENCOUNTER — HOSPITAL ENCOUNTER (OUTPATIENT)
Dept: RADIATION ONCOLOGY | Facility: HOSPITAL | Age: 70
Setting detail: RADIATION/ONCOLOGY SERIES
Discharge: HOME | End: 2020-05-28

## 2020-05-28 PROCEDURE — 77336 RADIATION PHYSICS CONSULT: CPT | Performed by: RADIOLOGY

## 2020-05-28 PROCEDURE — 77412 RADIATION TX DELIVERY LVL 3: CPT | Performed by: RADIOLOGY

## 2020-05-29 ENCOUNTER — HOSPITAL ENCOUNTER (OUTPATIENT)
Dept: RADIATION ONCOLOGY | Facility: HOSPITAL | Age: 70
Setting detail: RADIATION/ONCOLOGY SERIES
Discharge: HOME | End: 2020-05-29

## 2020-05-29 PROCEDURE — 77280 THER RAD SIMULAJ FIELD SMPL: CPT | Performed by: RADIOLOGY

## 2020-05-29 PROCEDURE — 77412 RADIATION TX DELIVERY LVL 3: CPT | Performed by: RADIOLOGY

## 2020-06-01 ENCOUNTER — HOSPITAL ENCOUNTER (OUTPATIENT)
Dept: RADIATION ONCOLOGY | Facility: HOSPITAL | Age: 70
Setting detail: RADIATION/ONCOLOGY SERIES
End: 2020-06-01

## 2020-06-01 ENCOUNTER — HOSPITAL ENCOUNTER (OUTPATIENT)
Dept: RADIATION ONCOLOGY | Facility: HOSPITAL | Age: 70
Setting detail: RADIATION/ONCOLOGY SERIES
Discharge: HOME | End: 2020-06-01

## 2020-06-01 PROCEDURE — 77412 RADIATION TX DELIVERY LVL 3: CPT | Performed by: RADIOLOGY

## 2020-06-02 ENCOUNTER — HOSPITAL ENCOUNTER (OUTPATIENT)
Dept: RADIATION ONCOLOGY | Facility: HOSPITAL | Age: 70
Setting detail: RADIATION/ONCOLOGY SERIES
Discharge: HOME | End: 2020-06-02

## 2020-06-02 PROCEDURE — 77412 RADIATION TX DELIVERY LVL 3: CPT | Performed by: RADIOLOGY

## 2020-06-03 ENCOUNTER — HOSPITAL ENCOUNTER (OUTPATIENT)
Dept: RADIATION ONCOLOGY | Facility: HOSPITAL | Age: 70
Setting detail: RADIATION/ONCOLOGY SERIES
Discharge: HOME | End: 2020-06-03

## 2020-06-03 PROCEDURE — 77412 RADIATION TX DELIVERY LVL 3: CPT | Performed by: RADIOLOGY

## 2020-06-04 ENCOUNTER — HOSPITAL ENCOUNTER (OUTPATIENT)
Dept: RADIATION ONCOLOGY | Facility: HOSPITAL | Age: 70
Setting detail: RADIATION/ONCOLOGY SERIES
Discharge: HOME | End: 2020-06-04

## 2020-06-04 PROCEDURE — 77336 RADIATION PHYSICS CONSULT: CPT | Performed by: RADIOLOGY

## 2020-06-04 PROCEDURE — 77412 RADIATION TX DELIVERY LVL 3: CPT | Performed by: RADIOLOGY

## 2020-06-12 ENCOUNTER — APPOINTMENT (OUTPATIENT)
Dept: INFUSION THERAPY | Age: 70
End: 2020-06-12
Payer: MEDICARE

## 2020-06-12 ENCOUNTER — HOSPITAL ENCOUNTER (OUTPATIENT)
Dept: INFUSION THERAPY | Age: 70
Setting detail: INFUSION SERIES
Discharge: HOME | End: 2020-06-12
Payer: MEDICARE

## 2020-06-12 VITALS
HEART RATE: 73 BPM | TEMPERATURE: 98.1 F | DIASTOLIC BLOOD PRESSURE: 74 MMHG | WEIGHT: 120 LBS | SYSTOLIC BLOOD PRESSURE: 125 MMHG | BODY MASS INDEX: 18.79 KG/M2 | RESPIRATION RATE: 18 BRPM | OXYGEN SATURATION: 97 %

## 2020-06-12 DIAGNOSIS — C50.911 MALIGNANT NEOPLASM OF RIGHT FEMALE BREAST, UNSPECIFIED ESTROGEN RECEPTOR STATUS, UNSPECIFIED SITE OF BREAST (HCC): ICD-10-CM

## 2020-06-12 DIAGNOSIS — C50.811 MALIGNANT NEOPLASM OF OVERLAPPING SITES OF RIGHT BREAST IN FEMALE, ESTROGEN RECEPTOR POSITIVE (HCC): ICD-10-CM

## 2020-06-12 DIAGNOSIS — Z17.0 MALIGNANT NEOPLASM OF OVERLAPPING SITES OF RIGHT BREAST IN FEMALE, ESTROGEN RECEPTOR POSITIVE (HCC): ICD-10-CM

## 2020-06-12 DIAGNOSIS — Z51.11 ENCOUNTER FOR ANTINEOPLASTIC CHEMOTHERAPY: Primary | ICD-10-CM

## 2020-06-12 LAB
ALBUMIN SERPL-MCNC: 3.5 G/DL (ref 3.5–5.2)
ALP BLD-CCNC: 94 U/L (ref 35–104)
ALT SERPL-CCNC: 24 U/L (ref 5–33)
ANION GAP SERPL CALCULATED.3IONS-SCNC: 9 MMOL/L (ref 7–19)
AST SERPL-CCNC: 41 U/L (ref 5–32)
BASOPHILS ABSOLUTE: 0 K/UL (ref 0–0.2)
BASOPHILS RELATIVE PERCENT: 0.6 % (ref 0–1)
BILIRUB SERPL-MCNC: <0.2 MG/DL (ref 0.2–1.2)
BUN BLDV-MCNC: 25 MG/DL (ref 8–23)
CALCIUM SERPL-MCNC: 9 MG/DL (ref 8.8–10.2)
CHLORIDE BLD-SCNC: 101 MMOL/L (ref 98–111)
CO2: 30 MMOL/L (ref 22–29)
CREAT SERPL-MCNC: 0.8 MG/DL (ref 0.5–0.9)
EOSINOPHILS ABSOLUTE: 0.1 K/UL (ref 0–0.6)
EOSINOPHILS RELATIVE PERCENT: 2.6 % (ref 0–5)
GFR NON-AFRICAN AMERICAN: >60
GLUCOSE BLD-MCNC: 180 MG/DL (ref 74–109)
HCT VFR BLD CALC: 29.9 % (ref 37–47)
HEMOGLOBIN: 9.6 G/DL (ref 12–16)
IMMATURE GRANULOCYTES #: 0 K/UL
LYMPHOCYTES ABSOLUTE: 0.6 K/UL (ref 1.1–4.5)
LYMPHOCYTES RELATIVE PERCENT: 13.8 % (ref 20–40)
MCH RBC QN AUTO: 34.2 PG (ref 27–31)
MCHC RBC AUTO-ENTMCNC: 32.1 G/DL (ref 33–37)
MCV RBC AUTO: 106.4 FL (ref 81–99)
MONOCYTES ABSOLUTE: 0.4 K/UL (ref 0–0.9)
MONOCYTES RELATIVE PERCENT: 8.2 % (ref 0–10)
NEUTROPHILS ABSOLUTE: 3.5 K/UL (ref 1.5–7.5)
NEUTROPHILS RELATIVE PERCENT: 74.6 % (ref 50–65)
PDW BLD-RTO: 14.6 % (ref 11.5–14.5)
PLATELET # BLD: 104 K/UL (ref 130–400)
PMV BLD AUTO: 11.5 FL (ref 9.4–12.3)
POTASSIUM SERPL-SCNC: 3.4 MMOL/L (ref 3.5–5)
RBC # BLD: 2.81 M/UL (ref 4.2–5.4)
SODIUM BLD-SCNC: 140 MMOL/L (ref 136–145)
TOTAL PROTEIN: 6.3 G/DL (ref 6.6–8.7)
WBC # BLD: 4.7 K/UL (ref 4.8–10.8)

## 2020-06-12 PROCEDURE — 96374 THER/PROPH/DIAG INJ IV PUSH: CPT

## 2020-06-12 PROCEDURE — 85025 COMPLETE CBC W/AUTO DIFF WBC: CPT

## 2020-06-12 PROCEDURE — 2580000003 HC RX 258: Performed by: INTERNAL MEDICINE

## 2020-06-12 PROCEDURE — 96365 THER/PROPH/DIAG IV INF INIT: CPT

## 2020-06-12 PROCEDURE — 80053 COMPREHEN METABOLIC PANEL: CPT

## 2020-06-12 PROCEDURE — 6360000002 HC RX W HCPCS: Performed by: INTERNAL MEDICINE

## 2020-06-12 PROCEDURE — 96413 CHEMO IV INFUSION 1 HR: CPT

## 2020-06-12 RX ORDER — POTASSIUM CHLORIDE 20 MEQ/1
20 TABLET, EXTENDED RELEASE ORAL DAILY
Qty: 10 TABLET | Refills: 0 | Status: SHIPPED | OUTPATIENT
Start: 2020-06-12 | End: 2020-07-02 | Stop reason: ALTCHOICE

## 2020-06-12 RX ORDER — MEPERIDINE HYDROCHLORIDE 25 MG/ML
12.5 INJECTION INTRAMUSCULAR; INTRAVENOUS; SUBCUTANEOUS ONCE
Status: CANCELLED | OUTPATIENT
Start: 2020-06-12

## 2020-06-12 RX ORDER — METHYLPREDNISOLONE SODIUM SUCCINATE 125 MG/2ML
125 INJECTION, POWDER, LYOPHILIZED, FOR SOLUTION INTRAMUSCULAR; INTRAVENOUS ONCE
Status: CANCELLED | OUTPATIENT
Start: 2020-06-12

## 2020-06-12 RX ORDER — DEXAMETHASONE SODIUM PHOSPHATE 10 MG/ML
10 INJECTION, SOLUTION INTRAMUSCULAR; INTRAVENOUS ONCE
Status: CANCELLED | OUTPATIENT
Start: 2020-06-12

## 2020-06-12 RX ORDER — HEPARIN SODIUM (PORCINE) LOCK FLUSH IV SOLN 100 UNIT/ML 100 UNIT/ML
500 SOLUTION INTRAVENOUS PRN
Status: CANCELLED | OUTPATIENT
Start: 2020-06-12

## 2020-06-12 RX ORDER — EPINEPHRINE 1 MG/ML
0.3 INJECTION, SOLUTION, CONCENTRATE INTRAVENOUS PRN
Status: CANCELLED | OUTPATIENT
Start: 2020-06-12

## 2020-06-12 RX ORDER — HEPARIN SODIUM (PORCINE) LOCK FLUSH IV SOLN 100 UNIT/ML 100 UNIT/ML
500 SOLUTION INTRAVENOUS PRN
Status: DISCONTINUED | OUTPATIENT
Start: 2020-06-12 | End: 2020-06-13 | Stop reason: HOSPADM

## 2020-06-12 RX ORDER — SODIUM CHLORIDE 9 MG/ML
20 INJECTION, SOLUTION INTRAVENOUS ONCE
Status: CANCELLED | OUTPATIENT
Start: 2020-06-12

## 2020-06-12 RX ORDER — SODIUM CHLORIDE 0.9 % (FLUSH) 0.9 %
10 SYRINGE (ML) INJECTION PRN
Status: CANCELLED | OUTPATIENT
Start: 2020-06-12

## 2020-06-12 RX ORDER — DIPHENHYDRAMINE HYDROCHLORIDE 50 MG/ML
50 INJECTION INTRAMUSCULAR; INTRAVENOUS ONCE
Status: CANCELLED | OUTPATIENT
Start: 2020-06-12

## 2020-06-12 RX ORDER — SODIUM CHLORIDE 9 MG/ML
20 INJECTION, SOLUTION INTRAVENOUS ONCE
Status: COMPLETED | OUTPATIENT
Start: 2020-06-12 | End: 2020-06-13

## 2020-06-12 RX ORDER — SODIUM CHLORIDE 0.9 % (FLUSH) 0.9 %
5 SYRINGE (ML) INJECTION PRN
Status: CANCELLED | OUTPATIENT
Start: 2020-06-12

## 2020-06-12 RX ORDER — SODIUM CHLORIDE 9 MG/ML
INJECTION, SOLUTION INTRAVENOUS CONTINUOUS
Status: CANCELLED | OUTPATIENT
Start: 2020-06-12

## 2020-06-12 RX ORDER — DEXAMETHASONE SODIUM PHOSPHATE 10 MG/ML
10 INJECTION, SOLUTION INTRAMUSCULAR; INTRAVENOUS ONCE
Status: COMPLETED | OUTPATIENT
Start: 2020-06-12 | End: 2020-06-12

## 2020-06-12 RX ORDER — SODIUM CHLORIDE 0.9 % (FLUSH) 0.9 %
10 SYRINGE (ML) INJECTION PRN
Status: DISCONTINUED | OUTPATIENT
Start: 2020-06-12 | End: 2020-06-13 | Stop reason: HOSPADM

## 2020-06-12 RX ADMIN — ADO-TRASTUZUMAB EMTANSINE 200 MG: 20 INJECTION, POWDER, LYOPHILIZED, FOR SOLUTION INTRAVENOUS at 14:55

## 2020-06-12 RX ADMIN — HEPARIN 500 UNITS: 100 SYRINGE at 15:30

## 2020-06-12 RX ADMIN — DEXAMETHASONE SODIUM PHOSPHATE 10 MG: 10 INJECTION, SOLUTION INTRAMUSCULAR; INTRAVENOUS at 14:46

## 2020-06-12 RX ADMIN — SODIUM CHLORIDE 20 ML/HR: 9 INJECTION, SOLUTION INTRAVENOUS at 14:46

## 2020-06-19 NOTE — PROGRESS NOTES
with response to therapy. No residual   enhancement of the right breast. Normal appearance of the right   pectoralis musculature. 2. Residual prominent right axillary lymph node, which is concerning   for residual disease. There appears to be a biopsy marker in this   lymph node. There is also a lymph node slightly lateral which is also   prominent. 3. Heterogeneous appearance of the liver compared to the prior. Consider CT abdomen and pelvis with contrast for further evaluation. 4. Cardiomegaly and small right pleural fluid. BI-RADS Final Assessment Category 6: Known Biopsy-Proven Malignancy       She is now status post Right modified radical mastectomy       PATHOLOGY REVEALS:  FINAL DIAGNOSIS:    Breast, right modified radical mastectomy:   1.  Primary tumor site identified with extensive fibrosis and nests of  residual high-grade carcinoma present in lymphatic spaces.   2.  Tumor is present in a lymphatic space at the deep surgical excision  margin.   3.  Sections of nipple identified, negative for evidence of malignancy.   4.  Sections of benign skin with benign seborrheic keratoses.   5.  3 out of 11 lymph nodes, positive for metastatic carcinoma.   6.  Multiple discrete nest of cells identified within the nodes.   7.  Largest focus of metastasis measures 0.5 cm in greatest dimension.   8.  Negative for extracapsular spread. She has just started her radiation. PHYSICAL EXAM:  The  wounds look good with no evidence of infection, fluid accumulation, or skin necrosis. IMPRESSION:    Doing well s/p right mastectomy and axillary node dissection 3/3/2020    She completed Radiation 6/4/2020 and is doing well. PLAN: Return with a unilateral Left Breast Ultrasound in Aug 2020. I have seen, examined and reviewed this patient medication list, appropriate labs and imaging studies. I reviewed relevant medical records and others physicians notes. I discussed the plans of care with the patient.  I

## 2020-06-22 ENCOUNTER — OFFICE VISIT (OUTPATIENT)
Dept: SURGERY | Age: 70
End: 2020-06-22
Payer: MEDICARE

## 2020-06-22 VITALS — SYSTOLIC BLOOD PRESSURE: 128 MMHG | HEART RATE: 80 BPM | DIASTOLIC BLOOD PRESSURE: 70 MMHG

## 2020-06-22 PROCEDURE — G8420 CALC BMI NORM PARAMETERS: HCPCS | Performed by: SURGERY

## 2020-06-22 PROCEDURE — G8399 PT W/DXA RESULTS DOCUMENT: HCPCS | Performed by: SURGERY

## 2020-06-22 PROCEDURE — 4040F PNEUMOC VAC/ADMIN/RCVD: CPT | Performed by: SURGERY

## 2020-06-22 PROCEDURE — G8428 CUR MEDS NOT DOCUMENT: HCPCS | Performed by: SURGERY

## 2020-06-22 PROCEDURE — 1036F TOBACCO NON-USER: CPT | Performed by: SURGERY

## 2020-06-22 PROCEDURE — 1090F PRES/ABSN URINE INCON ASSESS: CPT | Performed by: SURGERY

## 2020-06-22 PROCEDURE — 1123F ACP DISCUSS/DSCN MKR DOCD: CPT | Performed by: SURGERY

## 2020-06-22 PROCEDURE — 99213 OFFICE O/P EST LOW 20 MIN: CPT | Performed by: SURGERY

## 2020-06-22 PROCEDURE — 3017F COLORECTAL CA SCREEN DOC REV: CPT | Performed by: SURGERY

## 2020-06-22 RX ORDER — MONTELUKAST SODIUM 10 MG/1
10 TABLET ORAL DAILY
Qty: 30 TABLET | Refills: 5 | Status: SHIPPED | OUTPATIENT
Start: 2020-06-22 | End: 2021-02-24 | Stop reason: SDUPTHER

## 2020-07-01 ENCOUNTER — HOSPITAL ENCOUNTER (OUTPATIENT)
Dept: RADIATION ONCOLOGY | Facility: HOSPITAL | Age: 70
Setting detail: RADIATION/ONCOLOGY SERIES
End: 2020-07-01

## 2020-07-01 NOTE — PROGRESS NOTES
Yessi [facility]   1950     INTERVAL HISTORY/HISTORY OF PRESENT ILLNESS:    Diagnosis   · HER-2 IDC right breast, 2019  · [id number]  · ER 3%, MS 0%, HER-2/lea IHC 3+  · Mammaprint high risk Basal-like  · HER-2 FISH Positive    Treatment summary  · 10/18/2019 through  2020- 6 cycles of neoadjuvant Taxotere, Carboplatin and Herceptin and Perjeta  · 3/3/2020- right mastectomy/ lymph node dissection  · 3/20/2020-14 cycles of adjuvant TDM-1.   · Anticipated adjuvant radiation. · 4/10/2020-adjuvant endocrine therapy with letrozole x10 years. Patient is a pleasant 68years old female who has a diagnosis of locally advanced HER-2 positive breast cancer. She was started on chemotherapy with Taxotere carboplatin Herceptin Perjeta with a partial response. She is currently status post right mastectomy and lymph node dissection showing complete pathologic response in the breast but with residual lorena axillary disease. She was therefore recommended to switch her adjuvant treatment from Herceptin/Perjeta to [facility]. 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 She also had a repeat 2D echo recently that showed preserved EF. CBC today showed thrombocytopenia. Cancer history  Ms Robert Jordan was first seen by me on 10/03/2019. She felt a mass in the right upper breast and therefore contact her primary care provider. A diagnostic mammogram was performed. Her mother  of breast cancer at the age of 50. She has no other history of breast cancer or any other cancer in her family. · 2019- diagnostic mammogram showed a suspicious 2.2 cm hypoechoic spiculated right breast mass in the axillary tail within the internal calcifications had suggest malignancy. Ultrasound of the breast showed the lesion as well as a pathologic appearing right axillary adenopathy.   · 2019-core needle biopsy consistent with high grade invasive ductal carcinoma. MammaPrint high risk basal type. ER 3%, TN 0.2%, HER-2/lea IHC 3+ Ki-67 22%. HER-2/lea FISH positive  · 9/19/2019- MRI breast showed a large right breast mass compatible with primary malignancy measuring 2.2 x 1.2 x 3.5 cm in size. Abnormal enhancement in the pectoralis major muscle suggesting extension into the muscle. Multiple abnormal lymph nodes are seen in the right axilla with the largest measuring 2 x 1.6 cm. A small lymph node is seen along the right internal mammary chain. No suspicious left axillary adenopathy. · 9/27/2019-CT chest abdomen pelvis and bone scan showed no evidence of metastatic disease. · 10/3/2019- she was first seen by me. Recommended neoadjuvant chemotherapy approach with dose dense Adriamycin/cyclophosphamide x4 cycles followed by 12 weekly cycles of Taxol 80 mg/m² with Herceptin and Perjeta. · 10/14/2019- consultation at [facility]. Recommended Taxotere, carboplatin Herceptin Perjeta. · 10/17/2019- 1/31/2020 completion of 6 cycles of of neoadjuvant chemotherapy with Taxotere carboplatin Herceptin Perjeta. · 2/14/2020-MRI breast.  Showed findings consistent with response to therapy. No residual enhancement of the right breast. Residual right axillary lymph node concerning for residual disease. · 2/21/2020- maintenance Herceptin/Perjeta to complete 1 year of treatment. · 3/03/2020-she underwent a right mastectomy/axillary dissection by Dr. Guerita Hodge at [facility].  Primary tumor site identified with extensive fibrosis and nests of residual high-grade carcinoma present in lymphatic spaces. Tumor is present in a lymphatic space at the deep surgical excision margin. 3 out of 11 lymph nodes, positive for metastatic carcinoma, at least 1 metastasis greater than 2.0 mm . Largest focus of metastasis measures 0.5 cm in greatest dimension. AJCC STAGE: ypT0, pN1a, pMx  · 3/16/2020-repeat 2D echo EF 60%.       PAST MEDICAL HISTORY: Past Medical History:   Diagnosis Date    Malignant neoplasm of unspecified site of unspecified female breast [facility])     breast          PAST SURGICAL HISTORY:  Past Surgical History:   Procedure Laterality Date    BREAST BIOPSY Right 09/05/2019    COLONOSCOPY N/A 10/1/2019    Dr Faviola Leon, internal hemorrhoids-Grade 2 without bleeding stigmata and external hemorrhoids-TV x 1, AP x 1, 3 yr recall    MASTECTOMY Right 3/3/2020    RIGHT SIMPLE MASTECTOMY WITH SENTINEL NODE BIOPSY, FLAPS, PEC BLOCK performed by Jose Cruz Garner MD at [address] [place] [address] N/A 10/4/2019    PORT INSERTION WITH FLUORO performed by Jose Cruz Garner MD at [facility] N/A 10/1/2019    Dr Valeriy Bedolla hernia, HP gastric polyps        SOCIAL HISTORY:  Social History     Socioeconomic History    Marital status:      Spouse name: None    Number of children: None    Years of education: None    Highest education level: None   Occupational History    None   Social Needs    Financial resource strain: None    Food insecurity     Worry: None     Inability: None    Transportation needs     Medical: None     Non-medical: None   Tobacco Use    Smoking status: Never Smoker    Smokeless tobacco: Never Used   Substance and Sexual Activity    Alcohol use: Never     Frequency: Never    Drug use: Never    Sexual activity: Yes   Lifestyle    Physical activity     Days per week: None     Minutes per session: None    Stress: None   Relationships    Social connections     Talks on phone: None     Gets together: None     Attends [employer] service: None     Active member of club or organization: None     Attends meetings of clubs or organizations: None     Relationship status: None    Intimate partner violence     Fear of current or ex partner: None     Emotionally abused: None     Physically abused: None     Forced sexual activity: None   Other Topics Concern    None   Social History Narrative    None       FAMILY HISTORY:  Family History   Problem Relation Age of Onset    Breast Cancer Mother 39    Colon Cancer Neg Hx     Colon Polyps Neg Hx         Current Outpatient Medications   Medication Sig Dispense Refill    montelukast (SINGULAIR) 10 MG tablet Take 1 tablet by mouth daily 30 tablet 5    ibandronate (BONIVA) 150 MG tablet Take 1 tablet by mouth every 30 days Take one (1) tablet once per month in the morning with a full glass of water, on an empty stomach, and do not take anything else by mouth or lie down for the next 30 minutes. 30 tablet 6    letrozole (FEMARA) 2.5 MG tablet Take 1 tablet by mouth daily 30 tablet 3    ferrous sulfate 325 (65 Fe) MG tablet Take 325 mg by mouth daily (with breakfast)      vitamin D (ERGOCALCIFEROL) 04486 units CAPS capsule Take 1 capsule by mouth once a week 12 capsule 1    Calcium Carbonate-Vitamin D (OYSTER SHELL CALCIUM/D) 500-200 MG-UNIT TABS Take 1 tablet by mouth daily 360 tablet 0     No current facility-administered medications for this visit. REVIEW OF SYSTEMS:    Constitutional: no fever, no night sweats,  fatigue;   HEENT: no blurring of vision, no double vision, no hearing difficulty, no tinnitus,no ulceration, no dysphagia  Lungs: no cough, no shortness of breath, no wheeze;   CVS: no palpitation, no chest pain, no shortness of breath;  GI: no abdominal pain, no nausea , no vomiting, no constipation;   BRYNN: no dysuria, frequency and urgency, no hematuria, no kidney stones;   Musculoskeletal: no joint pain, swelling , stiffness;   Endocrine: no polyuria, polydypsia, no cold or heat intolerence; Hematology/lymphatic: no easy brusing or bleeding, no hx of clotting disorder; no peripheral adenopathy. Dermatology: no skin rash, no eczema, no pruritis; mild alopecia  Psychiatry: no depression, no anxiety,no panic attacks, no suicide ideation;    Neurology: no syncope, no seizures, no numbness or tingling of hands, no numbness or tingling of feet, no paresis; mild sensory neuropathy in bilateral fingers    PHYSICAL EXAM:    Vitals signs:  /74   Pulse 76   Temp 99.2 °F (37.3 °C)   Ht 5' 7\" (1.702 m)   Wt 123 lb 6.4 oz (56 kg)   SpO2 95%   BMI 19.33 kg/m²    Pain scale:  Pain Score:   0 - No pain     CONSTITUTIONAL: Alert, appropriate, no acute distress,   EYES: Non icteric, EOM intact, pupils equal round and reactive to light and accommodation. ENT: Oral mucus membranes moist, no oral pharyngeal lesions. External inspection of ears and nose are normal.   NECK: Supple, no masses. No palpable thyroid mass    CHEST/LUNGS: CTA bilaterally, normal respiratory effort   CARDIOVASCULAR: RRR, no murmurs. No lower extremity edema   ABDOMEN: soft non-tender, active bowel sounds, no hepatosplenomegaly. No palpable masses. EXTREMITIES: warm, Full ROM of all fours extremities. No focal weakness. SKIN: warm, dry with no rashes or lesions  LYMPH: No cervical, clavicular, axillary, or inguinal lymphadenopathy  NEUROLOGIC: follows commands, non focal.   PSYCH: mood and affect appropriate. Alert and oriented to time and place and person. Relevant Lab findings/reviewed by me:  QOF:4/7/1094  WBC-5.59  HGB-10.2  PLT-84,000  Neut-4.31    6/12/2020  Potassium-3.4  BUN-25  Total Protein-6.3  AST-41          Relevant Imaging studies/reviewed by me:  No results found. Letter sent here  ASSESSMENT    No orders of the defined types were placed in this encounter. Lenore Green was seen today for follow-up.     Diagnoses and all orders for this visit:    Malignant neoplasm of overlapping sites of right breast in female, estrogen receptor positive ([facility] [address]    Chemotherapy management, encounter for    Encounter for antineoplastic immunotherapy    Care plan discussed with patient    Encounter for monitoring aromatase inhibitor therapy       HER-2 positive, node positive invasive ductal carcinoma right breast, MammaPrint basal like type  She had regional residual disease  [id number]  Recommended adjuvant TDM 1-due to residual disease. As per NCCN guidelines. Recommended adjuvant radiation  The patient was counseled today about diagnosis, staging, prognosis, diagnostic tests, medications, side effects and disease management. The method of counseling included verbal explanation. The patient verbalized understanding. Proceed cycle 6 out of 14 TDM 1 adjuvant setting next week. Treatment will be delayed today due to thrombocytopenia. Treatment related toxicity- mild intermittent neuropathy, thrombocytopenia    PLAN:  · Delay treatment with TDM 1 for 1 week. · RTC every 3 weeks for treatment  · RTC MD 6 weeks  · Proceed cycle #6/14 next week TDM 1 every 3 weeks  · CMP during treatment. Follow-up:    No follow-ups on file. Data Baylee Lozano MA am scribing for Iesha Prather MD. Electronically sign Rohan Bolivar MA on 7/2/2020 at 11:18 AM    I, Dr Daniel Renteria, personally performed the services described in this documentation as scribed by Rohan Bolivar MA in my presence and is both accurate and complete. Over 50% of the total visit time of 25 minutes in face to face encounter with the patient, out of which more than 50% of the time was spent in counseling patient or family and coordination of care. Counseling included but was not limited to time spent reviewing labs, imaging studies/ treatment plan and answering questions.

## 2020-07-02 ENCOUNTER — HOSPITAL ENCOUNTER (OUTPATIENT)
Dept: INFUSION THERAPY | Age: 70
Setting detail: INFUSION SERIES
End: 2020-07-02
Payer: MEDICARE

## 2020-07-02 ENCOUNTER — HOSPITAL ENCOUNTER (OUTPATIENT)
Dept: INFUSION THERAPY | Age: 70
Discharge: HOME | End: 2020-07-02
Payer: MEDICARE

## 2020-07-02 ENCOUNTER — OFFICE VISIT (OUTPATIENT)
Dept: HEMATOLOGY | Age: 70
End: 2020-07-02
Payer: MEDICARE

## 2020-07-02 VITALS
HEIGHT: 67 IN | DIASTOLIC BLOOD PRESSURE: 74 MMHG | WEIGHT: 123.4 LBS | OXYGEN SATURATION: 95 % | SYSTOLIC BLOOD PRESSURE: 126 MMHG | TEMPERATURE: 99.2 F | HEART RATE: 76 BPM | BODY MASS INDEX: 19.37 KG/M2

## 2020-07-02 DIAGNOSIS — C50.811 MALIGNANT NEOPLASM OF OVERLAPPING SITES OF RIGHT BREAST IN FEMALE, ESTROGEN RECEPTOR POSITIVE (HCC): ICD-10-CM

## 2020-07-02 DIAGNOSIS — Z17.0 MALIGNANT NEOPLASM OF OVERLAPPING SITES OF RIGHT BREAST IN FEMALE, ESTROGEN RECEPTOR POSITIVE (HCC): ICD-10-CM

## 2020-07-02 LAB
BASOPHILS ABSOLUTE: 0.04 K/UL (ref 0.01–0.08)
BASOPHILS RELATIVE PERCENT: 0.7 % (ref 0.1–1.2)
EOSINOPHILS ABSOLUTE: 0.08 K/UL (ref 0.04–0.54)
EOSINOPHILS RELATIVE PERCENT: 1.4 % (ref 0.7–7)
HCT VFR BLD CALC: 32.7 % (ref 34.1–44.9)
HEMOGLOBIN: 10.2 G/DL (ref 11.2–15.7)
LYMPHOCYTES ABSOLUTE: 0.68 K/UL (ref 1.18–3.74)
LYMPHOCYTES RELATIVE PERCENT: 12.2 % (ref 19.3–53.1)
MCH RBC QN AUTO: 34.9 PG (ref 25.6–32.2)
MCHC RBC AUTO-ENTMCNC: 31.2 G/DL (ref 32.3–35.5)
MCV RBC AUTO: 112 FL (ref 79.4–94.8)
MONOCYTES ABSOLUTE: 0.48 K/UL (ref 0.24–0.82)
MONOCYTES RELATIVE PERCENT: 8.6 % (ref 4.7–12.5)
NEUTROPHILS ABSOLUTE: 4.31 K/UL (ref 1.56–6.13)
NEUTROPHILS RELATIVE PERCENT: 77.1 % (ref 34–71.1)
PDW BLD-RTO: 14.9 % (ref 11.7–14.4)
PLATELET # BLD: 84 K/UL (ref 182–369)
PMV BLD AUTO: 11.2 FL (ref 7.4–10.4)
RBC # BLD: 2.92 M/UL (ref 3.93–5.22)
WBC # BLD: 5.59 K/UL (ref 3.98–10.04)

## 2020-07-02 PROCEDURE — 99211 OFF/OP EST MAY X REQ PHY/QHP: CPT

## 2020-07-02 PROCEDURE — G8427 DOCREV CUR MEDS BY ELIG CLIN: HCPCS | Performed by: INTERNAL MEDICINE

## 2020-07-02 PROCEDURE — 1123F ACP DISCUSS/DSCN MKR DOCD: CPT | Performed by: INTERNAL MEDICINE

## 2020-07-02 PROCEDURE — 1090F PRES/ABSN URINE INCON ASSESS: CPT | Performed by: INTERNAL MEDICINE

## 2020-07-02 PROCEDURE — G8399 PT W/DXA RESULTS DOCUMENT: HCPCS | Performed by: INTERNAL MEDICINE

## 2020-07-02 PROCEDURE — 1036F TOBACCO NON-USER: CPT | Performed by: INTERNAL MEDICINE

## 2020-07-02 PROCEDURE — 3017F COLORECTAL CA SCREEN DOC REV: CPT | Performed by: INTERNAL MEDICINE

## 2020-07-02 PROCEDURE — 4040F PNEUMOC VAC/ADMIN/RCVD: CPT | Performed by: INTERNAL MEDICINE

## 2020-07-02 PROCEDURE — 85025 COMPLETE CBC W/AUTO DIFF WBC: CPT

## 2020-07-02 PROCEDURE — G8420 CALC BMI NORM PARAMETERS: HCPCS | Performed by: INTERNAL MEDICINE

## 2020-07-02 PROCEDURE — 99214 OFFICE O/P EST MOD 30 MIN: CPT | Performed by: INTERNAL MEDICINE

## 2020-07-10 ENCOUNTER — HOSPITAL ENCOUNTER (OUTPATIENT)
Dept: INFUSION THERAPY | Age: 70
Setting detail: INFUSION SERIES
Discharge: HOME | End: 2020-07-10
Payer: MEDICARE

## 2020-07-10 ENCOUNTER — HOSPITAL ENCOUNTER (OUTPATIENT)
Dept: INFUSION THERAPY | Age: 70
Discharge: HOME | End: 2020-07-10
Payer: MEDICARE

## 2020-07-10 VITALS
WEIGHT: 124.8 LBS | OXYGEN SATURATION: 97 % | BODY MASS INDEX: 19.59 KG/M2 | SYSTOLIC BLOOD PRESSURE: 124 MMHG | TEMPERATURE: 98.9 F | DIASTOLIC BLOOD PRESSURE: 70 MMHG | HEIGHT: 67 IN | HEART RATE: 99 BPM

## 2020-07-10 VITALS
TEMPERATURE: 98.1 F | HEART RATE: 68 BPM | DIASTOLIC BLOOD PRESSURE: 78 MMHG | RESPIRATION RATE: 18 BRPM | OXYGEN SATURATION: 99 % | SYSTOLIC BLOOD PRESSURE: 142 MMHG

## 2020-07-10 DIAGNOSIS — C50.911 MALIGNANT NEOPLASM OF RIGHT FEMALE BREAST, UNSPECIFIED ESTROGEN RECEPTOR STATUS, UNSPECIFIED SITE OF BREAST (HCC): ICD-10-CM

## 2020-07-10 DIAGNOSIS — C50.811 MALIGNANT NEOPLASM OF OVERLAPPING SITES OF RIGHT BREAST IN FEMALE, ESTROGEN RECEPTOR POSITIVE (HCC): ICD-10-CM

## 2020-07-10 DIAGNOSIS — Z17.0 MALIGNANT NEOPLASM OF OVERLAPPING SITES OF RIGHT BREAST IN FEMALE, ESTROGEN RECEPTOR POSITIVE (HCC): ICD-10-CM

## 2020-07-10 DIAGNOSIS — Z51.11 ENCOUNTER FOR ANTINEOPLASTIC CHEMOTHERAPY: Primary | ICD-10-CM

## 2020-07-10 LAB
ALBUMIN SERPL-MCNC: 3.8 G/DL (ref 3.5–5.2)
ALP BLD-CCNC: 95 U/L (ref 35–104)
ALT SERPL-CCNC: 37 U/L (ref 9–52)
ANION GAP SERPL CALCULATED.3IONS-SCNC: 6 MMOL/L (ref 7–19)
AST SERPL-CCNC: 56 U/L (ref 14–36)
BASOPHILS ABSOLUTE: 0.04 K/UL (ref 0.01–0.08)
BASOPHILS RELATIVE PERCENT: 0.7 % (ref 0.1–1.2)
BILIRUB SERPL-MCNC: 0.4 MG/DL (ref 0.2–1.3)
BUN BLDV-MCNC: 25 MG/DL (ref 7–17)
CALCIUM SERPL-MCNC: 8.9 MG/DL (ref 8.4–10.2)
CHLORIDE BLD-SCNC: 106 MMOL/L (ref 98–111)
CO2: 32 MMOL/L (ref 22–29)
CREAT SERPL-MCNC: 1.1 MG/DL (ref 0.5–1)
EOSINOPHILS ABSOLUTE: 0.08 K/UL (ref 0.04–0.54)
EOSINOPHILS RELATIVE PERCENT: 1.3 % (ref 0.7–7)
GFR NON-AFRICAN AMERICAN: 49
GLOBULIN: 2.8 G/DL
GLUCOSE BLD-MCNC: 86 MG/DL (ref 74–106)
HCT VFR BLD CALC: 31.9 % (ref 34.1–44.9)
HEMOGLOBIN: 10.5 G/DL (ref 11.2–15.7)
LYMPHOCYTES ABSOLUTE: 0.78 K/UL (ref 1.18–3.74)
LYMPHOCYTES RELATIVE PERCENT: 13 % (ref 19.3–53.1)
MCH RBC QN AUTO: 34.9 PG (ref 25.6–32.2)
MCHC RBC AUTO-ENTMCNC: 32.9 G/DL (ref 32.3–35.5)
MCV RBC AUTO: 106 FL (ref 79.4–94.8)
MONOCYTES ABSOLUTE: 0.62 K/UL (ref 0.24–0.82)
MONOCYTES RELATIVE PERCENT: 10.4 % (ref 4.7–12.5)
NEUTROPHILS ABSOLUTE: 4.46 K/UL (ref 1.56–6.13)
NEUTROPHILS RELATIVE PERCENT: 74.6 % (ref 34–71.1)
PDW BLD-RTO: 14.7 % (ref 11.7–14.4)
PLATELET # BLD: 106 K/UL (ref 182–369)
PMV BLD AUTO: 10.8 FL (ref 7.4–10.4)
POTASSIUM SERPL-SCNC: 3.4 MMOL/L (ref 3.5–5.1)
RBC # BLD: 3.01 M/UL (ref 3.93–5.22)
SODIUM BLD-SCNC: 144 MMOL/L (ref 137–145)
TOTAL PROTEIN: 6.6 G/DL (ref 6.3–8.2)
WBC # BLD: 5.98 K/UL (ref 3.98–10.04)

## 2020-07-10 PROCEDURE — 96413 CHEMO IV INFUSION 1 HR: CPT

## 2020-07-10 PROCEDURE — 6360000002 HC RX W HCPCS: Performed by: INTERNAL MEDICINE

## 2020-07-10 PROCEDURE — 85025 COMPLETE CBC W/AUTO DIFF WBC: CPT

## 2020-07-10 PROCEDURE — 80053 COMPREHEN METABOLIC PANEL: CPT

## 2020-07-10 PROCEDURE — 36415 COLL VENOUS BLD VENIPUNCTURE: CPT

## 2020-07-10 PROCEDURE — 2580000003 HC RX 258: Performed by: INTERNAL MEDICINE

## 2020-07-10 RX ORDER — HEPARIN SODIUM (PORCINE) LOCK FLUSH IV SOLN 100 UNIT/ML 100 UNIT/ML
500 SOLUTION INTRAVENOUS PRN
Status: CANCELLED | OUTPATIENT
Start: 2020-07-10

## 2020-07-10 RX ORDER — DEXAMETHASONE SODIUM PHOSPHATE 10 MG/ML
10 INJECTION, SOLUTION INTRAMUSCULAR; INTRAVENOUS ONCE
Status: COMPLETED | OUTPATIENT
Start: 2020-07-10 | End: 2020-07-10

## 2020-07-10 RX ORDER — DIPHENHYDRAMINE HYDROCHLORIDE 50 MG/ML
50 INJECTION INTRAMUSCULAR; INTRAVENOUS ONCE
Status: CANCELLED | OUTPATIENT
Start: 2020-07-10

## 2020-07-10 RX ORDER — EPINEPHRINE 1 MG/ML
0.3 INJECTION, SOLUTION, CONCENTRATE INTRAVENOUS PRN
Status: CANCELLED | OUTPATIENT
Start: 2020-07-10

## 2020-07-10 RX ORDER — SODIUM CHLORIDE 0.9 % (FLUSH) 0.9 %
10 SYRINGE (ML) INJECTION PRN
Status: DISCONTINUED | OUTPATIENT
Start: 2020-07-10 | End: 2020-07-11 | Stop reason: HOSPADM

## 2020-07-10 RX ORDER — SODIUM CHLORIDE 9 MG/ML
20 INJECTION, SOLUTION INTRAVENOUS ONCE
Status: CANCELLED | OUTPATIENT
Start: 2020-07-10

## 2020-07-10 RX ORDER — SODIUM CHLORIDE 9 MG/ML
20 INJECTION, SOLUTION INTRAVENOUS ONCE
Status: COMPLETED | OUTPATIENT
Start: 2020-07-10 | End: 2020-07-10

## 2020-07-10 RX ORDER — METHYLPREDNISOLONE SODIUM SUCCINATE 125 MG/2ML
125 INJECTION, POWDER, LYOPHILIZED, FOR SOLUTION INTRAMUSCULAR; INTRAVENOUS ONCE
Status: CANCELLED | OUTPATIENT
Start: 2020-07-10

## 2020-07-10 RX ORDER — SODIUM CHLORIDE 0.9 % (FLUSH) 0.9 %
5 SYRINGE (ML) INJECTION PRN
Status: CANCELLED | OUTPATIENT
Start: 2020-07-10

## 2020-07-10 RX ORDER — MEPERIDINE HYDROCHLORIDE 50 MG/ML
12.5 INJECTION INTRAMUSCULAR; INTRAVENOUS; SUBCUTANEOUS ONCE
Status: CANCELLED | OUTPATIENT
Start: 2020-07-10

## 2020-07-10 RX ORDER — SODIUM CHLORIDE 9 MG/ML
INJECTION, SOLUTION INTRAVENOUS CONTINUOUS
Status: CANCELLED | OUTPATIENT
Start: 2020-07-10

## 2020-07-10 RX ORDER — SODIUM CHLORIDE 0.9 % (FLUSH) 0.9 %
10 SYRINGE (ML) INJECTION PRN
Status: CANCELLED | OUTPATIENT
Start: 2020-07-10

## 2020-07-10 RX ORDER — HEPARIN SODIUM (PORCINE) LOCK FLUSH IV SOLN 100 UNIT/ML 100 UNIT/ML
500 SOLUTION INTRAVENOUS PRN
Status: DISCONTINUED | OUTPATIENT
Start: 2020-07-10 | End: 2020-07-11 | Stop reason: HOSPADM

## 2020-07-10 RX ADMIN — ADO-TRASTUZUMAB EMTANSINE 200 MG: 20 INJECTION, POWDER, LYOPHILIZED, FOR SOLUTION INTRAVENOUS at 10:36

## 2020-07-10 RX ADMIN — DEXAMETHASONE SODIUM PHOSPHATE 10 MG: 10 INJECTION, SOLUTION INTRAMUSCULAR; INTRAVENOUS at 10:02

## 2020-07-10 RX ADMIN — SODIUM CHLORIDE 20 ML/HR: 9 INJECTION, SOLUTION INTRAVENOUS at 10:03

## 2020-07-10 RX ADMIN — HEPARIN 500 UNITS: 100 SYRINGE at 11:17

## 2020-07-10 NOTE — PROGRESS NOTES
Patient presents to clinic for cbc/cmp/orders for C6 of 14 scheduled Kadcyla treatments. Patient is without complaint. Reports continued mild neuropathy to bilateral fingers, but has not worsened. Tx HELD last week d/t thrombocytopenia (plat=84) . Platelets [id number]. Per Dr Randall Haddad, proceed with tx as planned. Patient verbalized understanding of s/s to report.

## 2020-07-28 PROBLEM — Z92.3 HISTORY OF RADIATION THERAPY: Status: ACTIVE | Noted: 2020-07-28

## 2020-07-29 ENCOUNTER — OFFICE VISIT (OUTPATIENT)
Dept: RADIATION ONCOLOGY | Facility: HOSPITAL | Age: 70
End: 2020-07-29

## 2020-07-29 ENCOUNTER — HOSPITAL ENCOUNTER (OUTPATIENT)
Dept: RADIATION ONCOLOGY | Facility: HOSPITAL | Age: 70
Setting detail: RADIATION/ONCOLOGY SERIES
End: 2020-07-29

## 2020-07-29 VITALS
WEIGHT: 128.2 LBS | SYSTOLIC BLOOD PRESSURE: 114 MMHG | HEIGHT: 67 IN | HEART RATE: 80 BPM | BODY MASS INDEX: 20.12 KG/M2 | DIASTOLIC BLOOD PRESSURE: 70 MMHG

## 2020-07-29 DIAGNOSIS — Z98.890 S/P LYMPH NODE BIOPSY: ICD-10-CM

## 2020-07-29 DIAGNOSIS — Z92.3 HISTORY OF RADIATION THERAPY: ICD-10-CM

## 2020-07-29 DIAGNOSIS — Z78.9 NON-SMOKER: ICD-10-CM

## 2020-07-29 DIAGNOSIS — C77.9 REGIONAL LYMPH NODE METASTASIS PRESENT (HCC): ICD-10-CM

## 2020-07-29 DIAGNOSIS — C50.911 HER2-POSITIVE CARCINOMA OF RIGHT BREAST (HCC): Primary | ICD-10-CM

## 2020-07-29 DIAGNOSIS — Z90.11 S/P RIGHT MASTECTOMY: ICD-10-CM

## 2020-07-29 PROCEDURE — G0463 HOSPITAL OUTPT CLINIC VISIT: HCPCS | Performed by: RADIOLOGY

## 2020-07-29 RX ORDER — LETROZOLE 2.5 MG/1
2.5 TABLET, FILM COATED ORAL DAILY
COMMUNITY
Start: 2020-06-12

## 2020-07-29 RX ORDER — IBANDRONATE SODIUM 150 MG/1
150 TABLET, FILM COATED ORAL
COMMUNITY
Start: 2020-05-22

## 2020-07-31 ENCOUNTER — HOSPITAL ENCOUNTER (OUTPATIENT)
Dept: INFUSION THERAPY | Age: 70
Discharge: HOME | End: 2020-07-31
Payer: MEDICARE

## 2020-07-31 VITALS
BODY MASS INDEX: 20.14 KG/M2 | TEMPERATURE: 98.2 F | OXYGEN SATURATION: 96 % | SYSTOLIC BLOOD PRESSURE: 120 MMHG | WEIGHT: 128.3 LBS | HEIGHT: 67 IN | HEART RATE: 83 BPM | DIASTOLIC BLOOD PRESSURE: 82 MMHG

## 2020-07-31 DIAGNOSIS — C50.811 MALIGNANT NEOPLASM OF OVERLAPPING SITES OF RIGHT BREAST IN FEMALE, ESTROGEN RECEPTOR POSITIVE (HCC): Primary | ICD-10-CM

## 2020-07-31 DIAGNOSIS — Z17.0 MALIGNANT NEOPLASM OF OVERLAPPING SITES OF RIGHT BREAST IN FEMALE, ESTROGEN RECEPTOR POSITIVE (HCC): Primary | ICD-10-CM

## 2020-07-31 DIAGNOSIS — C50.812 MALIGNANT NEOPLASM OF OVERLAPPING SITES OF LEFT FEMALE BREAST, UNSPECIFIED ESTROGEN RECEPTOR STATUS (HCC): ICD-10-CM

## 2020-07-31 LAB
ALBUMIN SERPL-MCNC: 3.6 G/DL (ref 3.5–5.2)
ALP BLD-CCNC: 81 U/L (ref 35–104)
ALT SERPL-CCNC: 35 U/L (ref 9–52)
ANION GAP SERPL CALCULATED.3IONS-SCNC: 4 MMOL/L (ref 7–19)
AST SERPL-CCNC: 61 U/L (ref 14–36)
BASOPHILS ABSOLUTE: 0.05 K/UL (ref 0.01–0.08)
BASOPHILS RELATIVE PERCENT: 1.1 % (ref 0.1–1.2)
BILIRUB SERPL-MCNC: 0.4 MG/DL (ref 0.2–1.3)
BUN BLDV-MCNC: 25 MG/DL (ref 7–17)
CALCIUM SERPL-MCNC: 9 MG/DL (ref 8.4–10.2)
CHLORIDE BLD-SCNC: 104 MMOL/L (ref 98–111)
CO2: 31 MMOL/L (ref 22–29)
CREAT SERPL-MCNC: 0.8 MG/DL (ref 0.5–1)
EOSINOPHILS ABSOLUTE: 0.09 K/UL (ref 0.04–0.54)
EOSINOPHILS RELATIVE PERCENT: 1.9 % (ref 0.7–7)
GFR NON-AFRICAN AMERICAN: >60
GLOBULIN: 2.8 G/DL
GLUCOSE BLD-MCNC: 139 MG/DL (ref 74–106)
HCT VFR BLD CALC: 29.7 % (ref 34.1–44.9)
HEMOGLOBIN: 9.9 G/DL (ref 11.2–15.7)
LYMPHOCYTES ABSOLUTE: 0.66 K/UL (ref 1.18–3.74)
LYMPHOCYTES RELATIVE PERCENT: 14 % (ref 19.3–53.1)
MCH RBC QN AUTO: 35.6 PG (ref 25.6–32.2)
MCHC RBC AUTO-ENTMCNC: 33.3 G/DL (ref 32.3–35.5)
MCV RBC AUTO: 106.8 FL (ref 79.4–94.8)
MONOCYTES ABSOLUTE: 0.41 K/UL (ref 0.24–0.82)
MONOCYTES RELATIVE PERCENT: 8.7 % (ref 4.7–12.5)
NEUTROPHILS ABSOLUTE: 3.51 K/UL (ref 1.56–6.13)
NEUTROPHILS RELATIVE PERCENT: 74.3 % (ref 34–71.1)
PDW BLD-RTO: 14.6 % (ref 11.7–14.4)
PLATELET # BLD: 81 K/UL (ref 182–369)
PMV BLD AUTO: 10.7 FL (ref 7.4–10.4)
POTASSIUM SERPL-SCNC: 3.4 MMOL/L (ref 3.5–5.1)
RBC # BLD: 2.78 M/UL (ref 3.93–5.22)
SODIUM BLD-SCNC: 139 MMOL/L (ref 137–145)
TOTAL PROTEIN: 6.4 G/DL (ref 6.3–8.2)
WBC # BLD: 4.72 K/UL (ref 3.98–10.04)

## 2020-07-31 PROCEDURE — 96523 IRRIG DRUG DELIVERY DEVICE: CPT

## 2020-07-31 PROCEDURE — 80053 COMPREHEN METABOLIC PANEL: CPT

## 2020-07-31 PROCEDURE — 85025 COMPLETE CBC W/AUTO DIFF WBC: CPT

## 2020-07-31 PROCEDURE — 2580000003 HC RX 258: Performed by: INTERNAL MEDICINE

## 2020-07-31 PROCEDURE — 6360000002 HC RX W HCPCS: Performed by: INTERNAL MEDICINE

## 2020-07-31 RX ORDER — SODIUM CHLORIDE 0.9 % (FLUSH) 0.9 %
20 SYRINGE (ML) INJECTION PRN
Status: CANCELLED | OUTPATIENT
Start: 2020-07-31

## 2020-07-31 RX ORDER — SODIUM CHLORIDE 0.9 % (FLUSH) 0.9 %
20 SYRINGE (ML) INJECTION PRN
Status: DISCONTINUED | OUTPATIENT
Start: 2020-07-31 | End: 2020-08-01 | Stop reason: HOSPADM

## 2020-07-31 RX ORDER — HEPARIN SODIUM (PORCINE) LOCK FLUSH IV SOLN 100 UNIT/ML 100 UNIT/ML
500 SOLUTION INTRAVENOUS PRN
Status: DISCONTINUED | OUTPATIENT
Start: 2020-07-31 | End: 2020-08-01 | Stop reason: HOSPADM

## 2020-07-31 RX ORDER — HEPARIN SODIUM (PORCINE) LOCK FLUSH IV SOLN 100 UNIT/ML 100 UNIT/ML
500 SOLUTION INTRAVENOUS PRN
Status: CANCELLED | OUTPATIENT
Start: 2020-07-31

## 2020-07-31 RX ADMIN — SODIUM CHLORIDE, PRESERVATIVE FREE 20 ML: 5 INJECTION INTRAVENOUS at 09:39

## 2020-07-31 RX ADMIN — HEPARIN 500 UNITS: 100 SYRINGE at 09:39

## 2020-08-07 ENCOUNTER — HOSPITAL ENCOUNTER (OUTPATIENT)
Dept: INFUSION THERAPY | Age: 70
Setting detail: INFUSION SERIES
Discharge: HOME | End: 2020-08-07
Payer: MEDICARE

## 2020-08-07 ENCOUNTER — HOSPITAL ENCOUNTER (OUTPATIENT)
Dept: INFUSION THERAPY | Age: 70
Discharge: HOME | End: 2020-08-07
Payer: MEDICARE

## 2020-08-07 VITALS
SYSTOLIC BLOOD PRESSURE: 129 MMHG | DIASTOLIC BLOOD PRESSURE: 77 MMHG | RESPIRATION RATE: 18 BRPM | TEMPERATURE: 97.4 F | OXYGEN SATURATION: 97 % | HEART RATE: 67 BPM

## 2020-08-07 VITALS
WEIGHT: 128.2 LBS | DIASTOLIC BLOOD PRESSURE: 62 MMHG | BODY MASS INDEX: 20.12 KG/M2 | OXYGEN SATURATION: 96 % | SYSTOLIC BLOOD PRESSURE: 98 MMHG | HEART RATE: 87 BPM | TEMPERATURE: 98 F | HEIGHT: 67 IN

## 2020-08-07 DIAGNOSIS — Z17.0 MALIGNANT NEOPLASM OF OVERLAPPING SITES OF RIGHT BREAST IN FEMALE, ESTROGEN RECEPTOR POSITIVE (HCC): ICD-10-CM

## 2020-08-07 DIAGNOSIS — C50.911 MALIGNANT NEOPLASM OF RIGHT FEMALE BREAST, UNSPECIFIED ESTROGEN RECEPTOR STATUS, UNSPECIFIED SITE OF BREAST (HCC): ICD-10-CM

## 2020-08-07 DIAGNOSIS — Z51.11 ENCOUNTER FOR ANTINEOPLASTIC CHEMOTHERAPY: Primary | ICD-10-CM

## 2020-08-07 DIAGNOSIS — C50.811 MALIGNANT NEOPLASM OF OVERLAPPING SITES OF RIGHT BREAST IN FEMALE, ESTROGEN RECEPTOR POSITIVE (HCC): ICD-10-CM

## 2020-08-07 LAB
ALBUMIN SERPL-MCNC: 3.9 G/DL (ref 3.5–5.2)
ALP BLD-CCNC: 83 U/L (ref 35–104)
ALT SERPL-CCNC: 35 U/L (ref 9–52)
ANION GAP SERPL CALCULATED.3IONS-SCNC: 4 MMOL/L (ref 7–19)
AST SERPL-CCNC: 58 U/L (ref 14–36)
BASOPHILS ABSOLUTE: 0.07 K/UL (ref 0.01–0.08)
BASOPHILS RELATIVE PERCENT: 1.2 % (ref 0.1–1.2)
BILIRUB SERPL-MCNC: 0.4 MG/DL (ref 0.2–1.3)
BUN BLDV-MCNC: 29 MG/DL (ref 7–17)
CALCIUM SERPL-MCNC: 9.1 MG/DL (ref 8.4–10.2)
CHLORIDE BLD-SCNC: 104 MMOL/L (ref 98–111)
CO2: 32 MMOL/L (ref 22–29)
CREAT SERPL-MCNC: 0.9 MG/DL (ref 0.5–1)
EOSINOPHILS ABSOLUTE: 0.13 K/UL (ref 0.04–0.54)
EOSINOPHILS RELATIVE PERCENT: 2.2 % (ref 0.7–7)
GFR NON-AFRICAN AMERICAN: >60
GLOBULIN: 2.5 G/DL
GLUCOSE BLD-MCNC: 80 MG/DL (ref 74–106)
HCT VFR BLD CALC: 31 % (ref 34.1–44.9)
HEMOGLOBIN: 10.4 G/DL (ref 11.2–15.7)
LYMPHOCYTES ABSOLUTE: 0.92 K/UL (ref 1.18–3.74)
LYMPHOCYTES RELATIVE PERCENT: 15.8 % (ref 19.3–53.1)
MCH RBC QN AUTO: 36 PG (ref 25.6–32.2)
MCHC RBC AUTO-ENTMCNC: 33.5 G/DL (ref 32.3–35.5)
MCV RBC AUTO: 107.3 FL (ref 79.4–94.8)
MONOCYTES ABSOLUTE: 0.47 K/UL (ref 0.24–0.82)
MONOCYTES RELATIVE PERCENT: 8.1 % (ref 4.7–12.5)
NEUTROPHILS ABSOLUTE: 4.23 K/UL (ref 1.56–6.13)
NEUTROPHILS RELATIVE PERCENT: 72.7 % (ref 34–71.1)
PDW BLD-RTO: 14.4 % (ref 11.7–14.4)
PLATELET # BLD: 98 K/UL (ref 182–369)
PMV BLD AUTO: 9.9 FL (ref 7.4–10.4)
POTASSIUM SERPL-SCNC: 3.7 MMOL/L (ref 3.5–5.1)
RBC # BLD: 2.89 M/UL (ref 3.93–5.22)
SODIUM BLD-SCNC: 140 MMOL/L (ref 137–145)
TOTAL PROTEIN: 6.4 G/DL (ref 6.3–8.2)
WBC # BLD: 5.82 K/UL (ref 3.98–10.04)

## 2020-08-07 PROCEDURE — 85025 COMPLETE CBC W/AUTO DIFF WBC: CPT

## 2020-08-07 PROCEDURE — 96413 CHEMO IV INFUSION 1 HR: CPT

## 2020-08-07 PROCEDURE — 80053 COMPREHEN METABOLIC PANEL: CPT

## 2020-08-07 PROCEDURE — 2580000003 HC RX 258: Performed by: INTERNAL MEDICINE

## 2020-08-07 PROCEDURE — 6360000002 HC RX W HCPCS: Performed by: INTERNAL MEDICINE

## 2020-08-07 RX ORDER — SODIUM CHLORIDE 0.9 % (FLUSH) 0.9 %
10 SYRINGE (ML) INJECTION PRN
Status: CANCELLED | OUTPATIENT
Start: 2020-08-07

## 2020-08-07 RX ORDER — DEXAMETHASONE SODIUM PHOSPHATE 10 MG/ML
10 INJECTION, SOLUTION INTRAMUSCULAR; INTRAVENOUS ONCE
Status: COMPLETED | OUTPATIENT
Start: 2020-08-07 | End: 2020-08-07

## 2020-08-07 RX ORDER — HEPARIN SODIUM (PORCINE) LOCK FLUSH IV SOLN 100 UNIT/ML 100 UNIT/ML
500 SOLUTION INTRAVENOUS PRN
Status: DISCONTINUED | OUTPATIENT
Start: 2020-08-07 | End: 2020-08-08 | Stop reason: HOSPADM

## 2020-08-07 RX ORDER — DEXAMETHASONE SODIUM PHOSPHATE 10 MG/ML
10 INJECTION, SOLUTION INTRAMUSCULAR; INTRAVENOUS ONCE
Status: CANCELLED | OUTPATIENT
Start: 2020-08-07

## 2020-08-07 RX ORDER — SODIUM CHLORIDE 0.9 % (FLUSH) 0.9 %
5 SYRINGE (ML) INJECTION PRN
Status: CANCELLED | OUTPATIENT
Start: 2020-08-07

## 2020-08-07 RX ORDER — SODIUM CHLORIDE 9 MG/ML
INJECTION, SOLUTION INTRAVENOUS CONTINUOUS
Status: CANCELLED | OUTPATIENT
Start: 2020-08-07

## 2020-08-07 RX ORDER — MEPERIDINE HYDROCHLORIDE 25 MG/ML
12.5 INJECTION INTRAMUSCULAR; INTRAVENOUS; SUBCUTANEOUS ONCE
Status: CANCELLED | OUTPATIENT
Start: 2020-08-07

## 2020-08-07 RX ORDER — METHYLPREDNISOLONE SODIUM SUCCINATE 125 MG/2ML
125 INJECTION, POWDER, LYOPHILIZED, FOR SOLUTION INTRAMUSCULAR; INTRAVENOUS ONCE
Status: CANCELLED | OUTPATIENT
Start: 2020-08-07

## 2020-08-07 RX ORDER — HEPARIN SODIUM (PORCINE) LOCK FLUSH IV SOLN 100 UNIT/ML 100 UNIT/ML
500 SOLUTION INTRAVENOUS PRN
Status: CANCELLED | OUTPATIENT
Start: 2020-08-07

## 2020-08-07 RX ORDER — SODIUM CHLORIDE 0.9 % (FLUSH) 0.9 %
10 SYRINGE (ML) INJECTION PRN
Status: DISCONTINUED | OUTPATIENT
Start: 2020-08-07 | End: 2020-08-08 | Stop reason: HOSPADM

## 2020-08-07 RX ORDER — DIPHENHYDRAMINE HYDROCHLORIDE 50 MG/ML
50 INJECTION INTRAMUSCULAR; INTRAVENOUS ONCE
Status: CANCELLED | OUTPATIENT
Start: 2020-08-07

## 2020-08-07 RX ORDER — SODIUM CHLORIDE 9 MG/ML
20 INJECTION, SOLUTION INTRAVENOUS ONCE
Status: COMPLETED | OUTPATIENT
Start: 2020-08-07 | End: 2020-08-07

## 2020-08-07 RX ORDER — SODIUM CHLORIDE 9 MG/ML
20 INJECTION, SOLUTION INTRAVENOUS ONCE
Status: CANCELLED | OUTPATIENT
Start: 2020-08-07

## 2020-08-07 RX ORDER — EPINEPHRINE 1 MG/ML
0.3 INJECTION, SOLUTION, CONCENTRATE INTRAVENOUS PRN
Status: CANCELLED | OUTPATIENT
Start: 2020-08-07

## 2020-08-07 RX ADMIN — HEPARIN 500 UNITS: 100 SYRINGE at 11:15

## 2020-08-07 RX ADMIN — SODIUM CHLORIDE 20 ML/HR: 9 INJECTION, SOLUTION INTRAVENOUS at 10:04

## 2020-08-07 RX ADMIN — ADO-TRASTUZUMAB EMTANSINE 200.6 MG: 20 INJECTION, POWDER, LYOPHILIZED, FOR SOLUTION INTRAVENOUS at 10:31

## 2020-08-07 RX ADMIN — DEXAMETHASONE SODIUM PHOSPHATE 10 MG: 10 INJECTION, SOLUTION INTRAMUSCULAR; INTRAVENOUS at 10:27

## 2020-08-21 NOTE — PROGRESS NOTES
HISTORY OF PRESENT ILLNESS:    Ms. Jaiden Ridley  is here today for a 2 month breast check following her mammogram.   She had an ultrasound guided breast biopsy  on the right which revealed a 3.5  cm high grade  invasive ductal carcinoma. Fine-needle biopsy of axillary node was cytologically conclusive for malignancy. It is ER positive at 3%. NV is negative. HER-2 is positive at 3+. Ki-67 is high at 22%. MammaPrint demonstrates a high risk basal phenotype    She has been receiving neoadjuvant chemotherapy with Herceptin, Perjeta, Taxotere, and carboplatin. She has had an excellent clinical response. Her last treatment will be later this week. MRI-2/14/2020  FINDINGS:   Amount of Fibroglandular Tissue: Scattered fibroglandular tissue. Background Parenchymal Enhancement:  Minimal.   Right upper outer breast with decreased size of biopsy-proven   malignancy. No residual measurable enhancing mass. There is   susceptibility artifact at the right slightly upper outer breast,   middle to posterior third, likely a biopsy marker. No suspicious enhancement of the left breast.   Pectoralis musculature appears symmetric and nonenhancing. Right axillary lymph node measures 0.9 cm in short axis on axial   series 3, image 58. While this is not enlarged by cross-sectional size   criteria, it is abnormally rounded and asymmetric to the left side,   concerning for residual disease. There is a T1 hypointensity within   the lymph node, which was more evident on prior exam 9/19/2019,   favored to represent a biopsy marker. There is a second prominent   right axillary lymph node, just lateral to the above described noted   with biopsy marker. No enlarged internal mammary lymph node. Left axillary lymph nodes   appear symmetric. Port at the left chest wall. Cardiomegaly. Minimal right-sided pleural   fluid.  Heterogeneous appearance of the liver compared to prior exam.   Consider further evaluation with CT abdomen and recommended. 2. BI-RADS CATEGORY 2: BENIGN FINDINGS. 1. A negative x-ray report should not delay biopsy if a dominant or    clinically suspicious mass is present. Four    to eight percent of cancers are not identified by x-ray. 2. A negative report reinforce clinical impression. 3. Adenosis and dense breasts may obscure an underlying neoplasm. 4. False positive reports average eight percent nationally. 5. The mammograms were evaluated using computer aided detection. Signed by Dr Emilee Tobar on 8/24/2020 1:29 PM          PHYSICAL EXAM:  The  wounds look good with no evidence of infection, fluid accumulation, or skin necrosis. She has minimal radiation changes. She has no palpable masses, skin or nipple changes, or axillary adenopathy on the left breast    IMPRESSION:    Doing well s/p right mastectomy and axillary node dissection 3/3/2020    PLAN: Continue Singulair through June 2021 and may stop at that point. We will see her back in 6 months for a physical exam.         I have seen, examined and reviewed this patient medication list, appropriate labs and imaging studies. I reviewed relevant medical records and others physicians notes. I discussed the plans of care with the patient. I answered all the questions to the patients satisfaction. I, Dr Mina Sesay, personally performed the services described in this documentation as scribed by Laura Tsai MA in my presence and is both accurate and complete. (Please note that portions of this note were completed with a voice recognition program. Efforts were made to edit the dictations but occasionally words are mis-transcribed.)  Over 50% of the total visit time of 15 minutes in face to face encounter with the patient, out of which more than 50% of the time was spent in counseling patient or family and coordination of care.  Counseling included but was not limited to time spent reviewing labs, imaging studies/ treatment plan and answering questions.

## 2020-08-24 ENCOUNTER — HOSPITAL ENCOUNTER (OUTPATIENT)
Dept: WOMENS IMAGING | Age: 70
Discharge: HOME | End: 2020-08-24
Payer: MEDICARE

## 2020-08-24 ENCOUNTER — OFFICE VISIT (OUTPATIENT)
Dept: SURGERY | Age: 70
End: 2020-08-24
Payer: MEDICARE

## 2020-08-24 VITALS — SYSTOLIC BLOOD PRESSURE: 118 MMHG | DIASTOLIC BLOOD PRESSURE: 84 MMHG | HEART RATE: 72 BPM

## 2020-08-24 PROCEDURE — G8427 DOCREV CUR MEDS BY ELIG CLIN: HCPCS | Performed by: SURGERY

## 2020-08-24 PROCEDURE — G8399 PT W/DXA RESULTS DOCUMENT: HCPCS | Performed by: SURGERY

## 2020-08-24 PROCEDURE — 1036F TOBACCO NON-USER: CPT | Performed by: SURGERY

## 2020-08-24 PROCEDURE — 1123F ACP DISCUSS/DSCN MKR DOCD: CPT | Performed by: SURGERY

## 2020-08-24 PROCEDURE — 77063 BREAST TOMOSYNTHESIS BI: CPT

## 2020-08-24 PROCEDURE — 4040F PNEUMOC VAC/ADMIN/RCVD: CPT | Performed by: SURGERY

## 2020-08-24 PROCEDURE — G8420 CALC BMI NORM PARAMETERS: HCPCS | Performed by: SURGERY

## 2020-08-24 PROCEDURE — 3017F COLORECTAL CA SCREEN DOC REV: CPT | Performed by: SURGERY

## 2020-08-24 PROCEDURE — 99213 OFFICE O/P EST LOW 20 MIN: CPT | Performed by: SURGERY

## 2020-08-24 PROCEDURE — 1090F PRES/ABSN URINE INCON ASSESS: CPT | Performed by: SURGERY

## 2020-08-27 NOTE — PROGRESS NOTES
Osito Garcia   1950     INTERVAL HISTORY/HISTORY OF PRESENT ILLNESS:    Diagnosis   · HER-2 IDC right breast, 2019  · [id number]  · ER 3%, DE 0%, HER-2/lea IHC 3+  · Mammaprint high risk Basal-like  · Osteopenia, Aug 2019-T score -1.5 lumbar spine    Treatment summary  · 10/18/2019 through  2020- 6 cycles of neoadjuvant Taxotere, Carboplatin and Herceptin and Perjeta  · 3/3/2020- right mastectomy/ lymph node dissection  · 3/20/2020-14 cycles of adjuvant TDM-1.   · 2020-2020 Completed adjuvant radiation 6040 cGy  · 4/10/2020-adjuvant endocrine therapy with letrozole x10 years. Patient is a pleasant 68years old female who has a diagnosis of locally advanced HER-2 positive breast cancer. She was started on chemotherapy with Taxotere carboplatin Herceptin Perjeta with a partial response. She is currently status post right mastectomy and lymph node dissection showing complete pathologic response in the breast but with residual lorena axillary disease. She was therefore recommended to switch her adjuvant treatment from Herceptin/Perjeta to [facility]. 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 She denies any other new complaints. She has been taking Boniva calcium vitamin D for a diagnosis of osteopenia. Cancer history  Ms Serge Naranjo was first seen by me on 10/03/2019. She felt a mass in the right upper breast and therefore contact her primary care provider. A diagnostic mammogram was performed. Her mother  of breast cancer at the age of 50. She has no other history of breast cancer or any other cancer in her family. · 2019- diagnostic mammogram showed a suspicious 2.2 cm hypoechoic spiculated right breast mass in the axillary tail within the internal calcifications had suggest malignancy.   Ultrasound of the breast showed the lesion as well as a pathologic appearing right axillary adenopathy. · 09/05/2019-core needle biopsy consistent with high grade invasive ductal carcinoma. MammaPrint high risk basal type. ER 3%, RI 0.2%, HER-2/lea IHC 3+ Ki-67 22%. HER-2/lea FISH positive  · 9/19/2019- MRI breast showed a large right breast mass compatible with primary malignancy measuring 2.2 x 1.2 x 3.5 cm in size. Abnormal enhancement in the pectoralis major muscle suggesting extension into the muscle. Multiple abnormal lymph nodes are seen in the right axilla with the largest measuring 2 x 1.6 cm. A small lymph node is seen along the right internal mammary chain. No suspicious left axillary adenopathy. · 9/27/2019-CT chest abdomen pelvis and bone scan showed no evidence of metastatic disease. · 10/3/2019- she was first seen by me. Recommended neoadjuvant chemotherapy approach with dose dense Adriamycin/cyclophosphamide x4 cycles followed by 12 weekly cycles of Taxol 80 mg/m² with Herceptin and Perjeta. · 10/14/2019- consultation at [facility]. Recommended Taxotere, carboplatin Herceptin Perjeta. · 10/17/2019- 1/31/2020 completion of 6 cycles of of neoadjuvant chemotherapy with Taxotere carboplatin Herceptin Perjeta. · 2/14/2020-MRI breast.  Showed findings consistent with response to therapy. No residual enhancement of the right breast. Residual right axillary lymph node concerning for residual disease. · 2/21/2020- maintenance Herceptin/Perjeta to complete 1 year of treatment. · 3/03/2020-she underwent a right mastectomy/axillary dissection by Dr. Brian Farley at [facility].  Primary tumor site identified with extensive fibrosis and nests of residual high-grade carcinoma present in lymphatic spaces. Tumor is present in a lymphatic space at the deep surgical excision margin. 3 out of 11 lymph nodes, positive for metastatic carcinoma, at least 1 metastasis greater than 2.0 mm . Largest focus of metastasis measures 0.5 cm in greatest dimension.  AJCC STAGE: ypT0, pN1a, pMx  · 3/16/2020-repeat 2D echo EF 60%. · 4/20/2020-6/4/2020 Completion adjuvant radiation 6040 cGy  · 8/24/2020 Silver[employer] Digital Screen Unilateral Left- No mammographic evidence of malignancy within the left breast. Continued annual unilateral screening mammography recommended. BI-RADS Category 2.     PAST MEDICAL HISTORY:   Past Medical History:   Diagnosis Date    Malignant neoplasm of unspecified site of unspecified female breast [facility])     breast          PAST SURGICAL HISTORY:  Past Surgical History:   Procedure Laterality Date    BREAST BIOPSY Right 09/05/2019    COLONOSCOPY N/A 10/1/2019    Dr Joe Romero, internal hemorrhoids-Grade 2 without bleeding stigmata and external hemorrhoids-TV x 1, AP x 1, 3 yr recall    MASTECTOMY Right 3/3/2020    RIGHT SIMPLE MASTECTOMY WITH SENTINEL NODE BIOPSY, FLAPS, PEC BLOCK performed by Nancy Vera MD at [address] [place] [address] N/A 10/4/2019    PORT INSERTION WITH FLUORO performed by Nancy Vera MD at [facility] N/A 10/1/2019    Dr Jenny Hauser hernia, HP gastric polyps        SOCIAL HISTORY:  Social History     Socioeconomic History    Marital status:      Spouse name: None    Number of children: None    Years of education: None    Highest education level: None   Occupational History    None   Social Needs    Financial resource strain: None    Food insecurity     Worry: None     Inability: None    Transportation needs     Medical: None     Non-medical: None   Tobacco Use    Smoking status: Never Smoker    Smokeless tobacco: Never Used   Substance and Sexual Activity    Alcohol use: Never     Frequency: Never    Drug use: Never    Sexual activity: Yes   Lifestyle    Physical activity     Days per week: None     Minutes per session: None    Stress: None   Relationships    Social connections     Talks on phone: None     Gets together: None     Attends Oriental orthodox service: None Active member of club or organization: None     Attends meetings of clubs or organizations: None     Relationship status: None    Intimate partner violence     Fear of current or ex partner: None     Emotionally abused: None     Physically abused: None     Forced sexual activity: None   Other Topics Concern    None   Social History Narrative    None       FAMILY HISTORY:  Family History   Problem Relation Age of Onset    Breast Cancer Mother 39    Colon Cancer Neg Hx     Colon Polyps Neg Hx         Current Outpatient Medications   Medication Sig Dispense Refill    montelukast (SINGULAIR) 10 MG tablet Take 1 tablet by mouth daily 30 tablet 5    ibandronate (BONIVA) 150 MG tablet Take 1 tablet by mouth every 30 days Take one (1) tablet once per month in the morning with a full glass of water, on an empty stomach, and do not take anything else by mouth or lie down for the next 30 minutes. 30 tablet 6    letrozole (FEMARA) 2.5 MG tablet Take 1 tablet by mouth daily 30 tablet 3    ferrous sulfate 325 (65 Fe) MG tablet Take 325 mg by mouth daily (with breakfast)      vitamin D (ERGOCALCIFEROL) 47781 units CAPS capsule Take 1 capsule by mouth once a week 12 capsule 1    Calcium Carbonate-Vitamin D (OYSTER SHELL CALCIUM/D) 500-200 MG-UNIT TABS Take 1 tablet by mouth daily 360 tablet 0     No current facility-administered medications for this visit.          REVIEW OF SYSTEMS:    Constitutional: no fever, no night sweats,  fatigue;   HEENT: no blurring of vision, no double vision, no hearing difficulty, no tinnitus,no ulceration, no dysphagia  Lungs: no cough, no shortness of breath, no wheeze;   CVS: no palpitation, no chest pain, no shortness of breath;  GI: no abdominal pain, no nausea , no vomiting, no constipation;   BRYNN: no dysuria, frequency and urgency, no hematuria, no kidney stones;   Musculoskeletal: no joint pain, swelling , stiffness;   Endocrine: no polyuria, polydypsia, no cold or heat intolerence; Hematology/lymphatic: no easy brusing or bleeding, no hx of clotting disorder; no peripheral adenopathy. Dermatology: no skin rash, no eczema, no pruritis; mild alopecia  Psychiatry: no depression, no anxiety,no panic attacks, no suicide ideation; Neurology: no syncope, no seizures, no numbness or tingling of hands, no numbness or tingling of feet, no paresis; mild sensory neuropathy in bilateral fingers    PHYSICAL EXAM:    Vitals signs:  /80   Pulse 78   Temp 97.5 °F (36.4 °C) (Temporal)   Ht 5' 7\" (1.702 m)   Wt 129 lb (58.5 kg)   SpO2 97%   BMI 20.20 kg/m²    Pain scale:  Pain Score:   0 - No pain     CONSTITUTIONAL: Alert, appropriate, no acute distress,   EYES: Non icteric, EOM intact, pupils equal round and reactive to light and accommodation. ENT: Oral mucus membranes moist, no oral pharyngeal lesions. External inspection of ears and nose are normal.   NECK: Supple, no masses. No palpable thyroid mass    CHEST/LUNGS: CTA bilaterally, normal respiratory effort   CARDIOVASCULAR: RRR, no murmurs. No lower extremity edema   ABDOMEN: soft non-tender, active bowel sounds, no hepatosplenomegaly. No palpable masses. EXTREMITIES: warm, Full ROM of all fours extremities. No focal weakness. SKIN: warm, dry with no rashes or lesions  LYMPH: No cervical, clavicular, axillary, or inguinal lymphadenopathy  NEUROLOGIC: follows commands, non focal.   PSYCH: mood and affect appropriate. Alert and oriented to time and place and person.     Relevant Lab findings/reviewed by me:  Lab Results   Component Value Date    WBC 7.03 08/28/2020    HGB 10.8 (L) 08/28/2020    HCT 34.6 08/28/2020    .4 (H) 08/28/2020    PLT 97 (L) 08/28/2020     Lab Results   Component Value Date    NEUTROABS 5.28 08/28/2020       Lab Results   Component Value Date     08/07/2020    K 3.7 08/07/2020     08/07/2020    CO2 32 (H) 08/07/2020    BUN 29 (H) 08/07/2020    CREATININE 0.9 08/07/2020    GLUCOSE 80 08/07/2020 vitamin D      Follow-up:    No follow-ups on file. Data Gerardo Varghese MA am scribing for Dalia Valdes MD. Electronically sign Pineda Phma MA on 8/28/2020 at 10:37 AM    I, Dr Amanda Redding, personally performed the services described in this documentation as scribed by Pineda Pham MA in my presence and is both accurate and complete. Over 50% of the total visit time of 25 minutes in face to face encounter with the patient, out of which more than 50% of the time was spent in counseling patient or family and coordination of care. Counseling included but was not limited to time spent reviewing labs, imaging studies/ treatment plan and answering questions.

## 2020-08-28 ENCOUNTER — HOSPITAL ENCOUNTER (OUTPATIENT)
Dept: INFUSION THERAPY | Age: 70
Setting detail: INFUSION SERIES
Discharge: HOME | End: 2020-08-28
Payer: MEDICARE

## 2020-08-28 ENCOUNTER — HOSPITAL ENCOUNTER (OUTPATIENT)
Dept: INFUSION THERAPY | Age: 70
Discharge: HOME | End: 2020-08-28
Payer: MEDICARE

## 2020-08-28 ENCOUNTER — OFFICE VISIT (OUTPATIENT)
Dept: HEMATOLOGY | Age: 70
End: 2020-08-28
Payer: MEDICARE

## 2020-08-28 VITALS
RESPIRATION RATE: 18 BRPM | SYSTOLIC BLOOD PRESSURE: 123 MMHG | DIASTOLIC BLOOD PRESSURE: 85 MMHG | TEMPERATURE: 98.1 F | HEART RATE: 72 BPM

## 2020-08-28 VITALS
DIASTOLIC BLOOD PRESSURE: 80 MMHG | OXYGEN SATURATION: 97 % | TEMPERATURE: 97.5 F | WEIGHT: 129 LBS | HEIGHT: 67 IN | HEART RATE: 78 BPM | BODY MASS INDEX: 20.25 KG/M2 | SYSTOLIC BLOOD PRESSURE: 122 MMHG

## 2020-08-28 DIAGNOSIS — C50.911 MALIGNANT NEOPLASM OF RIGHT FEMALE BREAST, UNSPECIFIED ESTROGEN RECEPTOR STATUS, UNSPECIFIED SITE OF BREAST (HCC): ICD-10-CM

## 2020-08-28 DIAGNOSIS — C50.811 MALIGNANT NEOPLASM OF OVERLAPPING SITES OF RIGHT BREAST IN FEMALE, ESTROGEN RECEPTOR POSITIVE (HCC): ICD-10-CM

## 2020-08-28 DIAGNOSIS — Z51.11 ENCOUNTER FOR ANTINEOPLASTIC CHEMOTHERAPY: Primary | ICD-10-CM

## 2020-08-28 DIAGNOSIS — Z17.0 MALIGNANT NEOPLASM OF OVERLAPPING SITES OF RIGHT BREAST IN FEMALE, ESTROGEN RECEPTOR POSITIVE (HCC): ICD-10-CM

## 2020-08-28 LAB
ALBUMIN SERPL-MCNC: 3.9 G/DL (ref 3.5–5.2)
ALP BLD-CCNC: 83 U/L (ref 35–104)
ALT SERPL-CCNC: 24 U/L (ref 5–33)
ANION GAP SERPL CALCULATED.3IONS-SCNC: 10 MMOL/L (ref 7–19)
AST SERPL-CCNC: 39 U/L (ref 5–32)
BASOPHILS ABSOLUTE: 0.04 K/UL (ref 0.01–0.08)
BASOPHILS RELATIVE PERCENT: 0.6 % (ref 0.1–1.2)
BILIRUB SERPL-MCNC: 0.3 MG/DL (ref 0.2–1.2)
BUN BLDV-MCNC: 26 MG/DL (ref 8–23)
CALCIUM SERPL-MCNC: 9.3 MG/DL (ref 8.8–10.2)
CHLORIDE BLD-SCNC: 103 MMOL/L (ref 98–111)
CO2: 30 MMOL/L (ref 22–29)
CREAT SERPL-MCNC: 0.8 MG/DL (ref 0.5–0.9)
EOSINOPHILS ABSOLUTE: 0.12 K/UL (ref 0.04–0.54)
EOSINOPHILS RELATIVE PERCENT: 1.7 % (ref 0.7–7)
GFR AFRICAN AMERICAN: >59
GFR NON-AFRICAN AMERICAN: >60
GLUCOSE BLD-MCNC: 144 MG/DL (ref 74–109)
HCT VFR BLD CALC: 34.6 % (ref 34.1–44.9)
HEMOGLOBIN: 10.8 G/DL (ref 11.2–15.7)
LYMPHOCYTES ABSOLUTE: 0.91 K/UL (ref 1.18–3.74)
LYMPHOCYTES RELATIVE PERCENT: 12.9 % (ref 19.3–53.1)
MCH RBC QN AUTO: 35.4 PG (ref 25.6–32.2)
MCHC RBC AUTO-ENTMCNC: 31.2 G/DL (ref 32.3–35.5)
MCV RBC AUTO: 113.4 FL (ref 79.4–94.8)
MONOCYTES ABSOLUTE: 0.68 K/UL (ref 0.24–0.82)
MONOCYTES RELATIVE PERCENT: 9.7 % (ref 4.7–12.5)
NEUTROPHILS ABSOLUTE: 5.28 K/UL (ref 1.56–6.13)
NEUTROPHILS RELATIVE PERCENT: 75.1 % (ref 34–71.1)
PDW BLD-RTO: 14.5 % (ref 11.7–14.4)
PLATELET # BLD: 97 K/UL (ref 182–369)
PMV BLD AUTO: 10.7 FL (ref 7.4–10.4)
POTASSIUM SERPL-SCNC: 3.4 MMOL/L (ref 3.5–5)
RBC # BLD: 3.05 M/UL (ref 3.93–5.22)
SODIUM BLD-SCNC: 143 MMOL/L (ref 136–145)
TOTAL PROTEIN: 6.4 G/DL (ref 6.6–8.7)
WBC # BLD: 7.03 K/UL (ref 3.98–10.04)

## 2020-08-28 PROCEDURE — 80053 COMPREHEN METABOLIC PANEL: CPT

## 2020-08-28 PROCEDURE — G8420 CALC BMI NORM PARAMETERS: HCPCS | Performed by: INTERNAL MEDICINE

## 2020-08-28 PROCEDURE — 99214 OFFICE O/P EST MOD 30 MIN: CPT | Performed by: INTERNAL MEDICINE

## 2020-08-28 PROCEDURE — 96413 CHEMO IV INFUSION 1 HR: CPT

## 2020-08-28 PROCEDURE — 1090F PRES/ABSN URINE INCON ASSESS: CPT | Performed by: INTERNAL MEDICINE

## 2020-08-28 PROCEDURE — 85025 COMPLETE CBC W/AUTO DIFF WBC: CPT

## 2020-08-28 PROCEDURE — 2580000003 HC RX 258: Performed by: INTERNAL MEDICINE

## 2020-08-28 PROCEDURE — 1123F ACP DISCUSS/DSCN MKR DOCD: CPT | Performed by: INTERNAL MEDICINE

## 2020-08-28 PROCEDURE — 6360000002 HC RX W HCPCS: Performed by: INTERNAL MEDICINE

## 2020-08-28 PROCEDURE — 4040F PNEUMOC VAC/ADMIN/RCVD: CPT | Performed by: INTERNAL MEDICINE

## 2020-08-28 PROCEDURE — 1036F TOBACCO NON-USER: CPT | Performed by: INTERNAL MEDICINE

## 2020-08-28 PROCEDURE — G8399 PT W/DXA RESULTS DOCUMENT: HCPCS | Performed by: INTERNAL MEDICINE

## 2020-08-28 PROCEDURE — 3017F COLORECTAL CA SCREEN DOC REV: CPT | Performed by: INTERNAL MEDICINE

## 2020-08-28 PROCEDURE — 99211 OFF/OP EST MAY X REQ PHY/QHP: CPT

## 2020-08-28 PROCEDURE — G8427 DOCREV CUR MEDS BY ELIG CLIN: HCPCS | Performed by: INTERNAL MEDICINE

## 2020-08-28 RX ORDER — DIPHENHYDRAMINE HYDROCHLORIDE 50 MG/ML
50 INJECTION INTRAMUSCULAR; INTRAVENOUS ONCE
Status: CANCELLED | OUTPATIENT
Start: 2020-08-28

## 2020-08-28 RX ORDER — METHYLPREDNISOLONE SODIUM SUCCINATE 125 MG/2ML
125 INJECTION, POWDER, LYOPHILIZED, FOR SOLUTION INTRAMUSCULAR; INTRAVENOUS ONCE
Status: CANCELLED | OUTPATIENT
Start: 2020-08-28

## 2020-08-28 RX ORDER — SODIUM CHLORIDE 0.9 % (FLUSH) 0.9 %
10 SYRINGE (ML) INJECTION PRN
Status: DISCONTINUED | OUTPATIENT
Start: 2020-08-28 | End: 2020-08-29 | Stop reason: HOSPADM

## 2020-08-28 RX ORDER — EPINEPHRINE 1 MG/ML
0.3 INJECTION, SOLUTION, CONCENTRATE INTRAVENOUS PRN
Status: CANCELLED | OUTPATIENT
Start: 2020-08-28

## 2020-08-28 RX ORDER — HEPARIN SODIUM (PORCINE) LOCK FLUSH IV SOLN 100 UNIT/ML 100 UNIT/ML
500 SOLUTION INTRAVENOUS PRN
Status: CANCELLED | OUTPATIENT
Start: 2020-08-28

## 2020-08-28 RX ORDER — SODIUM CHLORIDE 0.9 % (FLUSH) 0.9 %
10 SYRINGE (ML) INJECTION PRN
Status: CANCELLED | OUTPATIENT
Start: 2020-08-28

## 2020-08-28 RX ORDER — LETROZOLE 2.5 MG/1
2.5 TABLET, FILM COATED ORAL DAILY
Qty: 90 TABLET | Refills: 5 | Status: SHIPPED | OUTPATIENT
Start: 2020-08-28 | End: 2021-06-14 | Stop reason: SDUPTHER

## 2020-08-28 RX ORDER — SODIUM CHLORIDE 0.9 % (FLUSH) 0.9 %
5 SYRINGE (ML) INJECTION PRN
Status: CANCELLED | OUTPATIENT
Start: 2020-08-28

## 2020-08-28 RX ORDER — SODIUM CHLORIDE 9 MG/ML
20 INJECTION, SOLUTION INTRAVENOUS ONCE
Status: COMPLETED | OUTPATIENT
Start: 2020-08-28 | End: 2020-08-28

## 2020-08-28 RX ORDER — DEXAMETHASONE SODIUM PHOSPHATE 10 MG/ML
10 INJECTION, SOLUTION INTRAMUSCULAR; INTRAVENOUS ONCE
Status: COMPLETED | OUTPATIENT
Start: 2020-08-28 | End: 2020-08-28

## 2020-08-28 RX ORDER — MEPERIDINE HYDROCHLORIDE 50 MG/ML
12.5 INJECTION INTRAMUSCULAR; INTRAVENOUS; SUBCUTANEOUS ONCE
Status: CANCELLED | OUTPATIENT
Start: 2020-08-28

## 2020-08-28 RX ORDER — SODIUM CHLORIDE 9 MG/ML
INJECTION, SOLUTION INTRAVENOUS CONTINUOUS
Status: CANCELLED | OUTPATIENT
Start: 2020-08-28

## 2020-08-28 RX ORDER — HEPARIN SODIUM (PORCINE) LOCK FLUSH IV SOLN 100 UNIT/ML 100 UNIT/ML
500 SOLUTION INTRAVENOUS PRN
Status: DISCONTINUED | OUTPATIENT
Start: 2020-08-28 | End: 2020-08-29 | Stop reason: HOSPADM

## 2020-08-28 RX ORDER — SODIUM CHLORIDE 9 MG/ML
20 INJECTION, SOLUTION INTRAVENOUS ONCE
Status: CANCELLED | OUTPATIENT
Start: 2020-08-28

## 2020-08-28 RX ADMIN — SODIUM CHLORIDE 20 ML/HR: 9 INJECTION, SOLUTION INTRAVENOUS at 12:41

## 2020-08-28 RX ADMIN — ADO-TRASTUZUMAB EMTANSINE 200 MG: 20 INJECTION, POWDER, LYOPHILIZED, FOR SOLUTION INTRAVENOUS at 12:58

## 2020-08-28 RX ADMIN — DEXAMETHASONE SODIUM PHOSPHATE 10 MG: 10 INJECTION, SOLUTION INTRAMUSCULAR; INTRAVENOUS at 12:41

## 2020-08-28 RX ADMIN — HEPARIN 500 UNITS: 100 SYRINGE at 15:13

## 2020-09-03 ENCOUNTER — VIRTUAL VISIT (OUTPATIENT)
Dept: INTERNAL MEDICINE | Age: 70
End: 2020-09-03
Payer: MEDICARE

## 2020-09-03 PROCEDURE — 99213 OFFICE O/P EST LOW 20 MIN: CPT | Performed by: INTERNAL MEDICINE

## 2020-09-03 RX ORDER — AZITHROMYCIN 250 MG/1
250 TABLET, FILM COATED ORAL SEE ADMIN INSTRUCTIONS
Qty: 6 TABLET | Refills: 0 | Status: SHIPPED | OUTPATIENT
Start: 2020-09-03 | End: 2020-09-08

## 2020-09-03 ASSESSMENT — ENCOUNTER SYMPTOMS
DIARRHEA: 0
BLOOD IN STOOL: 0
RHINORRHEA: 1
CONSTIPATION: 0
COUGH: 0
NAUSEA: 0
SORE THROAT: 1
SHORTNESS OF BREATH: 0

## 2020-09-03 NOTE — PROGRESS NOTES
9/3/2020    TELEHEALTH EVALUATION -- Audio/Visual (During MLDIV-09 public health emergency)    HPI:    Xin Diane (:  1950) has requested an audio/video evaluation for the following concern(s):    78-year-old female with known malignant neoplasm of the right breast status post right mastectomy and on chemotherapy    Patient calls for an urgent visit due to a rash in her feet she had some blisters but have now resolved and she has 2 lesions in her feet she has been applying some soothing gel which has improved greatly she also has chronic postnasal drip which she woke up with a sore throat today and she took some tea and the pain is now resolved  There was a story with her she had anyThe patient denies any fever chills no shortness of breath no cough with exposure no nausea vomiting diarrhea no change in odor or taste of food    Review of Systems   Constitutional: Negative for chills and fever. HENT: Positive for postnasal drip, rhinorrhea and sore throat. Eyes: Negative for visual disturbance. Respiratory: Negative for cough and shortness of breath. Gastrointestinal: Negative for blood in stool, constipation, diarrhea and nausea. Skin: Positive for rash (feet). Hematological: Negative for adenopathy. Prior to Visit Medications    Medication Sig Taking? Authorizing Provider   azithromycin (ZITHROMAX) 250 MG tablet Take 1 tablet by mouth See Admin Instructions for 5 days 500mg on day 1 followed by 250mg on days 2 - 5 Yes Ekaterina Freire MD   letrozole (FEMARA) 2.5 MG tablet Take 1 tablet by mouth daily  Adonis Webb MD   montelukast (SINGULAIR) 10 MG tablet Take 1 tablet by mouth daily  Staci Schwartz MD   ibandronate (BONIVA) 150 MG tablet Take 1 tablet by mouth every 30 days Take one (1) tablet once per month in the morning with a full glass of water, on an empty stomach, and do not take anything else by mouth or lie down for the next 30 minutes.   Adonis Webb MD ferrous sulfate 325 (65 Fe) MG tablet Take 325 mg by mouth daily (with breakfast)  Historical Provider, MD   vitamin D (ERGOCALCIFEROL) 09901 units CAPS capsule Take 1 capsule by mouth once a week  Ekaterina Freire MD   Calcium Carbonate-Vitamin D (OYSTER SHELL CALCIUM/D) 500-200 MG-UNIT TABS Take 1 tablet by mouth daily  Riddhi Marquez MD       Social History     Tobacco Use    Smoking status: Never Smoker    Smokeless tobacco: Never Used   Substance Use Topics    Alcohol use: Never     Frequency: Never    Drug use: Never        No Known Allergies,   Past Medical History:   Diagnosis Date    Malignant neoplasm of unspecified site of unspecified female breast ([facility] [address]     breast   ,   Past Surgical History:   Procedure Laterality Date    BREAST BIOPSY Right 09/05/2019    COLONOSCOPY N/A 10/1/2019    Dr Melva Leyden, internal hemorrhoids-Grade 2 without bleeding stigmata and external hemorrhoids-TV x 1, AP x 1, 3 yr recall    MASTECTOMY Right 3/3/2020    RIGHT SIMPLE MASTECTOMY WITH SENTINEL NODE BIOPSY, FLAPS, PEC BLOCK performed by Kiana Wilson MD at [address] N/A 10/4/2019    PORT INSERTION WITH FLUORO performed by Kiana Wilson MD at [address] N/A 10/1/2019    Dr Aquiles Lee hernia, HP gastric polyps   ,   Social History     Tobacco Use    Smoking status: Never Smoker    Smokeless tobacco: Never Used   Substance Use Topics    Alcohol use: Never     Frequency: Never    Drug use: Never   ,   Family History   Problem Relation Age of Onset    Breast Cancer Mother 39    Colon Cancer Neg Hx     Colon Polyps Neg Hx    ,   There is no immunization history on file for this patient.,   Health Maintenance   Topic Date Due    DTaP/Tdap/Td vaccine (1 - Tdap) 07/22/1967    Shingles Vaccine (1 of 2) 07/22/1998    Pneumococcal 65+ yrs at Risk Vaccine (1 of 2 - PCV13) 07/22/2013    Flu vaccine (1) 09/01/2020    Annual Wellness Visit (AWV)  04/24/2021    Breast cancer screen  08/24/2021    Colon cancer screen colonoscopy  10/01/2022    Lipid screen  08/22/2024    DEXA (modify frequency per FRAX score)  Completed    Hepatitis C screen  Completed    Hepatitis A vaccine  Aged Out    Hepatitis B vaccine  Aged Out    Hib vaccine  Aged Out    Meningococcal (ACWY) vaccine  Aged Out       PHYSICAL EXAMINATION:  [ INSTRUCTIONS:  \"[x]\" Indicates a positive item  \"[]\" Indicates a negative item  -- DELETE ALL ITEMS NOT EXAMINED]  Vital Signs: (As obtained by patient/caregiver or practitioner observation)    Blood pressure-  Heart rate-    Respiratory rate-    Temperature-  Pulse oximetry-     Constitutional: [x] Appears well-developed and well-nourished [x] No apparent distress      [] Abnormal-   Mental status  [x] Alert and awake  [x] Oriented to person/place/time []Able to follow commands      Eyes:  EOM    [x]  Normal  [] Abnormal-  Sclera  [x]  Normal  [] Abnormal -         Discharge [x]  None visible  [] Abnormal -    HENT:   [x] Normocephalic, atraumatic. [] Abnormal   [x] Mouth/Throat: Mucous membranes are moist.     External Ears [x] Normal  [] Abnormal-     Neck: [x] No visualized mass     Pulmonary/Chest: [x] Respiratory effort normal.  [x] No visualized signs of difficulty breathing or respiratory distress        [] Abnormal-      Mu       Neurological:        [x] No Facial Asymmetry (Cranial nerve 7 motor function) (limited exam to video visit)          [x] No gaze palsy        [] Abnormal-         Skin:        [] No significant exanthematous lesions or discoloration noted on facial skin         [] Abnormal-erythematous lesion right above fourth toe on the left foot on the dorsum           Psychiatric:       [x] Normal Affect [x] No Hallucinations        [] Abnormal-     Other pertinent observable physical exam findings-     ASSESSMENT/PLAN:  1.  Sore throat, chronic  Continue conservative measures salt water gargle 2-3 times a day, tea with honey in case patient gets worse develops fever she will  azithromycin  - azithromycin (ZITHROMAX) 250 MG tablet; Take 1 tablet by mouth See Admin Instructions for 5 days 500mg on day 1 followed by 250mg on days 2 - 5  Dispense: 6 tablet; Refill: 0    2. Hypovitaminosis D  History of vitamin D deficiency advised to have vitamin D checked  - Vitamin D 25 Hydroxy; Future    3. Screening for lipid disorders  Screening lipid panel  - Lipid Panel; Future    4. Screening for thyroid disorder    - TSH without Reflex; Future      No follow-ups on file. Todd Augustine is a 65 y.o. female being evaluated by a Virtual Visit (video visit) encounter to address concerns as mentioned above. A caregiver was present when appropriate. Due to this being a TeleHealth encounter (During SIJ-62 public health emergency), evaluation of the following organ systems was limited: Vitals/Constitutional/EENT/Resp/CV/GI//MS/Neuro/Skin/Heme-Lymph-Imm. Pursuant to the emergency declaration under the [address] [place] [address] and the [employer] and [employer] Act, this Virtual Visit was conducted with patient's (and/or legal guardian's) consent, to reduce the patient's risk of exposure to COVID-19 and provide necessary medical care. The patient (and/or legal guardian) has also been advised to contact this office for worsening conditions or problems, and seek emergency medical treatment and/or call 911 if deemed necessary. Patient identification was verified at the start of the visit: Yes    Total time spent on this encounter: 15    Services were provided through a video synchronous discussion virtually to substitute for in-person clinic visit. Patient and provider were located at their individual homes. --Kai Gray MD on 9/3/2020 at 11:08 AM    An electronic signature was used to authenticate this note.

## 2020-09-15 DIAGNOSIS — Z13.29 SCREENING FOR THYROID DISORDER: ICD-10-CM

## 2020-09-15 DIAGNOSIS — E55.9 HYPOVITAMINOSIS D: ICD-10-CM

## 2020-09-15 DIAGNOSIS — D64.9 NORMOCYTIC ANEMIA: ICD-10-CM

## 2020-09-15 DIAGNOSIS — Z13.220 SCREENING FOR LIPID DISORDERS: ICD-10-CM

## 2020-09-15 LAB
CHOLESTEROL, TOTAL: 170 MG/DL (ref 160–199)
FERRITIN: 134.3 NG/ML (ref 13–150)
FOLATE: 16 NG/ML (ref 4.8–37.3)
HDLC SERPL-MCNC: 39 MG/DL (ref 65–121)
IRON: 64 UG/DL (ref 37–145)
LDL CHOLESTEROL CALCULATED: 98 MG/DL
TOTAL IRON BINDING CAPACITY: 304 UG/DL (ref 250–400)
TRIGL SERPL-MCNC: 164 MG/DL (ref 0–149)
TSH SERPL DL<=0.05 MIU/L-ACNC: 2.7 UIU/ML (ref 0.27–4.2)
VITAMIN B-12: 684 PG/ML (ref 211–946)
VITAMIN D 25-HYDROXY: 40.2 NG/ML

## 2020-09-18 ENCOUNTER — HOSPITAL ENCOUNTER (OUTPATIENT)
Dept: INFUSION THERAPY | Age: 70
Setting detail: INFUSION SERIES
Discharge: HOME | End: 2020-09-18
Payer: MEDICARE

## 2020-09-18 VITALS
HEART RATE: 67 BPM | DIASTOLIC BLOOD PRESSURE: 76 MMHG | WEIGHT: 129 LBS | SYSTOLIC BLOOD PRESSURE: 133 MMHG | TEMPERATURE: 97.9 F | OXYGEN SATURATION: 97 % | RESPIRATION RATE: 18 BRPM | BODY MASS INDEX: 20.2 KG/M2

## 2020-09-18 DIAGNOSIS — C50.811 MALIGNANT NEOPLASM OF OVERLAPPING SITES OF RIGHT BREAST IN FEMALE, ESTROGEN RECEPTOR POSITIVE (HCC): ICD-10-CM

## 2020-09-18 DIAGNOSIS — Z17.0 MALIGNANT NEOPLASM OF OVERLAPPING SITES OF RIGHT BREAST IN FEMALE, ESTROGEN RECEPTOR POSITIVE (HCC): ICD-10-CM

## 2020-09-18 DIAGNOSIS — C50.911 MALIGNANT NEOPLASM OF RIGHT FEMALE BREAST, UNSPECIFIED ESTROGEN RECEPTOR STATUS, UNSPECIFIED SITE OF BREAST (HCC): ICD-10-CM

## 2020-09-18 DIAGNOSIS — Z51.11 ENCOUNTER FOR ANTINEOPLASTIC CHEMOTHERAPY: Primary | ICD-10-CM

## 2020-09-18 LAB
ALBUMIN SERPL-MCNC: 3.6 G/DL (ref 3.5–5.2)
ALP BLD-CCNC: 73 U/L (ref 35–104)
ALT SERPL-CCNC: 26 U/L (ref 5–33)
ANION GAP SERPL CALCULATED.3IONS-SCNC: 9 MMOL/L (ref 7–19)
AST SERPL-CCNC: 46 U/L (ref 5–32)
BASOPHILS ABSOLUTE: 0 K/UL (ref 0–0.2)
BASOPHILS RELATIVE PERCENT: 0.4 % (ref 0–1)
BILIRUB SERPL-MCNC: 0.3 MG/DL (ref 0.2–1.2)
BUN BLDV-MCNC: 32 MG/DL (ref 8–23)
CALCIUM SERPL-MCNC: 9.4 MG/DL (ref 8.8–10.2)
CHLORIDE BLD-SCNC: 105 MMOL/L (ref 98–111)
CO2: 29 MMOL/L (ref 22–29)
CREAT SERPL-MCNC: 0.9 MG/DL (ref 0.5–0.9)
EOSINOPHILS ABSOLUTE: 0.1 K/UL (ref 0–0.6)
EOSINOPHILS RELATIVE PERCENT: 1.3 % (ref 0–5)
GFR AFRICAN AMERICAN: >59
GFR NON-AFRICAN AMERICAN: >60
GLUCOSE BLD-MCNC: 148 MG/DL (ref 74–109)
HCT VFR BLD CALC: 30.6 % (ref 37–47)
HEMOGLOBIN: 10.1 G/DL (ref 12–16)
IMMATURE GRANULOCYTES #: 0 K/UL
LYMPHOCYTES ABSOLUTE: 0.7 K/UL (ref 1.1–4.5)
LYMPHOCYTES RELATIVE PERCENT: 13.9 % (ref 20–40)
MCH RBC QN AUTO: 34.2 PG (ref 27–31)
MCHC RBC AUTO-ENTMCNC: 33 G/DL (ref 33–37)
MCV RBC AUTO: 103.7 FL (ref 81–99)
MONOCYTES ABSOLUTE: 0.4 K/UL (ref 0–0.9)
MONOCYTES RELATIVE PERCENT: 7.9 % (ref 0–10)
NEUTROPHILS ABSOLUTE: 4.1 K/UL (ref 1.5–7.5)
NEUTROPHILS RELATIVE PERCENT: 76.1 % (ref 50–65)
PDW BLD-RTO: 14 % (ref 11.5–14.5)
PLATELET # BLD: 90 K/UL (ref 130–400)
PMV BLD AUTO: 10.9 FL (ref 9.4–12.3)
POTASSIUM SERPL-SCNC: 3.5 MMOL/L (ref 3.5–5)
RBC # BLD: 2.95 M/UL (ref 4.2–5.4)
SODIUM BLD-SCNC: 143 MMOL/L (ref 136–145)
TOTAL PROTEIN: 6.4 G/DL (ref 6.6–8.7)
WBC # BLD: 5.3 K/UL (ref 4.8–10.8)

## 2020-09-18 PROCEDURE — 6360000002 HC RX W HCPCS: Performed by: INTERNAL MEDICINE

## 2020-09-18 PROCEDURE — 96374 THER/PROPH/DIAG INJ IV PUSH: CPT

## 2020-09-18 PROCEDURE — 85025 COMPLETE CBC W/AUTO DIFF WBC: CPT

## 2020-09-18 PROCEDURE — 36591 DRAW BLOOD OFF VENOUS DEVICE: CPT

## 2020-09-18 PROCEDURE — 2580000003 HC RX 258: Performed by: INTERNAL MEDICINE

## 2020-09-18 PROCEDURE — 96413 CHEMO IV INFUSION 1 HR: CPT

## 2020-09-18 PROCEDURE — 80053 COMPREHEN METABOLIC PANEL: CPT

## 2020-09-18 RX ORDER — MEPERIDINE HYDROCHLORIDE 50 MG/ML
12.5 INJECTION INTRAMUSCULAR; INTRAVENOUS; SUBCUTANEOUS ONCE
Status: CANCELLED | OUTPATIENT
Start: 2020-09-18

## 2020-09-18 RX ORDER — SODIUM CHLORIDE 0.9 % (FLUSH) 0.9 %
10 SYRINGE (ML) INJECTION PRN
Status: CANCELLED | OUTPATIENT
Start: 2020-09-18

## 2020-09-18 RX ORDER — SODIUM CHLORIDE 9 MG/ML
20 INJECTION, SOLUTION INTRAVENOUS ONCE
Status: COMPLETED | OUTPATIENT
Start: 2020-09-18 | End: 2020-09-18

## 2020-09-18 RX ORDER — EPINEPHRINE 1 MG/ML
0.3 INJECTION, SOLUTION, CONCENTRATE INTRAVENOUS PRN
Status: CANCELLED | OUTPATIENT
Start: 2020-09-18

## 2020-09-18 RX ORDER — HEPARIN SODIUM (PORCINE) LOCK FLUSH IV SOLN 100 UNIT/ML 100 UNIT/ML
500 SOLUTION INTRAVENOUS PRN
Status: CANCELLED | OUTPATIENT
Start: 2020-09-18

## 2020-09-18 RX ORDER — METHYLPREDNISOLONE SODIUM SUCCINATE 125 MG/2ML
125 INJECTION, POWDER, LYOPHILIZED, FOR SOLUTION INTRAMUSCULAR; INTRAVENOUS ONCE
Status: CANCELLED | OUTPATIENT
Start: 2020-09-18

## 2020-09-18 RX ORDER — SODIUM CHLORIDE 9 MG/ML
INJECTION, SOLUTION INTRAVENOUS CONTINUOUS
Status: CANCELLED | OUTPATIENT
Start: 2020-09-18

## 2020-09-18 RX ORDER — SODIUM CHLORIDE 9 MG/ML
20 INJECTION, SOLUTION INTRAVENOUS ONCE
Status: CANCELLED | OUTPATIENT
Start: 2020-09-18

## 2020-09-18 RX ORDER — DIPHENHYDRAMINE HYDROCHLORIDE 50 MG/ML
50 INJECTION INTRAMUSCULAR; INTRAVENOUS ONCE
Status: CANCELLED | OUTPATIENT
Start: 2020-09-18

## 2020-09-18 RX ORDER — DEXAMETHASONE SODIUM PHOSPHATE 10 MG/ML
10 INJECTION, SOLUTION INTRAMUSCULAR; INTRAVENOUS ONCE
Status: COMPLETED | OUTPATIENT
Start: 2020-09-18 | End: 2020-09-18

## 2020-09-18 RX ORDER — SODIUM CHLORIDE 0.9 % (FLUSH) 0.9 %
5 SYRINGE (ML) INJECTION PRN
Status: CANCELLED | OUTPATIENT
Start: 2020-09-18

## 2020-09-18 RX ORDER — SODIUM CHLORIDE 0.9 % (FLUSH) 0.9 %
10 SYRINGE (ML) INJECTION PRN
Status: DISCONTINUED | OUTPATIENT
Start: 2020-09-18 | End: 2020-09-19 | Stop reason: HOSPADM

## 2020-09-18 RX ORDER — HEPARIN SODIUM (PORCINE) LOCK FLUSH IV SOLN 100 UNIT/ML 100 UNIT/ML
500 SOLUTION INTRAVENOUS PRN
Status: DISCONTINUED | OUTPATIENT
Start: 2020-09-18 | End: 2020-09-19 | Stop reason: HOSPADM

## 2020-09-18 RX ADMIN — ADO-TRASTUZUMAB EMTANSINE 200 MG: 20 INJECTION, POWDER, LYOPHILIZED, FOR SOLUTION INTRAVENOUS at 12:24

## 2020-09-18 RX ADMIN — SODIUM CHLORIDE 20 ML/HR: 9 INJECTION, SOLUTION INTRAVENOUS at 11:48

## 2020-09-18 RX ADMIN — HEPARIN 500 UNITS: 100 SYRINGE at 13:15

## 2020-09-18 RX ADMIN — DEXAMETHASONE SODIUM PHOSPHATE 10 MG: 10 INJECTION, SOLUTION INTRAMUSCULAR; INTRAVENOUS at 11:48

## 2020-09-22 DIAGNOSIS — R73.9 HYPERGLYCEMIA: ICD-10-CM

## 2020-09-22 LAB — HBA1C MFR BLD: 5.8 % (ref 4–6)

## 2020-09-22 NOTE — PROGRESS NOTES
[address], [address]  [address] [address]  Phone: [phone]  Fax: [phone]    Visit Date:  9/23/2020    SUBJECTIVE:     Mabel Rich is a 65 y.o. female presents today in the office for:    Chief Complaint   Patient presents with    6 Month Follow-Up       HPI  69year-old female FUV  Elevated BP, now controlled  R Breast Cancer, ff Claudino, will be gettingon maitenance [employer]  She has chronic fatigue, sleeps well she is still getting infusions every 3 weeks  She is eating more she supposed to gain weight she was given education about low-carb diet and low-fat diet otherwise she offers no complaints of chest pain palpitations or shortness of breath  No fever  She does not leave her house    Invasive ductal carcinoma, grade 3. Largest contiguous area of tumor measures 12 mm.   High-grade ductal carcinoma in situ, solid type.     Past Medical History:   Diagnosis Date    Malignant neoplasm of unspecified site of unspecified female breast [facility])     breast       Past Surgical History:   Procedure Laterality Date    BREAST BIOPSY Right 09/05/2019    COLONOSCOPY N/A 10/1/2019    Dr Shirley Carmona, internal hemorrhoids-Grade 2 without bleeding stigmata and external hemorrhoids-TV x 1, AP x 1, 3 yr recall    MASTECTOMY Right 3/3/2020    RIGHT SIMPLE MASTECTOMY WITH SENTINEL NODE BIOPSY, FLAPS, PEC BLOCK performed by Ezequiel Vergara MD at [address] [place] [address] N/A 10/4/2019    PORT INSERTION WITH FLUORO performed by Ezequiel Vergara MD at [facility] [place] ENDOSCOPY N/A 10/1/2019    Dr [place] hernia, [place] gastric polyps       Social History     Socioeconomic History    Marital status:      Spouse name: Not on file    Number of children: Not on file    Years of education: Not on file    Highest education level: Not on file   Occupational History    Not on file   Social Needs    Financial resource strain: Not on file for this visit. Patient Active Problem List   Diagnosis    Fatigue    Right bundle branch block (RBBB)    Menopause    Other specified disorders of bone density and structure, right upper arm     Macrocytic anemia    Malignant neoplasm of overlapping sites of right female breast ([facility] [address]    Malignant neoplasm of unspecified site of unspecified female breast ([facility] [address]    Encounter for antineoplastic chemotherapy     S/P right mastectomy and AND 3/3/2020    Hypovitaminosis D    Prediabetes             Review of Systems:   Review of Systems   Constitutional: Negative for activity change, chills, fatigue and fever. HENT: Negative for hearing loss, postnasal drip, rhinorrhea, sinus pain and trouble swallowing. Eyes: Negative for visual disturbance. Respiratory: Negative for cough, chest tightness, shortness of breath and wheezing. Cardiovascular: Negative for chest pain, palpitations and leg swelling. Gastrointestinal: Negative for abdominal pain, blood in stool, constipation, diarrhea and vomiting. Endocrine: Negative for cold intolerance. Genitourinary: Negative for frequency and urgency. Musculoskeletal: Negative for arthralgias, back pain and myalgias. Allergic/Immunologic: Negative for environmental allergies. Neurological: Negative for light-headedness, numbness and headaches. Psychiatric/Behavioral: Negative for sleep disturbance. OBJECTIVE:   @  BP Readings from Last 3 Encounters:   09/23/20 102/60   09/18/20 133/76   08/28/20 123/85        @  Lab Results   Component Value Date    LABA1C 5.8 09/22/2020     Lab Results   Component Value Date    LDLCALC 98 09/15/2020    CREATININE 0.9 09/18/2020        Physical Exam:    Physical Exam  Vitals signs and nursing note reviewed. Constitutional:       General: She is not in acute distress. HENT:      Head: Normocephalic.       Right Ear: External ear normal.      Left Ear: External ear normal.      Nose: Nose normal.   Eyes: General: No scleral icterus. Conjunctiva/sclera: Conjunctivae normal.      Pupils: Pupils are equal, round, and reactive to light. Neck:      Musculoskeletal: Normal range of motion and neck supple. Thyroid: No thyromegaly. Vascular: No JVD. Cardiovascular:      Rate and Rhythm: Normal rate and regular rhythm. Heart sounds: Normal heart sounds. No murmur. Pulmonary:      Effort: Pulmonary effort is normal. No respiratory distress. Breath sounds: Normal breath sounds. No wheezing or rales. Chest:      Breasts:         Right: Absent. Abdominal:      General: Bowel sounds are normal. There is no distension. Palpations: Abdomen is soft. Tenderness: There is no abdominal tenderness. Musculoskeletal: Normal range of motion. General: No deformity. Lymphadenopathy:      Cervical: No cervical adenopathy. Skin:     General: Skin is warm and dry. Findings: No rash. Neurological:      Mental Status: She is alert and oriented to person, place, and time. Cranial Nerves: No cranial nerve deficit. Deep Tendon Reflexes: Reflexes normal.   Psychiatric:         Behavior: Behavior normal.         Thought Content: Thought content normal.         Judgment: Judgment normal.       Orders Only on 09/22/2020   Component Date Value Ref Range Status    Hemoglobin A1C 09/22/2020 5.8  4.0 - 6.0 % Final    Comment: HbA1c levels >6% are an indication of hyperglycemia during the preceding 2  to 3 months or longer. HbA1c levels may reach 20% or higher in poorly controlled diabetes. Therapeutic action is suggested at levels above 8%. Diabetes patients with HbA1c levels below 7% meet the goal of the American  Diabetes Association.     HbA1c levels below the established reference range may indicate recent  episodes of hypoglycemia, the presence of Hb variants, or shortened lifetime  of erythrocytes.        Lab Results   Component Value Date     09/18/2020    K 102 mmHg      Is BP treated: No      HDL Cholesterol: 39 mg/dL      Total Cholesterol: 170 mg/dL    PLAN:    Will perform an EKG not a candidate for stress test she has no chest pain or shortness of breath at this time    Medications Ordered:  No orders of the defined types were placed in this encounter. Other Orders Placed:  Orders Placed This Encounter   Procedures    INFLUENZA, HIGH-DOSE, QUADV, 65 YRS +, IM, PF, 0.7ML (FLUZONE)    Lipid Panel     Standing Status:   Future     Standing Expiration Date:   9/23/2021     Order Specific Question:   Is Patient Fasting?/# of Hours     Answer:   fasting    Comprehensive Metabolic Panel     Standing Status:   Future     Standing Expiration Date:   9/23/2021    CBC Auto Differential     Standing Status:   Future     Standing Expiration Date:   9/23/2021       Return in about 6 months (around 3/23/2021).            Electronically signed by Jaime Gudino MD on 9/23/2020 at 9:58 AM

## 2020-09-23 ENCOUNTER — OFFICE VISIT (OUTPATIENT)
Dept: INTERNAL MEDICINE | Age: 70
End: 2020-09-23
Payer: MEDICARE

## 2020-09-23 VITALS
HEART RATE: 95 BPM | BODY MASS INDEX: 20.03 KG/M2 | HEIGHT: 67 IN | WEIGHT: 127.6 LBS | SYSTOLIC BLOOD PRESSURE: 102 MMHG | DIASTOLIC BLOOD PRESSURE: 60 MMHG | OXYGEN SATURATION: 94 %

## 2020-09-23 PROBLEM — R73.03 PREDIABETES: Status: ACTIVE | Noted: 2020-09-22

## 2020-09-23 PROCEDURE — 90662 IIV NO PRSV INCREASED AG IM: CPT | Performed by: INTERNAL MEDICINE

## 2020-09-23 PROCEDURE — 1036F TOBACCO NON-USER: CPT | Performed by: INTERNAL MEDICINE

## 2020-09-23 PROCEDURE — 4040F PNEUMOC VAC/ADMIN/RCVD: CPT | Performed by: INTERNAL MEDICINE

## 2020-09-23 PROCEDURE — 99214 OFFICE O/P EST MOD 30 MIN: CPT | Performed by: INTERNAL MEDICINE

## 2020-09-23 PROCEDURE — 1123F ACP DISCUSS/DSCN MKR DOCD: CPT | Performed by: INTERNAL MEDICINE

## 2020-09-23 PROCEDURE — G8427 DOCREV CUR MEDS BY ELIG CLIN: HCPCS | Performed by: INTERNAL MEDICINE

## 2020-09-23 PROCEDURE — 3017F COLORECTAL CA SCREEN DOC REV: CPT | Performed by: INTERNAL MEDICINE

## 2020-09-23 PROCEDURE — 93000 ELECTROCARDIOGRAM COMPLETE: CPT | Performed by: INTERNAL MEDICINE

## 2020-09-23 PROCEDURE — G8399 PT W/DXA RESULTS DOCUMENT: HCPCS | Performed by: INTERNAL MEDICINE

## 2020-09-23 PROCEDURE — G8420 CALC BMI NORM PARAMETERS: HCPCS | Performed by: INTERNAL MEDICINE

## 2020-09-23 PROCEDURE — 1090F PRES/ABSN URINE INCON ASSESS: CPT | Performed by: INTERNAL MEDICINE

## 2020-09-23 PROCEDURE — G0008 ADMIN INFLUENZA VIRUS VAC: HCPCS | Performed by: INTERNAL MEDICINE

## 2020-09-23 ASSESSMENT — ENCOUNTER SYMPTOMS
RHINORRHEA: 0
CHEST TIGHTNESS: 0
TROUBLE SWALLOWING: 0
COUGH: 0
WHEEZING: 0
SHORTNESS OF BREATH: 0
SINUS PAIN: 0
BLOOD IN STOOL: 0
ABDOMINAL PAIN: 0
CONSTIPATION: 0
BACK PAIN: 0
DIARRHEA: 0
VOMITING: 0

## 2020-09-23 NOTE — PATIENT INSTRUCTIONS
Patient Education        Heart-Healthy Diet: Care Instructions  Your Care Instructions     A heart-healthy diet has lots of vegetables, fruits, nuts, beans, and whole grains, and is low in salt. It limits foods that are high in saturated fat, such as meats, cheeses, and fried foods. It may be hard to change your diet, but even small changes can lower your risk of heart attack and heart disease. Follow-up care is a key part of your treatment and safety. Be sure to make and go to all appointments, and call your doctor if you are having problems. It's also a good idea to know your test results and keep a list of the medicines you take. How can you care for yourself at home? Watch your portions  · Learn what a serving is. A \"serving\" and a \"portion\" are not always the same thing. Make sure that you are not eating larger portions than are recommended. For example, a serving of pasta is ½ cup. A serving size of meat is 2 to 3 ounces. A 3-ounce serving is about the size of a deck of cards. Measure serving sizes until you are good at [place]" them. Keep in mind that restaurants often serve portions that are 2 or 3 times the size of one serving. · To keep your energy level up and keep you from feeling hungry, eat often but in smaller portions. · Eat only the number of calories you need to stay at a healthy weight. If you need to lose weight, eat fewer calories than your body burns (through exercise and other physical activity). Eat more fruits and vegetables  · Eat a variety of fruit and vegetables every day. Dark green, deep orange, red, or yellow fruits and vegetables are especially good for you. Examples include spinach, carrots, peaches, and berries. · Keep carrots, celery, and other veggies handy for snacks. Buy fruit that is in season and store it where you can see it so that you will be tempted to eat it. · Cook dishes that have a lot of veggies in them, such as stir-fries and soups.   Limit saturated and or helping is 1 piece of fruit, 1 cup of vegetables, or 2 cups of leafy, raw vegetables. Have an apple or some carrot sticks as an afternoon snack instead of a candy bar. Try to have fruits and/or vegetables at every meal.  · Make exercise part of your daily routine. You may want to start with simple activities, such as walking, bicycling, or slow swimming. Try to be active 30 to 60 minutes every day. You do not need to do all 30 to 60 minutes all at once. For example, you can exercise 3 times a day for 10 or 20 minutes. Moderate exercise is safe for most people, but it is always a good idea to talk to your doctor before starting an exercise program.  · Keep moving. [employer] the lawn, work in the garden, or [employer]. Take the stairs instead of the elevator at work. · If you smoke, quit. People who smoke have an increased risk for heart attack, stroke, cancer, and other lung illnesses. Quitting is hard, but there are ways to boost your chance of quitting tobacco for good. ? Use nicotine gum, patches, or lozenges. ? Ask your doctor about stop-smoking programs and medicines. ? Keep trying. In addition to reducing your risk of diseases in the future, you will notice some benefits soon after you stop using tobacco. If you have shortness of breath or asthma symptoms, they will likely get better within a few weeks after you quit. · Limit how much alcohol you drink. Moderate amounts of alcohol (up to 2 drinks a day for men, 1 drink a day for women) are okay. But drinking too much can lead to liver problems, high blood pressure, and other health problems. Family health  If you have a family, there are many things you can do together to improve your health. · Eat meals together as a family as often as possible. · Eat healthy foods. This includes fruits, vegetables, lean meats and dairy, and whole grains. · Include your family in your fitness plan.  Most people think of activities such as jogging or tennis as the way to fitness, but there are many ways you and your family can be more active. Anything that makes you breathe hard and gets your heart pumping is exercise. Here are some tips:  ? Walk to do errands or to take your child to school or the bus.  ? Go for a family bike ride after dinner instead of watching TV. Where can you learn more? Go to https://chpepiceweb.[employer]. org and sign in to your [employer] account. Enter K071 in the [facility] box to learn more about \"A Healthy Lifestyle: Care Instructions. \"     If you do not have an account, please click on the \"Sign Up Now\" link. Current as of: January 31, 2020               Content Version: 12.5  © 2006-2020 Healthwise, Incorporated. Care instructions adapted under license by [facility] ([facility]). If you have questions about a medical condition or this instruction, always ask your healthcare professional. [place] [address] any warranty or liability for your use of this information. Patient Education        Learning About the Mediterranean Diet  What is the [address]? The Mediterranean diet is a style of eating rather than a diet plan. It features foods eaten in [place], [place], [place] and [place], and other countries along the [facility]. It emphasizes eating foods like fish, fruits, vegetables, beans, high-fiber breads and whole grains, nuts, and olive oil. This style of eating includes limited red meat, cheese, and sweets. Why choose the Mediterranean diet? A Mediterranean-style diet may improve heart health. It contains more fat than other heart-healthy diets. But the fats are mainly from nuts, unsaturated oils (such as fish oils and olive oil), and certain nut or seed oils (such as canola, soybean, or flaxseed oil). These fats may help protect the heart and blood vessels. How can you get started on the Mediterranean diet?   Here are some things you can do to switch to a more Mediterranean way of eating. What to eat  · Eat a variety of fruits and vegetables each day, such as grapes, blueberries, tomatoes, broccoli, peppers, figs, olives, spinach, eggplant, beans, lentils, and chickpeas. · Eat a variety of whole-grain foods each day, such as oats, brown rice, and whole wheat bread, pasta, and couscous. · Eat fish at least 2 times a week. Try tuna, salmon, mackerel, lake trout, herring, or sardines. · Eat moderate amounts of low-fat dairy products, such as milk, cheese, or yogurt. · Eat moderate amounts of poultry and eggs. · Choose healthy (unsaturated) fats, such as nuts, olive oil, and certain nut or seed oils like canola, soybean, and flaxseed. · Limit unhealthy (saturated) fats, such as butter, palm oil, and coconut oil. And limit fats found in animal products, such as meat and dairy products made with whole milk. Try to eat red meat only a few times a month in very small amounts. · Limit sweets and desserts to only a few times a week. This includes sugar-sweetened drinks like soda. The Mediterranean diet may also include red wine with your meal--1 glass each day for women and up to 2 glasses a day for men. Tips for eating at home  · Use herbs, spices, garlic, lemon zest, and citrus juice instead of salt to add flavor to foods. · Add avocado slices to your sandwich instead of starkey. · Have fish for lunch or dinner instead of red meat. Brush the fish with olive oil, and broil or grill it. · Sprinkle your salad with seeds or nuts instead of cheese. · Cook with olive or canola oil instead of butter or oils that are high in saturated fat. · Switch from 2% milk or whole milk to 1% or fat-free milk. · Dip raw vegetables in a vinaigrette dressing or hummus instead of dips made from mayonnaise or sour cream.  · Have a piece of fruit for dessert instead of a piece of cake. Try baked apples, or have some dried fruit.   Tips for eating out  · Try broiled, grilled, baked, or poached fish instead of having it fried or breaded. · Ask your  to have your meals prepared with olive oil instead of butter. · Order dishes made with marinara sauce or sauces made from olive oil. Avoid sauces made from cream or mayonnaise. · Choose whole-grain breads, whole wheat pasta and pizza crust, brown rice, beans, and lentils. · Cut back on butter or margarine on bread. Instead, you can dip your bread in a small amount of olive oil. · Ask for a side salad or grilled vegetables instead of french fries or chips. Where can you learn more? Go to https://Zooplapepiceweb.[employer]. org and sign in to your [employer] account. Enter [phone] in the [employer] box to learn more about \"Learning About the Mediterranean Diet. \"     If you do not have an account, please click on the \"Sign Up Now\" link. Current as of: August 22, 2019               Content Version: 12.5  © 2766-0824 Healthwise, Incorporated. Care instructions adapted under license by [facility] ([facility]). If you have questions about a medical condition or this instruction, always ask your healthcare professional. [address]  any warranty or liability for your use of this information.

## 2020-10-09 ENCOUNTER — HOSPITAL ENCOUNTER (OUTPATIENT)
Dept: INFUSION THERAPY | Age: 70
Setting detail: INFUSION SERIES
Discharge: HOME | End: 2020-10-09
Payer: MEDICARE

## 2020-10-09 ENCOUNTER — HOSPITAL ENCOUNTER (OUTPATIENT)
Dept: INFUSION THERAPY | Age: 70
Discharge: HOME | End: 2020-10-09
Payer: MEDICARE

## 2020-10-09 ENCOUNTER — OFFICE VISIT (OUTPATIENT)
Dept: HEMATOLOGY | Age: 70
End: 2020-10-09
Payer: MEDICARE

## 2020-10-09 VITALS
SYSTOLIC BLOOD PRESSURE: 118 MMHG | HEART RATE: 95 BPM | OXYGEN SATURATION: 93 % | HEIGHT: 67 IN | DIASTOLIC BLOOD PRESSURE: 78 MMHG | WEIGHT: 129.3 LBS | TEMPERATURE: 98.4 F | BODY MASS INDEX: 20.29 KG/M2

## 2020-10-09 DIAGNOSIS — C50.811 MALIGNANT NEOPLASM OF OVERLAPPING SITES OF RIGHT BREAST IN FEMALE, ESTROGEN RECEPTOR POSITIVE (HCC): ICD-10-CM

## 2020-10-09 DIAGNOSIS — Z17.0 MALIGNANT NEOPLASM OF OVERLAPPING SITES OF RIGHT BREAST IN FEMALE, ESTROGEN RECEPTOR POSITIVE (HCC): ICD-10-CM

## 2020-10-09 LAB
ALBUMIN SERPL-MCNC: 3.8 G/DL (ref 3.5–5.2)
ALP BLD-CCNC: 75 U/L (ref 35–104)
ALT SERPL-CCNC: 34 U/L (ref 9–52)
ANION GAP SERPL CALCULATED.3IONS-SCNC: 9 MMOL/L (ref 7–19)
AST SERPL-CCNC: 64 U/L (ref 14–36)
BASOPHILS ABSOLUTE: 0.03 K/UL (ref 0.01–0.08)
BASOPHILS RELATIVE PERCENT: 0.5 % (ref 0.1–1.2)
BILIRUB SERPL-MCNC: 0.5 MG/DL (ref 0.2–1.3)
BUN BLDV-MCNC: 33 MG/DL (ref 7–17)
CALCIUM SERPL-MCNC: 9.3 MG/DL (ref 8.4–10.2)
CHLORIDE BLD-SCNC: 101 MMOL/L (ref 98–111)
CO2: 31 MMOL/L (ref 22–29)
CREAT SERPL-MCNC: 1 MG/DL (ref 0.5–1)
EOSINOPHILS ABSOLUTE: 0.06 K/UL (ref 0.04–0.54)
EOSINOPHILS RELATIVE PERCENT: 1 % (ref 0.7–7)
GFR NON-AFRICAN AMERICAN: 54
GLOBULIN: 2.8 G/DL
GLUCOSE BLD-MCNC: 109 MG/DL (ref 74–106)
HCT VFR BLD CALC: 33.3 % (ref 34.1–44.9)
HEMOGLOBIN: 11.2 G/DL (ref 11.2–15.7)
LYMPHOCYTES ABSOLUTE: 0.73 K/UL (ref 1.18–3.74)
LYMPHOCYTES RELATIVE PERCENT: 12.1 % (ref 19.3–53.1)
MCH RBC QN AUTO: 35.6 PG (ref 25.6–32.2)
MCHC RBC AUTO-ENTMCNC: 33.6 G/DL (ref 32.3–35.5)
MCV RBC AUTO: 105.7 FL (ref 79.4–94.8)
MONOCYTES ABSOLUTE: 0.43 K/UL (ref 0.24–0.82)
MONOCYTES RELATIVE PERCENT: 7.1 % (ref 4.7–12.5)
NEUTROPHILS ABSOLUTE: 4.8 K/UL (ref 1.56–6.13)
NEUTROPHILS RELATIVE PERCENT: 79.3 % (ref 34–71.1)
PDW BLD-RTO: 13.8 % (ref 11.7–14.4)
PLATELET # BLD: 78 K/UL (ref 182–369)
PMV BLD AUTO: 9.9 FL (ref 7.4–10.4)
POTASSIUM SERPL-SCNC: 3.6 MMOL/L (ref 3.5–5.1)
RBC # BLD: 3.15 M/UL (ref 3.93–5.22)
SODIUM BLD-SCNC: 141 MMOL/L (ref 137–145)
TOTAL PROTEIN: 6.6 G/DL (ref 6.3–8.2)
WBC # BLD: 6.05 K/UL (ref 3.98–10.04)

## 2020-10-09 PROCEDURE — 85025 COMPLETE CBC W/AUTO DIFF WBC: CPT

## 2020-10-09 PROCEDURE — G8420 CALC BMI NORM PARAMETERS: HCPCS | Performed by: INTERNAL MEDICINE

## 2020-10-09 PROCEDURE — 1123F ACP DISCUSS/DSCN MKR DOCD: CPT | Performed by: INTERNAL MEDICINE

## 2020-10-09 PROCEDURE — G8427 DOCREV CUR MEDS BY ELIG CLIN: HCPCS | Performed by: INTERNAL MEDICINE

## 2020-10-09 PROCEDURE — 99211 OFF/OP EST MAY X REQ PHY/QHP: CPT

## 2020-10-09 PROCEDURE — 99214 OFFICE O/P EST MOD 30 MIN: CPT | Performed by: INTERNAL MEDICINE

## 2020-10-09 PROCEDURE — 3017F COLORECTAL CA SCREEN DOC REV: CPT | Performed by: INTERNAL MEDICINE

## 2020-10-09 PROCEDURE — 80053 COMPREHEN METABOLIC PANEL: CPT

## 2020-10-09 PROCEDURE — G8484 FLU IMMUNIZE NO ADMIN: HCPCS | Performed by: INTERNAL MEDICINE

## 2020-10-09 PROCEDURE — G8399 PT W/DXA RESULTS DOCUMENT: HCPCS | Performed by: INTERNAL MEDICINE

## 2020-10-09 PROCEDURE — 1090F PRES/ABSN URINE INCON ASSESS: CPT | Performed by: INTERNAL MEDICINE

## 2020-10-09 PROCEDURE — 4040F PNEUMOC VAC/ADMIN/RCVD: CPT | Performed by: INTERNAL MEDICINE

## 2020-10-09 PROCEDURE — 1036F TOBACCO NON-USER: CPT | Performed by: INTERNAL MEDICINE

## 2020-10-09 NOTE — PROGRESS NOTES
Denia Darryl   1950   10/9/2020     INTERVAL HISTORY/HISTORY OF PRESENT ILLNESS:    Diagnosis   · HER-2 IDC right breast, 2019  · [id number]  · ER 3%, TX 0%, HER-2/lea IHC 3+  · Mammaprint high risk Basal-like  · Osteopenia, Aug 2019-T score -1.5 lumbar spine    Treatment summary  · 10/18/2019 through  2020- 6 cycles of neoadjuvant Taxotere, Carboplatin and Herceptin and Perjeta  · 3/3/2020- right mastectomy/ lymph node dissection  · 3/20/2020-14 cycles of adjuvant TDM-1.   · 2020-2020 Completed adjuvant radiation 6040 cGy  · 4/10/2020-adjuvant endocrine therapy with letrozole x10 years. Patient is a pleasant 68years old female who has a diagnosis of locally advanced HER-2 positive breast cancer. She was started on chemotherapy with Taxotere carboplatin Herceptin Perjeta with a partial response. She is currently status post right mastectomy and lymph node dissection showing complete pathologic response in the breast but with residual lorena axillary disease. She was therefore recommended to switch her adjuvant treatment from Herceptin/Perjeta to [facility]. She had complaints of mild sensory neuropathy that has resolved. She denies any other new complaints. She has been taking Boniva calcium vitamin D for a diagnosis of osteopenia. CBC today revealed thrombocytopenia. She was here today for consideration of cycle #10 of adjuvant edema. Treatment will be delayed for 1 week. Cancer history  Ms Shilpi Forman was first seen by me on 10/03/2019. She felt a mass in the right upper breast and therefore contact her primary care provider. A diagnostic mammogram was performed. Her mother  of breast cancer at the age of 50. She has no other history of breast cancer or any other cancer in her family.      · 2019- diagnostic mammogram showed a suspicious 2.2 cm hypoechoic spiculated right breast mass in the axillary tail within the internal calcifications had suggest malignancy. Ultrasound of the breast showed the lesion as well as a pathologic appearing right axillary adenopathy. · 09/05/2019-core needle biopsy consistent with high grade invasive ductal carcinoma. MammaPrint high risk basal type. ER 3%, OH 0.2%, HER-2/lea IHC 3+ Ki-67 22%. HER-2/lea FISH positive  · 9/19/2019- MRI breast showed a large right breast mass compatible with primary malignancy measuring 2.2 x 1.2 x 3.5 cm in size. Abnormal enhancement in the pectoralis major muscle suggesting extension into the muscle. Multiple abnormal lymph nodes are seen in the right axilla with the largest measuring 2 x 1.6 cm. A small lymph node is seen along the right internal mammary chain. No suspicious left axillary adenopathy. · 9/27/2019-CT chest abdomen pelvis and bone scan showed no evidence of metastatic disease. · 10/3/2019- she was first seen by me. Recommended neoadjuvant chemotherapy approach with dose dense Adriamycin/cyclophosphamide x4 cycles followed by 12 weekly cycles of Taxol 80 mg/m² with Herceptin and Perjeta. · 10/14/2019- consultation at [facility]. Recommended Taxotere, carboplatin Herceptin Perjeta. · 10/17/2019- 1/31/2020 completion of 6 cycles of of neoadjuvant chemotherapy with Taxotere carboplatin Herceptin Perjeta. · 2/14/2020-MRI breast.  Showed findings consistent with response to therapy. No residual enhancement of the right breast. Residual right axillary lymph node concerning for residual disease. · 2/21/2020- maintenance Herceptin/Perjeta to complete 1 year of treatment. · 3/03/2020-she underwent a right mastectomy/axillary dissection by Dr. Miguel Ángel Velasco at [facility].  Primary tumor site identified with extensive fibrosis and nests of residual high-grade carcinoma present in lymphatic spaces. Tumor is present in a lymphatic space at the deep surgical excision margin.  3 out of 11 lymph nodes, positive for metastatic carcinoma, at least 1 metastasis Social connections     Talks on phone: None     Gets together: None     Attends Buddhist service: None     Active member of club or organization: None     Attends meetings of clubs or organizations: None     Relationship status: None    Intimate partner violence     Fear of current or ex partner: None     Emotionally abused: None     Physically abused: None     Forced sexual activity: None   Other Topics Concern    None   Social History Narrative    None       FAMILY HISTORY:  Family History   Problem Relation Age of Onset    Breast Cancer Mother 39    Colon Cancer Neg Hx     Colon Polyps Neg Hx         Current Outpatient Medications   Medication Sig Dispense Refill    letrozole (FEMARA) 2.5 MG tablet Take 1 tablet by mouth daily 90 tablet 5    montelukast (SINGULAIR) 10 MG tablet Take 1 tablet by mouth daily 30 tablet 5    ibandronate (BONIVA) 150 MG tablet Take 1 tablet by mouth every 30 days Take one (1) tablet once per month in the morning with a full glass of water, on an empty stomach, and do not take anything else by mouth or lie down for the next 30 minutes. 30 tablet 6    ferrous sulfate 325 (65 Fe) MG tablet Take 325 mg by mouth daily (with breakfast)      Calcium Carbonate-Vitamin D (OYSTER SHELL CALCIUM/D) 500-200 MG-UNIT TABS Take 1 tablet by mouth daily 360 tablet 0     No current facility-administered medications for this visit.          REVIEW OF SYSTEMS:    Constitutional: no fever, no night sweats,  fatigue;   HEENT: no blurring of vision, no double vision, no hearing difficulty, no tinnitus,no ulceration, no dysphagia  Lungs: no cough, no shortness of breath, no wheeze;   CVS: no palpitation, no chest pain, no shortness of breath;  GI: no abdominal pain, no nausea , no vomiting, no constipation;   BRYNN: no dysuria, frequency and urgency, no hematuria, no kidney stones;   Musculoskeletal: no joint pain, swelling , stiffness;   Endocrine: no polyuria, polydypsia, no cold or heat intolerence; Hematology/lymphatic: no easy brusing or bleeding, no hx of clotting disorder; no peripheral adenopathy. Dermatology: no skin rash, no eczema, no pruritis; alopecia  Psychiatry: no depression, no anxiety,no panic attacks, no suicide ideation; Neurology: no syncope, no seizures, no numbness or tingling of hands, no numbness or tingling of feet, no paresis;     PHYSICAL EXAM:    Vitals signs:  /78   Pulse 95   Temp 98.4 °F (36.9 °C) (Oral)   Ht 5' 7\" (1.702 m)   Wt 129 lb 4.8 oz (58.7 kg)   SpO2 93%   BMI 20.25 kg/m²    Pain scale:  Pain Score:   0 - No pain     CONSTITUTIONAL: Alert, appropriate, no acute distress,   EYES: Non icteric, EOM intact, pupils equal round and reactive to light and accommodation. ENT: Oral mucus membranes moist, no oral pharyngeal lesions. External inspection of ears and nose are normal.   NECK: Supple, no masses. No palpable thyroid mass    CHEST/LUNGS: CTA bilaterally, normal respiratory effort   CARDIOVASCULAR: RRR, no murmurs. No lower extremity edema   ABDOMEN: soft non-tender, active bowel sounds, no hepatosplenomegaly. No palpable masses. EXTREMITIES: warm, Full ROM of all fours extremities. No focal weakness. SKIN: warm, dry with no rashes or lesions  LYMPH: No cervical, clavicular, axillary, or inguinal lymphadenopathy  NEUROLOGIC: follows commands, non focal.   PSYCH: mood and affect appropriate. Alert and oriented to time and place and person. Relevant Lab findings/reviewed by me:  Lab Results   Component Value Date    WBC 6.05 10/09/2020    HGB 11.2 10/09/2020    HCT 33.3 (L) 10/09/2020    .7 (H) 10/09/2020    PLT 78 (L) 10/09/2020     Lab Results   Component Value Date    NEUTROABS 4.80 10/09/2020       Relevant Imaging studies/reviewed by me:  No results found. ASSESSMENT    No orders of the defined types were placed in this encounter. Natasha Choi was seen today for follow-up.     Diagnoses and all orders for this visit:    Malignant neoplasm of overlapping sites of right breast in female, estrogen receptor positive ([facility] [address]    Encounter for antineoplastic immunotherapy    Care plan discussed with patient    Encounter for monitoring aromatase inhibitor therapy       HER-2 positive, node positive invasive ductal carcinoma right breast, MammaPrint basal like type  She had regional residual disease  [id number]  Recommended adjuvant TDM 1-due to residual disease. As per NCCN guidelines. Recommended adjuvant radiation  The patient was counseled today about diagnosis, staging, prognosis, diagnostic tests, medications, side effects and disease management. The method of counseling included verbal explanation. The patient verbalized understanding. Proceed cycle 9 out of 14 TDM 1 adjuvant setting next week. Treatment related toxicity- mild intermittent neuropathy, thrombocytopenia    Aromatase inhibitor toxicity- at risk worsening osteopenia    PLAN:  · LH OPIT every 3 weeks for treatment  · RTC MD 4 weeks  · Delay cycle #10/14 TDM 1 for 1 week  · CMP during treatment next week  · Repeat BMD 8/2021  · Continue calcium and vitamin D      Follow-up:    Return in about 4 weeks (around 11/6/2020) for an appointment with Dr. Hector Blackman and orders for OPIT. Data Lucho Long MA am scribing for Gabi Escalante MD. Electronically sign Velma Mg MA on 10/9/2020 at 10:16 AM    I, Dr Betsy Silva, personally performed the services described in this documentation as scribed by Velma Mg MA in my presence and is both accurate and complete. Over 50% of the total visit time of 25 minutes in face to face encounter with the patient, out of which more than 50% of the time was spent in counseling patient or family and coordination of care. Counseling included but was not limited to time spent reviewing labs, imaging studies/ treatment plan and answering questions.

## 2020-10-16 ENCOUNTER — HOSPITAL ENCOUNTER (OUTPATIENT)
Dept: INFUSION THERAPY | Age: 70
Setting detail: INFUSION SERIES
Discharge: HOME | End: 2020-10-16
Payer: MEDICARE

## 2020-10-16 VITALS
HEART RATE: 72 BPM | WEIGHT: 130 LBS | SYSTOLIC BLOOD PRESSURE: 118 MMHG | DIASTOLIC BLOOD PRESSURE: 66 MMHG | TEMPERATURE: 97.6 F | BODY MASS INDEX: 20.36 KG/M2 | RESPIRATION RATE: 18 BRPM

## 2020-10-16 DIAGNOSIS — Z17.0 MALIGNANT NEOPLASM OF OVERLAPPING SITES OF RIGHT BREAST IN FEMALE, ESTROGEN RECEPTOR POSITIVE (HCC): ICD-10-CM

## 2020-10-16 DIAGNOSIS — Z51.11 ENCOUNTER FOR ANTINEOPLASTIC CHEMOTHERAPY: Primary | ICD-10-CM

## 2020-10-16 DIAGNOSIS — C50.811 MALIGNANT NEOPLASM OF OVERLAPPING SITES OF RIGHT BREAST IN FEMALE, ESTROGEN RECEPTOR POSITIVE (HCC): ICD-10-CM

## 2020-10-16 DIAGNOSIS — C50.911 MALIGNANT NEOPLASM OF RIGHT FEMALE BREAST, UNSPECIFIED ESTROGEN RECEPTOR STATUS, UNSPECIFIED SITE OF BREAST (HCC): ICD-10-CM

## 2020-10-16 LAB
ALBUMIN SERPL-MCNC: 3.7 G/DL (ref 3.5–5.2)
ALP BLD-CCNC: 78 U/L (ref 35–104)
ALT SERPL-CCNC: 33 U/L (ref 5–33)
ANION GAP SERPL CALCULATED.3IONS-SCNC: 9 MMOL/L (ref 7–19)
AST SERPL-CCNC: 53 U/L (ref 5–32)
BASOPHILS ABSOLUTE: 0 K/UL (ref 0–0.2)
BASOPHILS RELATIVE PERCENT: 0.4 % (ref 0–1)
BILIRUB SERPL-MCNC: 0.3 MG/DL (ref 0.2–1.2)
BUN BLDV-MCNC: 24 MG/DL (ref 8–23)
CALCIUM SERPL-MCNC: 9.6 MG/DL (ref 8.8–10.2)
CHLORIDE BLD-SCNC: 104 MMOL/L (ref 98–111)
CO2: 29 MMOL/L (ref 22–29)
CREAT SERPL-MCNC: 0.8 MG/DL (ref 0.5–0.9)
EOSINOPHILS ABSOLUTE: 0.1 K/UL (ref 0–0.6)
EOSINOPHILS RELATIVE PERCENT: 1.5 % (ref 0–5)
GFR AFRICAN AMERICAN: >59
GFR NON-AFRICAN AMERICAN: >60
GLUCOSE BLD-MCNC: 117 MG/DL (ref 74–109)
HCT VFR BLD CALC: 32.6 % (ref 37–47)
HEMOGLOBIN: 10.7 G/DL (ref 12–16)
IMMATURE GRANULOCYTES #: 0 K/UL
LYMPHOCYTES ABSOLUTE: 0.7 K/UL (ref 1.1–4.5)
LYMPHOCYTES RELATIVE PERCENT: 14.2 % (ref 20–40)
MCH RBC QN AUTO: 34.1 PG (ref 27–31)
MCHC RBC AUTO-ENTMCNC: 32.8 G/DL (ref 33–37)
MCV RBC AUTO: 103.8 FL (ref 81–99)
MONOCYTES ABSOLUTE: 0.5 K/UL (ref 0–0.9)
MONOCYTES RELATIVE PERCENT: 10 % (ref 0–10)
NEUTROPHILS ABSOLUTE: 3.8 K/UL (ref 1.5–7.5)
NEUTROPHILS RELATIVE PERCENT: 73.7 % (ref 50–65)
PDW BLD-RTO: 14.2 % (ref 11.5–14.5)
PLATELET # BLD: 69 K/UL (ref 130–400)
PMV BLD AUTO: 11.7 FL (ref 9.4–12.3)
POTASSIUM SERPL-SCNC: 3.4 MMOL/L (ref 3.5–5)
RBC # BLD: 3.14 M/UL (ref 4.2–5.4)
SODIUM BLD-SCNC: 142 MMOL/L (ref 136–145)
TOTAL PROTEIN: 6.6 G/DL (ref 6.6–8.7)
WBC # BLD: 5.2 K/UL (ref 4.8–10.8)

## 2020-10-16 PROCEDURE — 85025 COMPLETE CBC W/AUTO DIFF WBC: CPT

## 2020-10-16 PROCEDURE — 36591 DRAW BLOOD OFF VENOUS DEVICE: CPT

## 2020-10-16 PROCEDURE — 2580000003 HC RX 258: Performed by: INTERNAL MEDICINE

## 2020-10-16 PROCEDURE — 80053 COMPREHEN METABOLIC PANEL: CPT

## 2020-10-16 PROCEDURE — 96523 IRRIG DRUG DELIVERY DEVICE: CPT

## 2020-10-16 PROCEDURE — 6360000002 HC RX W HCPCS: Performed by: INTERNAL MEDICINE

## 2020-10-16 RX ORDER — SODIUM CHLORIDE 0.9 % (FLUSH) 0.9 %
20 SYRINGE (ML) INJECTION PRN
Status: DISCONTINUED | OUTPATIENT
Start: 2020-10-16 | End: 2020-10-18 | Stop reason: HOSPADM

## 2020-10-16 RX ORDER — HEPARIN SODIUM (PORCINE) LOCK FLUSH IV SOLN 100 UNIT/ML 100 UNIT/ML
500 SOLUTION INTRAVENOUS PRN
Status: DISCONTINUED | OUTPATIENT
Start: 2020-10-16 | End: 2020-10-18 | Stop reason: HOSPADM

## 2020-10-16 RX ADMIN — HEPARIN 500 UNITS: 100 SYRINGE at 10:57

## 2020-10-16 RX ADMIN — SODIUM CHLORIDE, PRESERVATIVE FREE 20 ML: 5 INJECTION INTRAVENOUS at 10:57

## 2020-10-23 ENCOUNTER — HOSPITAL ENCOUNTER (OUTPATIENT)
Dept: INFUSION THERAPY | Age: 70
Setting detail: INFUSION SERIES
Discharge: HOME | End: 2020-10-23
Payer: MEDICARE

## 2020-10-23 VITALS
HEART RATE: 68 BPM | OXYGEN SATURATION: 98 % | BODY MASS INDEX: 20.36 KG/M2 | DIASTOLIC BLOOD PRESSURE: 72 MMHG | TEMPERATURE: 97.8 F | RESPIRATION RATE: 18 BRPM | WEIGHT: 130 LBS | SYSTOLIC BLOOD PRESSURE: 133 MMHG

## 2020-10-23 DIAGNOSIS — C50.911 MALIGNANT NEOPLASM OF RIGHT FEMALE BREAST, UNSPECIFIED ESTROGEN RECEPTOR STATUS, UNSPECIFIED SITE OF BREAST (HCC): ICD-10-CM

## 2020-10-23 DIAGNOSIS — C50.811 MALIGNANT NEOPLASM OF OVERLAPPING SITES OF RIGHT BREAST IN FEMALE, ESTROGEN RECEPTOR POSITIVE (HCC): ICD-10-CM

## 2020-10-23 DIAGNOSIS — Z17.0 MALIGNANT NEOPLASM OF OVERLAPPING SITES OF RIGHT BREAST IN FEMALE, ESTROGEN RECEPTOR POSITIVE (HCC): ICD-10-CM

## 2020-10-23 DIAGNOSIS — Z51.11 ENCOUNTER FOR ANTINEOPLASTIC CHEMOTHERAPY: Primary | ICD-10-CM

## 2020-10-23 LAB
ALBUMIN SERPL-MCNC: 3.6 G/DL (ref 3.5–5.2)
ALP BLD-CCNC: 72 U/L (ref 35–104)
ALT SERPL-CCNC: 29 U/L (ref 5–33)
ANION GAP SERPL CALCULATED.3IONS-SCNC: 8 MMOL/L (ref 7–19)
AST SERPL-CCNC: 43 U/L (ref 5–32)
BASOPHILS ABSOLUTE: 0 K/UL (ref 0–0.2)
BASOPHILS RELATIVE PERCENT: 0.6 % (ref 0–1)
BILIRUB SERPL-MCNC: 0.4 MG/DL (ref 0.2–1.2)
BUN BLDV-MCNC: 30 MG/DL (ref 8–23)
CALCIUM SERPL-MCNC: 9.3 MG/DL (ref 8.8–10.2)
CHLORIDE BLD-SCNC: 102 MMOL/L (ref 98–111)
CO2: 29 MMOL/L (ref 22–29)
CREAT SERPL-MCNC: 0.8 MG/DL (ref 0.5–0.9)
EOSINOPHILS ABSOLUTE: 0.1 K/UL (ref 0–0.6)
EOSINOPHILS RELATIVE PERCENT: 1.3 % (ref 0–5)
GFR AFRICAN AMERICAN: >59
GFR NON-AFRICAN AMERICAN: >60
GLUCOSE BLD-MCNC: 114 MG/DL (ref 74–109)
HCT VFR BLD CALC: 32.2 % (ref 37–47)
HEMOGLOBIN: 10.5 G/DL (ref 12–16)
IMMATURE GRANULOCYTES #: 0 K/UL
LYMPHOCYTES ABSOLUTE: 0.8 K/UL (ref 1.1–4.5)
LYMPHOCYTES RELATIVE PERCENT: 15.4 % (ref 20–40)
MCH RBC QN AUTO: 34.1 PG (ref 27–31)
MCHC RBC AUTO-ENTMCNC: 32.6 G/DL (ref 33–37)
MCV RBC AUTO: 104.5 FL (ref 81–99)
MONOCYTES ABSOLUTE: 0.4 K/UL (ref 0–0.9)
MONOCYTES RELATIVE PERCENT: 8.2 % (ref 0–10)
NEUTROPHILS ABSOLUTE: 3.9 K/UL (ref 1.5–7.5)
NEUTROPHILS RELATIVE PERCENT: 74.3 % (ref 50–65)
PDW BLD-RTO: 14.6 % (ref 11.5–14.5)
PLATELET # BLD: 79 K/UL (ref 130–400)
PMV BLD AUTO: 11.6 FL (ref 9.4–12.3)
POTASSIUM SERPL-SCNC: 3.6 MMOL/L (ref 3.5–5)
RBC # BLD: 3.08 M/UL (ref 4.2–5.4)
SODIUM BLD-SCNC: 139 MMOL/L (ref 136–145)
TOTAL PROTEIN: 6.3 G/DL (ref 6.6–8.7)
WBC # BLD: 5.3 K/UL (ref 4.8–10.8)

## 2020-10-23 PROCEDURE — 36591 DRAW BLOOD OFF VENOUS DEVICE: CPT

## 2020-10-23 PROCEDURE — 85025 COMPLETE CBC W/AUTO DIFF WBC: CPT

## 2020-10-23 PROCEDURE — 6360000002 HC RX W HCPCS: Performed by: INTERNAL MEDICINE

## 2020-10-23 PROCEDURE — 2580000003 HC RX 258: Performed by: INTERNAL MEDICINE

## 2020-10-23 PROCEDURE — 80053 COMPREHEN METABOLIC PANEL: CPT

## 2020-10-23 PROCEDURE — 96374 THER/PROPH/DIAG INJ IV PUSH: CPT

## 2020-10-23 PROCEDURE — 96413 CHEMO IV INFUSION 1 HR: CPT

## 2020-10-23 RX ORDER — MEPERIDINE HYDROCHLORIDE 50 MG/ML
12.5 INJECTION INTRAMUSCULAR; INTRAVENOUS; SUBCUTANEOUS ONCE
Status: CANCELLED | OUTPATIENT
Start: 2020-10-23

## 2020-10-23 RX ORDER — SODIUM CHLORIDE 9 MG/ML
20 INJECTION, SOLUTION INTRAVENOUS ONCE
Status: CANCELLED | OUTPATIENT
Start: 2020-10-23

## 2020-10-23 RX ORDER — SODIUM CHLORIDE 9 MG/ML
20 INJECTION, SOLUTION INTRAVENOUS ONCE
Status: COMPLETED | OUTPATIENT
Start: 2020-10-23 | End: 2020-10-23

## 2020-10-23 RX ORDER — HEPARIN SODIUM (PORCINE) LOCK FLUSH IV SOLN 100 UNIT/ML 100 UNIT/ML
500 SOLUTION INTRAVENOUS PRN
Status: CANCELLED | OUTPATIENT
Start: 2020-10-23

## 2020-10-23 RX ORDER — HEPARIN SODIUM (PORCINE) LOCK FLUSH IV SOLN 100 UNIT/ML 100 UNIT/ML
500 SOLUTION INTRAVENOUS PRN
Status: DISCONTINUED | OUTPATIENT
Start: 2020-10-23 | End: 2020-10-24 | Stop reason: HOSPADM

## 2020-10-23 RX ORDER — EPINEPHRINE 1 MG/ML
0.3 INJECTION, SOLUTION, CONCENTRATE INTRAVENOUS PRN
Status: CANCELLED | OUTPATIENT
Start: 2020-10-23

## 2020-10-23 RX ORDER — DEXAMETHASONE SODIUM PHOSPHATE 10 MG/ML
10 INJECTION, SOLUTION INTRAMUSCULAR; INTRAVENOUS ONCE
Status: COMPLETED | OUTPATIENT
Start: 2020-10-23 | End: 2020-10-23

## 2020-10-23 RX ORDER — SODIUM CHLORIDE 0.9 % (FLUSH) 0.9 %
10 SYRINGE (ML) INJECTION PRN
Status: CANCELLED | OUTPATIENT
Start: 2020-10-23

## 2020-10-23 RX ORDER — SODIUM CHLORIDE 9 MG/ML
INJECTION, SOLUTION INTRAVENOUS CONTINUOUS
Status: CANCELLED | OUTPATIENT
Start: 2020-10-23

## 2020-10-23 RX ORDER — SODIUM CHLORIDE 0.9 % (FLUSH) 0.9 %
5 SYRINGE (ML) INJECTION PRN
Status: CANCELLED | OUTPATIENT
Start: 2020-10-23

## 2020-10-23 RX ORDER — SODIUM CHLORIDE 0.9 % (FLUSH) 0.9 %
10 SYRINGE (ML) INJECTION PRN
Status: DISCONTINUED | OUTPATIENT
Start: 2020-10-23 | End: 2020-10-24 | Stop reason: HOSPADM

## 2020-10-23 RX ORDER — DIPHENHYDRAMINE HYDROCHLORIDE 50 MG/ML
50 INJECTION INTRAMUSCULAR; INTRAVENOUS ONCE
Status: CANCELLED | OUTPATIENT
Start: 2020-10-23

## 2020-10-23 RX ORDER — METHYLPREDNISOLONE SODIUM SUCCINATE 125 MG/2ML
125 INJECTION, POWDER, LYOPHILIZED, FOR SOLUTION INTRAMUSCULAR; INTRAVENOUS ONCE
Status: CANCELLED | OUTPATIENT
Start: 2020-10-23

## 2020-10-23 RX ADMIN — DEXAMETHASONE SODIUM PHOSPHATE 10 MG: 10 INJECTION INTRAMUSCULAR; INTRAVENOUS at 11:31

## 2020-10-23 RX ADMIN — ADO-TRASTUZUMAB EMTANSINE 170 MG: 20 INJECTION, POWDER, LYOPHILIZED, FOR SOLUTION INTRAVENOUS at 11:56

## 2020-10-23 RX ADMIN — SODIUM CHLORIDE 20 ML/HR: 9 INJECTION, SOLUTION INTRAVENOUS at 11:32

## 2020-10-23 RX ADMIN — HEPARIN 500 UNITS: 100 SYRINGE at 12:30

## 2020-11-12 NOTE — PROGRESS NOTES
0.2%, HER-2/lea IHC 3+ Ki-67 22%. HER-2/lea FISH positive  · 9/19/2019- MRI breast showed a large right breast mass compatible with primary malignancy measuring 2.2 x 1.2 x 3.5 cm in size. Abnormal enhancement in the pectoralis major muscle suggesting extension into the muscle. Multiple abnormal lymph nodes are seen in the right axilla with the largest measuring 2 x 1.6 cm. A small lymph node is seen along the right internal mammary chain. No suspicious left axillary adenopathy. · 9/27/2019-CT chest abdomen pelvis and bone scan showed no evidence of metastatic disease. · 10/3/2019- she was first seen by me. Recommended neoadjuvant chemotherapy approach with dose dense Adriamycin/cyclophosphamide x4 cycles followed by 12 weekly cycles of Taxol 80 mg/m² with Herceptin and Perjeta. · 10/14/2019- consultation at [facility]. Recommended Taxotere, carboplatin Herceptin Perjeta. · 10/17/2019- 1/31/2020 completion of 6 cycles of of neoadjuvant chemotherapy with Taxotere carboplatin Herceptin Perjeta. · 2/14/2020-MRI breast.  Showed findings consistent with response to therapy. No residual enhancement of the right breast. Residual right axillary lymph node concerning for residual disease. · 2/21/2020- maintenance Herceptin/Perjeta to complete 1 year of treatment. · 3/03/2020-she underwent a right mastectomy/axillary dissection by Dr. Wil Jones at [facility].  Primary tumor site identified with extensive fibrosis and nests of residual high-grade carcinoma present in lymphatic spaces. Tumor is present in a lymphatic space at the deep surgical excision margin. 3 out of 11 lymph nodes, positive for metastatic carcinoma, at least 1 metastasis greater than 2.0 mm . Largest focus of metastasis measures 0.5 cm in greatest dimension. AJCC STAGE: ypT0, pN1a, pMx  · 3/16/2020-repeat 2D echo EF 60%.   · 4/20/2020-6/4/2020 Completion adjuvant radiation 6040 cGy  · 8/24/2020 Silverio Digital Screen Unilateral Left- No mammographic evidence of malignancy within the left breast. Continued annual unilateral screening mammography recommended. BI-RADS Category 2.     PAST MEDICAL HISTORY:   Past Medical History:   Diagnosis Date    Malignant neoplasm of unspecified site of unspecified female breast [facility])     breast          PAST SURGICAL HISTORY:  Past Surgical History:   Procedure Laterality Date    BREAST BIOPSY Right 09/05/2019    COLONOSCOPY N/A 10/1/2019    Dr Tessa Newton, internal hemorrhoids-Grade 2 without bleeding stigmata and external hemorrhoids-TV x 1, AP x 1, 3 yr recall    MASTECTOMY Right 3/3/2020    RIGHT SIMPLE MASTECTOMY WITH SENTINEL NODE BIOPSY, FLAPS, PEC BLOCK performed by Bonita Corral MD at [address] [place] [address] N/A 10/4/2019    PORT INSERTION WITH FLUORO performed by Bonita Corral MD at [facility] N/A 10/1/2019    Dr Arnold Seat hernia, [place] gastric polyps        SOCIAL HISTORY:  Social History     Socioeconomic History    Marital status:      Spouse name: None    Number of children: None    Years of education: None    Highest education level: None   Occupational History    None   Social Needs    Financial resource strain: None    Food insecurity     Worry: None     Inability: None    Transportation needs     Medical: None     Non-medical: None   Tobacco Use    Smoking status: Never Smoker    Smokeless tobacco: Never Used   Substance and Sexual Activity    Alcohol use: Never     Frequency: Never    Drug use: Never    Sexual activity: Yes   Lifestyle    Physical activity     Days per week: None     Minutes per session: None    Stress: None   Relationships    Social connections     Talks on phone: None     Gets together: None     Attends Church service: None     Active member of club or organization: None     Attends meetings of clubs or organizations: None     Relationship status: None    Intimate partner violence     Fear of current or ex partner: None     Emotionally abused: None     Physically abused: None     Forced sexual activity: None   Other Topics Concern    None   Social History Narrative    None       FAMILY HISTORY:  Family History   Problem Relation Age of Onset    Breast Cancer Mother 39    Colon Cancer Neg Hx     Colon Polyps Neg Hx         Current Outpatient Medications   Medication Sig Dispense Refill    letrozole (FEMARA) 2.5 MG tablet Take 1 tablet by mouth daily 90 tablet 5    montelukast (SINGULAIR) 10 MG tablet Take 1 tablet by mouth daily 30 tablet 5    ibandronate (BONIVA) 150 MG tablet Take 1 tablet by mouth every 30 days Take one (1) tablet once per month in the morning with a full glass of water, on an empty stomach, and do not take anything else by mouth or lie down for the next 30 minutes. 30 tablet 6    ferrous sulfate 325 (65 Fe) MG tablet Take 325 mg by mouth daily (with breakfast)      Calcium Carbonate-Vitamin D (OYSTER SHELL CALCIUM/D) 500-200 MG-UNIT TABS Take 1 tablet by mouth daily 360 tablet 0     No current facility-administered medications for this visit. REVIEW OF SYSTEMS:    Constitutional: no fever, no night sweats,  fatigue;   HEENT: no blurring of vision, no double vision, no hearing difficulty, no tinnitus,no ulceration, no dysphagia  Lungs: no cough, no shortness of breath, no wheeze;   CVS: no palpitation, no chest pain, no shortness of breath;  GI: no abdominal pain, no nausea , no vomiting, no constipation;   BRYNN: no dysuria, frequency and urgency, no hematuria, no kidney stones;   Musculoskeletal: no joint pain, swelling , stiffness;   Endocrine: no polyuria, polydypsia, no cold or heat intolerence; Hematology/lymphatic: no easy brusing or bleeding, no hx of clotting disorder; no peripheral adenopathy.   Dermatology: no skin rash, no eczema, no pruritis; alopecia  Psychiatry: no depression, no anxiety,no panic attacks, no suicide ideation; Neurology: no syncope, no seizures, no numbness or tingling of hands, no numbness or tingling of feet, no paresis;     PHYSICAL EXAM:    Vitals signs:  /74   Pulse 94   Temp 97.8 °F (36.6 °C)   Ht 5' 7\" (1.702 m)   Wt 129 lb 1.6 oz (58.6 kg)   SpO2 99%   BMI 20.22 kg/m²    Pain scale:  Pain Score:   0 - No pain     CONSTITUTIONAL: Alert, appropriate, no acute distress,   EYES: Non icteric, EOM intact, pupils equal round and reactive to light and accommodation. ENT: Oral mucus membranes moist, no oral pharyngeal lesions. External inspection of ears and nose are normal.   NECK: Supple, no masses. No palpable thyroid mass    CHEST/LUNGS: CTA bilaterally, normal respiratory effort   CARDIOVASCULAR: RRR, no murmurs. No lower extremity edema   ABDOMEN: soft non-tender, active bowel sounds, no hepatosplenomegaly. No palpable masses. EXTREMITIES: warm, Full ROM of all fours extremities. No focal weakness. SKIN: warm, dry with no rashes or lesions  LYMPH: No cervical, clavicular, axillary, or inguinal lymphadenopathy  NEUROLOGIC: follows commands, non focal.   PSYCH: mood and affect appropriate. Alert and oriented to time and place and person. Relevant Lab findings/reviewed by me:  Lab Results   Component Value Date    WBC 5.67 11/13/2020    HGB 11.7 11/13/2020    HCT 37.0 11/13/2020    .8 (H) 11/13/2020    PLT 85 (L) 11/13/2020     Lab Results   Component Value Date    NEUTROABS 4.05 11/13/2020         Relevant Imaging studies/reviewed by me:  No results found. ASSESSMENT    No orders of the defined types were placed in this encounter. Trinidad was seen today for follow-up.     Diagnoses and all orders for this visit:    Malignant neoplasm of overlapping sites of right breast in female, estrogen receptor positive ([facility] [address]    Encounter for antineoplastic immunotherapy    Encounter for monitoring aromatase inhibitor therapy    Chemotherapy management, encounter for    Care plan discussed with patient    Thrombocytopenia ([facility] [address]       HER-2 positive, node positive invasive ductal carcinoma right breast, MammaPrint basal like type  She had regional residual disease  [id number]  Recommended adjuvant TDM 1-due to residual disease. As per NCCN guidelines. Recommended adjuvant radiation  The patient was counseled today about diagnosis, staging, prognosis, diagnostic tests, medications, side effects and disease management. The method of counseling included verbal explanation. The patient verbalized understanding. Proceed cycle 12 out of 14 TDM 1 adjuvant setting next week. Treatment related toxicity- mild intermittent neuropathy, thrombocytopenia    Aromatase inhibitor toxicity- at risk worsening osteopenia    PLAN:  · LH OPIT every 3 weeks for treatment  · RTC MD 6 weeks  · Proceed with Cycle 12 today  · CMP today and every treatment  · Repeat BMD 8/2021  · Continue calcium and vitamin D  · Continue Letrozole      Follow-up:    No follow-ups on file. Data Cleatis Chad, salena scribing for Ting Ray MD. Electronically signed by Yandy Smith RN on 11/13/2020 at 11:47 AM CST. I, Dr Drea Talavera, personally performed the services described in this documentation as scribed by Yandy Smith RN in my presence and is both accurate and complete. Over 50% of the total visit time of 25 minutes in face to face encounter with the patient, out of which more than 50% of the time was spent in counseling patient or family and coordination of care. Counseling included but was not limited to time spent reviewing labs, imaging studies/ treatment plan and answering questions.

## 2020-11-13 ENCOUNTER — HOSPITAL ENCOUNTER (OUTPATIENT)
Dept: INFUSION THERAPY | Age: 70
Setting detail: INFUSION SERIES
Discharge: HOME | End: 2020-11-13
Payer: MEDICARE

## 2020-11-13 ENCOUNTER — HOSPITAL ENCOUNTER (OUTPATIENT)
Dept: INFUSION THERAPY | Age: 70
Discharge: HOME | End: 2020-11-13
Payer: MEDICARE

## 2020-11-13 ENCOUNTER — OFFICE VISIT (OUTPATIENT)
Dept: HEMATOLOGY | Age: 70
End: 2020-11-13
Payer: MEDICARE

## 2020-11-13 VITALS
HEIGHT: 67 IN | DIASTOLIC BLOOD PRESSURE: 74 MMHG | HEART RATE: 94 BPM | BODY MASS INDEX: 20.26 KG/M2 | SYSTOLIC BLOOD PRESSURE: 120 MMHG | WEIGHT: 129.1 LBS | TEMPERATURE: 97.8 F | OXYGEN SATURATION: 99 %

## 2020-11-13 VITALS
DIASTOLIC BLOOD PRESSURE: 78 MMHG | SYSTOLIC BLOOD PRESSURE: 139 MMHG | OXYGEN SATURATION: 99 % | RESPIRATION RATE: 20 BRPM | HEART RATE: 68 BPM | TEMPERATURE: 97.8 F

## 2020-11-13 DIAGNOSIS — Z17.0 MALIGNANT NEOPLASM OF OVERLAPPING SITES OF RIGHT BREAST IN FEMALE, ESTROGEN RECEPTOR POSITIVE (HCC): ICD-10-CM

## 2020-11-13 DIAGNOSIS — Z51.11 ENCOUNTER FOR ANTINEOPLASTIC CHEMOTHERAPY: Primary | ICD-10-CM

## 2020-11-13 DIAGNOSIS — C50.911 MALIGNANT NEOPLASM OF RIGHT FEMALE BREAST, UNSPECIFIED ESTROGEN RECEPTOR STATUS, UNSPECIFIED SITE OF BREAST (HCC): ICD-10-CM

## 2020-11-13 DIAGNOSIS — C50.811 MALIGNANT NEOPLASM OF OVERLAPPING SITES OF RIGHT BREAST IN FEMALE, ESTROGEN RECEPTOR POSITIVE (HCC): ICD-10-CM

## 2020-11-13 LAB
ALBUMIN SERPL-MCNC: 3.9 G/DL (ref 3.5–5.2)
ALP BLD-CCNC: 86 U/L (ref 35–104)
ALT SERPL-CCNC: 35 U/L (ref 9–52)
ANION GAP SERPL CALCULATED.3IONS-SCNC: 7 MMOL/L (ref 7–19)
AST SERPL-CCNC: 65 U/L (ref 14–36)
BASOPHILS ABSOLUTE: 0.03 K/UL (ref 0.01–0.08)
BASOPHILS RELATIVE PERCENT: 0.5 % (ref 0.1–1.2)
BILIRUB SERPL-MCNC: 0.4 MG/DL (ref 0.2–1.3)
BUN BLDV-MCNC: 37 MG/DL (ref 7–17)
CALCIUM SERPL-MCNC: 9.7 MG/DL (ref 8.4–10.2)
CHLORIDE BLD-SCNC: 104 MMOL/L (ref 98–111)
CO2: 32 MMOL/L (ref 22–29)
CREAT SERPL-MCNC: 1 MG/DL (ref 0.5–1)
EOSINOPHILS ABSOLUTE: 0.08 K/UL (ref 0.04–0.54)
EOSINOPHILS RELATIVE PERCENT: 1.4 % (ref 0.7–7)
GFR NON-AFRICAN AMERICAN: 54
GLUCOSE BLD-MCNC: 117 MG/DL (ref 74–106)
HCT VFR BLD CALC: 37 % (ref 34.1–44.9)
HEMOGLOBIN: 11.7 G/DL (ref 11.2–15.7)
LYMPHOCYTES ABSOLUTE: 0.91 K/UL (ref 1.18–3.74)
LYMPHOCYTES RELATIVE PERCENT: 16 % (ref 19.3–53.1)
MCH RBC QN AUTO: 35.3 PG (ref 25.6–32.2)
MCHC RBC AUTO-ENTMCNC: 31.6 G/DL (ref 32.3–35.5)
MCV RBC AUTO: 111.8 FL (ref 79.4–94.8)
MONOCYTES ABSOLUTE: 0.6 K/UL (ref 0.24–0.82)
MONOCYTES RELATIVE PERCENT: 10.6 % (ref 4.7–12.5)
NEUTROPHILS ABSOLUTE: 4.05 K/UL (ref 1.56–6.13)
NEUTROPHILS RELATIVE PERCENT: 71.5 % (ref 34–71.1)
PDW BLD-RTO: 14.2 % (ref 11.7–14.4)
PLATELET # BLD: 85 K/UL (ref 182–369)
PMV BLD AUTO: 10.3 FL (ref 7.4–10.4)
POTASSIUM SERPL-SCNC: 3.8 MMOL/L (ref 3.5–5.1)
RBC # BLD: 3.31 M/UL (ref 3.93–5.22)
SODIUM BLD-SCNC: 143 MMOL/L (ref 137–145)
TOTAL PROTEIN: 7.1 G/DL (ref 6.3–8.2)
WBC # BLD: 5.67 K/UL (ref 3.98–10.04)

## 2020-11-13 PROCEDURE — 1090F PRES/ABSN URINE INCON ASSESS: CPT | Performed by: INTERNAL MEDICINE

## 2020-11-13 PROCEDURE — 99214 OFFICE O/P EST MOD 30 MIN: CPT | Performed by: INTERNAL MEDICINE

## 2020-11-13 PROCEDURE — 80053 COMPREHEN METABOLIC PANEL: CPT

## 2020-11-13 PROCEDURE — 85025 COMPLETE CBC W/AUTO DIFF WBC: CPT

## 2020-11-13 PROCEDURE — 96413 CHEMO IV INFUSION 1 HR: CPT

## 2020-11-13 PROCEDURE — 99211 OFF/OP EST MAY X REQ PHY/QHP: CPT

## 2020-11-13 PROCEDURE — 4040F PNEUMOC VAC/ADMIN/RCVD: CPT | Performed by: INTERNAL MEDICINE

## 2020-11-13 PROCEDURE — G8399 PT W/DXA RESULTS DOCUMENT: HCPCS | Performed by: INTERNAL MEDICINE

## 2020-11-13 PROCEDURE — 96374 THER/PROPH/DIAG INJ IV PUSH: CPT

## 2020-11-13 PROCEDURE — 1123F ACP DISCUSS/DSCN MKR DOCD: CPT | Performed by: INTERNAL MEDICINE

## 2020-11-13 PROCEDURE — G8428 CUR MEDS NOT DOCUMENT: HCPCS | Performed by: INTERNAL MEDICINE

## 2020-11-13 PROCEDURE — 36415 COLL VENOUS BLD VENIPUNCTURE: CPT

## 2020-11-13 PROCEDURE — 2580000003 HC RX 258: Performed by: INTERNAL MEDICINE

## 2020-11-13 PROCEDURE — 6360000002 HC RX W HCPCS: Performed by: INTERNAL MEDICINE

## 2020-11-13 PROCEDURE — 3017F COLORECTAL CA SCREEN DOC REV: CPT | Performed by: INTERNAL MEDICINE

## 2020-11-13 RX ORDER — MEPERIDINE HYDROCHLORIDE 50 MG/ML
12.5 INJECTION INTRAMUSCULAR; INTRAVENOUS; SUBCUTANEOUS ONCE
Status: CANCELLED | OUTPATIENT
Start: 2020-11-13

## 2020-11-13 RX ORDER — DEXAMETHASONE SODIUM PHOSPHATE 10 MG/ML
10 INJECTION, SOLUTION INTRAMUSCULAR; INTRAVENOUS ONCE
Status: COMPLETED | OUTPATIENT
Start: 2020-11-13 | End: 2020-11-13

## 2020-11-13 RX ORDER — SODIUM CHLORIDE 0.9 % (FLUSH) 0.9 %
10 SYRINGE (ML) INJECTION PRN
Status: CANCELLED | OUTPATIENT
Start: 2020-11-13

## 2020-11-13 RX ORDER — SODIUM CHLORIDE 0.9 % (FLUSH) 0.9 %
10 SYRINGE (ML) INJECTION PRN
Status: DISCONTINUED | OUTPATIENT
Start: 2020-11-13 | End: 2020-11-14 | Stop reason: HOSPADM

## 2020-11-13 RX ORDER — SODIUM CHLORIDE 9 MG/ML
INJECTION, SOLUTION INTRAVENOUS CONTINUOUS
Status: CANCELLED | OUTPATIENT
Start: 2020-11-13

## 2020-11-13 RX ORDER — SODIUM CHLORIDE 9 MG/ML
20 INJECTION, SOLUTION INTRAVENOUS ONCE
Status: CANCELLED | OUTPATIENT
Start: 2020-11-13

## 2020-11-13 RX ORDER — HEPARIN SODIUM (PORCINE) LOCK FLUSH IV SOLN 100 UNIT/ML 100 UNIT/ML
500 SOLUTION INTRAVENOUS PRN
Status: DISCONTINUED | OUTPATIENT
Start: 2020-11-13 | End: 2020-11-14 | Stop reason: HOSPADM

## 2020-11-13 RX ORDER — SODIUM CHLORIDE 0.9 % (FLUSH) 0.9 %
5 SYRINGE (ML) INJECTION PRN
Status: CANCELLED | OUTPATIENT
Start: 2020-11-13

## 2020-11-13 RX ORDER — HEPARIN SODIUM (PORCINE) LOCK FLUSH IV SOLN 100 UNIT/ML 100 UNIT/ML
500 SOLUTION INTRAVENOUS PRN
Status: CANCELLED | OUTPATIENT
Start: 2020-11-13

## 2020-11-13 RX ORDER — EPINEPHRINE 1 MG/ML
0.3 INJECTION, SOLUTION, CONCENTRATE INTRAVENOUS PRN
Status: CANCELLED | OUTPATIENT
Start: 2020-11-13

## 2020-11-13 RX ORDER — DIPHENHYDRAMINE HYDROCHLORIDE 50 MG/ML
50 INJECTION INTRAMUSCULAR; INTRAVENOUS ONCE
Status: CANCELLED | OUTPATIENT
Start: 2020-11-13

## 2020-11-13 RX ORDER — SODIUM CHLORIDE 9 MG/ML
20 INJECTION, SOLUTION INTRAVENOUS ONCE
Status: COMPLETED | OUTPATIENT
Start: 2020-11-13 | End: 2020-11-14

## 2020-11-13 RX ORDER — METHYLPREDNISOLONE SODIUM SUCCINATE 125 MG/2ML
125 INJECTION, POWDER, LYOPHILIZED, FOR SOLUTION INTRAMUSCULAR; INTRAVENOUS ONCE
Status: CANCELLED | OUTPATIENT
Start: 2020-11-13

## 2020-11-13 RX ADMIN — SODIUM CHLORIDE 20 ML/HR: 9 INJECTION, SOLUTION INTRAVENOUS at 09:41

## 2020-11-13 RX ADMIN — DEXAMETHASONE SODIUM PHOSPHATE 10 MG: 10 INJECTION, SOLUTION INTRAMUSCULAR; INTRAVENOUS at 09:41

## 2020-11-13 RX ADMIN — ADO-TRASTUZUMAB EMTANSINE 160.4 MG: 20 INJECTION, POWDER, LYOPHILIZED, FOR SOLUTION INTRAVENOUS at 10:00

## 2020-12-04 ENCOUNTER — HOSPITAL ENCOUNTER (OUTPATIENT)
Dept: INFUSION THERAPY | Age: 70
Discharge: HOME | End: 2020-12-04
Payer: MEDICARE

## 2020-12-04 ENCOUNTER — HOSPITAL ENCOUNTER (OUTPATIENT)
Dept: INFUSION THERAPY | Age: 70
Setting detail: INFUSION SERIES
Discharge: HOME | End: 2020-12-04
Payer: MEDICARE

## 2020-12-04 VITALS
HEART RATE: 71 BPM | SYSTOLIC BLOOD PRESSURE: 141 MMHG | OXYGEN SATURATION: 96 % | RESPIRATION RATE: 20 BRPM | TEMPERATURE: 98.1 F | DIASTOLIC BLOOD PRESSURE: 90 MMHG

## 2020-12-04 VITALS
DIASTOLIC BLOOD PRESSURE: 66 MMHG | WEIGHT: 130.6 LBS | BODY MASS INDEX: 20.5 KG/M2 | SYSTOLIC BLOOD PRESSURE: 108 MMHG | HEART RATE: 111 BPM | OXYGEN SATURATION: 95 % | HEIGHT: 67 IN | TEMPERATURE: 98 F

## 2020-12-04 DIAGNOSIS — C50.811 MALIGNANT NEOPLASM OF OVERLAPPING SITES OF RIGHT BREAST IN FEMALE, ESTROGEN RECEPTOR POSITIVE (HCC): ICD-10-CM

## 2020-12-04 DIAGNOSIS — C50.911 MALIGNANT NEOPLASM OF RIGHT FEMALE BREAST, UNSPECIFIED ESTROGEN RECEPTOR STATUS, UNSPECIFIED SITE OF BREAST (HCC): ICD-10-CM

## 2020-12-04 DIAGNOSIS — Z17.0 MALIGNANT NEOPLASM OF OVERLAPPING SITES OF RIGHT BREAST IN FEMALE, ESTROGEN RECEPTOR POSITIVE (HCC): ICD-10-CM

## 2020-12-04 DIAGNOSIS — Z51.11 ENCOUNTER FOR ANTINEOPLASTIC CHEMOTHERAPY: Primary | ICD-10-CM

## 2020-12-04 LAB
ALBUMIN SERPL-MCNC: 3.6 G/DL (ref 3.5–5.2)
ALP BLD-CCNC: 83 U/L (ref 35–104)
ALT SERPL-CCNC: 38 U/L (ref 9–52)
ANION GAP SERPL CALCULATED.3IONS-SCNC: 10 MMOL/L (ref 7–19)
AST SERPL-CCNC: 63 U/L (ref 14–36)
BASOPHILS ABSOLUTE: 0.05 K/UL (ref 0.01–0.08)
BASOPHILS RELATIVE PERCENT: 1 % (ref 0.1–1.2)
BILIRUB SERPL-MCNC: 0.6 MG/DL (ref 0.2–1.3)
BUN BLDV-MCNC: 28 MG/DL (ref 7–17)
CALCIUM SERPL-MCNC: 9.6 MG/DL (ref 8.4–10.2)
CHLORIDE BLD-SCNC: 103 MMOL/L (ref 98–111)
CO2: 31 MMOL/L (ref 22–29)
CREAT SERPL-MCNC: 1 MG/DL (ref 0.5–1)
EOSINOPHILS ABSOLUTE: 0.06 K/UL (ref 0.04–0.54)
EOSINOPHILS RELATIVE PERCENT: 1.2 % (ref 0.7–7)
GFR NON-AFRICAN AMERICAN: 54
GLOBULIN: 3.1 G/DL
GLUCOSE BLD-MCNC: 131 MG/DL (ref 74–106)
HCT VFR BLD CALC: 33.5 % (ref 34.1–44.9)
HEMOGLOBIN: 11.4 G/DL (ref 11.2–15.7)
LYMPHOCYTES ABSOLUTE: 0.82 K/UL (ref 1.18–3.74)
LYMPHOCYTES RELATIVE PERCENT: 16.8 % (ref 19.3–53.1)
MCH RBC QN AUTO: 35.5 PG (ref 25.6–32.2)
MCHC RBC AUTO-ENTMCNC: 34 G/DL (ref 32.3–35.5)
MCV RBC AUTO: 104.4 FL (ref 79.4–94.8)
MONOCYTES ABSOLUTE: 0.45 K/UL (ref 0.24–0.82)
MONOCYTES RELATIVE PERCENT: 9.2 % (ref 4.7–12.5)
NEUTROPHILS ABSOLUTE: 3.49 K/UL (ref 1.56–6.13)
NEUTROPHILS RELATIVE PERCENT: 71.8 % (ref 34–71.1)
PDW BLD-RTO: 13.8 % (ref 11.7–14.4)
PLATELET # BLD: 70 K/UL (ref 182–369)
PMV BLD AUTO: 11.1 FL (ref 7.4–10.4)
POTASSIUM SERPL-SCNC: 3.4 MMOL/L (ref 3.5–5.1)
RBC # BLD: 3.21 M/UL (ref 3.93–5.22)
SODIUM BLD-SCNC: 144 MMOL/L (ref 137–145)
TOTAL PROTEIN: 6.7 G/DL (ref 6.3–8.2)
WBC # BLD: 4.87 K/UL (ref 3.98–10.04)

## 2020-12-04 PROCEDURE — 80053 COMPREHEN METABOLIC PANEL: CPT

## 2020-12-04 PROCEDURE — 85025 COMPLETE CBC W/AUTO DIFF WBC: CPT

## 2020-12-04 PROCEDURE — 2580000003 HC RX 258: Performed by: INTERNAL MEDICINE

## 2020-12-04 PROCEDURE — 36415 COLL VENOUS BLD VENIPUNCTURE: CPT

## 2020-12-04 PROCEDURE — 6360000002 HC RX W HCPCS: Performed by: INTERNAL MEDICINE

## 2020-12-04 PROCEDURE — 96413 CHEMO IV INFUSION 1 HR: CPT

## 2020-12-04 RX ORDER — HEPARIN SODIUM (PORCINE) LOCK FLUSH IV SOLN 100 UNIT/ML 100 UNIT/ML
500 SOLUTION INTRAVENOUS PRN
Status: CANCELLED | OUTPATIENT
Start: 2020-12-04

## 2020-12-04 RX ORDER — SODIUM CHLORIDE 0.9 % (FLUSH) 0.9 %
10 SYRINGE (ML) INJECTION PRN
Status: CANCELLED | OUTPATIENT
Start: 2020-12-04

## 2020-12-04 RX ORDER — DEXAMETHASONE SODIUM PHOSPHATE 10 MG/ML
10 INJECTION, SOLUTION INTRAMUSCULAR; INTRAVENOUS ONCE
Status: COMPLETED | OUTPATIENT
Start: 2020-12-04 | End: 2020-12-04

## 2020-12-04 RX ORDER — MEPERIDINE HYDROCHLORIDE 50 MG/ML
12.5 INJECTION INTRAMUSCULAR; INTRAVENOUS; SUBCUTANEOUS ONCE
Status: CANCELLED | OUTPATIENT
Start: 2020-12-04

## 2020-12-04 RX ORDER — DIPHENHYDRAMINE HYDROCHLORIDE 50 MG/ML
50 INJECTION INTRAMUSCULAR; INTRAVENOUS ONCE
Status: CANCELLED | OUTPATIENT
Start: 2020-12-04

## 2020-12-04 RX ORDER — SODIUM CHLORIDE 9 MG/ML
20 INJECTION, SOLUTION INTRAVENOUS ONCE
Status: CANCELLED | OUTPATIENT
Start: 2020-12-04

## 2020-12-04 RX ORDER — SODIUM CHLORIDE 9 MG/ML
INJECTION, SOLUTION INTRAVENOUS CONTINUOUS
Status: CANCELLED | OUTPATIENT
Start: 2020-12-04

## 2020-12-04 RX ORDER — EPINEPHRINE 1 MG/ML
0.3 INJECTION, SOLUTION, CONCENTRATE INTRAVENOUS PRN
Status: CANCELLED | OUTPATIENT
Start: 2020-12-04

## 2020-12-04 RX ORDER — METHYLPREDNISOLONE SODIUM SUCCINATE 125 MG/2ML
125 INJECTION, POWDER, LYOPHILIZED, FOR SOLUTION INTRAMUSCULAR; INTRAVENOUS ONCE
Status: CANCELLED | OUTPATIENT
Start: 2020-12-04

## 2020-12-04 RX ORDER — SODIUM CHLORIDE 0.9 % (FLUSH) 0.9 %
5 SYRINGE (ML) INJECTION PRN
Status: CANCELLED | OUTPATIENT
Start: 2020-12-04

## 2020-12-04 RX ORDER — HEPARIN SODIUM (PORCINE) LOCK FLUSH IV SOLN 100 UNIT/ML 100 UNIT/ML
500 SOLUTION INTRAVENOUS PRN
Status: DISCONTINUED | OUTPATIENT
Start: 2020-12-04 | End: 2020-12-05 | Stop reason: HOSPADM

## 2020-12-04 RX ORDER — SODIUM CHLORIDE 9 MG/ML
20 INJECTION, SOLUTION INTRAVENOUS ONCE
Status: COMPLETED | OUTPATIENT
Start: 2020-12-04 | End: 2020-12-05

## 2020-12-04 RX ADMIN — SODIUM CHLORIDE 20 ML/HR: 9 INJECTION, SOLUTION INTRAVENOUS at 09:56

## 2020-12-04 RX ADMIN — DEXAMETHASONE SODIUM PHOSPHATE 10 MG: 10 INJECTION, SOLUTION INTRAMUSCULAR; INTRAVENOUS at 09:56

## 2020-12-04 RX ADMIN — ADO-TRASTUZUMAB EMTANSINE 160.4 MG: 20 INJECTION, POWDER, LYOPHILIZED, FOR SOLUTION INTRAVENOUS at 10:30

## 2020-12-29 NOTE — PROGRESS NOTES
Jaret Cuenca   1950     INTERVAL HISTORY/HISTORY OF PRESENT ILLNESS:    Diagnosis   · HER-2 IDC right breast, 2019  · [id number]  · ER 3%, CA 0%, HER-2/lea IHC 3+  · Mammaprint high risk Basal-like  · Osteopenia, Aug 2019-T score -1.5 lumbar spine    Treatment summary  · 10/18/2019 through  2020- 6 cycles of neoadjuvant Taxotere, Carboplatin and Herceptin and Perjeta  · 3/3/2020- right mastectomy/ lymph node dissection  · 3/20/2020-14 cycles of adjuvant TDM-1.   · 2020-2020 Completed adjuvant radiation 6040 cGy  · 4/10/2020-adjuvant endocrine therapy with letrozole x10 years. Patient is a 65years old female who has a diagnosis of locally advanced HER-2 positive breast cancer. She was started on neoadjuvant chemotherapy with Taxotere carboplatin Herceptin Perjeta with a partial response. She is status post right mastectomy and lymph node dissection and obtained a complete pathologic response in the breast but residual disease in the axilla. She was therefore recommended to switch her adjuvant treatment to TDM 1. She has been taking Boniva calcium vitamin D for a diagnosis of osteopenia. CBC today revealed persistent thrombocytopenia. She denies any new complaints. Cancer history  Ms Tello Alex was first seen by me on 10/03/2019. She felt a mass in the right upper breast and therefore contact her primary care provider. A diagnostic mammogram was performed. Her mother  of breast cancer at the age of 50. She has no other history of breast cancer or any other cancer in her family. · 2019- diagnostic mammogram showed a suspicious 2.2 cm hypoechoic spiculated right breast mass in the axillary tail within the internal calcifications had suggest malignancy. Ultrasound of the breast showed the lesion as well as a pathologic appearing right axillary adenopathy. · 2019-core needle biopsy consistent with high grade invasive ductal carcinoma. file     Active member of club or organization: Not on file     Attends meetings of clubs or organizations: Not on file     Relationship status: Not on file    Intimate partner violence     Fear of current or ex partner: Not on file     Emotionally abused: Not on file     Physically abused: Not on file     Forced sexual activity: Not on file   Other Topics Concern    Not on file   Social History Narrative    Not on file       FAMILY HISTORY:  Family History   Problem Relation Age of Onset    Breast Cancer Mother 39   [de-identified] Colon Cancer Neg Hx     Colon Polyps Neg Hx         Current Outpatient Medications   Medication Sig Dispense Refill    letrozole (FEMARA) 2.5 MG tablet Take 1 tablet by mouth daily 90 tablet 5    montelukast (SINGULAIR) 10 MG tablet Take 1 tablet by mouth daily 30 tablet 5    ibandronate (BONIVA) 150 MG tablet Take 1 tablet by mouth every 30 days Take one (1) tablet once per month in the morning with a full glass of water, on an empty stomach, and do not take anything else by mouth or lie down for the next 30 minutes. 30 tablet 6    ferrous sulfate 325 (65 Fe) MG tablet Take 325 mg by mouth daily (with breakfast)      Calcium Carbonate-Vitamin D (OYSTER SHELL CALCIUM/D) 500-200 MG-UNIT TABS Take 1 tablet by mouth daily 360 tablet 0     No current facility-administered medications for this visit. REVIEW OF SYSTEMS:    Constitutional: no fever, no night sweats,  fatigue;   HEENT: no blurring of vision, no double vision, no hearing difficulty, no tinnitus,no ulceration, no dysphagia  Lungs: no cough, no shortness of breath, no wheeze;   CVS: no palpitation, no chest pain, no shortness of breath;  GI: no abdominal pain, no nausea , no vomiting, no constipation;   BRYNN: no dysuria, frequency and urgency, no hematuria, no kidney stones;   Musculoskeletal: no joint pain, swelling , stiffness;   Endocrine: no polyuria, polydypsia, no cold or heat intolerence;    Hematology/lymphatic: no easy brusing or bleeding, no hx of clotting disorder; no peripheral adenopathy. Dermatology: no skin rash, no eczema, no pruritis; alopecia  Psychiatry: no depression, no anxiety,no panic attacks, no suicide ideation; Neurology: no syncope, no seizures, no numbness or tingling of hands, no numbness or tingling of feet, no paresis;     PHYSICAL EXAM:    Vitals signs:  BP (!) 142/82   Pulse 72 Comment: O2 - 97%  Temp 97.1 °F (36.2 °C)   Ht 5' 7\" (1.702 m)   Wt 128 lb 14.4 oz (58.5 kg)   BMI 20.19 kg/m²    Pain scale:  Pain Score:   0 - No pain     CONSTITUTIONAL: Alert, appropriate, no acute distress,   EYES: Non icteric, EOM intact, pupils equal round and reactive to light and accommodation. ENT: Oral mucus membranes moist, no oral pharyngeal lesions. External inspection of ears and nose are normal.   NECK: Supple, no masses. No palpable thyroid mass    CHEST/LUNGS: CTA bilaterally, normal respiratory effort   CARDIOVASCULAR: RRR, no murmurs. No lower extremity edema   ABDOMEN: soft non-tender, active bowel sounds, no hepatosplenomegaly. No palpable masses. EXTREMITIES: warm, Full ROM of all fours extremities. No focal weakness. SKIN: warm, dry with no rashes or lesions  LYMPH: No cervical, clavicular, axillary, or inguinal lymphadenopathy  NEUROLOGIC: follows commands, non focal.   PSYCH: mood and affect appropriate. Alert and oriented to time and place and person.     Relevant Lab findings/reviewed by me:    Lab Results   Component Value Date    NEUTROABS 3.34 01/04/2021     Lab Results   Component Value Date    WBC 4.89 01/04/2021    HGB 11.4 01/04/2021    HCT 34.9 01/04/2021    .7 (H) 01/04/2021    PLT 59 (L) 01/04/2021     Lab Results   Component Value Date     12/04/2020    K 3.4 (L) 12/04/2020     12/04/2020    CO2 31 (H) 12/04/2020    BUN 28 (H) 12/04/2020    CREATININE 1.0 12/04/2020    GLUCOSE 131 (H) 12/04/2020    CALCIUM 9.6 12/04/2020    PROT 6.7 12/04/2020    LABALBU 3.6 12/04/2020    BILITOT 0.6 12/04/2020    ALKPHOS 83 12/04/2020    AST 63 (H) 12/04/2020    ALT 38 12/04/2020    LABGLOM 54 (A) 12/04/2020    GFRAA >59 10/23/2020    GLOB 3.1 12/04/2020     My interpretation- mild hypokalemia. Hemoglobin 12.4 with . Platelets 69,403. Relevant Imaging studies/reviewed by me:  No results found. ASSESSMENT:    Orders Placed This Encounter   Procedures    CBC Auto Differential     Standing Status:   Future     Standing Expiration Date:   1/4/2022    Comprehensive Metabolic Panel     Standing Status:   Future     Standing Expiration Date:   1/4/2022    ECHO Complete 2D W Doppler W Color     Standing Status:   Future     Standing Expiration Date:   3/5/2021     Order Specific Question:   Reason for exam:     Answer:   assess cardiac function post chmotherapy      Samuel Paz was seen today for follow-up. Diagnoses and all orders for this visit:    Malignant neoplasm of overlapping sites of right breast in female, estrogen receptor positive ([facility] [address]  -     ECHO Complete 2D W Doppler W Color; Future  -     CBC Auto Differential; Future  -     Comprehensive Metabolic Panel; Future    Encounter for antineoplastic immunotherapy    Encounter for monitoring aromatase inhibitor therapy    Care plan discussed with patient    At risk for cardiomyopathy  -     ECHO Complete 2D W Doppler W Color; Future    Adverse effect of antineoplastic and immunosuppressive drugs, sequela   -     ECHO Complete 2D W Doppler W Color; Future    Thrombocytopenia (HCC)       HER-2 positive, node positive invasive ductal carcinoma right breast, MammaPrint basal like type  She had regional residual disease  [id number]  Recommended adjuvant TDM 1-due to residual disease. As per NCCN guidelines. Recommended adjuvant radiation  Thrombocytopenia-platelet count 86,051. Will delay TDM 1 for another week.     Treatment related toxicity- mild intermittent neuropathy, thrombocytopenia    Aromatase inhibitor toxicity- at risk worsening osteopenia. BMD every 2 years. LVEF monitoring-at risk for cardiac toxicity. We will repeat 2D echo before next visit. PLAN:  · LH OPIT every 3 weeks for treatment  · RTC MD 6 weeks  · Proceed with Cycle 12 today  · CMP today and every treatment  · Repeat BMD 8/2021  · Continue calcium and vitamin D  · Continue Letrozole  · Hold treatment today and delay for 1 week. · 2D echo before next MD visit      Follow-up:    Return in about 5 weeks (around 2/8/2021) for See Dr. Kathie Varela. Echo in 4 weeks     IBerny, am scribing for Renetta Castañeda MD. Electronically signed by Berny Roman RN on 1/4/2021 at 12:23 PM CST. I, Dr Oneal Nagel, personally performed the services described in this documentation as scribed by Berny Roman RN in my presence and is both accurate and complete.

## 2021-01-04 ENCOUNTER — OFFICE VISIT (OUTPATIENT)
Dept: HEMATOLOGY | Age: 71
End: 2021-01-04
Payer: MEDICARE

## 2021-01-04 ENCOUNTER — HOSPITAL ENCOUNTER (OUTPATIENT)
Dept: INFUSION THERAPY | Age: 71
Discharge: HOME | End: 2021-01-04
Payer: MEDICARE

## 2021-01-04 VITALS
WEIGHT: 128.9 LBS | BODY MASS INDEX: 20.23 KG/M2 | SYSTOLIC BLOOD PRESSURE: 142 MMHG | HEART RATE: 72 BPM | DIASTOLIC BLOOD PRESSURE: 82 MMHG | HEIGHT: 67 IN | TEMPERATURE: 97.1 F

## 2021-01-04 DIAGNOSIS — Z17.0 MALIGNANT NEOPLASM OF OVERLAPPING SITES OF RIGHT BREAST IN FEMALE, ESTROGEN RECEPTOR POSITIVE (HCC): ICD-10-CM

## 2021-01-04 DIAGNOSIS — Z51.81 ENCOUNTER FOR MONITORING AROMATASE INHIBITOR THERAPY: ICD-10-CM

## 2021-01-04 DIAGNOSIS — Z79.811 ENCOUNTER FOR MONITORING AROMATASE INHIBITOR THERAPY: ICD-10-CM

## 2021-01-04 DIAGNOSIS — D69.6 THROMBOCYTOPENIA (HCC): ICD-10-CM

## 2021-01-04 DIAGNOSIS — C50.811 MALIGNANT NEOPLASM OF OVERLAPPING SITES OF RIGHT BREAST IN FEMALE, ESTROGEN RECEPTOR POSITIVE (HCC): ICD-10-CM

## 2021-01-04 DIAGNOSIS — Z17.0 MALIGNANT NEOPLASM OF OVERLAPPING SITES OF RIGHT BREAST IN FEMALE, ESTROGEN RECEPTOR POSITIVE (HCC): Primary | ICD-10-CM

## 2021-01-04 DIAGNOSIS — C50.811 MALIGNANT NEOPLASM OF OVERLAPPING SITES OF RIGHT BREAST IN FEMALE, ESTROGEN RECEPTOR POSITIVE (HCC): Primary | ICD-10-CM

## 2021-01-04 DIAGNOSIS — Z51.12 ENCOUNTER FOR ANTINEOPLASTIC IMMUNOTHERAPY: ICD-10-CM

## 2021-01-04 DIAGNOSIS — Z91.89 AT RISK FOR CARDIOMYOPATHY: ICD-10-CM

## 2021-01-04 DIAGNOSIS — Z71.89 CARE PLAN DISCUSSED WITH PATIENT: ICD-10-CM

## 2021-01-04 DIAGNOSIS — T45.1X5S ADVERSE EFFECT OF ANTINEOPLASTIC AND IMMUNOSUPPRESSIVE DRUGS, SEQUELA: ICD-10-CM

## 2021-01-04 LAB
BASOPHILS ABSOLUTE: 0.04 K/UL (ref 0.01–0.08)
BASOPHILS RELATIVE PERCENT: 0.8 % (ref 0.1–1.2)
EOSINOPHILS ABSOLUTE: 0.09 K/UL (ref 0.04–0.54)
EOSINOPHILS RELATIVE PERCENT: 1.8 % (ref 0.7–7)
HCT VFR BLD CALC: 34.9 % (ref 34.1–44.9)
HEMOGLOBIN: 11.4 G/DL (ref 11.2–15.7)
LYMPHOCYTES ABSOLUTE: 0.89 K/UL (ref 1.18–3.74)
LYMPHOCYTES RELATIVE PERCENT: 18.2 % (ref 19.3–53.1)
MCH RBC QN AUTO: 35.5 PG (ref 25.6–32.2)
MCHC RBC AUTO-ENTMCNC: 32.7 G/DL (ref 32.3–35.5)
MCV RBC AUTO: 108.7 FL (ref 79.4–94.8)
MONOCYTES ABSOLUTE: 0.53 K/UL (ref 0.24–0.82)
MONOCYTES RELATIVE PERCENT: 10.8 % (ref 4.7–12.5)
NEUTROPHILS ABSOLUTE: 3.34 K/UL (ref 1.56–6.13)
NEUTROPHILS RELATIVE PERCENT: 68.4 % (ref 34–71.1)
PDW BLD-RTO: 14.2 % (ref 11.7–14.4)
PLATELET # BLD: 59 K/UL (ref 182–369)
PMV BLD AUTO: 11.3 FL (ref 7.4–10.4)
RBC # BLD: 3.21 M/UL (ref 3.93–5.22)
WBC # BLD: 4.89 K/UL (ref 3.98–10.04)

## 2021-01-04 PROCEDURE — G8399 PT W/DXA RESULTS DOCUMENT: HCPCS | Performed by: INTERNAL MEDICINE

## 2021-01-04 PROCEDURE — 1090F PRES/ABSN URINE INCON ASSESS: CPT | Performed by: INTERNAL MEDICINE

## 2021-01-04 PROCEDURE — G8427 DOCREV CUR MEDS BY ELIG CLIN: HCPCS | Performed by: INTERNAL MEDICINE

## 2021-01-04 PROCEDURE — 3017F COLORECTAL CA SCREEN DOC REV: CPT | Performed by: INTERNAL MEDICINE

## 2021-01-04 PROCEDURE — 99215 OFFICE O/P EST HI 40 MIN: CPT | Performed by: INTERNAL MEDICINE

## 2021-01-04 PROCEDURE — 1036F TOBACCO NON-USER: CPT | Performed by: INTERNAL MEDICINE

## 2021-01-04 PROCEDURE — 4040F PNEUMOC VAC/ADMIN/RCVD: CPT | Performed by: INTERNAL MEDICINE

## 2021-01-04 PROCEDURE — G8420 CALC BMI NORM PARAMETERS: HCPCS | Performed by: INTERNAL MEDICINE

## 2021-01-04 PROCEDURE — 85025 COMPLETE CBC W/AUTO DIFF WBC: CPT

## 2021-01-04 PROCEDURE — 1123F ACP DISCUSS/DSCN MKR DOCD: CPT | Performed by: INTERNAL MEDICINE

## 2021-01-04 PROCEDURE — 99211 OFF/OP EST MAY X REQ PHY/QHP: CPT

## 2021-01-04 PROCEDURE — G8484 FLU IMMUNIZE NO ADMIN: HCPCS | Performed by: INTERNAL MEDICINE

## 2021-01-07 ENCOUNTER — PATIENT MESSAGE (OUTPATIENT)
Dept: SURGERY | Age: 71
End: 2021-01-07

## 2021-01-07 NOTE — TELEPHONE ENCOUNTER
I spoke to Taurus Calloway over the phone. Dr. Jonathan Conte said he did not see why she couldn't get the vaccine. So, whenever it is available for her that would be fine. She was pleased with this response.

## 2021-01-18 ENCOUNTER — HOSPITAL ENCOUNTER (OUTPATIENT)
Dept: INFUSION THERAPY | Age: 71
Setting detail: INFUSION SERIES
Discharge: HOME | End: 2021-01-18
Payer: MEDICARE

## 2021-01-18 VITALS
DIASTOLIC BLOOD PRESSURE: 78 MMHG | RESPIRATION RATE: 18 BRPM | OXYGEN SATURATION: 97 % | TEMPERATURE: 97.8 F | HEART RATE: 72 BPM | SYSTOLIC BLOOD PRESSURE: 138 MMHG

## 2021-01-18 DIAGNOSIS — Z17.0 MALIGNANT NEOPLASM OF OVERLAPPING SITES OF RIGHT BREAST IN FEMALE, ESTROGEN RECEPTOR POSITIVE (HCC): ICD-10-CM

## 2021-01-18 DIAGNOSIS — C50.911 MALIGNANT NEOPLASM OF RIGHT FEMALE BREAST, UNSPECIFIED ESTROGEN RECEPTOR STATUS, UNSPECIFIED SITE OF BREAST (HCC): ICD-10-CM

## 2021-01-18 DIAGNOSIS — C50.811 MALIGNANT NEOPLASM OF OVERLAPPING SITES OF RIGHT BREAST IN FEMALE, ESTROGEN RECEPTOR POSITIVE (HCC): ICD-10-CM

## 2021-01-18 DIAGNOSIS — Z51.11 ENCOUNTER FOR ANTINEOPLASTIC CHEMOTHERAPY: Primary | ICD-10-CM

## 2021-01-18 LAB
ALBUMIN SERPL-MCNC: 3.8 G/DL (ref 3.5–5.2)
ALP BLD-CCNC: 93 U/L (ref 35–104)
ALT SERPL-CCNC: 25 U/L (ref 5–33)
ANION GAP SERPL CALCULATED.3IONS-SCNC: 8 MMOL/L (ref 7–19)
AST SERPL-CCNC: 49 U/L (ref 5–32)
BASOPHILS ABSOLUTE: 0 K/UL (ref 0–0.2)
BASOPHILS RELATIVE PERCENT: 0.7 % (ref 0–1)
BILIRUB SERPL-MCNC: 0.5 MG/DL (ref 0.2–1.2)
BUN BLDV-MCNC: 20 MG/DL (ref 8–23)
CALCIUM SERPL-MCNC: 9.2 MG/DL (ref 8.8–10.2)
CHLORIDE BLD-SCNC: 103 MMOL/L (ref 98–111)
CO2: 30 MMOL/L (ref 22–29)
CREAT SERPL-MCNC: 0.8 MG/DL (ref 0.5–0.9)
EOSINOPHILS ABSOLUTE: 0.1 K/UL (ref 0–0.6)
EOSINOPHILS RELATIVE PERCENT: 1.2 % (ref 0–5)
GFR AFRICAN AMERICAN: >59
GFR NON-AFRICAN AMERICAN: >60
GLUCOSE BLD-MCNC: 98 MG/DL (ref 74–109)
HCT VFR BLD CALC: 34.3 % (ref 37–47)
HEMOGLOBIN: 11 G/DL (ref 12–16)
IMMATURE GRANULOCYTES #: 0 K/UL
LYMPHOCYTES ABSOLUTE: 0.7 K/UL (ref 1.1–4.5)
LYMPHOCYTES RELATIVE PERCENT: 12.8 % (ref 20–40)
MCH RBC QN AUTO: 34 PG (ref 27–31)
MCHC RBC AUTO-ENTMCNC: 32.1 G/DL (ref 33–37)
MCV RBC AUTO: 105.9 FL (ref 81–99)
MONOCYTES ABSOLUTE: 0.5 K/UL (ref 0–0.9)
MONOCYTES RELATIVE PERCENT: 8.4 % (ref 0–10)
NEUTROPHILS ABSOLUTE: 4.3 K/UL (ref 1.5–7.5)
NEUTROPHILS RELATIVE PERCENT: 76.7 % (ref 50–65)
PDW BLD-RTO: 14.3 % (ref 11.5–14.5)
PLATELET # BLD: 80 K/UL (ref 130–400)
PMV BLD AUTO: 11.4 FL (ref 9.4–12.3)
POTASSIUM SERPL-SCNC: 3.4 MMOL/L (ref 3.5–5)
RBC # BLD: 3.24 M/UL (ref 4.2–5.4)
SODIUM BLD-SCNC: 141 MMOL/L (ref 136–145)
TOTAL PROTEIN: 6.5 G/DL (ref 6.6–8.7)
WBC # BLD: 5.6 K/UL (ref 4.8–10.8)

## 2021-01-18 PROCEDURE — 2580000003 HC RX 258: Performed by: INTERNAL MEDICINE

## 2021-01-18 PROCEDURE — 96415 CHEMO IV INFUSION ADDL HR: CPT

## 2021-01-18 PROCEDURE — 6360000002 HC RX W HCPCS: Performed by: INTERNAL MEDICINE

## 2021-01-18 PROCEDURE — 96413 CHEMO IV INFUSION 1 HR: CPT

## 2021-01-18 PROCEDURE — 80053 COMPREHEN METABOLIC PANEL: CPT

## 2021-01-18 PROCEDURE — 85025 COMPLETE CBC W/AUTO DIFF WBC: CPT

## 2021-01-18 RX ORDER — SODIUM CHLORIDE 0.9 % (FLUSH) 0.9 %
10 SYRINGE (ML) INJECTION PRN
Status: CANCELLED | OUTPATIENT
Start: 2021-01-18

## 2021-01-18 RX ORDER — METHYLPREDNISOLONE SODIUM SUCCINATE 125 MG/2ML
125 INJECTION, POWDER, LYOPHILIZED, FOR SOLUTION INTRAMUSCULAR; INTRAVENOUS ONCE
OUTPATIENT
Start: 2021-01-18 | End: 2021-01-18

## 2021-01-18 RX ORDER — SODIUM CHLORIDE 9 MG/ML
INJECTION, SOLUTION INTRAVENOUS CONTINUOUS
OUTPATIENT
Start: 2021-01-18

## 2021-01-18 RX ORDER — HEPARIN SODIUM (PORCINE) LOCK FLUSH IV SOLN 100 UNIT/ML 100 UNIT/ML
500 SOLUTION INTRAVENOUS PRN
Status: DISCONTINUED | OUTPATIENT
Start: 2021-01-18 | End: 2021-01-19 | Stop reason: HOSPADM

## 2021-01-18 RX ORDER — MEPERIDINE HYDROCHLORIDE 50 MG/ML
12.5 INJECTION INTRAMUSCULAR; INTRAVENOUS; SUBCUTANEOUS ONCE
OUTPATIENT
Start: 2021-01-18 | End: 2021-01-18

## 2021-01-18 RX ORDER — SODIUM CHLORIDE 0.9 % (FLUSH) 0.9 %
5 SYRINGE (ML) INJECTION PRN
OUTPATIENT
Start: 2021-01-18

## 2021-01-18 RX ORDER — HEPARIN SODIUM (PORCINE) LOCK FLUSH IV SOLN 100 UNIT/ML 100 UNIT/ML
500 SOLUTION INTRAVENOUS PRN
Status: CANCELLED | OUTPATIENT
Start: 2021-01-18

## 2021-01-18 RX ORDER — DIPHENHYDRAMINE HYDROCHLORIDE 50 MG/ML
50 INJECTION INTRAMUSCULAR; INTRAVENOUS ONCE
OUTPATIENT
Start: 2021-01-18 | End: 2021-01-18

## 2021-01-18 RX ORDER — SODIUM CHLORIDE 9 MG/ML
20 INJECTION, SOLUTION INTRAVENOUS ONCE
Status: CANCELLED | OUTPATIENT
Start: 2021-01-18 | End: 2021-01-18

## 2021-01-18 RX ORDER — SODIUM CHLORIDE 0.9 % (FLUSH) 0.9 %
10 SYRINGE (ML) INJECTION PRN
Status: DISCONTINUED | OUTPATIENT
Start: 2021-01-18 | End: 2021-01-19 | Stop reason: HOSPADM

## 2021-01-18 RX ORDER — SODIUM CHLORIDE 9 MG/ML
20 INJECTION, SOLUTION INTRAVENOUS ONCE
Status: COMPLETED | OUTPATIENT
Start: 2021-01-18 | End: 2021-01-19

## 2021-01-18 RX ORDER — EPINEPHRINE 1 MG/ML
0.3 INJECTION, SOLUTION, CONCENTRATE INTRAVENOUS PRN
OUTPATIENT
Start: 2021-01-18

## 2021-01-18 RX ORDER — DEXAMETHASONE SODIUM PHOSPHATE 10 MG/ML
10 INJECTION, SOLUTION INTRAMUSCULAR; INTRAVENOUS ONCE
Status: COMPLETED | OUTPATIENT
Start: 2021-01-18 | End: 2021-01-18

## 2021-01-18 RX ADMIN — ADO-TRASTUZUMAB EMTANSINE 160.4 MG: 20 INJECTION, POWDER, LYOPHILIZED, FOR SOLUTION INTRAVENOUS at 12:07

## 2021-01-18 RX ADMIN — SODIUM CHLORIDE 20 ML/HR: 9 INJECTION, SOLUTION INTRAVENOUS at 12:07

## 2021-01-18 RX ADMIN — HEPARIN 500 UNITS: 100 SYRINGE at 13:06

## 2021-01-18 RX ADMIN — DEXAMETHASONE SODIUM PHOSPHATE 10 MG: 10 INJECTION INTRAMUSCULAR; INTRAVENOUS at 11:34

## 2021-02-01 ENCOUNTER — HOSPITAL ENCOUNTER (OUTPATIENT)
Dept: NON INVASIVE DIAGNOSTICS | Age: 71
Discharge: HOME | End: 2021-02-01
Payer: MEDICARE

## 2021-02-01 DIAGNOSIS — Z91.89 AT RISK FOR CARDIOMYOPATHY: ICD-10-CM

## 2021-02-01 DIAGNOSIS — C50.811 MALIGNANT NEOPLASM OF OVERLAPPING SITES OF RIGHT BREAST IN FEMALE, ESTROGEN RECEPTOR POSITIVE (HCC): ICD-10-CM

## 2021-02-01 DIAGNOSIS — T45.1X5S ADVERSE EFFECT OF ANTINEOPLASTIC AND IMMUNOSUPPRESSIVE DRUGS, SEQUELA: ICD-10-CM

## 2021-02-01 DIAGNOSIS — Z17.0 MALIGNANT NEOPLASM OF OVERLAPPING SITES OF RIGHT BREAST IN FEMALE, ESTROGEN RECEPTOR POSITIVE (HCC): ICD-10-CM

## 2021-02-01 LAB
LV EF: 55 %
LVEF MODALITY: NORMAL

## 2021-02-01 PROCEDURE — 93306 TTE W/DOPPLER COMPLETE: CPT

## 2021-02-12 NOTE — PROGRESS NOTES
Casey Flair   1950   2/15/2021     INTERVAL HISTORY/HISTORY OF PRESENT ILLNESS:    Diagnosis   · HER-2 IDC right breast, 2019  · [id number]  · ER 3%, TX 0%, HER-2/lea IHC 3+  · Mammaprint high risk Basal-like  · Osteopenia, Aug 2019T score -1.5 lumbar spine    Treatment summary  · 10/18/2019 through  2020 6 cycles of neoadjuvant Taxotere, Carboplatin and Herceptin and Perjeta  · 3/3/2020- right mastectomy/ lymph node dissection  · 3/20/202014 cycles of adjuvant TDM-1.   · 2020-2020 Completed adjuvant radiation 6040 cGy  · 4/10/2020adjuvant endocrine therapy with letrozole x10 years. Patient is a 65years old female who has a diagnosis of locally advanced HER-2 positive breast cancer. She was started on neoadjuvant chemotherapy with Taxotere carboplatin Herceptin Perjeta with a partial response. She is status post right mastectomy and lymph node dissection and obtained a complete pathologic response in the breast but residual disease in the axilla. She was therefore recommended to switch her adjuvant treatment to TDM 1 and completed 14 additional doses completed in 2021. She is current receiving adjuvant endocrine therapy with letrozole. She has been tolerating treatment well except for new complaints of right shoulder pain. She has been taking Boniva calcium vitamin D for a diagnosis of osteopenia. Cancer history  Ms Austyn Guzman was first seen by me on 10/03/2019. She felt a mass in the right upper breast and therefore contact her primary care provider. A diagnostic mammogram was performed. Her mother  of breast cancer at the age of 50. She has no other history of breast cancer or any other cancer in her family. · 8/23/2019 diagnostic mammogram showed a suspicious 2.2 cm hypoechoic spiculated right breast mass in the axillary tail within the internal calcifications had suggest malignancy. Ultrasound of the breast showed the lesion as well as a pathologic appearing right axillary adenopathy. · 09/05/2019-core needle biopsy consistent with high grade invasive ductal carcinoma. MammaPrint high risk basal type. ER 3%, ID 0.2%, HER-2/lea IHC 3+ Ki-67 22%. HER-2/lea FISH positive  · 9/19/2019 MRI breast showed a large right breast mass compatible with primary malignancy measuring 2.2 x 1.2 x 3.5 cm in size. Abnormal enhancement in the pectoralis major muscle suggesting extension into the muscle. Multiple abnormal lymph nodes are seen in the right axilla with the largest measuring 2 x 1.6 cm. A small lymph node is seen along the right internal mammary chain. No suspicious left axillary adenopathy. · 9/27/2019CT chest abdomen pelvis and bone scan showed no evidence of metastatic disease. · 10/3/2019 she was first seen by me. Recommended neoadjuvant chemotherapy approach with dose dense Adriamycin/cyclophosphamide x4 cycles followed by 12 weekly cycles of Taxol 80 mg/m² with Herceptin and Perjeta. · 10/14/2019 consultation at [facility]. Recommended Taxotere, carboplatin Herceptin Perjeta. · 10/17/2019 1/31/2020 completion of 6 cycles of of neoadjuvant chemotherapy with Taxotere carboplatin Herceptin Perjeta. · 2/14/2020MRI breast.  Showed findings consistent with response to therapy. No residual enhancement of the right breast. Residual right axillary lymph node concerning for residual disease. · 2/21/2020 maintenance Herceptin/Perjeta to complete 1 year of treatment. · 3/03/2020-she underwent a right mastectomy/axillary dissection by Dr. Wing Wilson at [facility].  Primary tumor site identified with extensive fibrosis and nests of residual high-grade carcinoma present in lymphatic spaces. Tumor is present in a lymphatic space at the deep surgical excision margin. 3 out of 11 lymph nodes, positive for metastatic carcinoma, at least 1 metastasis greater than 2.0 mm . Largest focus of metastasis measures 0.5 cm in greatest dimension. AJCC STAGE: ypT0, pN1a, pMx  · 3/16/2020repeat 2D echo EF 60%. · 4/20/2020-6/4/2020 Completion adjuvant radiation 6040 cGy  · 8/24/2020 Silverio Digital Screen Unilateral Left- No mammographic evidence of malignancy within the left breast. Continued annual unilateral screening mammography recommended. BI-RADS Category 2.  · 2/1/21 2D echo: ejection fraction estimated at 55%.      PAST MEDICAL HISTORY:   Past Medical History:   Diagnosis Date    Malignant neoplasm of unspecified site of unspecified female breast [facility])     breast      PAST SURGICAL HISTORY:  Past Surgical History:   Procedure Laterality Date    BREAST BIOPSY Right 09/05/2019    COLONOSCOPY N/A 10/1/2019    Dr Santa Dempsey, internal hemorrhoids-Grade 2 without bleeding stigmata and external hemorrhoids-TV x 1, AP x 1, 3 yr recall    MASTECTOMY Right 3/3/2020    RIGHT SIMPLE MASTECTOMY WITH SENTINEL NODE BIOPSY, FLAPS, PEC BLOCK performed by [place] MD at [facility] [place] [facility] N/A 10/4/2019    PORT INSERTION WITH FLUORO performed by [place] MD at [address] N/A 10/1/2019    Dr Nunez Praletha hernia, [place] gastric polyps        SOCIAL HISTORY:  Social History     Socioeconomic History    Marital status:      Spouse name: None    Number of children: None    Years of education: None    Highest education level: None   Occupational History    None   Social Needs    Financial resource strain: None HEENT: no blurring of vision, no double vision, no hearing difficulty, no tinnitus,no ulceration, no dysphagia  Lungs: no cough, no shortness of breath, no wheeze;   CVS: no palpitation, no chest pain, no shortness of breath;  GI: no abdominal pain, no nausea , no vomiting, no constipation;   BRYNN: no dysuria, frequency and urgency, no hematuria, no kidney stones;   Musculoskeletal: right shoulder joint pain, swelling , stiffness;   Endocrine: no polyuria, polydypsia, no cold or heat intolerence; Hematology/lymphatic: no easy brusing or bleeding, no hx of clotting disorder; no peripheral adenopathy. Dermatology: no skin rash, no eczema, no pruritis; alopecia  Psychiatry: no depression, no anxiety,no panic attacks, no suicide ideation; Neurology: no syncope, no seizures, no numbness or tingling of hands, no numbness or tingling of feet, no paresis;     PHYSICAL EXAM:    Vitals signs: There were no vitals taken for this visit. Pain scale:      Covid 19 visit format. Relevant Lab findings/reviewed by me:  none    Relevant Imaging studies/reviewed by me:  2/1/21 2D echo: LV is normal in size with normal systolic function. LV ejection fraction estimated at 55%. Diastolic function appears preserved. RV appears normal in size with normal systolic function. Normal left atrial size. Normal right atrial size. Aortic valve is trileaflet with normal leaflet mobility. No stenosis. Trivial aortic regurgitation. Mitral valve appears structurally normal with normal leaflet mobility. No  significant stenosis or regurgitation noted. Mild tricuspid regurgitation. No significant pericardial effusion. ASSESSMENT:    No orders of the defined types were placed in this encounter. Christal Lozano was seen today for follow-up.     Diagnoses and all orders for this visit:    Malignant neoplasm of overlapping sites of right breast in female, estrogen receptor positive ([facility] [address]    Encounter for antineoplastic immunotherapy Total Time: minutes: 11-20 minutes    Note: not billable if this call serves to triage the patient into an appointment for the relevant concern      David Robin

## 2021-02-15 ENCOUNTER — VIRTUAL VISIT (OUTPATIENT)
Dept: HEMATOLOGY | Age: 71
End: 2021-02-15
Payer: MEDICARE

## 2021-02-15 DIAGNOSIS — Z71.89 CARE PLAN DISCUSSED WITH PATIENT: ICD-10-CM

## 2021-02-15 DIAGNOSIS — Z51.81 ENCOUNTER FOR MONITORING AROMATASE INHIBITOR THERAPY: ICD-10-CM

## 2021-02-15 DIAGNOSIS — C50.811 MALIGNANT NEOPLASM OF OVERLAPPING SITES OF RIGHT BREAST IN FEMALE, ESTROGEN RECEPTOR POSITIVE (HCC): Primary | ICD-10-CM

## 2021-02-15 DIAGNOSIS — M25.511 ACUTE PAIN OF RIGHT SHOULDER: ICD-10-CM

## 2021-02-15 DIAGNOSIS — Z51.12 ENCOUNTER FOR ANTINEOPLASTIC IMMUNOTHERAPY: ICD-10-CM

## 2021-02-15 DIAGNOSIS — Z17.0 MALIGNANT NEOPLASM OF OVERLAPPING SITES OF RIGHT BREAST IN FEMALE, ESTROGEN RECEPTOR POSITIVE (HCC): Primary | ICD-10-CM

## 2021-02-15 DIAGNOSIS — Z79.811 ENCOUNTER FOR MONITORING AROMATASE INHIBITOR THERAPY: ICD-10-CM

## 2021-02-15 PROCEDURE — 99442 PR PHYS/QHP TELEPHONE EVALUATION 11-20 MIN: CPT | Performed by: INTERNAL MEDICINE

## 2021-02-22 ENCOUNTER — HOSPITAL ENCOUNTER (OUTPATIENT)
Dept: GENERAL RADIOLOGY | Age: 71
Discharge: HOME | End: 2021-02-22
Payer: MEDICARE

## 2021-02-22 DIAGNOSIS — M25.511 ACUTE PAIN OF RIGHT SHOULDER: ICD-10-CM

## 2021-02-22 PROCEDURE — 73030 X-RAY EXAM OF SHOULDER: CPT

## 2021-02-22 NOTE — PROGRESS NOTES
HISTORY OF PRESENT ILLNESS:    Ms. Horton Bamberger presents  today for a 6 month breast exam.    She had an ultrasound guided breast biopsy  on the right which revealed a 3.5  cm high grade  invasive ductal carcinoma. Fine-needle biopsy of axillary node was cytologically conclusive for malignancy. It is ER positive at 3%. IL is negative. HER-2 is positive at 3+. Ki-67 is high at 22%. MammaPrint demonstrates a high risk basal phenotype    She has been receiving neoadjuvant chemotherapy with Herceptin, Perjeta, Taxotere, and carboplatin. She has had an excellent clinical response. Her last treatment will be later this week. MRI-2/14/2020  FINDINGS:   Amount of Fibroglandular Tissue: Scattered fibroglandular tissue. Background Parenchymal Enhancement:  Minimal.   Right upper outer breast with decreased size of biopsy-proven   malignancy. No residual measurable enhancing mass. There is   susceptibility artifact at the right slightly upper outer breast,   middle to posterior third, likely a biopsy marker. No suspicious enhancement of the left breast.   Pectoralis musculature appears symmetric and nonenhancing. Right axillary lymph node measures 0.9 cm in short axis on axial   series 3, image 58. While this is not enlarged by cross-sectional size   criteria, it is abnormally rounded and asymmetric to the left side,   concerning for residual disease. There is a T1 hypointensity within   the lymph node, which was more evident on prior exam 9/19/2019,   favored to represent a biopsy marker. There is a second prominent   right axillary lymph node, just lateral to the above described noted   with biopsy marker. No enlarged internal mammary lymph node. Left axillary lymph nodes   appear symmetric. Port at the left chest wall. Cardiomegaly. Minimal right-sided pleural   fluid.  Heterogeneous appearance of the liver compared to prior exam.   Consider further evaluation with CT abdomen and pelvis with contrast.     Impression   1. Findings consistent with response to therapy. No residual   enhancement of the right breast. Normal appearance of the right   pectoralis musculature. 2. Residual prominent right axillary lymph node, which is concerning   for residual disease. There appears to be a biopsy marker in this   lymph node. There is also a lymph node slightly lateral which is also   prominent. 3. Heterogeneous appearance of the liver compared to the prior. Consider CT abdomen and pelvis with contrast for further evaluation. 4. Cardiomegaly and small right pleural fluid. BI-RADS Final Assessment Category 6: Known Biopsy-Proven Malignancy     She had a Right modified radical mastectomy     PATHOLOGY REVEALS    FINAL DIAGNOSIS  Breast, right modified radical mastectomy:  1.  Primary tumor site identified with extensive fibrosis and nests of  residual high-grade carcinoma present in lymphatic spaces. 2.  Tumor is present in a lymphatic space at the deep surgical excision  margin. 3.  Sections of nipple identified, negative for evidence of malignancy. 4.  Sections of benign skin with benign seborrheic keratoses. 5.  3 out of 11 lymph nodes, positive for metastatic carcinoma. 6.  Multiple discrete nest of cells identified within the nodes. 7.  Largest focus of metastasis measures 0.5 cm in greatest dimension. 8.  Negative for extracapsular spread. She completed Radiation 6/4/2020 and is doing well. She is currently receiving TDM-1.    8/24/2020-Unilateral Left Screening Mammogram  FINDINGS: Breast tissue is heterogeneously dense, category C. No    architectural distortion or suspicious nodule identified in the left    breast. Benign vascular and coarse calcifications are present. No    abnormal cluster of microcalcifications visualized.         Impression    1. No mammographic evidence of malignancy within the left breast.    Continued annual unilateral screening mammography recommended.     2. BI-RADS CATEGORY 2: BENIGN FINDINGS. 1. A negative x-ray report should not delay biopsy if a dominant or    clinically suspicious mass is present. Four    to eight percent of cancers are not identified by x-ray. 2. A negative report reinforce clinical impression. 3. Adenosis and dense breasts may obscure an underlying neoplasm. 4. False positive reports average eight percent nationally. 5. The mammograms were evaluated using computer aided detection. Signed by Dr Renita Fleischer on 8/24/2020 1:29 PM          PHYSICAL EXAM:  The  wounds look good with no evidence of infection, fluid accumulation, or skin necrosis. She has minimal radiation changes. She has no palpable masses, skin or nipple changes, or axillary adenopathy on the left breast    IMPRESSION:    Doing well s/p right mastectomy and axillary node dissection 3/3/2020    PLAN:Continue Singulair for 3-4 more months. I will see her back in 6 months with a unilateral screening mammogram with a physical exam. She will call me with any new concerns. I have seen, examined and reviewed this patient medication list, appropriate labs and imaging studies. I reviewed relevant medical records and others physicians notes. I discussed the plans of care with the patient. I answered all the questions to the patients satisfaction. I, Dr Taurus Becker, personally performed the services described in this documentation as scribed by Rhoda Pierre MA in my presence and is both accurate and complete. (Please note that portions of this note were completed with a voice recognition program. Efforts were made to edit the dictations but occasionally words are mis-transcribed.)  Over 50% of the total visit time of 15 minutes in face to face encounter with the patient, out of which more than 50% of the time was spent in counseling patient or family and coordination of care.  Counseling included but was not limited to time spent reviewing labs, imaging studies/ treatment plan and answering questions.

## 2021-02-24 ENCOUNTER — OFFICE VISIT (OUTPATIENT)
Dept: SURGERY | Age: 71
End: 2021-02-24
Payer: MEDICARE

## 2021-02-24 VITALS — DIASTOLIC BLOOD PRESSURE: 70 MMHG | SYSTOLIC BLOOD PRESSURE: 120 MMHG | HEART RATE: 72 BPM

## 2021-02-24 DIAGNOSIS — Z90.11 S/P RIGHT MASTECTOMY: ICD-10-CM

## 2021-02-24 DIAGNOSIS — Z12.31 VISIT FOR SCREENING MAMMOGRAM: Primary | ICD-10-CM

## 2021-02-24 PROCEDURE — G8399 PT W/DXA RESULTS DOCUMENT: HCPCS | Performed by: SURGERY

## 2021-02-24 PROCEDURE — 3017F COLORECTAL CA SCREEN DOC REV: CPT | Performed by: SURGERY

## 2021-02-24 PROCEDURE — G8484 FLU IMMUNIZE NO ADMIN: HCPCS | Performed by: SURGERY

## 2021-02-24 PROCEDURE — 1036F TOBACCO NON-USER: CPT | Performed by: SURGERY

## 2021-02-24 PROCEDURE — 1123F ACP DISCUSS/DSCN MKR DOCD: CPT | Performed by: SURGERY

## 2021-02-24 PROCEDURE — 99213 OFFICE O/P EST LOW 20 MIN: CPT | Performed by: SURGERY

## 2021-02-24 PROCEDURE — 1090F PRES/ABSN URINE INCON ASSESS: CPT | Performed by: SURGERY

## 2021-02-24 PROCEDURE — G8427 DOCREV CUR MEDS BY ELIG CLIN: HCPCS | Performed by: SURGERY

## 2021-02-24 PROCEDURE — 4040F PNEUMOC VAC/ADMIN/RCVD: CPT | Performed by: SURGERY

## 2021-02-24 PROCEDURE — G8420 CALC BMI NORM PARAMETERS: HCPCS | Performed by: SURGERY

## 2021-02-24 RX ORDER — MONTELUKAST SODIUM 10 MG/1
10 TABLET ORAL DAILY
Qty: 30 TABLET | Refills: 5 | Status: SHIPPED | OUTPATIENT
Start: 2021-02-24 | End: 2021-07-07 | Stop reason: ALTCHOICE

## 2021-03-04 ENCOUNTER — HOSPITAL ENCOUNTER (EMERGENCY)
Age: 71
Discharge: HOME | End: 2021-03-04
Attending: EMERGENCY MEDICINE
Payer: MEDICARE

## 2021-03-04 ENCOUNTER — APPOINTMENT (OUTPATIENT)
Dept: GENERAL RADIOLOGY | Age: 71
End: 2021-03-04
Payer: MEDICARE

## 2021-03-04 VITALS
HEART RATE: 84 BPM | RESPIRATION RATE: 16 BRPM | BODY MASS INDEX: 19.62 KG/M2 | DIASTOLIC BLOOD PRESSURE: 74 MMHG | SYSTOLIC BLOOD PRESSURE: 146 MMHG | TEMPERATURE: 98.9 F | WEIGHT: 125 LBS | OXYGEN SATURATION: 95 % | HEIGHT: 67 IN

## 2021-03-04 DIAGNOSIS — M25.511 RIGHT SHOULDER PAIN, UNSPECIFIED CHRONICITY: Primary | ICD-10-CM

## 2021-03-04 PROCEDURE — 73030 X-RAY EXAM OF SHOULDER: CPT

## 2021-03-04 PROCEDURE — 99283 EMERGENCY DEPT VISIT LOW MDM: CPT

## 2021-03-04 PROCEDURE — 6370000000 HC RX 637 (ALT 250 FOR IP): Performed by: EMERGENCY MEDICINE

## 2021-03-04 RX ORDER — IBUPROFEN 200 MG
400 TABLET ORAL ONCE
Status: COMPLETED | OUTPATIENT
Start: 2021-03-04 | End: 2021-03-04

## 2021-03-04 RX ADMIN — IBUPROFEN 400 MG: 200 TABLET, FILM COATED ORAL at 15:20

## 2021-03-04 ASSESSMENT — ENCOUNTER SYMPTOMS
EYE PAIN: 0
BACK PAIN: 0
SHORTNESS OF BREATH: 0
ABDOMINAL PAIN: 0
VOMITING: 0
DIARRHEA: 0

## 2021-03-04 NOTE — ED PROVIDER NOTES
[facility]  eMERGENCY dEPARTMENT eNCOUnter      Pt Name: Tommy Gunn  MRN: [id number]  [place] 1950  Date of evaluation: 3/4/2021  Provider: Danuta Obrien MD    CHIEF COMPLAINT       Chief Complaint   Patient presents with    Shoulder Pain     right shoulder pain for over one month; had x-ray by Dr Kathryn Mathew; denies injury or fall         HISTORY OF PRESENT ILLNESS   (Location/Symptom, Timing/Onset,Context/Setting, Quality, Duration, Modifying Factors, Severity)  Note limiting factors. Tommy Gunn is a 65 y.o. female who presents to the emergency department due to right shoulder pain for a month. Pain described as constant. Worse with any movement of the shoulder. No injuries. No numbness or weakness. Pain very well localized to the shoulder. No pain in the neck. No pain in the chest.  No difficulty breathing. No fevers chills or other constitutional symptoms. HPI    NursingNotes were reviewed. REVIEW OF SYSTEMS    (2-9 systems for level 4, 10 or more for level 5)     Review of Systems   Constitutional: Negative for fever. Eyes: Negative for pain. Respiratory: Negative for shortness of breath. Cardiovascular: Negative for chest pain and palpitations. Gastrointestinal: Negative for abdominal pain, diarrhea and vomiting. Genitourinary: Negative for dysuria. Musculoskeletal: Negative for back pain, joint swelling and neck pain. Skin: Negative for rash. Neurological: Negative for weakness, numbness and headaches. All other systems reviewed and are negative. A complete review of systems was performed and is negative except as noted above in the HPI.        PAST MEDICAL HISTORY     Past Medical History:   Diagnosis Date    Malignant neoplasm of unspecified site of unspecified female breast [facility])     breast         SURGICAL HISTORY       Past Surgical History:   Procedure Laterality Date    BREAST BIOPSY Right 09/05/2019    COLONOSCOPY N/A 10/1/2019 Dr Eliceo Olguin, internal hemorrhoids-Grade 2 without bleeding stigmata and external hemorrhoids-TV x 1, AP x 1, 3 yr recall    MASTECTOMY Right 3/3/2020    RIGHT SIMPLE MASTECTOMY WITH SENTINEL NODE BIOPSY, FLAPS, PEC BLOCK performed by Chandrika Dempsey MD at [address] N/A 10/4/2019    PORT INSERTION WITH FLUORO performed by Chandrika Dempsey MD at [address] N/A 10/1/2019    Dr Diania Pallas hernia, HP gastric polyps         CURRENT MEDICATIONS       Discharge Medication List as of 3/4/2021  2:48 PM      CONTINUE these medications which have NOT CHANGED    Details   montelukast (SINGULAIR) 10 MG tablet Take 1 tablet by mouth daily, Disp-30 tablet, R-5Normal      letrozole (FEMARA) 2.5 MG tablet Take 1 tablet by mouth daily, Disp-90 tablet,R-5Normal      ibandronate (BONIVA) 150 MG tablet Take 1 tablet by mouth every 30 days Take one (1) tablet once per month in the morning with a full glass of water, on an empty stomach, and do not take anything else by mouth or lie down for the next 30 minutes. , Disp-30 tablet, R-6Normal      ferrous sulfate 325 (65 Fe) MG tablet Take 325 mg by mouth daily (with breakfast)Historical Med      Calcium Carbonate-Vitamin D (OYSTER SHELL CALCIUM/D) 500-200 MG-UNIT TABS Take 1 tablet by mouth daily, Disp-360 tablet, R-0Normal             ALLERGIES     Patient has no known allergies.     FAMILY HISTORY       Family History   Problem Relation Age of Onset    Breast Cancer Mother 39    Colon Cancer Neg Hx     Colon Polyps Neg Hx           SOCIAL HISTORY       Social History     Socioeconomic History    Marital status:      Spouse name: None    Number of children: None    Years of education: None    Highest education level: None   Occupational History    None   Social Needs    Financial resource strain: None    Food insecurity     Worry: None     Inability: None    Transportation needs Patient's pain very well localized to the right shoulder and easily reproduced with any movement of the shoulder. No erythema or warmth. No real bony tenderness just pain with range of motion. Suspect this might be due to arthritis but told patient cannot be certain about this. The pain seems very well localized to the shoulder and does not have any other symptoms so do not see indication for further work-up at this time. No chest pain or trouble breathing. No neck pain. No numbness or weakness. No fever chills or other constitutional symptoms. Will place in sling for comfort and refer to Ortho for follow-up. Told patient to follow-up with Ortho and primary care for further evaluation and to return to the ER for change worsening symptoms or new concerns. Patient agreeable plan. CONSULTS:  None    PROCEDURES:  Unless otherwise notedbelow, none     Procedures    FINAL IMPRESSION     1.  Right shoulder pain, unspecified chronicity          DISPOSITION/PLAN   DISPOSITION Decision To Discharge 03/04/2021 02:48:03 PM      PATIENT REFERRED TO:  @FUP@    DISCHARGE MEDICATIONS:  Discharge Medication List as of 3/4/2021  2:48 PM             (Please note that portions of this note were completed with a voice recognition program.  Efforts were made to edit the dictations butoccasionally words are mis-transcribed.)    Melony Morris MD (electronically signed)  AttendingEmergency Physician         Melony Morris MD  03/04/21 [id number]

## 2021-03-05 DIAGNOSIS — D64.9 NORMOCYTIC ANEMIA: ICD-10-CM

## 2021-03-05 DIAGNOSIS — R73.9 HYPERGLYCEMIA: ICD-10-CM

## 2021-03-05 DIAGNOSIS — Z13.220 SCREENING FOR LIPID DISORDERS: ICD-10-CM

## 2021-03-05 LAB
ALBUMIN SERPL-MCNC: 3.7 G/DL (ref 3.5–5.2)
ALP BLD-CCNC: 104 U/L (ref 35–104)
ALT SERPL-CCNC: 13 U/L (ref 5–33)
ANION GAP SERPL CALCULATED.3IONS-SCNC: 11 MMOL/L (ref 7–19)
AST SERPL-CCNC: 27 U/L (ref 5–32)
BASOPHILS ABSOLUTE: 0 K/UL (ref 0–0.2)
BASOPHILS RELATIVE PERCENT: 0.5 % (ref 0–1)
BILIRUB SERPL-MCNC: 0.6 MG/DL (ref 0.2–1.2)
BUN BLDV-MCNC: 15 MG/DL (ref 8–23)
CALCIUM SERPL-MCNC: 10 MG/DL (ref 8.8–10.2)
CHLORIDE BLD-SCNC: 100 MMOL/L (ref 98–111)
CHOLESTEROL, TOTAL: 163 MG/DL (ref 160–199)
CO2: 30 MMOL/L (ref 22–29)
CREAT SERPL-MCNC: 0.8 MG/DL (ref 0.5–0.9)
EOSINOPHILS ABSOLUTE: 0 K/UL (ref 0–0.6)
EOSINOPHILS RELATIVE PERCENT: 0.3 % (ref 0–5)
GFR AFRICAN AMERICAN: >59
GFR NON-AFRICAN AMERICAN: >60
GLUCOSE BLD-MCNC: 109 MG/DL (ref 74–109)
HCT VFR BLD CALC: 35.4 % (ref 37–47)
HDLC SERPL-MCNC: 42 MG/DL (ref 65–121)
HEMOGLOBIN: 11.4 G/DL (ref 12–16)
IMMATURE GRANULOCYTES #: 0 K/UL
LDL CHOLESTEROL CALCULATED: 106 MG/DL
LYMPHOCYTES ABSOLUTE: 0.9 K/UL (ref 1.1–4.5)
LYMPHOCYTES RELATIVE PERCENT: 9.6 % (ref 20–40)
MCH RBC QN AUTO: 34.1 PG (ref 27–31)
MCHC RBC AUTO-ENTMCNC: 32.2 G/DL (ref 33–37)
MCV RBC AUTO: 106 FL (ref 81–99)
MONOCYTES ABSOLUTE: 0.9 K/UL (ref 0–0.9)
MONOCYTES RELATIVE PERCENT: 9.8 % (ref 0–10)
NEUTROPHILS ABSOLUTE: 7 K/UL (ref 1.5–7.5)
NEUTROPHILS RELATIVE PERCENT: 79.6 % (ref 50–65)
PDW BLD-RTO: 13.6 % (ref 11.5–14.5)
PLATELET # BLD: 166 K/UL (ref 130–400)
PMV BLD AUTO: 10.5 FL (ref 9.4–12.3)
POTASSIUM SERPL-SCNC: 3.6 MMOL/L (ref 3.5–5)
RBC # BLD: 3.34 M/UL (ref 4.2–5.4)
SODIUM BLD-SCNC: 141 MMOL/L (ref 136–145)
TOTAL PROTEIN: 7.8 G/DL (ref 6.6–8.7)
TRIGL SERPL-MCNC: 77 MG/DL (ref 0–149)
WBC # BLD: 8.8 K/UL (ref 4.8–10.8)

## 2021-03-08 DIAGNOSIS — E55.9 HYPOVITAMINOSIS D: Primary | ICD-10-CM

## 2021-03-08 DIAGNOSIS — E55.9 HYPOVITAMINOSIS D: ICD-10-CM

## 2021-03-08 PROBLEM — M85.859 OSTEOPENIA OF HIP: Status: ACTIVE | Noted: 2021-03-08

## 2021-03-08 NOTE — ASSESSMENT & PLAN NOTE
Lab Results   Component Value Date    LABA1C 5.8 09/22/2020     No results found for: EAG     It is best to get your carbohydrates from fruits, vegetables, whole grains, and low-fat milk. ?Protein -  It is best to eat lean meats, fish, eggs, beans, peas, soy products, nuts, and seeds. ?Fats - The type of fat you eat is more important than the amount of fat. \"Saturated\" and \"trans\" fats can increase your risk for heart problems, like a heart attack. Foods that have saturated fats include meat, butter, cheese, and ice cream.  Foods that have trans fats include processed food with \"partially hydrogenated oils\" on the ingredient list. This may include fried foods, store bought cookies, muffins, pies, and cakes. \"Monounsaturated\" and \"polyunsaturated\" fats are better for you. Foods with these types of fat include fish, avocado, olive oil, and nuts. ?Calories - People need to eat a certain amount of calories each day to keep their weight the same. People who are overweight and want to lose weight need to eat fewer calories each day. ?Fiber - Eating foods with a lot of fiber can help control a person's blood sugar level. ?[place] who have high blood pressure should not eat foods that contain a lot of salt (also called sodium). People with high blood pressure should also eat healthy foods, such as fruits, vegetables, and low-fat dairy foods. ? Alcohol - Having more than 1 drink (for women) or 2 drinks (for men) a day can raise blood sugar levels. Also, drinks that have fruit juice or soda in them can raise blood sugar levels.

## 2021-03-08 NOTE — PROGRESS NOTES
Tahmina Mccarthy ( 1950) is a 65 y.o. female, Established , here for evaluation of the following chief complaint(s). Shoulder Pain (follow up from ER vist for Right shoulder pain)        ASSESSMENT/PLAN:  Problem List        Musculoskeletal and Integument    Osteopenia of hip     Calcium and vitamin D    .pto         Relevant Orders    Comprehensive Metabolic Panel    Vitamin D 25 Hydroxy       Other    Hypovitaminosis D - Primary    Relevant Orders    Vitamin D 25 Hydroxy    Malignant neoplasm of unspecified site of unspecified female breast ([facility] [address]     Managed by ONcology         Relevant Medications    letrozole (FEMARA) 2.5 MG tablet    Other Relevant Orders    Comprehensive Metabolic Panel    Prediabetes     Lab Results   Component Value Date    LABA1C 5.8 2020     No results found for: EAG     It is best to get your carbohydrates from fruits, vegetables, whole grains, and low-fat milk. ?Protein -  It is best to eat lean meats, fish, eggs, beans, peas, soy products, nuts, and seeds. ?Fats - The type of fat you eat is more important than the amount of fat. \"Saturated\" and \"trans\" fats can increase your risk for heart problems, like a heart attack. Foods that have saturated fats include meat, butter, cheese, and ice cream.  Foods that have trans fats include processed food with \"partially hydrogenated oils\" on the ingredient list. This may include fried foods, store bought cookies, muffins, pies, and cakes. \"Monounsaturated\" and \"polyunsaturated\" fats are better for you. Foods with these types of fat include fish, avocado, olive oil, and nuts. ?Calories - People need to eat a certain amount of calories each day to keep their weight the same. People who are overweight and want to lose weight need to eat fewer calories each day. ?Fiber - Eating foods with a lot of fiber can help control a person's blood sugar level.   ?[place] who have high blood pressure should not eat foods that contain a mg/dL    Triglycerides 77 0 - 149 mg/dL    HDL 42 (L) 65 - 121 mg/dL    LDL Calculated 106 <100 mg/dL       No follow-ups on file. Shoulder Pain   The pain is present in the right shoulder. This is a new problem. The current episode started 1 to 4 weeks ago. There has been no history of extremity trauma. The problem occurs daily. The problem has been gradually improving. The pain is at a severity of 3/10. The pain is mild. Associated symptoms include a limited range of motion and stiffness. Pertinent negatives include no fever, inability to bear weight, itching, joint locking, joint swelling, numbness or tingling. The symptoms are aggravated by activity. She has tried NSAIDS for the symptoms. The treatment provided mild relief. Family history does not include gout or rheumatoid arthritis. There is no history of diabetes, gout, osteoarthritis or rheumatoid arthritis. Review of Systems   Constitutional: Negative for fever. Musculoskeletal: Positive for stiffness. Negative for gout. Skin: Negative for itching. Neurological: Negative for tingling and numbness. Physical Exam  Vitals signs reviewed. Constitutional:       Appearance: Normal appearance. Cardiovascular:      Rate and Rhythm: Normal rate and regular rhythm. Pulses: Normal pulses. Heart sounds: Normal heart sounds. Abdominal:      General: Abdomen is flat. Bowel sounds are normal.   Musculoskeletal:      Right shoulder: She exhibits decreased range of motion. She exhibits no tenderness, no bony tenderness, no swelling, no effusion and no deformity. Neurological:      Mental Status: She is alert.            (Time Documentation Optional [id number])    An electronic signaturewaas used to authenticate this note  -Derek Velasquez MD on 3/9/2021 at 1:48 PM

## 2021-03-08 NOTE — PROGRESS NOTES
Lab called back said they could not add to the lab she had. I called the pt and asked her when she comes in if she could stop by the lab and get that drawn. Pt said she would.

## 2021-03-09 ENCOUNTER — OFFICE VISIT (OUTPATIENT)
Dept: INTERNAL MEDICINE | Age: 71
End: 2021-03-09
Payer: MEDICARE

## 2021-03-09 VITALS
HEIGHT: 67 IN | HEART RATE: 81 BPM | DIASTOLIC BLOOD PRESSURE: 70 MMHG | OXYGEN SATURATION: 96 % | BODY MASS INDEX: 19.09 KG/M2 | SYSTOLIC BLOOD PRESSURE: 102 MMHG | WEIGHT: 121.6 LBS

## 2021-03-09 DIAGNOSIS — M75.51 SUBACROMIAL BURSITIS OF RIGHT SHOULDER JOINT: Primary | ICD-10-CM

## 2021-03-09 DIAGNOSIS — E55.9 HYPOVITAMINOSIS D: ICD-10-CM

## 2021-03-09 DIAGNOSIS — M85.859 OSTEOPENIA OF HIP, UNSPECIFIED LATERALITY: ICD-10-CM

## 2021-03-09 DIAGNOSIS — M85.821 OTHER SPECIFIED DISORDERS OF BONE DENSITY AND STRUCTURE, RIGHT UPPER ARM: ICD-10-CM

## 2021-03-09 DIAGNOSIS — R73.03 PREDIABETES: ICD-10-CM

## 2021-03-09 LAB — VITAMIN D 25-HYDROXY: 44.8 NG/ML

## 2021-03-09 PROCEDURE — G8484 FLU IMMUNIZE NO ADMIN: HCPCS | Performed by: INTERNAL MEDICINE

## 2021-03-09 PROCEDURE — 99213 OFFICE O/P EST LOW 20 MIN: CPT | Performed by: INTERNAL MEDICINE

## 2021-03-09 PROCEDURE — G8427 DOCREV CUR MEDS BY ELIG CLIN: HCPCS | Performed by: INTERNAL MEDICINE

## 2021-03-09 PROCEDURE — 1090F PRES/ABSN URINE INCON ASSESS: CPT | Performed by: INTERNAL MEDICINE

## 2021-03-09 PROCEDURE — G8420 CALC BMI NORM PARAMETERS: HCPCS | Performed by: INTERNAL MEDICINE

## 2021-03-09 PROCEDURE — 3017F COLORECTAL CA SCREEN DOC REV: CPT | Performed by: INTERNAL MEDICINE

## 2021-03-09 PROCEDURE — 1123F ACP DISCUSS/DSCN MKR DOCD: CPT | Performed by: INTERNAL MEDICINE

## 2021-03-09 PROCEDURE — 1036F TOBACCO NON-USER: CPT | Performed by: INTERNAL MEDICINE

## 2021-03-09 PROCEDURE — G8399 PT W/DXA RESULTS DOCUMENT: HCPCS | Performed by: INTERNAL MEDICINE

## 2021-03-09 PROCEDURE — 4040F PNEUMOC VAC/ADMIN/RCVD: CPT | Performed by: INTERNAL MEDICINE

## 2021-03-09 ASSESSMENT — PATIENT HEALTH QUESTIONNAIRE - PHQ9
SUM OF ALL RESPONSES TO PHQ9 QUESTIONS 1 & 2: 0
2. FEELING DOWN, DEPRESSED OR HOPELESS: 0
SUM OF ALL RESPONSES TO PHQ QUESTIONS 1-9: 0
1. LITTLE INTEREST OR PLEASURE IN DOING THINGS: 0

## 2021-03-09 NOTE — ASSESSMENT & PLAN NOTE
New injury, was not able to move R shoulder, was given steroids and also some,Ibuprofen she can raise her shoulders but continues to have pain

## 2021-03-23 PROBLEM — C77.9 REGIONAL LYMPH NODE METASTASIS PRESENT (HCC): Status: ACTIVE | Noted: 2021-03-23

## 2021-03-23 NOTE — ASSESSMENT & PLAN NOTE
Eat foods with a lot of calcium, such as milk, yogurt,   and green leafy vegetables (  Eat foods with a lot of   vitamin D, such as milk that has vitamin D added, and fish from the ocean  Take calcium and vitamin D   pills (if you do not get enough from the food that you eat)  Be active for at least 30 minutes, most days of the week  Avoid smoking   Limit the amount of alcohol   you drink to 1 to 2 drinks a day at most

## 2021-03-24 ENCOUNTER — TELEPHONE (OUTPATIENT)
Dept: INTERNAL MEDICINE | Age: 71
End: 2021-03-24

## 2021-03-24 ENCOUNTER — OFFICE VISIT (OUTPATIENT)
Dept: INTERNAL MEDICINE | Age: 71
End: 2021-03-24
Payer: MEDICARE

## 2021-03-24 VITALS
BODY MASS INDEX: 18.71 KG/M2 | DIASTOLIC BLOOD PRESSURE: 60 MMHG | OXYGEN SATURATION: 96 % | HEART RATE: 109 BPM | HEIGHT: 67 IN | WEIGHT: 119.2 LBS | SYSTOLIC BLOOD PRESSURE: 130 MMHG

## 2021-03-24 DIAGNOSIS — I89.0 LYMPHEDEMA OF RIGHT ARM: Primary | ICD-10-CM

## 2021-03-24 DIAGNOSIS — G89.29 CHRONIC RIGHT SHOULDER PAIN: ICD-10-CM

## 2021-03-24 DIAGNOSIS — Z78.0 MENOPAUSE: ICD-10-CM

## 2021-03-24 DIAGNOSIS — Z90.11 S/P RIGHT MASTECTOMY: ICD-10-CM

## 2021-03-24 DIAGNOSIS — D53.9 MACROCYTIC ANEMIA: ICD-10-CM

## 2021-03-24 DIAGNOSIS — M85.852 OSTEOPENIA OF BOTH HIPS: ICD-10-CM

## 2021-03-24 DIAGNOSIS — M25.511 CHRONIC RIGHT SHOULDER PAIN: ICD-10-CM

## 2021-03-24 DIAGNOSIS — E55.9 HYPOVITAMINOSIS D: ICD-10-CM

## 2021-03-24 DIAGNOSIS — M85.851 OSTEOPENIA OF BOTH HIPS: ICD-10-CM

## 2021-03-24 DIAGNOSIS — C77.9 REGIONAL LYMPH NODE METASTASIS PRESENT (HCC): ICD-10-CM

## 2021-03-24 DIAGNOSIS — R73.03 PREDIABETES: ICD-10-CM

## 2021-03-24 PROCEDURE — 99214 OFFICE O/P EST MOD 30 MIN: CPT | Performed by: INTERNAL MEDICINE

## 2021-03-24 PROCEDURE — G8399 PT W/DXA RESULTS DOCUMENT: HCPCS | Performed by: INTERNAL MEDICINE

## 2021-03-24 PROCEDURE — G8427 DOCREV CUR MEDS BY ELIG CLIN: HCPCS | Performed by: INTERNAL MEDICINE

## 2021-03-24 PROCEDURE — 1123F ACP DISCUSS/DSCN MKR DOCD: CPT | Performed by: INTERNAL MEDICINE

## 2021-03-24 PROCEDURE — G8484 FLU IMMUNIZE NO ADMIN: HCPCS | Performed by: INTERNAL MEDICINE

## 2021-03-24 PROCEDURE — 4040F PNEUMOC VAC/ADMIN/RCVD: CPT | Performed by: INTERNAL MEDICINE

## 2021-03-24 PROCEDURE — 1036F TOBACCO NON-USER: CPT | Performed by: INTERNAL MEDICINE

## 2021-03-24 PROCEDURE — 3017F COLORECTAL CA SCREEN DOC REV: CPT | Performed by: INTERNAL MEDICINE

## 2021-03-24 PROCEDURE — G8420 CALC BMI NORM PARAMETERS: HCPCS | Performed by: INTERNAL MEDICINE

## 2021-03-24 PROCEDURE — 1090F PRES/ABSN URINE INCON ASSESS: CPT | Performed by: INTERNAL MEDICINE

## 2021-03-24 ASSESSMENT — ENCOUNTER SYMPTOMS
TROUBLE SWALLOWING: 0
CHEST TIGHTNESS: 0
VOMITING: 0
RHINORRHEA: 0
BACK PAIN: 0
COUGH: 0
SINUS PAIN: 0
SHORTNESS OF BREATH: 0
WHEEZING: 0
BLOOD IN STOOL: 0
ABDOMINAL PAIN: 0
DIARRHEA: 0
CONSTIPATION: 0

## 2021-03-24 ASSESSMENT — PATIENT HEALTH QUESTIONNAIRE - PHQ9
SUM OF ALL RESPONSES TO PHQ9 QUESTIONS 1 & 2: 0
SUM OF ALL RESPONSES TO PHQ QUESTIONS 1-9: 0
SUM OF ALL RESPONSES TO PHQ QUESTIONS 1-9: 0

## 2021-03-24 NOTE — TELEPHONE ENCOUNTER
----- Message from Bere Reynoso MD sent at 3/24/2021  9:13 AM CDT -----  She wants home PT, can we do this for her

## 2021-03-24 NOTE — TELEPHONE ENCOUNTER
----- Message from Brenton Cha MD sent at 3/24/2021 10:33 AM CDT -----  Please call the patient and tell her I referred her to home PT and that the lymphedema clinic of [facility] will contact her

## 2021-03-25 ENCOUNTER — TELEPHONE (OUTPATIENT)
Dept: INTERNAL MEDICINE CLINIC | Age: 71
End: 2021-03-25

## 2021-03-25 NOTE — TELEPHONE ENCOUNTER
[facility] PT honorio completed and per PT Pt. presents with limitation of pain, weakness, impaired balance and gait difficulty, with risk for contractures and worsening of lymphedema. Recommendation :  POC for further PT 2wk3, 1wk1 beginning 03/28/21.     Please advise if approved

## 2021-03-25 NOTE — ASSESSMENT & PLAN NOTE
Patient has been referred to Lymphedema clinic  She may have some contracture in her R axilla, she will be referred to PT and be revealuated

## 2021-03-26 ENCOUNTER — TELEPHONE (OUTPATIENT)
Dept: INTERNAL MEDICINE | Age: 71
End: 2021-03-26

## 2021-04-19 ENCOUNTER — TELEPHONE (OUTPATIENT)
Dept: SURGERY | Age: 71
End: 2021-04-19

## 2021-04-26 ENCOUNTER — OFFICE VISIT (OUTPATIENT)
Dept: SURGERY | Age: 71
End: 2021-04-26
Payer: MEDICARE

## 2021-04-26 VITALS
TEMPERATURE: 97.4 F | BODY MASS INDEX: 18.39 KG/M2 | HEIGHT: 67 IN | SYSTOLIC BLOOD PRESSURE: 120 MMHG | WEIGHT: 117.2 LBS | DIASTOLIC BLOOD PRESSURE: 74 MMHG

## 2021-04-26 DIAGNOSIS — Z17.0 MALIGNANT NEOPLASM OF OVERLAPPING SITES OF RIGHT BREAST IN FEMALE, ESTROGEN RECEPTOR POSITIVE (HCC): Primary | ICD-10-CM

## 2021-04-26 DIAGNOSIS — C50.811 MALIGNANT NEOPLASM OF OVERLAPPING SITES OF RIGHT BREAST IN FEMALE, ESTROGEN RECEPTOR POSITIVE (HCC): Primary | ICD-10-CM

## 2021-04-26 PROCEDURE — 1090F PRES/ABSN URINE INCON ASSESS: CPT | Performed by: PHYSICIAN ASSISTANT

## 2021-04-26 PROCEDURE — 1036F TOBACCO NON-USER: CPT | Performed by: PHYSICIAN ASSISTANT

## 2021-04-26 PROCEDURE — 99214 OFFICE O/P EST MOD 30 MIN: CPT | Performed by: PHYSICIAN ASSISTANT

## 2021-04-26 PROCEDURE — 3017F COLORECTAL CA SCREEN DOC REV: CPT | Performed by: PHYSICIAN ASSISTANT

## 2021-04-26 PROCEDURE — 1123F ACP DISCUSS/DSCN MKR DOCD: CPT | Performed by: PHYSICIAN ASSISTANT

## 2021-04-26 PROCEDURE — G8419 CALC BMI OUT NRM PARAM NOF/U: HCPCS | Performed by: PHYSICIAN ASSISTANT

## 2021-04-26 PROCEDURE — G8399 PT W/DXA RESULTS DOCUMENT: HCPCS | Performed by: PHYSICIAN ASSISTANT

## 2021-04-26 PROCEDURE — G8427 DOCREV CUR MEDS BY ELIG CLIN: HCPCS | Performed by: PHYSICIAN ASSISTANT

## 2021-04-26 PROCEDURE — 4040F PNEUMOC VAC/ADMIN/RCVD: CPT | Performed by: PHYSICIAN ASSISTANT

## 2021-04-26 NOTE — LETTER
Preop Orders:     Patient: Mira Aguilar  : 1950    Hospital:  [facility]    Admitting Physician:        Diagnosis:  Right breast cancer     Procedure:   Port removal    Anesthesia:MAC    Admission:Outpatient    Date: next available    Labs:per anesthesia    Pre-Op Meds:  Kefzol 2grm IV    Latex Allergy: no    Beta Blocker: no    Medication Instructions: No plavix for 2 weeks prior to procedure; no asprin for 3 days prior to procedure    This has been electronically signed by Jeet Elizabeth M.D.

## 2021-04-26 NOTE — PROGRESS NOTES
HISTORY OF PRESENT ILLNESS:  Shamar Mejia is a pleasant 65year old female with right mastectomy with axillary node dissection. She is status post treatment and presents today to consider port removal.  She denies any neck or shoulder swelling. She has had no fevers. She denies any pain. Patient Active Problem List    Diagnosis Date Noted    Lymphedema of right arm 03/24/2021    Regional lymph node metastasis present ([facility] [address] 03/23/2021    Osteopenia of hip 03/08/2021    Prediabetes 09/22/2020    Hypovitaminosis D 03/25/2020    S/P right mastectomy and AND 3/3/2020 03/03/2020    Encounter for antineoplastic chemotherapy      Malignant neoplasm of unspecified site of unspecified female breast ([facility] [address]     Macrocytic anemia 09/24/2019    [facility] overlapping sites [facility] 09/24/2019    Fatigue 08/21/2019    Right bundle branch block (RBBB) 08/21/2019    Menopause 08/21/2019    Other specified disorders of bone density and structure, right upper arm  08/21/2019     Current Outpatient Medications   Medication Sig Dispense Refill    montelukast (SINGULAIR) 10 MG tablet Take 1 tablet by mouth daily 30 tablet 5    letrozole (FEMARA) 2.5 MG tablet Take 1 tablet by mouth daily 90 tablet 5    ibandronate (BONIVA) 150 MG tablet Take 1 tablet by mouth every 30 days Take one (1) tablet once per month in the morning with a full glass of water, on an empty stomach, and do not take anything else by mouth or lie down for the next 30 minutes. 30 tablet 6    ferrous sulfate 325 (65 Fe) MG tablet Take 325 mg by mouth daily (with breakfast)      Calcium Carbonate-Vitamin D (OYSTER SHELL CALCIUM/D) 500-200 MG-UNIT TABS Take 1 tablet by mouth daily 360 tablet 0     No current facility-administered medications for this visit. Allergies: Patient has no known allergies.   Past Medical History:   Diagnosis Date    Malignant neoplasm of right breast in female, estrogen receptor positive [facility])     breast     Past Surgical History:   Procedure Laterality Date    BREAST BIOPSY Right 09/05/2019    COLONOSCOPY N/A 10/1/2019    Dr Moris Price, internal hemorrhoids-Grade 2 without bleeding stigmata and external hemorrhoids-TV x 1, AP x 1, 3 yr recall    MASTECTOMY Right 3/3/2020    RIGHT SIMPLE MASTECTOMY WITH SENTINEL NODE BIOPSY, FLAPS, PEC BLOCK performed by Corona Albrecht MD at [address] N/A 10/4/2019    PORT INSERTION WITH FLUORO performed by Corona Albrecht MD at [facility] N/A 10/1/2019    Dr Osei Pedteo hernia, HP gastric polyps     Family History   Problem Relation Age of Onset    Breast Cancer Mother 39    Colon Cancer Neg Hx     Colon Polyps Neg Hx      Social History     Tobacco Use    Smoking status: Never Smoker    Smokeless tobacco: Never Used   Substance Use Topics    Alcohol use: Never     Frequency: Never       Review of Systems   Reviewed and positive for the above all other systems noted be negative. Physical Exam  Blood pressure 120/74, temperature 97.4 °F (36.3 °C), temperature source Temporal, height 5' 7\" (1.702 m), weight 117 lb 3.2 oz (53.2 kg). GENERAL:  Reveals a 65 y.o. female that  appears to be in no acute distress. HEENT:  Head is normocephalic and atraumatic. NECK:  Neck is supple without masses. CHEST:  Patient has normal respiratory effort. Chest is clear bilaterally with good thoracic expansion. Port area in the left chest wall shows no evidence of infection. HEART:  Heart demonstrated a regular rhythm and rate with no cardiac murmurs, gallops or rubs noted to auscultation. ABDOMEN:  Inspection of the abdomen demonstrated the patient to have normal bowel sounds present. The abdomen is soft and nontender with no hepatosplenomegaly. EXTREMITIES:  Extremities demonstrated no cyanosis or pitting edema bilaterally.     PSYCHIATRIC:  Patient is oriented to time, place

## 2021-04-30 DIAGNOSIS — Z17.0 MALIGNANT NEOPLASM OF OVERLAPPING SITES OF RIGHT BREAST IN FEMALE, ESTROGEN RECEPTOR POSITIVE (HCC): Primary | ICD-10-CM

## 2021-04-30 DIAGNOSIS — C50.811 MALIGNANT NEOPLASM OF OVERLAPPING SITES OF RIGHT BREAST IN FEMALE, ESTROGEN RECEPTOR POSITIVE (HCC): Primary | ICD-10-CM

## 2021-05-03 ENCOUNTER — HOSPITAL ENCOUNTER (OUTPATIENT)
Dept: PHYSICAL THERAPY | Age: 71
Setting detail: THERAPIES SERIES
Discharge: HOME | End: 2021-05-03
Payer: MEDICARE

## 2021-05-03 PROCEDURE — 97750 PHYSICAL PERFORMANCE TEST: CPT

## 2021-05-03 PROCEDURE — 97162 PT EVAL MOD COMPLEX 30 MIN: CPT

## 2021-05-03 PROCEDURE — 97530 THERAPEUTIC ACTIVITIES: CPT

## 2021-05-03 PROCEDURE — 97110 THERAPEUTIC EXERCISES: CPT

## 2021-05-03 NOTE — PROGRESS NOTES
Physical Therapy  Initial Assessment  Date: 5/3/2021  Patient Name: Maritza Hernandez  MRN: [id number]  : 1950     Treatment Diagnosis: muscle weakness    Restrictions       Subjective   General  Chart Reviewed: Yes  Patient assessed for rehabilitation services?: Yes  Additional Pertinent Hx: Sukumar Sears is an 70y. o. female who presents with Stage 0 right UE lymhpedema. She has a history malignant neoplasm of the right breast and a right masectomy on 3/3/2021. Patient did have chemo and radiation but her  ports gets removed next week. She is somewhat knowledgeable about what lymphedema is. She does not show any skin changes or large difference in measurements when comparing both extremitie at this time but in March after her Covid vaccine she did experience swelling, redness and increase in skin temp that subsided over time. Patient reports she is very active now that the sun os out more often  Referring Practitioner: Torrie  Referral Date : 21  Diagnosis: Lymphedema of right arm  Follows Commands: Within Functional Limits  PT Visit Information  PT Insurance Information: Medicare  Total # of Visits Approved: 12  Total # of Visits to Date: 1  Progress Note Due Date: 21  Subjective  Subjective: Patient reports after she rceived her 1st covid shot in March  she started having swelling  in her right arm, and no affects with the second shot.  She does not notice any selling in her chest area and no longer has swelling in her arm  Pain Screening  Patient Currently in Pain: Denies  Vital Signs  Patient Currently in Pain: Denies    Vision/Hearing  Vision  Vision: Within Functional Limits  Hearing  Hearing: Within functional limits    Orientation  Orientation  Overall Orientation Status: Within Normal Limits    Social/Functional History       Objective     Observation/Palpation  Posture: Poor(kyphotic)  Observation: rounded shoulders, kyphotic posture, limited right shoulder ROM  Edema: none    AROM RUE (degrees)  RUE AROM : Exceptions  R Shoulder Flexion 0-180: 90  R Shoulder ABduction 0-180: 90  AROM LUE (degrees)  LUE AROM : WFL    Strength RUE  Strength RUE: Exception  R Shoulder Flexion: 3-/5  R Shoulder ABduction: 3-/5  Strength LUE  Strength LUE: WNL  Motor Control  Gross Motor?: WFL  Additional Measures  Girth: 11cm from middle finger ad every 5 cm from styloid process:  RUE  17,15.1,14.1,15.5,18.2,20,21.1,22,21.8,22          LUE:16.6,15.1,14.8,17.0,19.2,20.9,20.7,22.4,22,22  Special Tests: Stemmer sign negative  Other: Lymphedema Life Impact Scale: 34%        L-Dex score -1.3  Sensation  Overall Sensation Status: WFL                Balance  Posture: Poor  Exercises  Exercise 1: Pulleys (5-10min)  Exercise 2: supine cane flexion and abduction  Exercise 3: Passive ROM and stretching of right arm (15min)  Exercise 4: right towel wall slides in flexion and abduction  Exercise 5: supine snow theo  Exercise 6: Chin Tucks scapular retraction with band  Exercise 7: Balll roll up    ---- arm ergometer  Exercise 8: Manual: Soft tissue mobilization to right axillary area (15 min)  Exercise 9: Patient/Caregiver Training                      Assessment   Conditions Requiring Skilled Therapeutic Intervention  Body structures, Functions, Activity limitations: Decreased functional mobility ; Decreased ROM; Decreased strength  Assessment:  is an 65 y.o. female who presents with Stage 0 right upper extremity  lymphedema. She has a history of  right malignant neoplasm and a right masectomy. 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 I believe the patient is a great canidate for skilled Physical therapy treatment at this time to increase functional mobility with her right arm, educate her and caregiver on lymphedema  and to prevent her from progressing to later stages of this progressive and life-long disease.  Patient does not perform proper skin care and has very dry skin. PT gave patient a sample dipika of skin mositurizer. She was measured and compression sleeve was given for her to wear when she is outside and very active. The compression was 15-20mmhg, PT recommended that they purchase garment glue because patient arm is very small and the top band does not fit very snug. She was also educated on the physiology and anatomy of the lymphatic system, and educated on the POC and what all it will entail. Treatment would include therapeutic exercise, compression garment assessment, manual therapy and family education and a additional recommendations for an effective home lymphedema program. She was given a pulley apparatus to take home and was able to use during evaluation as well. Her Ldex score was -1.3 which is in normal range and her circumference measurements compared to each other was normal. She was given handouts of Dos and Dont's and education on the sozo machine.   Treatment Diagnosis: muscle weakness  Prognosis: Good  Decision Making: Medium Complexity  REQUIRES PT FOLLOW UP: Yes  Discharge Recommendations: 2-3 sessions per week         Plan   Plan  Times per week: 2  Plan weeks: 4-6  Current Treatment Recommendations: Strengthening, Neuromuscular Re-education, Home Exercise Program, ROM, Manual Therapy - Soft Tissue Mobilization, Safety Education & Training, Manual Lymphatic Drainage, Manual Therapy - Joint Manipulation, Modalities, Positioning, Functional Mobility Training, Patient/Caregiver Education & Training    Goals  Short term goals  Time Frame for Short term goals: 2-4  Short term goal 1: Patient will be indep with HEP  Short term goal 2: Patient will know the signs and symptoms of cellulitis to decrease risk of hospitilization  Short term goal 3: Patient will increase right shoulder abduction and flexion to atleat 110 degrees  Short term goal 4: Patient will know how to perform proper skin care to decrease risk of cellulitis  Short term goal 5: Patient will increase right UE to atleast 4/5  Long term goals  Time Frame for Long term goals : 4-6  Long term goal 1: Patient Lymphedema Life Impact Scale impairment score will decrease by 20%  Long term goal 2: Patient will be able to indep joceline and doff compression corectly to decrease risk of skin tears  Long term goal 3: Patient right UE ROM will increase to at least 150 degrees  Long term goal 4: Patient will have decrease tightness in right axillary area to improve ROM and decrease tenderness in area  Patient Goals   Patient goals : Improve strength and ROM       Therapy Time   Individual Concurrent Group Co-treatment   Time In 1300         Time Out 1400         Minutes 60         Timed Code Treatment Minutes: 60 Minutes  Electronically signed by Yamila Wiseman PT on 5/3/2021 at 2:51 PM

## 2021-05-06 ENCOUNTER — ANESTHESIA EVENT (OUTPATIENT)
Dept: OPERATING ROOM | Age: 71
End: 2021-05-06

## 2021-05-07 ENCOUNTER — HOSPITAL ENCOUNTER (OUTPATIENT)
Dept: PHYSICAL THERAPY | Age: 71
Setting detail: THERAPIES SERIES
Discharge: HOME | End: 2021-05-07
Payer: MEDICARE

## 2021-05-07 PROCEDURE — 97110 THERAPEUTIC EXERCISES: CPT

## 2021-05-07 NOTE — PROGRESS NOTES
structures, Functions, Activity limitations: Decreased functional mobility ; Decreased ROM; Decreased strength  Assessment: Initiated exericses and soft tissue mobilization to increase range of motion and strength. Patient reported no pain throughout session. She has limited range with all flexion and abduction exericses. Will continue stretching to increase range of motion as patient tolerates.   Treatment Diagnosis: muscle weakness  REQUIRES PT FOLLOW UP: Yes  Discharge Recommendations: 2-3 sessions per week    Goals:  Short term goals  Time Frame for Short term goals: 2-4  Short term goal 1: Patient will be indep with HEP  Short term goal 2: Patient will know the signs and symptoms of cellulitis to decrease risk of hospitilization  Short term goal 3: Patient will increase right shoulder abduction and flexion to atleat 110 degrees  Short term goal 4: Patient will know how to perform proper skin care to decrease risk of cellulitis  Short term goal 5: Patient will increase right UE to atleast 4/5  Long term goals  Time Frame for Long term goals : 4-6  Long term goal 1: Patient Lymphedema Life Impact Scale impairment score will decrease by 20%  Long term goal 2: Patient will be able to indep joceline and doff compression corectly to decrease risk of skin tears  Long term goal 3: Patient right UE ROM will increase to at least 150 degrees  Long term goal 4: Patient will have decrease tightness in right axillary area to improve ROM and decrease tenderness in area  Patient Goals   Patient goals : Improve strength and ROM  Plan:    Plan  Times per week: 2  Plan weeks: 4-6  Current Treatment Recommendations: Strengthening, Neuromuscular Re-education, Home Exercise Program, ROM, Manual Therapy - Soft Tissue Mobilization, Safety Education & Training, Manual Lymphatic Drainage, Manual Therapy - Joint Manipulation, Modalities, Positioning, Functional Mobility Training, Patient/Caregiver Education & Training  Timed Code Treatment Minutes: 74 Minutes     Therapy Time   Individual Concurrent Group Co-treatment   Time In 3095         Time Out 1102         Minutes 74         Timed Code Treatment Minutes: 74 Minutes  Electronically signed by Neel Jean PTA on 5/7/2021 at 12:00 PM

## 2021-05-10 ENCOUNTER — HOSPITAL ENCOUNTER (OUTPATIENT)
Dept: NON INVASIVE DIAGNOSTICS | Age: 71
Discharge: HOME | End: 2021-05-10
Payer: MEDICARE

## 2021-05-10 ENCOUNTER — HOSPITAL ENCOUNTER (OUTPATIENT)
Dept: PHYSICAL THERAPY | Age: 71
Setting detail: THERAPIES SERIES
Discharge: HOME | End: 2021-05-10
Payer: MEDICARE

## 2021-05-10 ENCOUNTER — OFFICE VISIT (OUTPATIENT)
Age: 71
End: 2021-05-10

## 2021-05-10 VITALS — HEART RATE: 70 BPM | OXYGEN SATURATION: 97 %

## 2021-05-10 DIAGNOSIS — Z11.59 SCREENING FOR VIRAL DISEASE: Primary | ICD-10-CM

## 2021-05-10 DIAGNOSIS — Z01.818 PREOP TESTING: ICD-10-CM

## 2021-05-10 LAB
EKG P AXIS: 121 DEGREES
EKG P-R INTERVAL: 122 MS
EKG Q-T INTERVAL: 442 MS
EKG QRS DURATION: 108 MS
EKG QTC CALCULATION (BAZETT): 454 MS
EKG T AXIS: 55 DEGREES
SARS-COV-2, PCR: NOT DETECTED

## 2021-05-10 PROCEDURE — 99999 PR OFFICE/OUTPT VISIT,PROCEDURE ONLY: CPT | Performed by: NURSE PRACTITIONER

## 2021-05-10 PROCEDURE — 93005 ELECTROCARDIOGRAM TRACING: CPT | Performed by: ANESTHESIOLOGY

## 2021-05-10 PROCEDURE — 93010 ELECTROCARDIOGRAM REPORT: CPT | Performed by: INTERNAL MEDICINE

## 2021-05-10 PROCEDURE — 97110 THERAPEUTIC EXERCISES: CPT

## 2021-05-10 NOTE — PROGRESS NOTES
Physical Therapy  Daily Treatment Note  Date: 5/10/2021  Patient Name: Yifan Bentley  MRN: [id number]     :   1950    Subjective:   General  Chart Reviewed: Yes  Additional Pertinent Hx:  is an 70y. o. female who presents with Stage 0 right UE lymhpedema. She has a history malignant neoplasm of the right breast and a right masectomy on 3/3/2021. Patient did have chemo and radiation but her  ports gets removed next week. She is somewhat knowledgeable about what lymphedema is. She does not show any skin changes or large difference in measurements when comparing both extremitie at this time but in March after her Covid vaccine she did experience swelling, redness and increase in skin temp that subsided over time. Patient reports she is very active now that the sun os out more often  Response To Previous Treatment: Patient reporting fatigue but able to participate. Family / Caregiver Present: No  Referring Practitioner: Torrie  PT Visit Information  PT Insurance Information: Medicare  Total # of Visits Approved: 12  Total # of Visits to Date: 3  Progress Note Due Date: 21  Subjective  Subjective: I think it (therapy) is helping.   Pain Screening  Patient Currently in Pain: Denies  Vital Signs  Patient Currently in Pain: Denies       Treatment Activities:    Exercises  Exercise 1: Pulleys (5-10min)  5 minutes  Exercise 2: supine cane flexion and abduction  x 15  Exercise 3: Passive ROM and stretching of right arm  x15 minutes  Exercise 4: right towel wall slides in flexion and abduction  x 10 each  Exercise 5: supine snow theo  x 15  Exercise 6: Chin Tucks scapular retraction with red t-band  x 10  Exercise 7: Balll roll up  x 5  with 5 second hold  ---- arm ergometer level 1       5 minutes  Exercise 8: Manual: Soft tissue mobilization to right axillary area x 15 minutes  Exercise 9: Patient/Caregiver Training  Exercise 10: 05/10 HEP issued     Assessment:   Conditions Requiring Skilled Therapeutic Intervention  Body structures, Functions, Activity limitations: Decreased functional mobility ; Decreased ROM; Decreased strength  Assessment: Soft tissue mobilization performed to right axillary area to decrease tightness and increase range of motion. Patient reports that she was able to wash her hair easier with right arm due to increased range of motion since last session. Will continue stretching to increase right arm range of motion as patient will tolerate.   Treatment Diagnosis: muscle weakness  REQUIRES PT FOLLOW UP: Yes  Discharge Recommendations: 2-3 sessions per week    Goals:  Short term goals  Time Frame for Short term goals: 2-4  Short term goal 1: Patient will be indep with HEP(05/10  HEP issued)  Short term goal 2: Patient will know the signs and symptoms of cellulitis to decrease risk of hospitilization  Short term goal 3: Patient will increase right shoulder abduction and flexion to atleat 110 degrees  Short term goal 4: Patient will know how to perform proper skin care to decrease risk of cellulitis  Short term goal 5: Patient will increase right UE to atleast 4/5  Long term goals  Time Frame for Long term goals : 4-6  Long term goal 1: Patient Lymphedema Life Impact Scale impairment score will decrease by 20%  Long term goal 2: Patient will be able to indep joceline and doff compression corectly to decrease risk of skin tears  Long term goal 3: Patient right UE ROM will increase to at least 150 degrees  Long term goal 4: Patient will have decrease tightness in right axillary area to improve ROM and decrease tenderness in area  Patient Goals   Patient goals : Improve strength and ROM  Plan:    Plan  Times per week: 2  Plan weeks: 4-6  Current Treatment Recommendations: Strengthening, Neuromuscular Re-education, Home Exercise Program, ROM, Manual Therapy - Soft Tissue Mobilization, Safety Education & Training, Manual Lymphatic Drainage, Manual Therapy - Joint Manipulation, Modalities, Positioning, Functional Mobility Training, Patient/Caregiver Education & Training  Timed Code Treatment Minutes: 60 Minutes     Therapy Time   Individual Concurrent Group Co-treatment   Time In 1055         Time Out 8765         Minutes 60         Timed Code Treatment Minutes: 60 Minutes  Electronically signed by Hany Lees PTA on 5/10/2021 at 12:04 PM

## 2021-05-12 ENCOUNTER — HOSPITAL ENCOUNTER (OUTPATIENT)
Dept: PHYSICAL THERAPY | Age: 71
Setting detail: THERAPIES SERIES
Discharge: HOME | End: 2021-05-12
Payer: MEDICARE

## 2021-05-12 PROCEDURE — 97110 THERAPEUTIC EXERCISES: CPT

## 2021-05-12 NOTE — PROGRESS NOTES
Physical Therapy  Daily Treatment Note  Date: 2021  Patient Name: Michell Nieves  MRN: [id number]     :   1950    Subjective:   General  Chart Reviewed: Yes  Additional Pertinent Hx:  is an 70y. o. female who presents with Stage 0 right UE lymhpedema. She has a history malignant neoplasm of the right breast and a right masectomy on 3/3/2021. Patient did have chemo and radiation but her  ports gets removed next week. She is somewhat knowledgeable about what lymphedema is. She does not show any skin changes or large difference in measurements when comparing both extremitie at this time but in March after her Covid vaccine she did experience swelling, redness and increase in skin temp that subsided over time. Patient reports she is very active now that the sun os out more often  Response To Previous Treatment: Patient with no complaints from previous session. Family / Caregiver Present: No  Referring Practitioner: Torrie  PT Visit Information  PT Insurance Information: Medicare  Total # of Visits Approved: 12  Total # of Visits to Date: 4  Progress Note Due Date: 21  Subjective  Subjective: If I don't get any better than this I would still be happy.   Pain Screening  Patient Currently in Pain: Denies  Vital Signs  Patient Currently in Pain: Denies       Treatment Activities:    Exercises  Exercise 1: Pulleys (5-10min)  5 minutes  Exercise 2: supine cane flexion and abduction  x 15  Exercise 3: Passive ROM and stretching of right arm  x15 minutes  Exercise 4: right towel wall slides in flexion and abduction  x 10 each  Exercise 5: supine snow theo  x 15  Exercise 6: Chin Tucks scapular retraction without red t-band  x 10  Exercise 7: Balll roll up  x 5  with 5 second hold  not today   ---- arm ergometer level 1       5 minutes  Exercise 8: Manual: Soft tissue mobilization to right axillary area x 15 minutes  Exercise 9: Patient/Caregiver Training  Exercise 10: 05/10 HEP issued Assessment:   Conditions Requiring Skilled Therapeutic Intervention  Body structures, Functions, Activity limitations: Decreased functional mobility ; Decreased ROM; Decreased strength  Assessment: Soft tissue mobilization performed to right axillary area to decrease tightness and increase range of motion. Patient reports that overall discomfort with right arm range of motion has decreased. She reports that she will be having port removed tomorrow. Instructed not to perform pulleys this weekend and try wall walks and stretches in shower with right arm only. Will continue stretching to increase right arm range of motion as patient will tolerate.   Treatment Diagnosis: muscle weakness  REQUIRES PT FOLLOW UP: Yes  Discharge Recommendations: 2-3 sessions per week    Goals:  Short term goals  Time Frame for Short term goals: 2-4  Short term goal 1: Patient will be indep with HEP(05/10  HEP issued)  Short term goal 2: Patient will know the signs and symptoms of cellulitis to decrease risk of hospitilization  Short term goal 3: Patient will increase right shoulder abduction and flexion to atleat 110 degrees  Short term goal 4: Patient will know how to perform proper skin care to decrease risk of cellulitis  Short term goal 5: Patient will increase right UE to atleast 4/5  Long term goals  Time Frame for Long term goals : 4-6  Long term goal 1: Patient Lymphedema Life Impact Scale impairment score will decrease by 20%  Long term goal 2: Patient will be able to indep joceline and doff compression corectly to decrease risk of skin tears  Long term goal 3: Patient right UE ROM will increase to at least 150 degrees  Long term goal 4: Patient will have decrease tightness in right axillary area to improve ROM and decrease tenderness in area  Patient Goals   Patient goals : Improve strength and ROM  Plan:    Plan  Times per week: 2  Plan weeks: 4-6  Current Treatment Recommendations: Strengthening, Neuromuscular Re-education, Home Exercise Program, ROM, Manual Therapy - Soft Tissue Mobilization, Safety Education & Training, Manual Lymphatic Drainage, Manual Therapy - Joint Manipulation, Modalities, Positioning, Functional Mobility Training, Patient/Caregiver Education & Training  Timed Code Treatment Minutes: 77 Minutes     Therapy Time   Individual Concurrent Group Co-treatment   Time In [phone]         Time Out 1058         Minutes 66         Timed Code Treatment Minutes: 66 Minutes  Electronically signed by Odalys Goodwin PTA on 5/12/2021 at 11:53 AM

## 2021-05-12 NOTE — ANESTHESIA PRE PROCEDURE
Department of Anesthesiology  Preprocedure Note       Name:  Rupal Gaxiola   Age:  65 y.o.  :  1950                                          MRN:  [id number]         Date:  2021      Surgeon: Jennifer Jade):  Felisa Ghotra MD    Procedure: Procedure(s):  PORT REMOVAL    Medications prior to admission:   Prior to Admission medications    Medication Sig Start Date End Date Taking? Authorizing Provider   montelukast (SINGULAIR) 10 MG tablet Take 1 tablet by mouth daily 21   Felisa Ghotra MD   letrozole [facility]) 2.5 MG tablet Take 1 tablet by mouth daily 20   Chalo Snyder MD   ibandronate (BONIVA) 150 MG tablet Take 1 tablet by mouth every 30 days Take one (1) tablet once per month in the morning with a full glass of water, on an empty stomach, and do not take anything else by mouth or lie down for the next 30 minutes. 20   Chalo Snyder MD   ferrous sulfate 325 (65 Fe) MG tablet Take 325 mg by mouth daily (with breakfast)    Historical Provider, MD   Calcium Carbonate-Vitamin D (OYSTER SHELL CALCIUM/D) 500-200 MG-UNIT TABS Take 1 tablet by mouth daily 8/24/19 3/24/21  Kalen Watson MD       Current medications:    No current facility-administered medications for this encounter. Current Outpatient Medications   Medication Sig Dispense Refill    montelukast (SINGULAIR) 10 MG tablet Take 1 tablet by mouth daily 30 tablet 5    letrozole (FEMARA) 2.5 MG tablet Take 1 tablet by mouth daily 90 tablet 5    ibandronate (BONIVA) 150 MG tablet Take 1 tablet by mouth every 30 days Take one (1) tablet once per month in the morning with a full glass of water, on an empty stomach, and do not take anything else by mouth or lie down for the next 30 minutes.  30 tablet 6    ferrous sulfate 325 (65 Fe) MG tablet Take 325 mg by mouth daily (with breakfast)      Calcium Carbonate-Vitamin D (OYSTER SHELL CALCIUM/D) 500-200 MG-UNIT TABS Take 1 tablet by mouth daily 360 tablet 0 Allergies:  No Known Allergies    Problem List:    Patient Active Problem List   Diagnosis Code    Fatigue R53.83    Right bundle branch block (RBBB) I45.10    Menopause Z78.0    Other specified disorders of bone density and structure, right upper arm  M85.821    Macrocytic anemia D53.9    Malignant neoplasm of overlapping sites of right female breast ([facility] C50.811    Malignant neoplasm of unspecified site of unspecified [facility] breast [facility] C50.919    Encounter for antineoplastic chemotherapy  Z51.11    S/P right mastectomy and AND 3/3/2020 Z90.11    Hypovitaminosis D E55.9    Prediabetes R73.03    Osteopenia of hip M85.859    Regional lymph node metastasis present ([facility] C77.9    Lymphedema of right arm I89.0       Past Medical History:        Diagnosis Date    Malignant neoplasm of right breast in female, estrogen receptor positive ([facility]     breast       Past Surgical History:        Procedure Laterality Date    BREAST BIOPSY Right 09/05/2019    COLONOSCOPY N/A 10/1/2019    Dr Benigno Luo, internal hemorrhoids-Grade 2 without bleeding stigmata and external hemorrhoids-TV x 1, AP x 1, 3 yr recall    MASTECTOMY Right 3/3/2020    RIGHT SIMPLE MASTECTOMY WITH SENTINEL NODE BIOPSY, FLAPS, PEC BLOCK performed by Pablito Arias MD at [place] [address] [place] N/A 10/4/2019    PORT INSERTION WITH FLUORO performed by Pablito Arias MD at [facility] ENDOSCOPY N/A 10/1/2019    Dr Lady Barber hernia, [place] gastric polyps       Social History:    Social History     Tobacco Use    Smoking status: Never Smoker    Smokeless tobacco: Never Used   Substance Use Topics    Alcohol use: Never     Frequency: Never                                Counseling given: Not Answered      Vital Signs (Current): There were no vitals filed for this visit.                                            BP Readings from Last 3 Encounters:   04/26/21 120/74   03/24/21 130/60   03/09/21 102/70       NPO Status:                                                                                 BMI:   Wt Readings from Last 3 Encounters:   04/26/21 117 lb 3.2 oz (53.2 kg)   03/24/21 119 lb 3.2 oz (54.1 kg)   03/09/21 121 lb 9.6 oz (55.2 kg)     There is no height or weight on file to calculate BMI.    CBC:   Lab Results   Component Value Date    WBC 8.8 03/05/2021    RBC 3.34 03/05/2021    HGB 11.4 03/05/2021    HCT 35.4 03/05/2021    .0 03/05/2021    RDW 13.6 03/05/2021     03/05/2021       CMP:   Lab Results   Component Value Date     03/05/2021    K 3.6 03/05/2021     03/05/2021    CO2 30 03/05/2021    BUN 15 03/05/2021    CREATININE 0.8 03/05/2021    GFRAA >59 03/05/2021    LABGLOM >60 03/05/2021    GLUCOSE 109 03/05/2021    PROT 7.8 03/05/2021    CALCIUM 10.0 03/05/2021    BILITOT 0.6 03/05/2021    ALKPHOS 104 03/05/2021    AST 27 03/05/2021    ALT 13 03/05/2021       POC Tests: No results for input(s): POCGLU, POCNA, POCK, POCCL, POCBUN, POCHEMO, POCHCT in the last 72 hours.     Coags: No results found for: PROTIME, INR, APTT    HCG (If Applicable): No results found for: PREGTESTUR, PREGSERUM, HCG, HCGQUANT     ABGs: No results found for: PHART, PO2ART, KIP2XKH, ERS1DAY, BEART, U7OANVAH     Type & Screen (If Applicable):  No results found for: LABABO, LABRH    Drug/Infectious Status (If Applicable):  No results found for: HIV, HEPCAB    COVID-19 Screening (If Applicable):   Lab Results   Component Value Date    COVID19 Not Detected 05/10/2021           Anesthesia Evaluation  Patient summary reviewed and Nursing notes reviewed  Airway: Mallampati: II  TM distance: >3 FB   Neck ROM: full  Mouth opening: > = 3 FB Dental:          Pulmonary:Negative Pulmonary ROS                              Cardiovascular:  Exercise tolerance: poor (<4 METS),            Beta Blocker:  Not on Beta Blocker         Neuro/Psych:   Negative Neuro/Psych ROS              GI/Hepatic/Renal: Neg GI/Hepatic/Renal ROS            Endo/Other:    (+) blood dyscrasia: anemia:., malignancy/cancer. Pt had no PAT visit       Abdominal:           Vascular: negative vascular ROS. Anesthesia Plan      MAC     ASA 3       Induction: intravenous. Anesthetic plan and risks discussed with patient and child/children.                     Monica Gallagher, GUNNER - CRNA   5/12/2021

## 2021-05-13 ENCOUNTER — HOSPITAL ENCOUNTER (OUTPATIENT)
Age: 71
Setting detail: OUTPATIENT SURGERY
Discharge: HOME | End: 2021-05-13
Attending: SURGERY | Admitting: SURGERY
Payer: MEDICARE

## 2021-05-13 ENCOUNTER — ANESTHESIA (OUTPATIENT)
Dept: OPERATING ROOM | Age: 71
End: 2021-05-13

## 2021-05-13 VITALS
RESPIRATION RATE: 14 BRPM | OXYGEN SATURATION: 100 % | BODY MASS INDEX: 18.83 KG/M2 | SYSTOLIC BLOOD PRESSURE: 129 MMHG | HEART RATE: 59 BPM | TEMPERATURE: 97.7 F | WEIGHT: 120 LBS | HEIGHT: 67 IN | DIASTOLIC BLOOD PRESSURE: 76 MMHG

## 2021-05-13 VITALS — TEMPERATURE: 97 F | OXYGEN SATURATION: 98 % | SYSTOLIC BLOOD PRESSURE: 147 MMHG | DIASTOLIC BLOOD PRESSURE: 76 MMHG

## 2021-05-13 DIAGNOSIS — Z01.818 PREOP TESTING: Primary | ICD-10-CM

## 2021-05-13 PROCEDURE — G8907 PT DOC NO EVENTS ON DISCHARG: HCPCS

## 2021-05-13 PROCEDURE — 36590 REMOVAL TUNNELED CV CATH: CPT

## 2021-05-13 PROCEDURE — G8916 PT W IV AB GIVEN ON TIME: HCPCS

## 2021-05-13 PROCEDURE — 36590 REMOVAL TUNNELED CV CATH: CPT | Performed by: SURGERY

## 2021-05-13 RX ORDER — HYDROMORPHONE HCL 110MG/55ML
0.5 PATIENT CONTROLLED ANALGESIA SYRINGE INTRAVENOUS EVERY 5 MIN PRN
Status: DISCONTINUED | OUTPATIENT
Start: 2021-05-13 | End: 2021-05-13 | Stop reason: HOSPADM

## 2021-05-13 RX ORDER — PROPOFOL 10 MG/ML
INJECTION, EMULSION INTRAVENOUS PRN
Status: DISCONTINUED | OUTPATIENT
Start: 2021-05-13 | End: 2021-05-13 | Stop reason: SDUPTHER

## 2021-05-13 RX ORDER — HYDROMORPHONE HCL 110MG/55ML
0.25 PATIENT CONTROLLED ANALGESIA SYRINGE INTRAVENOUS EVERY 5 MIN PRN
Status: DISCONTINUED | OUTPATIENT
Start: 2021-05-13 | End: 2021-05-13 | Stop reason: HOSPADM

## 2021-05-13 RX ORDER — ENALAPRILAT 2.5 MG/2ML
1.25 INJECTION INTRAVENOUS
Status: DISCONTINUED | OUTPATIENT
Start: 2021-05-13 | End: 2021-05-13 | Stop reason: HOSPADM

## 2021-05-13 RX ORDER — FENTANYL CITRATE 50 UG/ML
INJECTION, SOLUTION INTRAMUSCULAR; INTRAVENOUS PRN
Status: DISCONTINUED | OUTPATIENT
Start: 2021-05-13 | End: 2021-05-13 | Stop reason: SDUPTHER

## 2021-05-13 RX ORDER — LIDOCAINE HYDROCHLORIDE 10 MG/ML
INJECTION, SOLUTION EPIDURAL; INFILTRATION; INTRACAUDAL; PERINEURAL PRN
Status: DISCONTINUED | OUTPATIENT
Start: 2021-05-13 | End: 2021-05-13 | Stop reason: SDUPTHER

## 2021-05-13 RX ORDER — LABETALOL HYDROCHLORIDE 5 MG/ML
5 INJECTION, SOLUTION INTRAVENOUS EVERY 10 MIN PRN
Status: DISCONTINUED | OUTPATIENT
Start: 2021-05-13 | End: 2021-05-13 | Stop reason: HOSPADM

## 2021-05-13 RX ORDER — MORPHINE SULFATE 10 MG/ML
2 INJECTION, SOLUTION INTRAMUSCULAR; INTRAVENOUS EVERY 5 MIN PRN
Status: DISCONTINUED | OUTPATIENT
Start: 2021-05-13 | End: 2021-05-13 | Stop reason: HOSPADM

## 2021-05-13 RX ORDER — SODIUM CHLORIDE, SODIUM LACTATE, POTASSIUM CHLORIDE, CALCIUM CHLORIDE 600; 310; 30; 20 MG/100ML; MG/100ML; MG/100ML; MG/100ML
INJECTION, SOLUTION INTRAVENOUS CONTINUOUS
Status: DISCONTINUED | OUTPATIENT
Start: 2021-05-13 | End: 2021-05-13 | Stop reason: HOSPADM

## 2021-05-13 RX ORDER — MEPERIDINE HYDROCHLORIDE 25 MG/ML
12.5 INJECTION INTRAMUSCULAR; INTRAVENOUS; SUBCUTANEOUS EVERY 5 MIN PRN
Status: DISCONTINUED | OUTPATIENT
Start: 2021-05-13 | End: 2021-05-13 | Stop reason: HOSPADM

## 2021-05-13 RX ORDER — HYDRALAZINE HYDROCHLORIDE 20 MG/ML
5 INJECTION INTRAMUSCULAR; INTRAVENOUS EVERY 10 MIN PRN
Status: DISCONTINUED | OUTPATIENT
Start: 2021-05-13 | End: 2021-05-13 | Stop reason: HOSPADM

## 2021-05-13 RX ORDER — MORPHINE SULFATE 10 MG/ML
4 INJECTION, SOLUTION INTRAMUSCULAR; INTRAVENOUS EVERY 5 MIN PRN
Status: DISCONTINUED | OUTPATIENT
Start: 2021-05-13 | End: 2021-05-13 | Stop reason: HOSPADM

## 2021-05-13 RX ORDER — METOCLOPRAMIDE HYDROCHLORIDE 5 MG/ML
10 INJECTION INTRAMUSCULAR; INTRAVENOUS
Status: DISCONTINUED | OUTPATIENT
Start: 2021-05-13 | End: 2021-05-13 | Stop reason: HOSPADM

## 2021-05-13 RX ORDER — DIPHENHYDRAMINE HYDROCHLORIDE 50 MG/ML
12.5 INJECTION INTRAMUSCULAR; INTRAVENOUS
Status: DISCONTINUED | OUTPATIENT
Start: 2021-05-13 | End: 2021-05-13 | Stop reason: HOSPADM

## 2021-05-13 RX ORDER — PROMETHAZINE HYDROCHLORIDE 25 MG/ML
6.25 INJECTION, SOLUTION INTRAMUSCULAR; INTRAVENOUS
Status: DISCONTINUED | OUTPATIENT
Start: 2021-05-13 | End: 2021-05-13 | Stop reason: HOSPADM

## 2021-05-13 RX ORDER — LIDOCAINE HYDROCHLORIDE 10 MG/ML
1 INJECTION, SOLUTION EPIDURAL; INFILTRATION; INTRACAUDAL; PERINEURAL
Status: DISCONTINUED | OUTPATIENT
Start: 2021-05-13 | End: 2021-05-13 | Stop reason: HOSPADM

## 2021-05-13 RX ORDER — CEFAZOLIN SODIUM 1 G/3ML
INJECTION, POWDER, FOR SOLUTION INTRAMUSCULAR; INTRAVENOUS PRN
Status: DISCONTINUED | OUTPATIENT
Start: 2021-05-13 | End: 2021-05-13 | Stop reason: SDUPTHER

## 2021-05-13 RX ADMIN — PROPOFOL 50 MG: 10 INJECTION, EMULSION INTRAVENOUS at 08:47

## 2021-05-13 RX ADMIN — PROPOFOL 20 MG: 10 INJECTION, EMULSION INTRAVENOUS at 09:08

## 2021-05-13 RX ADMIN — LIDOCAINE HYDROCHLORIDE 50 MG: 10 INJECTION, SOLUTION EPIDURAL; INFILTRATION; INTRACAUDAL; PERINEURAL at 08:47

## 2021-05-13 RX ADMIN — FENTANYL CITRATE 100 MCG: 50 INJECTION, SOLUTION INTRAMUSCULAR; INTRAVENOUS at 08:51

## 2021-05-13 RX ADMIN — PROPOFOL 20 MG: 10 INJECTION, EMULSION INTRAVENOUS at 08:58

## 2021-05-13 RX ADMIN — SODIUM CHLORIDE, SODIUM LACTATE, POTASSIUM CHLORIDE, CALCIUM CHLORIDE: 600; 310; 30; 20 INJECTION, SOLUTION INTRAVENOUS at 07:22

## 2021-05-13 RX ADMIN — CEFAZOLIN SODIUM 1000 MG: 1 INJECTION, POWDER, FOR SOLUTION INTRAMUSCULAR; INTRAVENOUS at 08:48

## 2021-05-13 RX ADMIN — PROPOFOL 20 MG: 10 INJECTION, EMULSION INTRAVENOUS at 08:53

## 2021-05-13 NOTE — ANESTHESIA POSTPROCEDURE EVALUATION
Department of Anesthesiology  Postprocedure Note    Patient: Martin Mon  MRN: [id number]  YOB: 1950  Date of evaluation: 5/13/2021  Time:  9:20 AM     Procedure Summary     Date: 05/13/21 Room / Location: [facility] OR [address] [place] [address]    Anesthesia Start: 1187 Anesthesia Stop: 6668    Procedure: PORT REMOVAL (N/A Chest) Diagnosis: (RIGHT BREAST CANCER)    Surgeons: Ngozi Mauricio MD Responsible Provider: GUNNER Carlson CRNA    Anesthesia Type: MAC ASA Status: 3          Anesthesia Type: MAC    Peter Phase I:      Peter Phase II:      Last vitals: Reviewed and per EMR flowsheets.        Anesthesia Post Evaluation    Patient location during evaluation: bedside  Patient participation: complete - patient participated  Level of consciousness: awake and alert  Pain score: 0  Airway patency: patent  Nausea & Vomiting: no nausea and no vomiting  Complications: no  Cardiovascular status: hemodynamically stable  Respiratory status: nonlabored ventilation, spontaneous ventilation, room air and acceptable  Hydration status: euvolemic

## 2021-05-13 NOTE — H&P
HISTORY OF PRESENT ILLNESS:  Wanda Eastman is a pleasant 65year old female with right mastectomy with axillary node dissection. She is status post treatment and presents today to consider port removal.  She denies any neck or shoulder swelling. She has had no fevers. She denies any pain.          Patient Active Problem List     Diagnosis Date Noted    Lymphedema of right arm 03/24/2021    Regional lymph node metastasis present ([facility] 03/23/2021    Osteopenia of hip 03/08/2021    Prediabetes 09/22/2020    Hypovitaminosis D 03/25/2020    S/P right mastectomy and AND 3/3/2020 03/03/2020    Encounter for antineoplastic chemotherapy       Malignant neoplasm of unspecified site of unspecified female breast ([facility]      Macrocytic anemia 09/24/2019    [facility] of [facility] 09/24/2019    Fatigue 08/21/2019    Right bundle branch block (RBBB) 08/21/2019    Menopause 08/21/2019    Other specified disorders of bone density and structure, right upper arm  08/21/2019      Current Facility-Administered Medications          Current Outpatient Medications   Medication Sig Dispense Refill    montelukast (SINGULAIR) 10 MG tablet Take 1 tablet by mouth daily 30 tablet 5    letrozole (FEMARA) 2.5 MG tablet Take 1 tablet by mouth daily 90 tablet 5    ibandronate (BONIVA) 150 MG tablet Take 1 tablet by mouth every 30 days Take one (1) tablet once per month in the morning with a full glass of water, on an empty stomach, and do not take anything else by mouth or lie down for the next 30 minutes. 30 tablet 6    ferrous sulfate 325 (65 Fe) MG tablet Take 325 mg by mouth daily (with breakfast)        Calcium Carbonate-Vitamin D (OYSTER SHELL CALCIUM/D) 500-200 MG-UNIT TABS Take 1 tablet by mouth daily 360 tablet 0      No current facility-administered medications for this visit.          Allergies: Patient has no known allergies.   Past Medical History        Past Medical History: soft and nontender with no hepatosplenomegaly.     EXTREMITIES:  Extremities demonstrated no cyanosis or pitting edema bilaterally.     PSYCHIATRIC:  Patient is oriented to time, place and person. The patient's mood and affect are normal.        IMPRESSION:  Right breast cancer status post right modified radical mastectomy and subsequent treatment     PLAN:  The risks, benefits, and options of port removal were discussed with the patient. She  is willing to proceed with surgery.

## 2021-05-14 NOTE — PROCEDURES
[address]  . [place], [address]                               [phone]                                 PROCEDURE NOTE    PATIENT NAME: Sari Mcnamara                        :             1950  MED REC NO:   [id number]                               LOCATION:        [facility]  ACCOUNT NO:   [de-identified]                            ADMISSION DATE:  2021  PROVIDER:     Celestine Gr MD    DATE OF PROCEDURE:  2021    SURGEON:  Celestine Gr MD    INDICATIONS:  The patient is a delightful 47-year-old lady who received  neoadjuvant chemotherapy prior to her modified radical mastectomy. She  is doing well with no evidence of disease. She is ready to have her  port removed. We discussed the risks and benefits. She understood and  was agreeable. OPERATIVE PROCEDURE:  Today, she was brought to the operating room,  adequately sedated and prepped and draped in a sterile fashion. The  area around the port was anesthetized with 1/16% Xylocaine. We then  reopened the old incision, dissected down to the catheter and placed a  3-0 Vicryl pursestring around the insertion site. We withdrew the  catheter, tied down our pursestring, and then excised the port with its  capsule. We irrigated copiously, obtained good hemostasis, and closed  with 2-0 and 3-0 Vicryl and 4-0 Monocryl for the skin, and Dermabond  dressing was applied. Estimated blood loss, minimal.  Complications,  none. She tolerated the procedure well.         Laila Jones MD    D: 2021 10:45:17      T: 2021 12:43:31     [email or web address]  Job#: [id number]     Doc#: [id number]    CC:

## 2021-05-18 ENCOUNTER — HOSPITAL ENCOUNTER (OUTPATIENT)
Dept: PHYSICAL THERAPY | Age: 71
Setting detail: THERAPIES SERIES
Discharge: HOME | End: 2021-05-18
Payer: MEDICARE

## 2021-05-18 ENCOUNTER — APPOINTMENT (OUTPATIENT)
Dept: PHYSICAL THERAPY | Age: 71
End: 2021-05-18
Payer: MEDICARE

## 2021-05-18 PROCEDURE — 97110 THERAPEUTIC EXERCISES: CPT

## 2021-05-18 PROCEDURE — 97140 MANUAL THERAPY 1/> REGIONS: CPT

## 2021-05-18 NOTE — PROGRESS NOTES
to right axillary area x 15 minutes  Exercise 10: Patient/Caregiver Training  Exercise 11: 05/10 HEP issued        Assessment:   Conditions Requiring Skilled Therapeutic Intervention  Body structures, Functions, Activity limitations: Decreased functional mobility ; Decreased ROM; Decreased strength  Assessment: Soft tissue mobilization performed to right axillary area to decrease tightness and increase range of motion. Patient reports that overall  range of motion in right arm has increased. She reports that port site is  and sore. Instructed not to stretch with left arm too much and if she felt pain to stop. Will continue stretching to increase right arm range of motion and increasing resistance to increase stregth as patient will tolerate.   Treatment Diagnosis: muscle weakness  REQUIRES PT FOLLOW UP: Yes  Discharge Recommendations: 2-3 sessions per week    Goals:  Short term goals  Time Frame for Short term goals: 2-4  Short term goal 1: Patient will be indep with HEP (05/10  HEP issued)  Short term goal 2: Patient will know the signs and symptoms of cellulitis to decrease risk of hospitilization  Short term goal 3: Patient will increase right shoulder abduction and flexion to atleat 110 degrees  Short term goal 4: Patient will know how to perform proper skin care to decrease risk of cellulitis  Short term goal 5: Patient will increase right UE to atleast 4/5  Long term goals  Time Frame for Long term goals : 4-6  Long term goal 1: Patient Lymphedema Life Impact Scale impairment score will decrease by 20%  Long term goal 2: Patient will be able to indep joceline and doff compression corectly to decrease risk of skin tears  Long term goal 3: Patient right UE ROM will increase to at least 150 degrees  Long term goal 4: Patient will have decrease tightness in right axillary area to improve ROM and decrease tenderness in area  Patient Goals   Patient goals : Improve strength and ROM  Plan:    Plan  Times

## 2021-05-20 ENCOUNTER — HOSPITAL ENCOUNTER (OUTPATIENT)
Dept: PHYSICAL THERAPY | Age: 71
Setting detail: THERAPIES SERIES
Discharge: HOME | End: 2021-05-20
Payer: MEDICARE

## 2021-05-20 ENCOUNTER — APPOINTMENT (OUTPATIENT)
Dept: PHYSICAL THERAPY | Age: 71
End: 2021-05-20
Payer: MEDICARE

## 2021-05-20 PROCEDURE — 97110 THERAPEUTIC EXERCISES: CPT

## 2021-05-20 NOTE — PROGRESS NOTES
Physical Therapy  Daily Treatment Note  Date: 2021  Patient Name: Luisito Jenkins  MRN: [id number]     :   1950    Subjective:   General  Chart Reviewed: Yes  Additional Pertinent Hx:  is an 70y. o. female who presents with Stage 0 right UE lymhpedema. She has a history malignant neoplasm of the right breast and a right masectomy on 3/3/2021. Patient did have chemo and radiation but her  ports gets removed next week. She is somewhat knowledgeable about what lymphedema is. She does not show any skin changes or large difference in measurements when comparing both extremitie at this time but in March after her Covid vaccine she did experience swelling, redness and increase in skin temp that subsided over time. Patient reports she is very active now that the sun os out more often  Response To Previous Treatment: Patient with no complaints from previous session. Family / Caregiver Present: No  Referring Practitioner: Torrie  PT Visit Information  PT Insurance Information: Medicare  Total # of Visits Approved: 12  Total # of Visits to Date: 6  Subjective  Subjective: It is gettting so much better.   Pain Screening  Patient Currently in Pain: Denies  Vital Signs  Patient Currently in Pain: Denies       Treatment Activities:       Exercises  Exercise 1: Pulleys (5-10min)  5 minutes  Exercise 2: supine cane flexion and abduction with one and half pound weight  x 10  Exercise 3: Passive ROM and stretching of right arm  x10 minutes  Exercise 4: right towel wall slides in flexion and abduction  x 10 each  Exercise 5: supine snow theo  x 15  Exercise 6: Chin Tucks scapular retraction without red t-band  x 10  Exercise 7: AROM  standing flexion and abduction  x 10  Exercise 8: Balll roll up  x 5  with 5 second hold  right arm not today   ---- arm ergometer level 2       5 minutes  Exercise 9: Manual: Soft tissue mobilization to right axillary area x 15 minutes  Exercise 10: Patient/Caregiver Training  Exercise 11: 05/10 HEP issued           Assessment:   Conditions Requiring Skilled Therapeutic Intervention  Body structures, Functions, Activity limitations: Decreased functional mobility ; Decreased ROM; Decreased strength  Assessment: Soft tissue mobilization performed to right axillary area to decrease tightness and increase range of motion. Patient reports that overall  range of motion in right arm has increased. She reports that left side is as tender and sore as right side. Instructed to keep stretching and using right arm at home as much as possible. Will continue stretching to increase right arm range of motion and increasing resistance to increase stregth as patient will tolerate.   Treatment Diagnosis: muscle weakness  REQUIRES PT FOLLOW UP: Yes  Discharge Recommendations: 2-3 sessions per week    Goals:  Short term goals  Time Frame for Short term goals: 2-4  Short term goal 1: Patient will be indep with HEP (05/10  HEP issued)  Short term goal 2: Patient will know the signs and symptoms of cellulitis to decrease risk of hospitilization  Short term goal 3: Patient will increase right shoulder abduction and flexion to atleat 110 degrees  Short term goal 4: Patient will know how to perform proper skin care to decrease risk of cellulitis  Short term goal 5: Patient will increase right UE to atleast 4/5  Long term goals  Time Frame for Long term goals : 4-6  Long term goal 1: Patient Lymphedema Life Impact Scale impairment score will decrease by 20%  Long term goal 2: Patient will be able to indep joceline and doff compression corectly to decrease risk of skin tears  Long term goal 3: Patient right UE ROM will increase to at least 150 degrees  Long term goal 4: Patient will have decrease tightness in right axillary area to improve ROM and decrease tenderness in area  Patient Goals   Patient goals : Improve strength and ROM  Plan:    Plan  Times per week: 2  Plan weeks: 4-6  Current Treatment Recommendations: Strengthening, Neuromuscular Re-education, Home Exercise Program, ROM, Manual Therapy - Soft Tissue Mobilization, Safety Education & Training, Manual Lymphatic Drainage, Manual Therapy - Joint Manipulation, Modalities, Positioning, Functional Mobility Training, Patient/Caregiver Education & Training  Timed Code Treatment Minutes: 53 Minutes     Therapy Time   Individual Concurrent Group Co-treatment   Time In 0957         Time Out 1050         Minutes 53         Timed Code Treatment Minutes: 53 Minutes  Electronically signed by Emilee Lugo PTA on 5/20/2021 at 10:54 AM

## 2021-05-25 ENCOUNTER — APPOINTMENT (OUTPATIENT)
Dept: PHYSICAL THERAPY | Age: 71
End: 2021-05-25
Payer: MEDICARE

## 2021-05-25 ENCOUNTER — HOSPITAL ENCOUNTER (OUTPATIENT)
Dept: PHYSICAL THERAPY | Age: 71
Setting detail: THERAPIES SERIES
Discharge: HOME | End: 2021-05-25
Payer: MEDICARE

## 2021-05-25 PROCEDURE — 97140 MANUAL THERAPY 1/> REGIONS: CPT

## 2021-05-25 PROCEDURE — 97110 THERAPEUTIC EXERCISES: CPT

## 2021-05-25 NOTE — PROGRESS NOTES
Physical Therapy  Daily Treatment Note  Date: 2021  Patient Name: Checo Schultz  MRN: [id number]     :   1950    Subjective:   General  Chart Reviewed: Yes  Additional Pertinent Hx:  is an 70y. o. female who presents with Stage 0 right UE lymhpedema. She has a history malignant neoplasm of the right breast and a right masectomy on 3/3/2021. Patient did have chemo and radiation but her  ports gets removed next week. She is somewhat knowledgeable about what lymphedema is. She does not show any skin changes or large difference in measurements when comparing both extremitie at this time but in March after her Covid vaccine she did experience swelling, redness and increase in skin temp that subsided over time. Patient reports she is very active now that the sun os out more often  Response To Previous Treatment: Patient with no complaints from previous session.   Family / Caregiver Present: No  Referring Practitioner: Torrie  PT Visit Information  PT Insurance Information: Medicare  Total # of Visits Approved: 12  Total # of Visits to Date: 7  Progress Note Due Date: 21  Subjective  Subjective: I mowed yesterday  Pain Screening  Patient Currently in Pain: Denies  Vital Signs  Patient Currently in Pain: Denies       Treatment Activities:    Exercises  Exercise 1: Pulleys (5-10min)  5 minutes  Exercise 2: supine cane flexion and abduction with two pound weight  x 10  Exercise 3: Passive ROM and stretching of right arm  x10 minutes  Exercise 4: right towel wall slides in flexion and abduction  x 10 each  Exercise 5: supine snow theo  x 15  Exercise 6: Chin Tucks scapular retraction without red t-band  x 10  Exercise 7: AROM  standing flexion and abduction  x 10  Exercise 8: Balll roll up  x 5  with 5 second hold  right arm not today   ---- arm ergometer level 2       5 minutes  Exercise 9: Manual: Soft tissue mobilization to right axillary area x 15 minutes  Exercise 10: Patient/Caregiver Treatment Recommendations: Strengthening, Neuromuscular Re-education, Home Exercise Program, ROM, Manual Therapy - Soft Tissue Mobilization, Safety Education & Training, Manual Lymphatic Drainage, Manual Therapy - Joint Manipulation, Modalities, Positioning, Functional Mobility Training, Patient/Caregiver Education & Training        Therapy Time   Individual Concurrent Group Co-treatment   Time In           Time Out           Minutes              Electronically signed by Cristofer Moreland PTA on 5/25/2021 at 11:57 AM

## 2021-05-27 ENCOUNTER — HOSPITAL ENCOUNTER (OUTPATIENT)
Dept: PHYSICAL THERAPY | Age: 71
Setting detail: THERAPIES SERIES
Discharge: HOME | End: 2021-05-27
Payer: MEDICARE

## 2021-05-27 ENCOUNTER — APPOINTMENT (OUTPATIENT)
Dept: PHYSICAL THERAPY | Age: 71
End: 2021-05-27
Payer: MEDICARE

## 2021-05-27 PROCEDURE — 97110 THERAPEUTIC EXERCISES: CPT

## 2021-05-27 NOTE — PROGRESS NOTES
Physical Therapy  Daily Treatment Note  Date: 2021  Patient Name: Tammie Mayer  MRN: [id number]     :   1950    Subjective:   General  Chart Reviewed: Yes  Additional Pertinent Hx:  is an 70y. o. female who presents with Stage 0 right UE lymhpedema. She has a history malignant neoplasm of the right breast and a right masectomy on 3/3/2021. Patient did have chemo and radiation but her  ports gets removed next week. She is somewhat knowledgeable about what lymphedema is. She does not show any skin changes or large difference in measurements when comparing both extremitie at this time but in March after her Covid vaccine she did experience swelling, redness and increase in skin temp that subsided over time. Patient reports she is very active now that the sun os out more often  Response To Previous Treatment: Patient with no complaints from previous session. Family / Caregiver Present: No  Referring Practitioner: Torrie  PT Visit Information  PT Insurance Information: Medicare  Total # of Visits Approved: 12  Total # of Visits to Date: 8  Progress Note Due Date: 21  Subjective  Subjective: I am going to weedeated today.   Pain Screening  Patient Currently in Pain: Denies  Vital Signs  Patient Currently in Pain: Denies       Treatment Activities:    Exercises  Exercise 1: Pulleys (5-10min)  5 minutes  Exercise 2: supine cane flexion and abduction with two pound weight  x 10  Exercise 3: Passive ROM and stretching of right arm  x10 minutes  Exercise 4: right towel wall slides in flexion and abduction  x 15 each  Exercise 5: supine snow theo  x 15  Exercise 6: Chin Tucks scapular retraction without red t-band  x 10  Exercise 7: AROM  standing flexion and abduction  x 10  Exercise 8: Balll roll up  x 5  with 5 second hold  ---- arm ergometer level 2  2.5 minutes forward and backward  total       5 minutes  Exercise 9: Manual: Soft tissue mobilization to right axillary area x 15

## 2021-06-01 ENCOUNTER — HOSPITAL ENCOUNTER (OUTPATIENT)
Dept: PHYSICAL THERAPY | Age: 71
Setting detail: THERAPIES SERIES
Discharge: HOME | End: 2021-06-01
Payer: MEDICARE

## 2021-06-01 ENCOUNTER — APPOINTMENT (OUTPATIENT)
Dept: PHYSICAL THERAPY | Age: 71
End: 2021-06-01
Payer: MEDICARE

## 2021-06-01 PROCEDURE — 97140 MANUAL THERAPY 1/> REGIONS: CPT

## 2021-06-01 PROCEDURE — 97110 THERAPEUTIC EXERCISES: CPT

## 2021-06-01 NOTE — PROGRESS NOTES
Physical Therapy  Daily Treatment Note  Date: 2021  Patient Name: Henrietta Cam  MRN: [id number]     :   1950    Subjective:   General  Chart Reviewed: Yes  Additional Pertinent Hx:  is an 70y. o. female who presents with Stage 0 right UE lymhpedema. She has a history malignant neoplasm of the right breast and a right masectomy on 3/3/2021. Patient did have chemo and radiation but her  ports gets removed next week. She is somewhat knowledgeable about what lymphedema is. She does not show any skin changes or large difference in measurements when comparing both extremitie at this time but in March after her Covid vaccine she did experience swelling, redness and increase in skin temp that subsided over time. Patient reports she is very active now that the sun os out more often  Response To Previous Treatment: Patient with no complaints from previous session. Family / Caregiver Present: No  Referring Practitioner: Torrie  PT Visit Information  PT Insurance Information: Medicare  Total # of Visits Approved: 12  Total # of Visits to Date: 9  Progress Note Due Date: 21  Subjective  Subjective:  Oh it's so much better  Pain Screening  Patient Currently in Pain: Denies  Vital Signs  Patient Currently in Pain: Denies       Treatment Activities:       Exercises  Exercise 1: Pulleys (5-10min)  5 minutes  Exercise 2: supine cane flexion and abduction with two pound weight  x 10  Exercise 3: Passive ROM and stretching of right arm  x15 minutes  Exercise 4: right towel wall slides in flexion and abduction  x 15 each  Exercise 5: supine snow theo  x 15  Exercise 6: Chin Tucks scapular retraction without red t-band  x 10  Exercise 7: AROM  standing flexion and abduction  x 10  Exercise 8: Balll roll up  x 5  with 5 second hold  ---- arm ergometer level 2  forward and backward alternating total 6 minutes  Exercise 9: Manual: Soft tissue mobilization to right axillary area x 15 minutes  Exercise 10: Patient/Caregiver Training  Exercise 11: 05/10 HEP issued        Assessment:   Conditions Requiring Skilled Therapeutic Intervention  Body structures, Functions, Activity limitations: Decreased functional mobility ; Decreased ROM; Decreased strength  Assessment: Soft tissue mobilization performed to right axillary area to decrease tightness and increase range of motion. Patient reports that overall  range of motion in right arm continues to increase. Instructed to keep stretching with right arm into abduction at home as much as possible. Patient tends to want to work in scaption plane when performing stretches and pulleys. Instructed to try to keep hand in front of her or at the side when performing wall slides. Will continue stretching to increase right arm range of motion and increasing resistance to increase strength as patient will tolerate.   Treatment Diagnosis: muscle weakness  REQUIRES PT FOLLOW UP: Yes  Discharge Recommendations: 2-3 sessions per week    Goals:  Short term goals  Time Frame for Short term goals: 2-4  Short term goal 1: Patient will be indep with HEP (05/10  HEP issued)  Short term goal 2: Patient will know the signs and symptoms of cellulitis to decrease risk of hospitilization  Short term goal 3: Patient will increase right shoulder abduction and flexion to atleat 110 degrees  Short term goal 4: Patient will know how to perform proper skin care to decrease risk of cellulitis  Short term goal 5: Patient will increase right UE to atleast 4/5  Long term goals  Time Frame for Long term goals : 4-6  Long term goal 1: Patient Lymphedema Life Impact Scale impairment score will decrease by 20%  Long term goal 2: Patient will be able to indep joceline and doff compression corectly to decrease risk of skin tears  Long term goal 3: Patient right UE ROM will increase to at least 150 degrees  Long term goal 4: Patient will have decrease tightness in right axillary area to improve ROM and decrease

## 2021-06-03 ENCOUNTER — APPOINTMENT (OUTPATIENT)
Dept: PHYSICAL THERAPY | Age: 71
End: 2021-06-03
Payer: MEDICARE

## 2021-06-03 ENCOUNTER — HOSPITAL ENCOUNTER (OUTPATIENT)
Dept: PHYSICAL THERAPY | Age: 71
Setting detail: THERAPIES SERIES
Discharge: HOME | End: 2021-06-03
Payer: MEDICARE

## 2021-06-03 PROCEDURE — 97530 THERAPEUTIC ACTIVITIES: CPT

## 2021-06-03 PROCEDURE — 97140 MANUAL THERAPY 1/> REGIONS: CPT

## 2021-06-03 PROCEDURE — 97110 THERAPEUTIC EXERCISES: CPT

## 2021-06-03 NOTE — PROGRESS NOTES
Physical Therapy  Discharge/ Daily Treatment Note  Date: 6/3/2021  Patient Name: Alexander Mccoy  MRN: [id number]     :   1950    Subjective:   General  Chart Reviewed: Yes  Additional Pertinent Hx:  is an 70y. o. female who presents with Stage 0 right UE lymhpedema. She has a history malignant neoplasm of the right breast and a right masectomy on 3/3/2021. Patient did have chemo and radiation but her  ports gets removed next week. She is somewhat knowledgeable about what lymphedema is. She does not show any skin changes or large difference in measurements when comparing both extremitie at this time but in March after her Covid vaccine she did experience swelling, redness and increase in skin temp that subsided over time. Patient reports she is very active now that the sun os out more often  Response To Previous Treatment: Patient with no complaints from previous session.   Family / Caregiver Present: No  Referring Practitioner: Torrie  PT Visit Information  PT Insurance Information: Medicare  Total # of Visits Approved: 12  Total # of Visits to Date: 10  Subjective  Subjective: Patient reports she can now do over head activities like washing her hair with no difficulty  Pain Screening  Patient Currently in Pain: Denies  Vital Signs  Patient Currently in Pain: Denies       Treatment Activities:                              AROM RUE (degrees)  RUE AROM : Exceptions  R Shoulder Flexion 0-180: 130  R Shoulder ABduction 0-180: 160  AROM LUE (degrees)  LUE AROM : WFL     Exercises  Exercise 1: Pulleys (5-10min)  5 minutes / educated on stretching with pulleys  Exercise 2: supine cane flexion and abduction with two pound weight  x 10not today  Exercise 3: Passive ROM and stretching of right arm  x15 minutesnot today  Exercise 4: right towel wall slides in flexion and abduction  x 15 each  Exercise 5: supine snow theo  x 15not today  Exercise 6: Chin Tucks scapular retraction without red t-band  x 10not today  Exercise 7: AROM  standing flexion and abduction  x 10  Exercise 8: Balll roll up  x 5  with 5 second hold  ---- arm ergometer level 2  forward and backward alternating total 6 minutes not today  Exercise 9: Manual: Soft tissue mobilization to right axillary area x 15 minutes  Exercise 10: Patient/Caregiver Training: training on resistance bands with additional HEP given for shoulder strength  Exercise 11: 05/10 HEP issued                               Assessment:   Conditions Requiring Skilled Therapeutic Intervention  Body structures, Functions, Activity limitations: Decreased functional mobility ; Decreased ROM; Decreased strength  Assessment:  is being seen for a PT discharge. She has made significant improvemnt with her RUE ROM. Her ROM improved from 90 degrees of flexion and abduction to 130 degrees of flexion(150 passively) and 160 abduction. Her Lymphedema Life Impact Scale score decreased from 34% to 3% indicating little to no impairment. Patient can now perform over head activities, like washing her hair, and raching into her cabinets. She still has a RUE strength impairment. She was given an additional HEP with resistance bands to address strength activities and ROM at home. Patient is very pleased with her progress and states she will call if she has any issues.   Treatment Diagnosis: muscle weakness  REQUIRES PT FOLLOW UP: Yes  Discharge Recommendations: 2-3 sessions per week    Goals:  Short term goals  Time Frame for Short term goals: 2-4  Short term goal 1: Patient will be indep with HEP met (05/10  HEP issued)  Short term goal 2: Patient will know the signs and symptoms of cellulitis to decrease risk of hospitilization met (6/3: able to verbalize signs)  Short term goal 3: Patient will increase right shoulder abduction and flexion to atleat 110 degrees met (6/3: flexion 130 AROM PROM 150 degrees, abduction: AROM 160)  Short term goal 4: Patient will know how to perform proper skin care to decrease risk of cellulitis met (6/3: performs skin care everyday, she ordered a specific lotion)  Short term goal 5: Patient will increase right UE to atleast 4/5 not met (6/3: 3-/5)  Long term goals  Time Frame for Long term goals : 4-6  Long term goal 1: Patient Lymphedema Life Impact Scale impairment score will decrease by 20% met (6/3: decreased by 31%)  Long term goal 2: Patient will be able to indep joceline and doff compression corectly to decrease risk of skin tears discontinue (discontinue)  Long term goal 3: Patient right UE ROM will increase to at least 150 degrees partially met (6/3: Abduction : 160 degrees flexion : 130 degrees)  Long term goal 4: Patient will have decrease tightness in right axillary area to improve ROM and decrease tenderness in area met (6/3: decreased tenderness and tightness but there is still tightness in the axilla)  Patient Goals   Patient goals : Improve strength and ROM  Plan:       Timed Code Treatment Minutes: 43 Minutes     Therapy Time   Individual Concurrent Group Co-treatment   Time In 1000         Time Out 1043         Minutes 43         Timed Code Treatment Minutes: 43 Minutes  Electronically signed by James Emmanuel PT on 6/3/2021 at 11:00 AM

## 2021-06-11 DIAGNOSIS — C50.811 MALIGNANT NEOPLASM OF OVERLAPPING SITES OF RIGHT FEMALE BREAST, UNSPECIFIED ESTROGEN RECEPTOR STATUS (HCC): Primary | ICD-10-CM

## 2021-06-14 ENCOUNTER — OFFICE VISIT (OUTPATIENT)
Dept: HEMATOLOGY | Age: 71
End: 2021-06-14
Payer: MEDICARE

## 2021-06-14 ENCOUNTER — HOSPITAL ENCOUNTER (OUTPATIENT)
Dept: INFUSION THERAPY | Age: 71
Discharge: HOME | End: 2021-06-14
Payer: MEDICARE

## 2021-06-14 VITALS
SYSTOLIC BLOOD PRESSURE: 118 MMHG | WEIGHT: 114.2 LBS | HEIGHT: 67 IN | BODY MASS INDEX: 17.92 KG/M2 | HEART RATE: 81 BPM | DIASTOLIC BLOOD PRESSURE: 80 MMHG | OXYGEN SATURATION: 95 %

## 2021-06-14 DIAGNOSIS — Z78.0 POST-MENOPAUSAL: ICD-10-CM

## 2021-06-14 DIAGNOSIS — C50.811 MALIGNANT NEOPLASM OF OVERLAPPING SITES OF RIGHT BREAST IN FEMALE, ESTROGEN RECEPTOR POSITIVE (HCC): Primary | ICD-10-CM

## 2021-06-14 DIAGNOSIS — C50.811 MALIGNANT NEOPLASM OF OVERLAPPING SITES OF RIGHT FEMALE BREAST, UNSPECIFIED ESTROGEN RECEPTOR STATUS (HCC): ICD-10-CM

## 2021-06-14 DIAGNOSIS — M85.80 OSTEOPENIA, UNSPECIFIED LOCATION: ICD-10-CM

## 2021-06-14 DIAGNOSIS — I89.0 LYMPHEDEMA OF RIGHT ARM: ICD-10-CM

## 2021-06-14 DIAGNOSIS — Z51.81 ENCOUNTER FOR MONITORING AROMATASE INHIBITOR THERAPY: ICD-10-CM

## 2021-06-14 DIAGNOSIS — Z17.0 MALIGNANT NEOPLASM OF OVERLAPPING SITES OF RIGHT BREAST IN FEMALE, ESTROGEN RECEPTOR POSITIVE (HCC): Primary | ICD-10-CM

## 2021-06-14 DIAGNOSIS — Z79.811 ENCOUNTER FOR MONITORING AROMATASE INHIBITOR THERAPY: ICD-10-CM

## 2021-06-14 DIAGNOSIS — Z71.89 CARE PLAN DISCUSSED WITH PATIENT: ICD-10-CM

## 2021-06-14 LAB
BASOPHILS ABSOLUTE: 0.03 K/UL (ref 0.01–0.08)
BASOPHILS RELATIVE PERCENT: 0.4 % (ref 0.1–1.2)
EOSINOPHILS ABSOLUTE: 0.04 K/UL (ref 0.04–0.54)
EOSINOPHILS RELATIVE PERCENT: 0.5 % (ref 0.7–7)
HCT VFR BLD CALC: 36.6 % (ref 34.1–44.9)
HEMOGLOBIN: 11.1 G/DL (ref 11.2–15.7)
LYMPHOCYTES ABSOLUTE: 0.95 K/UL (ref 1.18–3.74)
LYMPHOCYTES RELATIVE PERCENT: 11.2 % (ref 19.3–53.1)
MCH RBC QN AUTO: 32.4 PG (ref 25.6–32.2)
MCHC RBC AUTO-ENTMCNC: 30.3 G/DL (ref 32.3–35.5)
MCV RBC AUTO: 106.7 FL (ref 79.4–94.8)
MONOCYTES ABSOLUTE: 0.67 K/UL (ref 0.24–0.82)
MONOCYTES RELATIVE PERCENT: 7.9 % (ref 4.7–12.5)
NEUTROPHILS ABSOLUTE: 6.83 K/UL (ref 1.56–6.13)
NEUTROPHILS RELATIVE PERCENT: 80 % (ref 34–71.1)
PDW BLD-RTO: 15 % (ref 11.7–14.4)
PLATELET # BLD: 163 K/UL (ref 182–369)
PMV BLD AUTO: 10.1 FL (ref 7.4–10.4)
RBC # BLD: 3.43 M/UL (ref 3.93–5.22)
WBC # BLD: 8.52 K/UL (ref 3.98–10.04)

## 2021-06-14 PROCEDURE — 1036F TOBACCO NON-USER: CPT | Performed by: NURSE PRACTITIONER

## 2021-06-14 PROCEDURE — 36415 COLL VENOUS BLD VENIPUNCTURE: CPT

## 2021-06-14 PROCEDURE — 3017F COLORECTAL CA SCREEN DOC REV: CPT | Performed by: NURSE PRACTITIONER

## 2021-06-14 PROCEDURE — 99212 OFFICE O/P EST SF 10 MIN: CPT

## 2021-06-14 PROCEDURE — 1123F ACP DISCUSS/DSCN MKR DOCD: CPT | Performed by: NURSE PRACTITIONER

## 2021-06-14 PROCEDURE — 99214 OFFICE O/P EST MOD 30 MIN: CPT | Performed by: NURSE PRACTITIONER

## 2021-06-14 PROCEDURE — G8399 PT W/DXA RESULTS DOCUMENT: HCPCS | Performed by: NURSE PRACTITIONER

## 2021-06-14 PROCEDURE — 85025 COMPLETE CBC W/AUTO DIFF WBC: CPT

## 2021-06-14 PROCEDURE — G8427 DOCREV CUR MEDS BY ELIG CLIN: HCPCS | Performed by: NURSE PRACTITIONER

## 2021-06-14 PROCEDURE — G8419 CALC BMI OUT NRM PARAM NOF/U: HCPCS | Performed by: NURSE PRACTITIONER

## 2021-06-14 PROCEDURE — 1090F PRES/ABSN URINE INCON ASSESS: CPT | Performed by: NURSE PRACTITIONER

## 2021-06-14 PROCEDURE — 4040F PNEUMOC VAC/ADMIN/RCVD: CPT | Performed by: NURSE PRACTITIONER

## 2021-06-14 RX ORDER — IBANDRONATE SODIUM 150 MG/1
150 TABLET, FILM COATED ORAL
Qty: 3 TABLET | Refills: 3 | Status: SHIPPED | OUTPATIENT
Start: 2021-06-14 | End: 2022-07-19 | Stop reason: SDUPTHER

## 2021-06-14 RX ORDER — LETROZOLE 2.5 MG/1
2.5 TABLET, FILM COATED ORAL DAILY
Qty: 90 TABLET | Refills: 3 | Status: SHIPPED | OUTPATIENT
Start: 2021-06-14 | End: 2022-05-24 | Stop reason: SDUPTHER

## 2021-06-14 NOTE — PROGRESS NOTES
Progress Note      Pt Name: Aviva Asif  YOB: 1950  MRN: [id number]    Date of evaluation: 6/14/2021   History Obtained From:  patient, electronic medical record    CHIEF COMPLAINT:    Chief Complaint   Patient presents with    Follow-up    Breast Cancer     Femara    Other     Osteopenia/Boniva    Fatigue     HISTORY OF PRESENT ILLNESS:    Aviva Asif is a 65 y.o.  female who is currently being followed for a diagnosis of locally advanced HER-2 positive breast cancer. She received neoadjuvant chemotherapy with Taxotere carboplatin, Herceptin and Perjeta that provided a partial response. Carla Lewis underwent right mastectomy and lymph node dissection and obtained a complete pathologic response in the breast but residual disease remained in the axilla. She was therefore recommended to switch her adjuvant treatment to TDM 1 and completed 14 additional doses completed in January 2021. aCrla Lewis is current taking adjuvant endocrine therapy with letrozole, anticipate 10-year dosing through 4/10/2030. She returns today in scheduled follow-up for evaluation, side effect monitoring, lab monitoring and further recommendations. Carla Lewis presents today indicating that overall she is doing fairly well. She denies any left breast or right chest wall complaints. She reported being evaluated by the lymphedema clinic last week, no significant lymphedema noted today. Her Port-A-Cath was removed by Dr. Moose Alexander on 5/13/2021 without incident. Carla Lewis was last seen by Dr. Moose Alexander on 2/24/2021, I reviewed his progress note that reported no concerning findings and recommended she continue Singulair for 3-4 more months. Left breast and right chest wall examination today revealed  no dominant masses, no skin or nipple changes and no axillary adenopathy. No suspicious abnormality to suggest recurrent breast cancer.     CBC was reviewed today, is within acceptable limits and is documented Flor. Recommended neoadjuvant chemotherapy approach with dose dense Adriamycin/cyclophosphamide x4 cycles followed by 12 weekly cycles of Taxol 80 mg/m² with Herceptin and Perjeta. · 10/14/2019- consultation at [facility]. Recommended Taxotere, carboplatin Herceptin Perjeta. · 10/17/2019- 1/31/2020 completion of 6 cycles of of neoadjuvant chemotherapy with Taxotere carboplatin Herceptin Perjeta. · 2/14/2020-MRI breast.  Showed findings consistent with response to therapy. No residual enhancement of the right breast. Residual right axillary lymph node concerning for residual disease. · 2/21/2020- maintenance Herceptin/Perjeta to complete 1 year of treatment. · 3/03/2020-she underwent a right mastectomy/axillary dissection by Dr. Mare Sepulveda at [facility].  Primary tumor site identified with extensive fibrosis and nests of residual high-grade carcinoma present in lymphatic spaces. Tumor is present in a lymphatic space at the deep surgical excision margin. 3 out of 11 lymph nodes, positive for metastatic carcinoma, at least 1 metastasis greater than 2.0 mm . Largest focus of metastasis measures 0.5 cm in greatest dimension. AJCC STAGE: ypT0, pN1a, pMx  · 3/16/2020-repeat 2D echo EF 60%. · 4/20/2020-6/4/2020 Completion adjuvant radiation 6040 cGy  · 8/24/2020 [facility] Digital Screen Unilateral Left- No mammographic evidence of malignancy within the left breast. Continued annual unilateral screening mammography recommended.   BI-RADS Category 2.  · 2/1/21 2D echo: ejection fraction estimated at 55%.      Past Medical History:    Past Medical History:   Diagnosis Date    Malignant neoplasm of right breast in female, estrogen receptor positive ([facility] [address]     breast       Past Surgical History:    Past Surgical History:   Procedure Laterality Date    BREAST BIOPSY Right 09/05/2019    COLONOSCOPY N/A 10/1/2019    Dr Benito Pimentel, internal hemorrhoids-Grade 2 without bleeding stigmata and external hemorrhoids-TV x 1, AP x 1, 3 yr recall    MASTECTOMY Right 3/3/2020    RIGHT SIMPLE MASTECTOMY WITH SENTINEL NODE BIOPSY, FLAPS, PEC BLOCK performed by Zack Gallagher MD at [address] N/A 10/4/2019    PORT INSERTION WITH FLUORO performed by Zack Gallagher MD at [address] N/A 5/13/2021    PORT REMOVAL performed by Zack Gallagher MD at [address] ENDOSCOPY N/A 10/1/2019    Dr Jonathan Rossi hernia, HP gastric polyps       Current Medications:    Current Outpatient Medications   Medication Sig Dispense Refill    ibandronate (BONIVA) 150 MG tablet Take 1 tablet by mouth every 30 days Take one (1) tablet once per month in the morning with a full glass of water, on an empty stomach, and do not take anything else by mouth or lie down for the next 30 minutes. 3 tablet 3    letrozole (FEMARA) 2.5 MG tablet Take 1 tablet by mouth daily 90 tablet 3    montelukast (SINGULAIR) 10 MG tablet Take 1 tablet by mouth daily (Patient not taking: Reported on 6/14/2021) 30 tablet 5    Calcium Carbonate-Vitamin D (OYSTER SHELL CALCIUM/D) 500-200 MG-UNIT TABS Take 1 tablet by mouth daily 360 tablet 0     No current facility-administered medications for this visit. Allergies: No Known Allergies    Social History:    Social History     Tobacco Use    Smoking status: Never Smoker    Smokeless tobacco: Never Used   Vaping Use    Vaping Use: Never used   Substance Use Topics    Alcohol use: Never    Drug use: Never       Family History:   Family History   Problem Relation Age of Onset    Breast Cancer Mother 39    Colon Cancer Neg Hx     Colon Polyps Neg Hx        Vitals:  Vitals:    06/14/21 1032   BP: 118/80   Pulse: 81   SpO2: 95%   Weight: 114 lb 3.2 oz (51.8 kg)   Height: 5' 7\" (1.702 m)        Subjective   REVIEW OF SYSTEMS:   Review of Systems   Constitutional: Positive for fatigue. Negative for chills, diaphoresis and fever. Abdominal:      General: Bowel sounds are normal. There is no distension. Palpations: Abdomen is soft. There is no mass. Tenderness: There is no abdominal tenderness. There is no guarding or rebound. Hernia: No hernia is present. Musculoskeletal:         General: No tenderness or deformity. Cervical back: Normal range of motion and neck supple. Comments: Range of motion within normal limits x4 extremities   Skin:     General: Skin is warm. Coloration: Skin is not pale. Findings: No erythema or rash. Comments: Well-healed chest incision scar from Port-A-Cath    Neurological:      Mental Status: She is alert and oriented to person, place, and time. Cranial Nerves: No cranial nerve deficit. Coordination: Coordination normal.   Psychiatric:         Behavior: Behavior normal.         Thought Content: Thought content normal.         Labs reviewed today:  Lab Results   Component Value Date    WBC 8.52 06/14/2021    HGB 11.1 (L) 06/14/2021    HCT 36.6 06/14/2021    .7 (H) 06/14/2021     (L) 06/14/2021     Lab Results   Component Value Date    NEUTROABS 6.83 (H) 06/14/2021       ASSESSMENT/PLAN:      1. Invasive ductal carcinoma of the right breast, node positive, HER-2 positive. Currently taking adjuvant endocrine therapy with letrozole, anticipate 10-year dosing through 4/10/2030. Denies any left breast or right chest wall complaints. 8/24/2020 [facility] Digital Screen Unilateral Left- No mammographic evidence of malignancy within the left breast. Continued annual unilateral screening mammography recommended. BI-RADS Category 2.    - letrozole (FEMARA) 2.5 MG tablet; Take 1 tablet by mouth daily  Dispense: 90 tablet; Refill: 3  -Follow-up with Dr. Patsy Restrepo as recommended and scheduled mammogram    I discussed the importance of compliance with Femara.  Decreased compliance with adjuvant endocrine therapy has been linked to decreased survival. We discussed the various barriers and side effects of aromatase inhibitor therapy and ways to improve compliance. She acknowledged understanding. 2.Osteopenia, density study on 8/23/2019 documented a lumbar spine T score of -1.5 and bilateral hip T score of -1.0. Currently taking Boniva and calcium, reports compliant.  - ibandronate (BONIVA) 150 MG tablet; Take 1 tablet by mouth every 30 days Take one (1) tablet once per month in the morning with a full glass of water, on an empty stomach, and do not take anything else by mouth or lie down for the next 30 minutes. Dispense: 3 tablet; Refill: 3  - DEXA BONE DENSITY 2 SITES; Future    The use of bisphosphonates as adjuvant therapy should be considered for all postmenopausal women with early breast cancer who are deemed to be candidates for adjuvant therapy, according to a joint clinical practice guideline from [address] [place] [address] and the [address] [place] [address] ([facility] 3. Encounter for monitoring aromatase inhibitor therapy  -Denies hot flashes or significant change in arthralgias. 4. Care plan discussed with patient    5. Lymphedema of right arm, currently stable. Recommend continuing following with the lymphedema clinic and home exercises as recommended. I discussed all of the above findings included in the assessment and plan with the patient and the patient is in agreement to move forward with current recommendations/treatment. I have addressed all of their questions and concerns that were verbalized. FOLLOW UP:  1. Follow-up appointment given for 4 months, sooner if needed  2. Continue to follow with other medical providers as recommended    Discussed precautions related to [address] and being at increased risk. Discussed proper handwashing to be done frequently, limit exposure to other individuals and maintain social distancing of 6 feet.   Recommend contacting primary care provider if having respiratory symptoms for further recommendations and

## 2021-06-28 ENCOUNTER — APPOINTMENT (OUTPATIENT)
Dept: GENERAL RADIOLOGY | Facility: HOSPITAL | Age: 71
End: 2021-06-28

## 2021-06-28 ENCOUNTER — HOSPITAL ENCOUNTER (INPATIENT)
Facility: HOSPITAL | Age: 71
LOS: 2 days | Discharge: HOME | End: 2021-06-30
Attending: EMERGENCY MEDICINE | Admitting: INTERNAL MEDICINE

## 2021-06-28 DIAGNOSIS — L03.113 CELLULITIS OF RIGHT UPPER ARM: Primary | ICD-10-CM

## 2021-06-28 PROBLEM — I89.0 CHRONIC ACQUIRED LYMPHEDEMA: Status: ACTIVE | Noted: 2021-06-28

## 2021-06-28 LAB
ALBUMIN SERPL-MCNC: 3.5 G/DL (ref 3.5–5.2)
ALBUMIN/GLOB SERPL: 0.8 G/DL
ALP SERPL-CCNC: 81 U/L (ref 39–117)
ALT SERPL W P-5'-P-CCNC: 13 U/L (ref 1–33)
ANION GAP SERPL CALCULATED.3IONS-SCNC: 10 MMOL/L (ref 5–15)
AST SERPL-CCNC: 32 U/L (ref 1–32)
BASOPHILS # BLD AUTO: 0.03 10*3/MM3 (ref 0–0.2)
BASOPHILS NFR BLD AUTO: 0.3 % (ref 0–1.5)
BILIRUB SERPL-MCNC: 0.4 MG/DL (ref 0–1.2)
BILIRUB UR QL STRIP: NEGATIVE
BUN SERPL-MCNC: 24 MG/DL (ref 8–23)
BUN/CREAT SERPL: 31.6 (ref 7–25)
CALCIUM SPEC-SCNC: 9.4 MG/DL (ref 8.6–10.5)
CHLORIDE SERPL-SCNC: 98 MMOL/L (ref 98–107)
CLARITY UR: CLEAR
CO2 SERPL-SCNC: 27 MMOL/L (ref 22–29)
COLOR UR: YELLOW
CREAT SERPL-MCNC: 0.76 MG/DL (ref 0.57–1)
D DIMER PPP FEU-MCNC: 3.57 MG/L (FEU) (ref 0–0.5)
D-LACTATE SERPL-SCNC: 0.7 MMOL/L (ref 0.5–2)
DEPRECATED RDW RBC AUTO: 53.2 FL (ref 37–54)
EOSINOPHIL # BLD AUTO: 0.02 10*3/MM3 (ref 0–0.4)
EOSINOPHIL NFR BLD AUTO: 0.2 % (ref 0.3–6.2)
ERYTHROCYTE [DISTWIDTH] IN BLOOD BY AUTOMATED COUNT: 14.8 % (ref 12.3–15.4)
GFR SERPL CREATININE-BSD FRML MDRD: 75 ML/MIN/1.73
GLOBULIN UR ELPH-MCNC: 4.5 GM/DL
GLUCOSE SERPL-MCNC: 120 MG/DL (ref 65–99)
GLUCOSE UR STRIP-MCNC: NEGATIVE MG/DL
HCT VFR BLD AUTO: 33.1 % (ref 34–46.6)
HGB BLD-MCNC: 10.7 G/DL (ref 12–15.9)
HGB UR QL STRIP.AUTO: NEGATIVE
HOLD SPECIMEN: NORMAL
HOLD SPECIMEN: NORMAL
IMM GRANULOCYTES # BLD AUTO: 0.05 10*3/MM3 (ref 0–0.05)
IMM GRANULOCYTES NFR BLD AUTO: 0.4 % (ref 0–0.5)
KETONES UR QL STRIP: ABNORMAL
LEUKOCYTE ESTERASE UR QL STRIP.AUTO: NEGATIVE
LYMPHOCYTES # BLD AUTO: 0.55 10*3/MM3 (ref 0.7–3.1)
LYMPHOCYTES NFR BLD AUTO: 4.7 % (ref 19.6–45.3)
MCH RBC QN AUTO: 31.8 PG (ref 26.6–33)
MCHC RBC AUTO-ENTMCNC: 32.3 G/DL (ref 31.5–35.7)
MCV RBC AUTO: 98.2 FL (ref 79–97)
MONOCYTES # BLD AUTO: 0.53 10*3/MM3 (ref 0.1–0.9)
MONOCYTES NFR BLD AUTO: 4.5 % (ref 5–12)
NEUTROPHILS NFR BLD AUTO: 10.63 10*3/MM3 (ref 1.7–7)
NEUTROPHILS NFR BLD AUTO: 89.9 % (ref 42.7–76)
NITRITE UR QL STRIP: NEGATIVE
NRBC BLD AUTO-RTO: 0 /100 WBC (ref 0–0.2)
PH UR STRIP.AUTO: 6.5 [PH] (ref 5–8)
PLATELET # BLD AUTO: 184 10*3/MM3 (ref 140–450)
PMV BLD AUTO: 10 FL (ref 6–12)
POTASSIUM SERPL-SCNC: 3.9 MMOL/L (ref 3.5–5.2)
PROCALCITONIN SERPL-MCNC: 0.15 NG/ML (ref 0–0.25)
PROT SERPL-MCNC: 8 G/DL (ref 6–8.5)
PROT UR QL STRIP: ABNORMAL
RBC # BLD AUTO: 3.37 10*6/MM3 (ref 3.77–5.28)
SARS-COV-2 RNA PNL SPEC NAA+PROBE: NOT DETECTED
SODIUM SERPL-SCNC: 135 MMOL/L (ref 136–145)
SP GR UR STRIP: 1.02 (ref 1–1.03)
UROBILINOGEN UR QL STRIP: ABNORMAL
WBC # BLD AUTO: 11.81 10*3/MM3 (ref 3.4–10.8)
WHOLE BLOOD HOLD SPECIMEN: NORMAL

## 2021-06-28 PROCEDURE — 36415 COLL VENOUS BLD VENIPUNCTURE: CPT

## 2021-06-28 PROCEDURE — 87635 SARS-COV-2 COVID-19 AMP PRB: CPT | Performed by: EMERGENCY MEDICINE

## 2021-06-28 PROCEDURE — 25010000002 DEXAMETHASONE PER 1 MG: Performed by: INTERNAL MEDICINE

## 2021-06-28 PROCEDURE — 87040 BLOOD CULTURE FOR BACTERIA: CPT | Performed by: EMERGENCY MEDICINE

## 2021-06-28 PROCEDURE — 82607 VITAMIN B-12: CPT | Performed by: NURSE PRACTITIONER

## 2021-06-28 PROCEDURE — 99284 EMERGENCY DEPT VISIT MOD MDM: CPT

## 2021-06-28 PROCEDURE — 25010000002 PIPERACILLIN SOD-TAZOBACTAM PER 1 G: Performed by: EMERGENCY MEDICINE

## 2021-06-28 PROCEDURE — 83605 ASSAY OF LACTIC ACID: CPT | Performed by: EMERGENCY MEDICINE

## 2021-06-28 PROCEDURE — 71045 X-RAY EXAM CHEST 1 VIEW: CPT

## 2021-06-28 PROCEDURE — 84145 PROCALCITONIN (PCT): CPT | Performed by: INTERNAL MEDICINE

## 2021-06-28 PROCEDURE — 85025 COMPLETE CBC W/AUTO DIFF WBC: CPT | Performed by: EMERGENCY MEDICINE

## 2021-06-28 PROCEDURE — 81003 URINALYSIS AUTO W/O SCOPE: CPT | Performed by: EMERGENCY MEDICINE

## 2021-06-28 PROCEDURE — 85379 FIBRIN DEGRADATION QUANT: CPT | Performed by: INTERNAL MEDICINE

## 2021-06-28 PROCEDURE — 94799 UNLISTED PULMONARY SVC/PX: CPT

## 2021-06-28 PROCEDURE — 80053 COMPREHEN METABOLIC PANEL: CPT | Performed by: EMERGENCY MEDICINE

## 2021-06-28 PROCEDURE — 25010000002 ENOXAPARIN PER 10 MG: Performed by: INTERNAL MEDICINE

## 2021-06-28 PROCEDURE — 25010000002 VANCOMYCIN PER 500 MG: Performed by: EMERGENCY MEDICINE

## 2021-06-28 RX ORDER — ONDANSETRON 4 MG/1
4 TABLET, FILM COATED ORAL EVERY 6 HOURS PRN
Status: DISCONTINUED | OUTPATIENT
Start: 2021-06-28 | End: 2021-06-30 | Stop reason: HOSPADM

## 2021-06-28 RX ORDER — VANCOMYCIN HYDROCHLORIDE 1 G/200ML
1000 INJECTION, SOLUTION INTRAVENOUS ONCE
Status: COMPLETED | OUTPATIENT
Start: 2021-06-28 | End: 2021-06-28

## 2021-06-28 RX ORDER — SODIUM CHLORIDE 0.9 % (FLUSH) 0.9 %
10 SYRINGE (ML) INJECTION AS NEEDED
Status: DISCONTINUED | OUTPATIENT
Start: 2021-06-28 | End: 2021-06-30 | Stop reason: HOSPADM

## 2021-06-28 RX ORDER — ACETAMINOPHEN 325 MG/1
650 TABLET ORAL EVERY 4 HOURS PRN
Status: DISCONTINUED | OUTPATIENT
Start: 2021-06-28 | End: 2021-06-30 | Stop reason: HOSPADM

## 2021-06-28 RX ORDER — SODIUM CHLORIDE 0.9 % (FLUSH) 0.9 %
10 SYRINGE (ML) INJECTION EVERY 12 HOURS SCHEDULED
Status: DISCONTINUED | OUTPATIENT
Start: 2021-06-28 | End: 2021-06-30 | Stop reason: HOSPADM

## 2021-06-28 RX ORDER — ONDANSETRON 2 MG/ML
4 INJECTION INTRAMUSCULAR; INTRAVENOUS EVERY 6 HOURS PRN
Status: DISCONTINUED | OUTPATIENT
Start: 2021-06-28 | End: 2021-06-30 | Stop reason: HOSPADM

## 2021-06-28 RX ORDER — SODIUM CHLORIDE, SODIUM LACTATE, POTASSIUM CHLORIDE, CALCIUM CHLORIDE 600; 310; 30; 20 MG/100ML; MG/100ML; MG/100ML; MG/100ML
75 INJECTION, SOLUTION INTRAVENOUS CONTINUOUS
Status: DISCONTINUED | OUTPATIENT
Start: 2021-06-28 | End: 2021-06-30

## 2021-06-28 RX ORDER — DEXAMETHASONE SODIUM PHOSPHATE 10 MG/ML
10 INJECTION INTRAMUSCULAR; INTRAVENOUS ONCE
Status: COMPLETED | OUTPATIENT
Start: 2021-06-28 | End: 2021-06-28

## 2021-06-28 RX ORDER — ACETAMINOPHEN 500 MG
1000 TABLET ORAL ONCE
Status: COMPLETED | OUTPATIENT
Start: 2021-06-28 | End: 2021-06-28

## 2021-06-28 RX ORDER — VANCOMYCIN HYDROCHLORIDE 1 G/200ML
20 INJECTION, SOLUTION INTRAVENOUS EVERY 24 HOURS
Status: DISCONTINUED | OUTPATIENT
Start: 2021-06-29 | End: 2021-06-30

## 2021-06-28 RX ADMIN — DEXAMETHASONE SODIUM PHOSPHATE 10 MG: 10 INJECTION INTRAMUSCULAR; INTRAVENOUS at 17:59

## 2021-06-28 RX ADMIN — SODIUM CHLORIDE, POTASSIUM CHLORIDE, SODIUM LACTATE AND CALCIUM CHLORIDE 75 ML/HR: 600; 310; 30; 20 INJECTION, SOLUTION INTRAVENOUS at 19:42

## 2021-06-28 RX ADMIN — SODIUM CHLORIDE, PRESERVATIVE FREE 10 ML: 5 INJECTION INTRAVENOUS at 19:42

## 2021-06-28 RX ADMIN — VANCOMYCIN HYDROCHLORIDE 1000 MG: 1 INJECTION, SOLUTION INTRAVENOUS at 15:35

## 2021-06-28 RX ADMIN — ENOXAPARIN SODIUM 40 MG: 40 INJECTION SUBCUTANEOUS at 19:42

## 2021-06-28 RX ADMIN — ACETAMINOPHEN 1000 MG: 500 TABLET, FILM COATED ORAL at 14:40

## 2021-06-28 RX ADMIN — TAZOBACTAM SODIUM AND PIPERACILLIN SODIUM 3.38 G: 375; 3 INJECTION, SOLUTION INTRAVENOUS at 14:45

## 2021-06-29 ENCOUNTER — APPOINTMENT (OUTPATIENT)
Dept: ULTRASOUND IMAGING | Facility: HOSPITAL | Age: 71
End: 2021-06-29

## 2021-06-29 LAB
ANION GAP SERPL CALCULATED.3IONS-SCNC: 8 MMOL/L (ref 5–15)
BASOPHILS # BLD AUTO: 0.01 10*3/MM3 (ref 0–0.2)
BASOPHILS NFR BLD AUTO: 0.1 % (ref 0–1.5)
BUN SERPL-MCNC: 23 MG/DL (ref 8–23)
BUN/CREAT SERPL: 40.4 (ref 7–25)
CALCIUM SPEC-SCNC: 9.2 MG/DL (ref 8.6–10.5)
CHLORIDE SERPL-SCNC: 105 MMOL/L (ref 98–107)
CO2 SERPL-SCNC: 28 MMOL/L (ref 22–29)
CREAT SERPL-MCNC: 0.57 MG/DL (ref 0.57–1)
CRP SERPL-MCNC: 10.32 MG/DL (ref 0–0.5)
DEPRECATED RDW RBC AUTO: 53.3 FL (ref 37–54)
EOSINOPHIL # BLD AUTO: 0 10*3/MM3 (ref 0–0.4)
EOSINOPHIL NFR BLD AUTO: 0 % (ref 0.3–6.2)
ERYTHROCYTE [DISTWIDTH] IN BLOOD BY AUTOMATED COUNT: 14.6 % (ref 12.3–15.4)
GFR SERPL CREATININE-BSD FRML MDRD: 104 ML/MIN/1.73
GLUCOSE SERPL-MCNC: 168 MG/DL (ref 65–99)
HCT VFR BLD AUTO: 33.5 % (ref 34–46.6)
HGB BLD-MCNC: 10.7 G/DL (ref 12–15.9)
IMM GRANULOCYTES # BLD AUTO: 0.04 10*3/MM3 (ref 0–0.05)
IMM GRANULOCYTES NFR BLD AUTO: 0.4 % (ref 0–0.5)
LYMPHOCYTES # BLD AUTO: 0.55 10*3/MM3 (ref 0.7–3.1)
LYMPHOCYTES NFR BLD AUTO: 5.1 % (ref 19.6–45.3)
MAGNESIUM SERPL-MCNC: 2.2 MG/DL (ref 1.6–2.4)
MCH RBC QN AUTO: 31.7 PG (ref 26.6–33)
MCHC RBC AUTO-ENTMCNC: 31.9 G/DL (ref 31.5–35.7)
MCV RBC AUTO: 99.1 FL (ref 79–97)
MONOCYTES # BLD AUTO: 0.1 10*3/MM3 (ref 0.1–0.9)
MONOCYTES NFR BLD AUTO: 0.9 % (ref 5–12)
NEUTROPHILS NFR BLD AUTO: 10.13 10*3/MM3 (ref 1.7–7)
NEUTROPHILS NFR BLD AUTO: 93.5 % (ref 42.7–76)
NRBC BLD AUTO-RTO: 0 /100 WBC (ref 0–0.2)
PHOSPHATE SERPL-MCNC: 3.3 MG/DL (ref 2.5–4.5)
PLATELET # BLD AUTO: 177 10*3/MM3 (ref 140–450)
PMV BLD AUTO: 10.3 FL (ref 6–12)
POTASSIUM SERPL-SCNC: 3.7 MMOL/L (ref 3.5–5.2)
PROCALCITONIN SERPL-MCNC: 0.29 NG/ML (ref 0–0.25)
RBC # BLD AUTO: 3.38 10*6/MM3 (ref 3.77–5.28)
SODIUM SERPL-SCNC: 141 MMOL/L (ref 136–145)
TSH SERPL DL<=0.05 MIU/L-ACNC: 0.84 UIU/ML (ref 0.27–4.2)
VIT B12 BLD-MCNC: 459 PG/ML (ref 211–946)
WBC # BLD AUTO: 10.83 10*3/MM3 (ref 3.4–10.8)

## 2021-06-29 PROCEDURE — 25010000002 ENOXAPARIN PER 10 MG: Performed by: INTERNAL MEDICINE

## 2021-06-29 PROCEDURE — 25010000002 VANCOMYCIN PER 500 MG: Performed by: INTERNAL MEDICINE

## 2021-06-29 PROCEDURE — 80048 BASIC METABOLIC PNL TOTAL CA: CPT | Performed by: INTERNAL MEDICINE

## 2021-06-29 PROCEDURE — 86140 C-REACTIVE PROTEIN: CPT | Performed by: INTERNAL MEDICINE

## 2021-06-29 PROCEDURE — 93971 EXTREMITY STUDY: CPT | Performed by: SURGERY

## 2021-06-29 PROCEDURE — 83735 ASSAY OF MAGNESIUM: CPT | Performed by: INTERNAL MEDICINE

## 2021-06-29 PROCEDURE — 84100 ASSAY OF PHOSPHORUS: CPT | Performed by: INTERNAL MEDICINE

## 2021-06-29 PROCEDURE — 93971 EXTREMITY STUDY: CPT

## 2021-06-29 PROCEDURE — 85025 COMPLETE CBC W/AUTO DIFF WBC: CPT | Performed by: INTERNAL MEDICINE

## 2021-06-29 PROCEDURE — 84443 ASSAY THYROID STIM HORMONE: CPT | Performed by: INTERNAL MEDICINE

## 2021-06-29 PROCEDURE — 84145 PROCALCITONIN (PCT): CPT | Performed by: INTERNAL MEDICINE

## 2021-06-29 RX ORDER — LETROZOLE 2.5 MG/1
2.5 TABLET, FILM COATED ORAL NIGHTLY
Status: DISCONTINUED | OUTPATIENT
Start: 2021-06-29 | End: 2021-06-30 | Stop reason: HOSPADM

## 2021-06-29 RX ADMIN — SODIUM CHLORIDE, PRESERVATIVE FREE 10 ML: 5 INJECTION INTRAVENOUS at 09:19

## 2021-06-29 RX ADMIN — ENOXAPARIN SODIUM 40 MG: 40 INJECTION SUBCUTANEOUS at 18:36

## 2021-06-29 RX ADMIN — VANCOMYCIN HYDROCHLORIDE 1000 MG: 1 INJECTION, SOLUTION INTRAVENOUS at 05:01

## 2021-06-29 RX ADMIN — SODIUM CHLORIDE, PRESERVATIVE FREE 10 ML: 5 INJECTION INTRAVENOUS at 21:03

## 2021-06-29 RX ADMIN — LETROZOLE 2.5 MG: 2.5 TABLET ORAL at 21:03

## 2021-06-30 VITALS
RESPIRATION RATE: 18 BRPM | SYSTOLIC BLOOD PRESSURE: 122 MMHG | WEIGHT: 114.8 LBS | DIASTOLIC BLOOD PRESSURE: 78 MMHG | HEART RATE: 69 BPM | HEIGHT: 67 IN | TEMPERATURE: 97.9 F | OXYGEN SATURATION: 94 % | BODY MASS INDEX: 18.02 KG/M2

## 2021-06-30 LAB
ANION GAP SERPL CALCULATED.3IONS-SCNC: 8 MMOL/L (ref 5–15)
BUN SERPL-MCNC: 29 MG/DL (ref 8–23)
BUN/CREAT SERPL: 44.6 (ref 7–25)
CALCIUM SPEC-SCNC: 9 MG/DL (ref 8.6–10.5)
CHLORIDE SERPL-SCNC: 104 MMOL/L (ref 98–107)
CO2 SERPL-SCNC: 27 MMOL/L (ref 22–29)
CREAT SERPL-MCNC: 0.65 MG/DL (ref 0.57–1)
CRP SERPL-MCNC: 5.78 MG/DL (ref 0–0.5)
DEPRECATED RDW RBC AUTO: 53.8 FL (ref 37–54)
ERYTHROCYTE [DISTWIDTH] IN BLOOD BY AUTOMATED COUNT: 14.6 % (ref 12.3–15.4)
FERRITIN SERPL-MCNC: 218.2 NG/ML (ref 13–150)
FOLATE SERPL-MCNC: 10.9 NG/ML (ref 4.78–24.2)
GFR SERPL CREATININE-BSD FRML MDRD: 90 ML/MIN/1.73
GLUCOSE SERPL-MCNC: 93 MG/DL (ref 65–99)
HCT VFR BLD AUTO: 30.1 % (ref 34–46.6)
HGB BLD-MCNC: 9.5 G/DL (ref 12–15.9)
IRON 24H UR-MRATE: 51 MCG/DL (ref 37–145)
IRON SATN MFR SERPL: 25 % (ref 20–50)
MCH RBC QN AUTO: 31.4 PG (ref 26.6–33)
MCHC RBC AUTO-ENTMCNC: 31.6 G/DL (ref 31.5–35.7)
MCV RBC AUTO: 99.3 FL (ref 79–97)
PLATELET # BLD AUTO: 182 10*3/MM3 (ref 140–450)
PMV BLD AUTO: 10.2 FL (ref 6–12)
POTASSIUM SERPL-SCNC: 3.3 MMOL/L (ref 3.5–5.2)
RBC # BLD AUTO: 3.03 10*6/MM3 (ref 3.77–5.28)
SODIUM SERPL-SCNC: 139 MMOL/L (ref 136–145)
TIBC SERPL-MCNC: 207 MCG/DL (ref 298–536)
TRANSFERRIN SERPL-MCNC: 139 MG/DL (ref 200–360)
WBC # BLD AUTO: 8.64 10*3/MM3 (ref 3.4–10.8)

## 2021-06-30 PROCEDURE — 80048 BASIC METABOLIC PNL TOTAL CA: CPT | Performed by: INTERNAL MEDICINE

## 2021-06-30 PROCEDURE — 84466 ASSAY OF TRANSFERRIN: CPT | Performed by: NURSE PRACTITIONER

## 2021-06-30 PROCEDURE — 85027 COMPLETE CBC AUTOMATED: CPT | Performed by: INTERNAL MEDICINE

## 2021-06-30 PROCEDURE — 82746 ASSAY OF FOLIC ACID SERUM: CPT | Performed by: NURSE PRACTITIONER

## 2021-06-30 PROCEDURE — 83540 ASSAY OF IRON: CPT | Performed by: NURSE PRACTITIONER

## 2021-06-30 PROCEDURE — 82728 ASSAY OF FERRITIN: CPT | Performed by: NURSE PRACTITIONER

## 2021-06-30 PROCEDURE — 25010000002 VANCOMYCIN PER 500 MG: Performed by: INTERNAL MEDICINE

## 2021-06-30 PROCEDURE — 86140 C-REACTIVE PROTEIN: CPT | Performed by: INTERNAL MEDICINE

## 2021-06-30 RX ORDER — CLINDAMYCIN HYDROCHLORIDE 150 MG/1
450 CAPSULE ORAL EVERY 8 HOURS SCHEDULED
Qty: 63 CAPSULE | Refills: 0 | Status: SHIPPED | OUTPATIENT
Start: 2021-06-30 | End: 2021-07-07

## 2021-06-30 RX ORDER — SACCHAROMYCES BOULARDII 250 MG
500 CAPSULE ORAL 2 TIMES DAILY
Status: DISCONTINUED | OUTPATIENT
Start: 2021-06-30 | End: 2021-06-30 | Stop reason: HOSPADM

## 2021-06-30 RX ORDER — POTASSIUM CHLORIDE 750 MG/1
40 CAPSULE, EXTENDED RELEASE ORAL ONCE
Status: COMPLETED | OUTPATIENT
Start: 2021-06-30 | End: 2021-06-30

## 2021-06-30 RX ORDER — SACCHAROMYCES BOULARDII 250 MG
500 CAPSULE ORAL 2 TIMES DAILY
Qty: 28 CAPSULE | Refills: 0 | Status: SHIPPED | OUTPATIENT
Start: 2021-06-30 | End: 2021-06-30

## 2021-06-30 RX ORDER — SACCHAROMYCES BOULARDII 250 MG
500 CAPSULE ORAL 2 TIMES DAILY
Qty: 28 CAPSULE | Refills: 0 | Status: SHIPPED | OUTPATIENT
Start: 2021-06-30 | End: 2021-07-07

## 2021-06-30 RX ORDER — CLINDAMYCIN HYDROCHLORIDE 150 MG/1
450 CAPSULE ORAL EVERY 8 HOURS SCHEDULED
Qty: 63 CAPSULE | Refills: 0 | Status: SHIPPED | OUTPATIENT
Start: 2021-06-30 | End: 2021-06-30

## 2021-06-30 RX ORDER — CLINDAMYCIN HYDROCHLORIDE 150 MG/1
450 CAPSULE ORAL EVERY 8 HOURS SCHEDULED
Status: DISCONTINUED | OUTPATIENT
Start: 2021-06-30 | End: 2021-06-30 | Stop reason: HOSPADM

## 2021-06-30 RX ADMIN — VANCOMYCIN HYDROCHLORIDE 1000 MG: 1 INJECTION, SOLUTION INTRAVENOUS at 06:20

## 2021-06-30 RX ADMIN — POTASSIUM CHLORIDE 40 MEQ: 750 CAPSULE, EXTENDED RELEASE ORAL at 11:51

## 2021-06-30 RX ADMIN — SODIUM CHLORIDE, PRESERVATIVE FREE 10 ML: 5 INJECTION INTRAVENOUS at 08:00

## 2021-06-30 RX ADMIN — Medication 500 MG: at 11:51

## 2021-07-01 ENCOUNTER — TELEPHONE (OUTPATIENT)
Dept: INTERNAL MEDICINE | Age: 71
End: 2021-07-01

## 2021-07-01 ENCOUNTER — READMISSION MANAGEMENT (OUTPATIENT)
Dept: CALL CENTER | Facility: HOSPITAL | Age: 71
End: 2021-07-01

## 2021-07-01 NOTE — OUTREACH NOTE
Prep Survey      Responses   [facility]  [place]   Is LACE score < 7 ?  No   Emergency Room discharge w/ pulse ox?  No   Eligibility  Readm Mgmt   Discharge diagnosis  Cellulitis of right upper arm   Does the patient have one of the following disease processes/diagnoses(primary or secondary)?  Other   Does the patient have Home health ordered?  No   Is there a DME ordered?  No   Prep survey completed?  Yes          Leeann Bernstein RN

## 2021-07-01 NOTE — TELEPHONE ENCOUNTER
[facility] Transitions Initial Follow Up Call    Outreach made within 2 business days of discharge: Yes    Patient: Yoli Morocho   Patient : 1950  MRN: [id number]    Reason for Admission: Admitted 21 for edema, weakness, fatigue    Discharge Date: 21  Final Discharge Diagnoses: Active Hospital Problems   Diagnosis    Cellulitis of right upper arm    Chronic acquired lymphedema s/p mastectomy    History of radiation therapy    S/P lymph node biopsy    S/P right mastectomy    Malignant neoplasm of overlapping sites of right female breast (CMS/HCC)   · HER-2 IDC right breast, 2019  · [id number]  · ER 3%, CT 0%, HER-2/lea IHC 3+  · Mammaprint high risk Basal-like  · HER-2 FISH Positive   HER2-positive carcinoma of right breast (CMS/HCC)   breast     Spoke with: patient     Discharge department/facility: [facility] Patient Contact:  Was patient able to fill all prescriptions: Yes  Was patient instructed to bring all medications to the follow-up visit: Yes  Is patient taking all medications as directed in the discharge summary? Yes  Does patient understand their discharge instructions: Yes  Does patient have questions or concerns that need addressed prior to 7-14 day follow up office visit: no    I spoke with Mrs. Vargas Rocha, she states she was discharge from [facility] yesterday and doing much better. She states her arm is much improved. The swelling has gone down and she reports her arm is now skin colored instead of red. She states she was discharged with Clindamycin 150mg three times daily. She is taking this as directed and tolerating it well. She is taking a pribiotic as well. Her bowels and bladder are moving as usual. Her appetite is good. She states she is eating yogurt daily. She denies any needs at this time and will follow up next week as scheduled.  I have advised her we do have after hours providers available if she were to have any issues

## 2021-07-02 ENCOUNTER — READMISSION MANAGEMENT (OUTPATIENT)
Dept: CALL CENTER | Facility: HOSPITAL | Age: 71
End: 2021-07-02

## 2021-07-02 NOTE — OUTREACH NOTE
Medical Week 1 Survey      Responses   [facility] patient discharged from?  [place]   Does the patient have one of the following disease processes/diagnoses(primary or secondary)?  Other   Week 1 attempt successful?  Yes   Call start time  1516   Call end time  1520   Discharge diagnosis  Cellulitis of right upper arm   Meds reviewed with patient/caregiver?  Yes   Is the patient having any side effects they believe may be caused by any medication additions or changes?  No   Does the patient have all medications ordered at discharge?  Yes   Is the patient taking all medications as directed (includes completed medication regime)?  Yes   Does the patient have a primary care provider?   Yes   Does the patient have an appointment with their PCP within 7 days of discharge?  Yes   Comments regarding PCP  PCP APPOINTMENT IS 7/7/21   Has the patient kept scheduled appointments due by today?  N/A   Has home health visited the patient within 72 hours of discharge?  N/A   Psychosocial issues?  No   Did the patient receive a copy of their discharge instructions?  Yes   Nursing interventions  Reviewed instructions with patient   What is the patient's perception of their health status since discharge?  Improving   Is the patient/caregiver able to teach back signs and symptoms related to disease process for when to call PCP?  Yes   Is the patient/caregiver able to teach back signs and symptoms related to disease process for when to call 911?  Yes   Is the patient/caregiver able to teach back the hierarchy of who to call/visit for symptoms/problems? PCP, Specialist, Home health nurse, Urgent Care, ED, 911  Yes   If the patient is a current smoker, are they able to teach back resources for cessation?  Not a smoker   Week 1 call completed?  Yes          Yaima Salguero LPN

## 2021-07-03 LAB
BACTERIA SPEC AEROBE CULT: NORMAL
BACTERIA SPEC AEROBE CULT: NORMAL

## 2021-07-06 PROBLEM — I89.0 CHRONIC ACQUIRED LYMPHEDEMA: Status: ACTIVE | Noted: 2021-06-28

## 2021-07-06 PROBLEM — L03.113 CELLULITIS OF RIGHT UPPER ARM: Status: ACTIVE | Noted: 2021-06-28

## 2021-07-06 ASSESSMENT — ENCOUNTER SYMPTOMS
DIARRHEA: 0
EYE PAIN: 0
SORE THROAT: 0
SHORTNESS OF BREATH: 0
ABDOMINAL PAIN: 0
GASTROINTESTINAL NEGATIVE: 1
WHEEZING: 0
CONSTIPATION: 0
VOMITING: 0
NAUSEA: 0
EYE REDNESS: 0
RESPIRATORY NEGATIVE: 1
BLOOD IN STOOL: 0
BACK PAIN: 0
EYES NEGATIVE: 1
EYE DISCHARGE: 0
COUGH: 0

## 2021-07-06 NOTE — PROGRESS NOTES
Post-Discharge Transitional Care Management Services or Hospital Follow Up      Tommy Aguayo   YOB: 1950    Date of Office Visit:  7/7/2021  Date of Hospital Admission: 6/28/2021  Date of Hospital Discharge: 6/30/2021  Readmission Risk Score(high >=14%. Medium >=10%):No data recorded    Care management risk score Rising risk (score 2-5) and Complex Care (Scores >=6): 0     Non face to face  following discharge, date last encounter closed (first attempt may have been earlier): 7/1/2021  2:04 PM 7/1/2021  2:04 PM    Call initiated 2 business days of discharge: Yes     Patient Active Problem List   Diagnosis    Fatigue    Right bundle branch block (RBBB)    Menopause    Other specified disorders of bone density and structure, right upper arm     Macrocytic anemia    Malignant neoplasm of overlapping sites of right female breast ([facility] [address]    Malignant neoplasm of unspecified site of unspecified female breast ([facility] [address]    Preop testing    S/P right mastectomy and AND 3/3/2020    Hypovitaminosis D    Prediabetes    Osteopenia of hip    Regional lymph node metastasis present ([facility] [address]    Lymphedema of right arm    Right arm cellulitis    Chronic acquired lymphedema       No Known Allergies    Medications listed as ordered at the time of discharge from hospital   [facility] Medication Instructions CRISSY:    Printed on:07/07/21 1004   Medication Information                      Calcium Carbonate-Vitamin D (OYSTER SHELL CALCIUM/D) 500-200 MG-UNIT TABS  Take 1 tablet by mouth daily             ibandronate (BONIVA) 150 MG tablet  Take 1 tablet by mouth every 30 days Take one (1) tablet once per month in the morning with a full glass of water, on an empty stomach, and do not take anything else by mouth or lie down for the next 30 minutes.              letrozole (FEMARA) 2.5 MG tablet  Take 1 tablet by mouth daily                   Medications marked \"taking\" at this time  Outpatient Medications Marked as Taking for the 7/7/21 encounter (Office Visit) with Marina Pena MD   Medication Sig Dispense Refill    ibandronate (BONIVA) 150 MG tablet Take 1 tablet by mouth every 30 days Take one (1) tablet once per month in the morning with a full glass of water, on an empty stomach, and do not take anything else by mouth or lie down for the next 30 minutes. 3 tablet 3    letrozole (FEMARA) 2.5 MG tablet Take 1 tablet by mouth daily 90 tablet 3    Calcium Carbonate-Vitamin D (OYSTER SHELL CALCIUM/D) 500-200 MG-UNIT TABS Take 1 tablet by mouth daily 360 tablet 0        Medications patient taking as of now reconciled against medications ordered at time of hospital discharge: Yes    Chief Complaint   Patient presents with   [address] from Hospital       HPI  70-year-old female seen for hospital follow-up visit  Patient was admitted due to cellulitis of the right upper extremity extending to the chest wall was treated and released and is presenting here for follow-up  Inpatient course: Discharge summary reviewed- see chart. Interval history/Current status:   Patient has a history of acquired lymphedema history of right breast cancer status post mastectomy with lymph node dissection with acquired lymphedema  History of osteopenia on antiresorptive therapy  Prediabetes      Review of Systems   Constitutional: Negative for activity change, chills, fatigue and fever. HENT: Negative for hearing loss, postnasal drip, rhinorrhea, sinus pain and trouble swallowing. Eyes: Negative for visual disturbance. Respiratory: Negative for cough, chest tightness, shortness of breath and wheezing. Cardiovascular: Negative for chest pain, palpitations and leg swelling. Gastrointestinal: Negative for abdominal pain, blood in stool, constipation, diarrhea and vomiting. Endocrine: Negative for cold intolerance. Genitourinary: Negative for frequency and urgency.    Musculoskeletal: Negative for arthralgias, back pain and myalgias. R arm swelling   Allergic/Immunologic: Negative for environmental allergies. Neurological: Negative for light-headedness, numbness and headaches. Vitals:    07/07/21 0944   BP: 106/60   Site: Left Upper Arm   Position: Sitting   Cuff Size: Medium Adult   Pulse: 81   SpO2: 92%   Weight: 119 lb 3.2 oz (54.1 kg)   Height: 5' 7\" (1.702 m)     Body mass index is 18.67 kg/m². Wt Readings from Last 3 Encounters:   07/07/21 119 lb 3.2 oz (54.1 kg)   06/14/21 114 lb 3.2 oz (51.8 kg)   05/13/21 120 lb (54.4 kg)     BP Readings from Last 3 Encounters:   07/07/21 106/60   06/14/21 118/80   05/13/21 (!) 147/76       Physical Exam  Vitals reviewed. Constitutional:       Appearance: Normal appearance. She is normal weight. HENT:      Head: Normocephalic. Eyes:      Extraocular Movements: Extraocular movements intact. Conjunctiva/sclera: Conjunctivae normal.   Neck:      Thyroid: No thyroid mass or thyroid tenderness. Cardiovascular:      Rate and Rhythm: Normal rate and regular rhythm. Pulses: Normal pulses. Heart sounds: Normal heart sounds, S1 normal and S2 normal.   Pulmonary:      Effort: Pulmonary effort is normal.      Breath sounds: Normal breath sounds and air entry. Chest:      Breasts:         Right: Absent. Abdominal:      General: Abdomen is flat. Bowel sounds are normal.      Palpations: Abdomen is soft. Musculoskeletal:      Right shoulder: No swelling, deformity, effusion, tenderness or bony tenderness. Decreased range of motion. Cervical back: Full passive range of motion without pain and normal range of motion. Right lower leg: No edema. Left lower leg: No edema. Comments: R mid arm circ 10 inches  L arm mid circ 8.5 inches   Neurological:      Mental Status: She is alert.              Assessment/Plan:  Problem List        Other    Right arm cellulitis      Well-controlled, continue current medications and continue Lymphedema exrcises, advised to monitor, advised Pneumococcal vaccine today             1. Right arm cellulitis  **        Medical Decision Making: moderate complexity

## 2021-07-07 ENCOUNTER — OFFICE VISIT (OUTPATIENT)
Dept: INTERNAL MEDICINE | Age: 71
End: 2021-07-07
Payer: MEDICARE

## 2021-07-07 VITALS
HEART RATE: 81 BPM | OXYGEN SATURATION: 92 % | BODY MASS INDEX: 18.71 KG/M2 | SYSTOLIC BLOOD PRESSURE: 106 MMHG | WEIGHT: 119.2 LBS | HEIGHT: 67 IN | DIASTOLIC BLOOD PRESSURE: 60 MMHG

## 2021-07-07 DIAGNOSIS — Z23 NEED FOR 23-POLYVALENT PNEUMOCOCCAL POLYSACCHARIDE VACCINE: Primary | ICD-10-CM

## 2021-07-07 DIAGNOSIS — L03.113 RIGHT ARM CELLULITIS: ICD-10-CM

## 2021-07-07 PROCEDURE — G0009 ADMIN PNEUMOCOCCAL VACCINE: HCPCS | Performed by: INTERNAL MEDICINE

## 2021-07-07 PROCEDURE — 99495 TRANSJ CARE MGMT MOD F2F 14D: CPT | Performed by: INTERNAL MEDICINE

## 2021-07-07 PROCEDURE — 1111F DSCHRG MED/CURRENT MED MERGE: CPT | Performed by: INTERNAL MEDICINE

## 2021-07-07 PROCEDURE — 90732 PPSV23 VACC 2 YRS+ SUBQ/IM: CPT | Performed by: INTERNAL MEDICINE

## 2021-07-07 SDOH — ECONOMIC STABILITY: FOOD INSECURITY: WITHIN THE PAST 12 MONTHS, THE FOOD YOU BOUGHT JUST DIDN'T LAST AND YOU DIDN'T HAVE MONEY TO GET MORE.: NEVER TRUE

## 2021-07-07 SDOH — ECONOMIC STABILITY: FOOD INSECURITY: WITHIN THE PAST 12 MONTHS, YOU WORRIED THAT YOUR FOOD WOULD RUN OUT BEFORE YOU GOT MONEY TO BUY MORE.: NEVER TRUE

## 2021-07-07 ASSESSMENT — PATIENT HEALTH QUESTIONNAIRE - PHQ9
SUM OF ALL RESPONSES TO PHQ QUESTIONS 1-9: 0
2. FEELING DOWN, DEPRESSED OR HOPELESS: 0
1. LITTLE INTEREST OR PLEASURE IN DOING THINGS: 0
SUM OF ALL RESPONSES TO PHQ QUESTIONS 1-9: 0
SUM OF ALL RESPONSES TO PHQ QUESTIONS 1-9: 0
SUM OF ALL RESPONSES TO PHQ9 QUESTIONS 1 & 2: 0

## 2021-07-07 ASSESSMENT — ENCOUNTER SYMPTOMS
CONSTIPATION: 0
CHEST TIGHTNESS: 0
ABDOMINAL PAIN: 0
SINUS PAIN: 0
VOMITING: 0
RHINORRHEA: 0
SHORTNESS OF BREATH: 0
TROUBLE SWALLOWING: 0
BACK PAIN: 0
DIARRHEA: 0
COUGH: 0
BLOOD IN STOOL: 0
WHEEZING: 0

## 2021-07-07 ASSESSMENT — SOCIAL DETERMINANTS OF HEALTH (SDOH): HOW HARD IS IT FOR YOU TO PAY FOR THE VERY BASICS LIKE FOOD, HOUSING, MEDICAL CARE, AND HEATING?: NOT HARD AT ALL

## 2021-07-07 NOTE — PROGRESS NOTES
After obtaining consent, and per orders of Dr. Tip Shay, injection of Pneumococcal 23 given in Left deltoid by Brittanie Dietz MA. Patient instructed to remain in clinic for 20 minutes afterwards, and to report any adverse reaction to me immediately.

## 2021-07-07 NOTE — ASSESSMENT & PLAN NOTE
Well-controlled, continue current medications and continue Lymphedema exrcises, advised to monitor, advised Pneumococcal vaccine today

## 2021-07-09 ENCOUNTER — READMISSION MANAGEMENT (OUTPATIENT)
Dept: CALL CENTER | Facility: HOSPITAL | Age: 71
End: 2021-07-09

## 2021-07-09 NOTE — OUTREACH NOTE
Medical Week 2 Survey      Responses   [facility] patient discharged from?  [place]   Does the patient have one of the following disease processes/diagnoses(primary or secondary)?  Other   Week 2 attempt successful?  Yes   Call start time  1341   Discharge diagnosis  Cellulitis of right upper arm   Call end time  1344   Meds reviewed with patient/caregiver?  Yes   Is the patient having any side effects they believe may be caused by any medication additions or changes?  No   Does the patient have all medications ordered at discharge?  Yes   Is the patient taking all medications as directed (includes completed medication regime)?  Yes   Medication comments  Finished antibiotics    Comments regarding appointments  Saw PCP thikiko past Wed.    Does the patient have a primary care provider?   Yes   Does the patient have an appointment with their PCP within 7 days of discharge?  Yes   Has the patient kept scheduled appointments due by today?  N/A   Did the patient receive a copy of their discharge instructions?  Yes   Nursing interventions  Reviewed instructions with patient   What is the patient's perception of their health status since discharge?  Improving   Is the patient/caregiver able to teach back signs and symptoms related to disease process for when to call PCP?  Yes   Is the patient/caregiver able to teach back signs and symptoms related to disease process for when to call 911?  Yes   Is the patient/caregiver able to teach back the hierarchy of who to call/visit for symptoms/problems? PCP, Specialist, Home health nurse, Urgent Care, ED, 911  Yes   If the patient is a current smoker, are they able to teach back resources for cessation?  Not a smoker   Week 2 Call Completed?  Yes   Wrap up additional comments  Caller has no needs identified          Lilia Campbell RN

## 2021-07-20 ENCOUNTER — READMISSION MANAGEMENT (OUTPATIENT)
Dept: CALL CENTER | Facility: HOSPITAL | Age: 71
End: 2021-07-20

## 2021-07-20 NOTE — OUTREACH NOTE
Medical Week 3 Survey      Responses   [facility] patient discharged from?  [facility]   Does the patient have one of the following disease processes/diagnoses(primary or secondary)?  Other   Week 3 attempt successful?  Yes   Call start time  1405   Call end time  1406   Discharge diagnosis  Cellulitis of right upper arm   Meds reviewed with patient/caregiver?  Yes   Is the patient having any side effects they believe may be caused by any medication additions or changes?  No   Does the patient have all medications ordered at discharge?  Yes   Is the patient taking all medications as directed (includes completed medication regime)?  Yes   Does the patient have a primary care provider?   Yes   Does the patient have an appointment with their PCP within 7 days of discharge?  Yes   Has the patient kept scheduled appointments due by today?  Yes   Has home health visited the patient within 72 hours of discharge?  N/A   Psychosocial issues?  No   Did the patient receive a copy of their discharge instructions?  Yes   Nursing interventions  Reviewed instructions with patient   What is the patient's perception of their health status since discharge?  Improving   Is the patient/caregiver able to teach back signs and symptoms related to disease process for when to call PCP?  Yes   Is the patient/caregiver able to teach back signs and symptoms related to disease process for when to call 911?  Yes   Is the patient/caregiver able to teach back the hierarchy of who to call/visit for symptoms/problems? PCP, Specialist, Home health nurse, Urgent Care, ED, 911  Yes   If the patient is a current smoker, are they able to teach back resources for cessation?  Not a smoker   Additional teach back comments  Arm is healed, eating and sleeping well.   Week 3 Call Completed?  Yes   Graduated  Yes   Did the patient feel the follow up calls were helpful during their recovery period?  Yes   Was the number of calls appropriate?  Yes   Wrap up  additional comments  No questions or issues at this time.          Katia Knott RN

## 2021-07-30 ENCOUNTER — OFFICE VISIT (OUTPATIENT)
Dept: HEMATOLOGY | Age: 71
End: 2021-07-30
Payer: MEDICARE

## 2021-07-30 ENCOUNTER — HOSPITAL ENCOUNTER (OUTPATIENT)
Dept: INFUSION THERAPY | Age: 71
Discharge: HOME | End: 2021-07-30
Payer: MEDICARE

## 2021-07-30 VITALS
BODY MASS INDEX: 18.68 KG/M2 | HEART RATE: 85 BPM | DIASTOLIC BLOOD PRESSURE: 72 MMHG | SYSTOLIC BLOOD PRESSURE: 130 MMHG | WEIGHT: 119 LBS | OXYGEN SATURATION: 96 % | HEIGHT: 67 IN

## 2021-07-30 DIAGNOSIS — I89.0 LYMPHEDEMA OF RIGHT ARM: ICD-10-CM

## 2021-07-30 DIAGNOSIS — M85.80 OSTEOPENIA, UNSPECIFIED LOCATION: ICD-10-CM

## 2021-07-30 DIAGNOSIS — Z79.811 ENCOUNTER FOR MONITORING AROMATASE INHIBITOR THERAPY: ICD-10-CM

## 2021-07-30 DIAGNOSIS — Z51.81 ENCOUNTER FOR MONITORING AROMATASE INHIBITOR THERAPY: ICD-10-CM

## 2021-07-30 DIAGNOSIS — C50.811 MALIGNANT NEOPLASM OF OVERLAPPING SITES OF RIGHT FEMALE BREAST, UNSPECIFIED ESTROGEN RECEPTOR STATUS (HCC): ICD-10-CM

## 2021-07-30 DIAGNOSIS — D53.9 MACROCYTIC ANEMIA: ICD-10-CM

## 2021-07-30 DIAGNOSIS — Z78.0 POST-MENOPAUSAL: ICD-10-CM

## 2021-07-30 DIAGNOSIS — Z71.89 CARE PLAN DISCUSSED WITH PATIENT: ICD-10-CM

## 2021-07-30 DIAGNOSIS — Z17.0 MALIGNANT NEOPLASM OF OVERLAPPING SITES OF RIGHT BREAST IN FEMALE, ESTROGEN RECEPTOR POSITIVE (HCC): Primary | ICD-10-CM

## 2021-07-30 DIAGNOSIS — C50.811 MALIGNANT NEOPLASM OF OVERLAPPING SITES OF RIGHT BREAST IN FEMALE, ESTROGEN RECEPTOR POSITIVE (HCC): Primary | ICD-10-CM

## 2021-07-30 LAB
BASOPHILS ABSOLUTE: 0.03 K/UL (ref 0.01–0.08)
BASOPHILS RELATIVE PERCENT: 0.5 % (ref 0.1–1.2)
EOSINOPHILS ABSOLUTE: 0.12 K/UL (ref 0.04–0.54)
EOSINOPHILS RELATIVE PERCENT: 1.8 % (ref 0.7–7)
HCT VFR BLD CALC: 35 % (ref 34.1–44.9)
HEMOGLOBIN: 10.9 G/DL (ref 11.2–15.7)
LYMPHOCYTES ABSOLUTE: 1.08 K/UL (ref 1.18–3.74)
LYMPHOCYTES RELATIVE PERCENT: 16.3 % (ref 19.3–53.1)
MCH RBC QN AUTO: 32.2 PG (ref 25.6–32.2)
MCHC RBC AUTO-ENTMCNC: 31.1 G/DL (ref 32.3–35.5)
MCV RBC AUTO: 103.6 FL (ref 79.4–94.8)
MONOCYTES ABSOLUTE: 0.59 K/UL (ref 0.24–0.82)
MONOCYTES RELATIVE PERCENT: 8.9 % (ref 4.7–12.5)
NEUTROPHILS ABSOLUTE: 4.81 K/UL (ref 1.56–6.13)
NEUTROPHILS RELATIVE PERCENT: 72.5 % (ref 34–71.1)
PDW BLD-RTO: 16.2 % (ref 11.7–14.4)
PLATELET # BLD: 102 K/UL (ref 182–369)
PMV BLD AUTO: 10.7 FL (ref 7.4–10.4)
RBC # BLD: 3.38 M/UL (ref 3.93–5.22)
WBC # BLD: 6.63 K/UL (ref 3.98–10.04)

## 2021-07-30 PROCEDURE — 1036F TOBACCO NON-USER: CPT | Performed by: NURSE PRACTITIONER

## 2021-07-30 PROCEDURE — 99214 OFFICE O/P EST MOD 30 MIN: CPT | Performed by: NURSE PRACTITIONER

## 2021-07-30 PROCEDURE — 4040F PNEUMOC VAC/ADMIN/RCVD: CPT | Performed by: NURSE PRACTITIONER

## 2021-07-30 PROCEDURE — 85025 COMPLETE CBC W/AUTO DIFF WBC: CPT

## 2021-07-30 PROCEDURE — 1123F ACP DISCUSS/DSCN MKR DOCD: CPT | Performed by: NURSE PRACTITIONER

## 2021-07-30 PROCEDURE — 99211 OFF/OP EST MAY X REQ PHY/QHP: CPT

## 2021-07-30 PROCEDURE — G8399 PT W/DXA RESULTS DOCUMENT: HCPCS | Performed by: NURSE PRACTITIONER

## 2021-07-30 PROCEDURE — 1090F PRES/ABSN URINE INCON ASSESS: CPT | Performed by: NURSE PRACTITIONER

## 2021-07-30 PROCEDURE — G8427 DOCREV CUR MEDS BY ELIG CLIN: HCPCS | Performed by: NURSE PRACTITIONER

## 2021-07-30 PROCEDURE — 3017F COLORECTAL CA SCREEN DOC REV: CPT | Performed by: NURSE PRACTITIONER

## 2021-07-30 PROCEDURE — G8420 CALC BMI NORM PARAMETERS: HCPCS | Performed by: NURSE PRACTITIONER

## 2021-07-30 ASSESSMENT — ENCOUNTER SYMPTOMS
NAUSEA: 0
SORE THROAT: 0
SHORTNESS OF BREATH: 0
CONSTIPATION: 0
BLOOD IN STOOL: 0
BACK PAIN: 0
VOMITING: 0
EYES NEGATIVE: 1
EYE PAIN: 0
ABDOMINAL PAIN: 0
RESPIRATORY NEGATIVE: 1
WHEEZING: 0
DIARRHEA: 0
GASTROINTESTINAL NEGATIVE: 1
EYE REDNESS: 0
COUGH: 0
EYE DISCHARGE: 0

## 2021-07-30 NOTE — PROGRESS NOTES
Progress Note      Pt Name: Selin Hurtado  YOB: 1950  MRN: [id number]    Date of evaluation: 7/30/2021  History Obtained From:  patient, electronic medical record    CHIEF COMPLAINT:    Chief Complaint   Patient presents with    Follow-up    Breast Cancer     Femara    Anemia     Macrocytic    Other     Osteopenia     HISTORY OF PRESENT ILLNESS:    Selin Hurtado is a 66 y.o.  female who is currently being followed for a diagnosis of locally advanced HER-2 positive breast cancer. She is status post neoadjuvant chemotherapy with a partial response, right mastectomy and lymph node dissection and obtained a complete pathologic response in the breast but residual disease remained in the axilla. Which was followed by adjuvant treatment to TDM 1 and current taking adjuvant endocrine therapy with letrozole, anticipate 10-year dosing through 4/10/2030. She is also taking biphosphonate therapy with Boniva for osteopenia in the setting of aromatase inhibitor therapy. Ngozi Ruby returns today in scheduled follow-up for evaluation, side effect monitoring, lab monitoring and further recommendations. Ngozi Ruby presents today indicating that overall she is doing well. She reports being compliant with Femara and tolerating without significant difficulty. She denies any left breast or right chest wall complaints. No concerning lymphedema noted to the right upper extremity, she has been evaluated and followed by the lymphedema clinic. Since her last follow-up appointment she required hospitalization at [facility] on 6/28/2021 for right upper extremity cellulitis, noninvasive venous study was completed that revealed no evidence of DVT or SVT.       ONCOLOGIC HISTORY:   Diagnosis   · HER-2 IDC right breast, September 2019  · [id number]  · ER 3%, OK 0%, HER-2/lea IHC 3+  · Mammaprint high risk Basal-like  · Osteopenia, Aug 2019-T score -1.5 lumbar spine     Treatment summary  · 10/18/2019 through  March - 6 cycles of neoadjuvant Taxotere, Carboplatin and Herceptin and Perjeta  · 3/3/2020- right mastectomy/ lymph node dissection  · 3/20/2020-14 cycles of adjuvant TDM-1.   · 2020-2020 Completed adjuvant radiation 6040 cGy  · 4/10/2020-adjuvant endocrine therapy with letrozole x10 years.     Ms Rhianna Goddard was first seen by Dr. Panfilo Ga on 10/03/2019. She felt a mass in the right upper breast and therefore contact her primary care provider. A diagnostic mammogram was performed. Her mother  of breast cancer at the age of 50. She has no other history of breast cancer or any other cancer in her family. · 2019- diagnostic mammogram showed a suspicious 2.2 cm hypoechoic spiculated right breast mass in the axillary tail within the internal calcifications had suggest malignancy. Ultrasound of the breast showed the lesion as well as a pathologic appearing right axillary adenopathy. · 2019-core needle biopsy consistent with high grade invasive ductal carcinoma. MammaPrint high risk basal type. ER 3%, TN 0.2%, HER-2/lea IHC 3+ Ki-67 22%. HER-2/lea FISH positive  · 2019- MRI breast showed a large right breast mass compatible with primary malignancy measuring 2.2 x 1.2 x 3.5 cm in size. Abnormal enhancement in the pectoralis major muscle suggesting extension into the muscle. Multiple abnormal lymph nodes are seen in the right axilla with the largest measuring 2 x 1.6 cm. A small lymph node is seen along the right internal mammary chain. No suspicious left axillary adenopathy. · 2019-CT chest abdomen pelvis and bone scan showed no evidence of metastatic disease. · 10/3/2019- she was first seen by Dr. Panfilo Ga. Recommended neoadjuvant chemotherapy approach with dose dense Adriamycin/cyclophosphamide x4 cycles followed by 12 weekly cycles of Taxol 80 mg/m² with Herceptin and Perjeta. · 10/14/2019- consultation at [facility].   Recommended Taxotere, carboplatin Herceptin Perjeta. · 10/17/2019- 1/31/2020 completion of 6 cycles of of neoadjuvant chemotherapy with Taxotere carboplatin Herceptin Perjeta. · 2/14/2020-MRI breast.  Showed findings consistent with response to therapy. No residual enhancement of the right breast. Residual right axillary lymph node concerning for residual disease. · 2/21/2020- maintenance Herceptin/Perjeta to complete 1 year of treatment. · 3/03/2020-she underwent a right mastectomy/axillary dissection by Dr. Lory Case at [facility].  Primary tumor site identified with extensive fibrosis and nests of residual high-grade carcinoma present in lymphatic spaces. Tumor is present in a lymphatic space at the deep surgical excision margin. 3 out of 11 lymph nodes, positive for metastatic carcinoma, at least 1 metastasis greater than 2.0 mm . Largest focus of metastasis measures 0.5 cm in greatest dimension. AJCC STAGE: ypT0, pN1a, pMx  · 3/16/2020-repeat 2D echo EF 60%. · 4/20/2020-6/4/2020 Completion adjuvant radiation 6040 cGy  · 8/24/2020 [facility] Digital Screen Unilateral Left- No mammographic evidence of malignancy within the left breast. Continued annual unilateral screening mammography recommended.   BI-RADS Category 2.  · 2/1/21 2D echo: ejection fraction estimated at 55%.        Hematologic history: Macrocytic anemia    Serology studies 6/28/2021 at [facility]  · D-Dimer 3.57  · CRP 5.78  · H/H 9.5 & 30.1; MCV 99.3  Iron 51  TIBC 207  Iron saturation 25  Ferritin 218.20  Folate 10.90  Transferrin 139    Past Medical History:    Past Medical History:   Diagnosis Date    Malignant neoplasm of right breast in female, estrogen receptor positive ([facility] [address]     breast       Past Surgical History:    Past Surgical History:   Procedure Laterality Date    BREAST BIOPSY Right 09/05/2019    COLONOSCOPY N/A 10/1/2019    Dr Chaudhari Adjutant, internal hemorrhoids-Grade 2 without bleeding stigmata and external hemorrhoids-TV x 1, AP x 1, 3 yr recall  MASTECTOMY Right 3/3/2020    RIGHT SIMPLE MASTECTOMY WITH SENTINEL NODE BIOPSY, FLAPS, PEC BLOCK performed by Sonia Guan MD at [address] N/A 10/4/2019    PORT INSERTION WITH FLUORO performed by Sonia Guan MD at [address] N/A 5/13/2021    PORT REMOVAL performed by Sonia Guan MD at [place] [address] ENDOSCOPY N/A 10/1/2019    Dr Jose Brown hernia, HP gastric polyps       Current Medications:    Current Outpatient Medications   Medication Sig Dispense Refill    ibandronate (BONIVA) 150 MG tablet Take 1 tablet by mouth every 30 days Take one (1) tablet once per month in the morning with a full glass of water, on an empty stomach, and do not take anything else by mouth or lie down for the next 30 minutes. 3 tablet 3    letrozole (FEMARA) 2.5 MG tablet Take 1 tablet by mouth daily 90 tablet 3    Calcium Carbonate-Vitamin D (OYSTER SHELL CALCIUM/D) 500-200 MG-UNIT TABS Take 1 tablet by mouth daily 360 tablet 0     No current facility-administered medications for this visit. Allergies: No Known Allergies    Social History:    Social History     Tobacco Use    Smoking status: Never Smoker    Smokeless tobacco: Never Used   Vaping Use    Vaping Use: Never used   Substance Use Topics    Alcohol use: Never    Drug use: Never       Family History:   Family History   Problem Relation Age of Onset    Breast Cancer Mother 39    Colon Cancer Neg Hx     Colon Polyps Neg Hx        Vitals:  Vitals:    07/30/21 1404   BP: 130/72   Pulse: 85   SpO2: 96%   Weight: 119 lb (54 kg)   Height: 5' 7\" (1.702 m)        Subjective   REVIEW OF SYSTEMS:   Review of Systems   Constitutional: Positive for fatigue. Negative for chills, diaphoresis and fever. HENT: Negative. Negative for congestion, ear pain, hearing loss, nosebleeds, sore throat and tinnitus. Eyes: Negative. Negative for pain, discharge and redness. Respiratory: Negative. Negative for cough, shortness of breath and wheezing. Cardiovascular: Negative. Negative for chest pain, palpitations and leg swelling. Gastrointestinal: Negative. Negative for abdominal pain, blood in stool, constipation, diarrhea, nausea and vomiting. Endocrine: Negative for polydipsia. Genitourinary: Negative for dysuria, flank pain, frequency, hematuria and urgency. Musculoskeletal: Negative. Negative for back pain, myalgias and neck pain. Skin: Negative. Negative for rash. Cellulitis right upper extremity improved   Neurological: Negative. Negative for dizziness, tremors, seizures, weakness and headaches. Hematological: Does not bruise/bleed easily. Psychiatric/Behavioral: Negative. The patient is not nervous/anxious. Objective   PHYSICAL EXAM:  Physical Exam  Vitals reviewed. Constitutional:       General: She is not in acute distress. Appearance: She is well-developed. She is not diaphoretic. HENT:      Head: Normocephalic and atraumatic. Mouth/Throat:      Pharynx: Uvula midline. Tonsils: No tonsillar exudate. Eyes:      General: Lids are normal. No scleral icterus. Right eye: No discharge. Left eye: No discharge. Conjunctiva/sclera: Conjunctivae normal.      Pupils: Pupils are equal, round, and reactive to light. Neck:      Thyroid: No thyroid mass or thyromegaly. Vascular: No JVD. Trachea: Trachea normal. No tracheal deviation. Cardiovascular:      Rate and Rhythm: Normal rate and regular rhythm. Heart sounds: Normal heart sounds. No murmur heard. No friction rub. No gallop. Pulmonary:      Effort: Pulmonary effort is normal. No respiratory distress. Breath sounds: Normal breath sounds. No wheezing or rales. Chest:      Chest wall: No tenderness. Abdominal:      General: Bowel sounds are normal. There is no distension. Palpations: Abdomen is soft. There is no mass. Tenderness:  There is no abdominal tenderness. There is no guarding or rebound. Hernia: No hernia is present. Musculoskeletal:         General: No tenderness or deformity. Cervical back: Normal range of motion and neck supple. Comments: Range of motion within normal limits x4 extremities   Skin:     General: Skin is warm. Coloration: Skin is not pale. Findings: No erythema or rash. Neurological:      Mental Status: She is alert and oriented to person, place, and time. Cranial Nerves: No cranial nerve deficit. Coordination: Coordination normal.   Psychiatric:         Behavior: Behavior normal.         Thought Content: Thought content normal.         Labs reviewed today:  Lab Results   Component Value Date    WBC 6.63 07/30/2021    HGB 10.9 (L) 07/30/2021    HCT 35.0 07/30/2021    .6 (H) 07/30/2021     (L) 07/30/2021     Lab Results   Component Value Date    NEUTROABS 4.81 07/30/2021       ASSESSMENT/PLAN:      1. Invasive ductal carcinoma of the right breast, node positive, HER-2 positive. Currently taking adjuvant endocrine therapy with letrozole, anticipate 10-year dosing through 4/10/2030. Denies any left breast or right chest wall complaints. 8/24/2020 [facility] Digital Screen Unilateral Left- No mammographic evidence of malignancy within the left breast. Continued annual unilateral screening mammography recommended. BI-RADS Category 2.    -Scheduled for annual mammogram on 8/25/2021 along with a follow-up appointment with Dr. Bo Foster  - continue letrozole [facility]) 2.5 MG tablet; Take 1 tablet by mouth daily  Dispense: 90 tablet; Refill: 3      I discussed the importance of compliance with Femara/letrozole. Decreased compliance with adjuvant endocrine therapy has been linked to decreased survival. We discussed the various barriers and side effects of endocrine therapy and ways to improve compliance. She acknowledged understanding.     2.Osteopenia, density study on 8/23/2019 documented a lumbar spine T score of -1.5 and bilateral hip T score of -1.0. Currently taking Boniva and calcium, reports compliant.  -Continue Boniva  -Continue calcium supplement  - DEXA BONE DENSITY 2 SITES; Future, scheduled for 8/25/2021    The use of bisphosphonates as adjuvant therapy should be considered for all postmenopausal women with early breast cancer who are deemed to be candidates for adjuvant therapy, according to a joint clinical practice guideline from [facility] and the [address] [place] [address] (ASCO). 3. Encounter for monitoring aromatase inhibitor therapy  Denies hot flashes or significant arthralgias  4. Care plan discussed with patient    5. Lymphedema of right arm, currently stable. Recommend continuing following with the lymphedema clinic and home exercises as recommended. Recently treated for cellulitis in the right upper extremity, improved. 6.  Macrocytic anemia, chronic dating back to January 2020. Hemoglobin 10.9 with an MCV of 103.6          Serology studies 6/28/2021 at [facility]  · D-Dimer 3.57  · CRP 5.78  · H/H 9.5 & 30.1; MCV 99.3  Iron 51  TIBC 207  Iron saturation 25  Ferritin 218.20  Folate 10.90  Transferrin 139    -Will repeat iron substrates upon return      I discussed all of the above findings included in the assessment and plan with the patient and the patient is in agreement to move forward with current recommendations/treatment. I have addressed all of their questions and concerns that were verbalized. FOLLOW UP:  1. Follow-up appointment given for 4 months, sooner if needed  2. Continue to follow with other medical providers as recommended    Discussed precautions related to [address] and being at increased risk. Discussed proper handwashing to be done frequently, limit exposure to other individuals and maintain social distancing of 6 feet.   Recommend contacting primary care provider if having respiratory symptoms for further recommendations and consideration for testing. EMR Dragon/Transcription disclaimer:   Much of this encounter note is an electronic transcription/translation of spoken language to printed text. The electronic translation of spoken language may permit erroneous, or at times, nonsensical words or phrases to be inadvertently transcribed; although attempts have made to review the note for such errors, some may still exist. Also, portions of this note have been copied forward, however, changed to reflect the most current clinical status of this patient. Teodora Vences am scribing for GUNNER Ball. Electronically signed by Nia Robertson RN on 7/30/2021 at 0930. I, GUNNER Ricardo personally performed the services described in this documentation as scribed by Nia Robertson RN in my presence and is both accurate and complete.   Electronically signed by GUNNER Ball on 8/21/2021 at 6:58 PM

## 2021-08-08 PROCEDURE — 87186 SC STD MICRODIL/AGAR DIL: CPT | Performed by: NURSE PRACTITIONER

## 2021-08-08 PROCEDURE — 87086 URINE CULTURE/COLONY COUNT: CPT | Performed by: NURSE PRACTITIONER

## 2021-08-08 PROCEDURE — 87635 SARS-COV-2 COVID-19 AMP PRB: CPT | Performed by: NURSE PRACTITIONER

## 2021-08-24 NOTE — PROGRESS NOTES
HISTORY OF PRESENT ILLNESS:    Ms. Jaclyn Carroll presents  today for a breast exam followed by an annual mammogram.    She had an ultrasound guided breast biopsy  on the right which revealed a 3.5  cm high grade  invasive ductal carcinoma. Fine-needle biopsy of axillary node was cytologically conclusive for malignancy. It is ER positive at 3%. OH is negative. HER-2 is positive at 3+. Ki-67 is high at 22%. MammaPrint demonstrates a high risk basal phenotype    She has been receiving neoadjuvant chemotherapy with Herceptin, Perjeta, Taxotere, and carboplatin. She has had an excellent clinical response. Her last treatment will be later this week. MRI-2/14/2020  FINDINGS:   Amount of Fibroglandular Tissue: Scattered fibroglandular tissue. Background Parenchymal Enhancement:  Minimal.   Right upper outer breast with decreased size of biopsy-proven   malignancy. No residual measurable enhancing mass. There is   susceptibility artifact at the right slightly upper outer breast,   middle to posterior third, likely a biopsy marker. No suspicious enhancement of the left breast.   Pectoralis musculature appears symmetric and nonenhancing. Right axillary lymph node measures 0.9 cm in short axis on axial   series 3, image 58. While this is not enlarged by cross-sectional size   criteria, it is abnormally rounded and asymmetric to the left side,   concerning for residual disease. There is a T1 hypointensity within   the lymph node, which was more evident on prior exam 9/19/2019,   favored to represent a biopsy marker. There is a second prominent   right axillary lymph node, just lateral to the above described noted   with biopsy marker. No enlarged internal mammary lymph node. Left axillary lymph nodes   appear symmetric. Port at the left chest wall. Cardiomegaly. Minimal right-sided pleural   fluid.  Heterogeneous appearance of the liver compared to prior exam.   Consider further evaluation with CT abdomen and pelvis with contrast.       Impression   1. Findings consistent with response to therapy. No residual   enhancement of the right breast. Normal appearance of the right   pectoralis musculature. 2. Residual prominent right axillary lymph node, which is concerning   for residual disease. There appears to be a biopsy marker in this   lymph node. There is also a lymph node slightly lateral which is also   prominent. 3. Heterogeneous appearance of the liver compared to the prior. Consider CT abdomen and pelvis with contrast for further evaluation. 4. Cardiomegaly and small right pleural fluid. BI-RADS Final Assessment Category 6: Known Biopsy-Proven Malignancy     She had a Right modified radical mastectomy     PATHOLOGY REVEALS    FINAL DIAGNOSIS  Breast, right modified radical mastectomy:  1.  Primary tumor site identified with extensive fibrosis and nests of  residual high-grade carcinoma present in lymphatic spaces. 2.  Tumor is present in a lymphatic space at the deep surgical excision  margin. 3.  Sections of nipple identified, negative for evidence of malignancy. 4.  Sections of benign skin with benign seborrheic keratoses. 5.  3 out of 11 lymph nodes, positive for metastatic carcinoma. 6.  Multiple discrete nest of cells identified within the nodes. 7.  Largest focus of metastasis measures 0.5 cm in greatest dimension. 8.  Negative for extracapsular spread. She completed Radiation 6/4/2020 and is doing well. She is currently receiving TDM-1. Unilateral Left Screening Mammogram-8/25/2021  FINDINGS:   No suspicious masses or calcifications are identified. There are no  developing densities or areas of architectural distortion. IMPRESSION AND RECOMMENDATION:   No mammographic evidence of malignancy. Recommendation is for the  patient to return for routine mammography in one year or sooner, if clinically indicated.    BIRADS Category 2 - Benign findings      PHYSICAL EXAM:  The  wounds look good with no evidence of infection, fluid accumulation, or skin necrosis. She has minimal radiation changes. She has no palpable masses, skin or nipple changes, or axillary adenopathy on the left breast    IMPRESSION:    Doing well s/p right mastectomy and axillary node dissection 3/3/2020    PLAN: I will see her back in six months for a physical exam. She will call me with any new concerns. I have seen, examined and reviewed this patient medication list, appropriate labs and imaging studies. I reviewed relevant medical records and others physicians notes. I discussed the plans of care with the patient. I answered all the questions to the patients satisfaction. I, Dr Katie Aldana, personally performed the services described in this documentation as scribed by Joe Pennington MA in my presence and is both accurate and complete. (Please note that portions of this note were completed with a voice recognition program. Efforts were made to edit the dictations but occasionally words are mis-transcribed.)  Over 50% of the total visit time of 15 minutes in face to face encounter with the patient, out of which more than 50% of the time was spent in counseling patient or family and coordination of care. Counseling included but was not limited to time spent reviewing labs, imaging studies/ treatment plan and answering questions.

## 2021-08-25 ENCOUNTER — HOSPITAL ENCOUNTER (OUTPATIENT)
Dept: WOMENS IMAGING | Age: 71
Discharge: HOME | End: 2021-08-25
Payer: MEDICARE

## 2021-08-25 ENCOUNTER — OFFICE VISIT (OUTPATIENT)
Dept: SURGERY | Age: 71
End: 2021-08-25
Payer: MEDICARE

## 2021-08-25 VITALS — HEART RATE: 72 BPM | SYSTOLIC BLOOD PRESSURE: 118 MMHG | DIASTOLIC BLOOD PRESSURE: 84 MMHG

## 2021-08-25 DIAGNOSIS — Z17.0 MALIGNANT NEOPLASM OF OVERLAPPING SITES OF RIGHT BREAST IN FEMALE, ESTROGEN RECEPTOR POSITIVE (HCC): ICD-10-CM

## 2021-08-25 DIAGNOSIS — C50.811 MALIGNANT NEOPLASM OF OVERLAPPING SITES OF RIGHT FEMALE BREAST, UNSPECIFIED ESTROGEN RECEPTOR STATUS (HCC): Primary | ICD-10-CM

## 2021-08-25 DIAGNOSIS — Z90.11 S/P RIGHT MASTECTOMY: ICD-10-CM

## 2021-08-25 DIAGNOSIS — Z78.0 POST-MENOPAUSAL: ICD-10-CM

## 2021-08-25 DIAGNOSIS — M85.80 OSTEOPENIA, UNSPECIFIED LOCATION: ICD-10-CM

## 2021-08-25 DIAGNOSIS — C50.811 MALIGNANT NEOPLASM OF OVERLAPPING SITES OF RIGHT BREAST IN FEMALE, ESTROGEN RECEPTOR POSITIVE (HCC): ICD-10-CM

## 2021-08-25 DIAGNOSIS — Z12.31 VISIT FOR SCREENING MAMMOGRAM: ICD-10-CM

## 2021-08-25 PROCEDURE — G8399 PT W/DXA RESULTS DOCUMENT: HCPCS | Performed by: SURGERY

## 2021-08-25 PROCEDURE — 1123F ACP DISCUSS/DSCN MKR DOCD: CPT | Performed by: SURGERY

## 2021-08-25 PROCEDURE — 77080 DXA BONE DENSITY AXIAL: CPT

## 2021-08-25 PROCEDURE — 99213 OFFICE O/P EST LOW 20 MIN: CPT | Performed by: SURGERY

## 2021-08-25 PROCEDURE — 3017F COLORECTAL CA SCREEN DOC REV: CPT | Performed by: SURGERY

## 2021-08-25 PROCEDURE — 4040F PNEUMOC VAC/ADMIN/RCVD: CPT | Performed by: SURGERY

## 2021-08-25 PROCEDURE — G8427 DOCREV CUR MEDS BY ELIG CLIN: HCPCS | Performed by: SURGERY

## 2021-08-25 PROCEDURE — 1036F TOBACCO NON-USER: CPT | Performed by: SURGERY

## 2021-08-25 PROCEDURE — G8420 CALC BMI NORM PARAMETERS: HCPCS | Performed by: SURGERY

## 2021-08-25 PROCEDURE — 1090F PRES/ABSN URINE INCON ASSESS: CPT | Performed by: SURGERY

## 2021-08-25 PROCEDURE — 77063 BREAST TOMOSYNTHESIS BI: CPT

## 2021-09-23 PROBLEM — E87.6 HYPOKALEMIA: Status: ACTIVE | Noted: 2021-09-23

## 2021-09-23 NOTE — PROGRESS NOTES
Isai Ramos ( 1950) is a 66 y.o. female,  Established , here for evaluation of the following chief complaint(s).   6 Month Follow-Up        ASSESSMENT/PLAN:  Problem List        Musculoskeletal and Integument    Kyphosis    Osteopenia of hip      Well-controlled, continue current medications, medication adherence emphasized and lifestyle modifications recommended            Other    Chronic acquired lymphedema      Well-controlled, underwent lympedema treatment         Hypokalemia      Unclear control, continue current medications and patient does not like pills, she will use salt substiture and incoprporate potassium rich foods in her diet         Lymphedema of right arm      Monitored by specialist- no acute findings meriting change in the plan         Malignant neoplasm of overlapping sites of right female breast ([facility] [address]      Monitored by specialist- no acute findings meriting change in the plan         Relevant Medications    letrozole (FEMARA) 2.5 MG tablet    Prediabetes      Well-controlled, lifestyle modifications recommended               Results for orders placed or performed in visit on 21   Vitamin B12 & Folate   Result Value Ref Range    Vitamin B-12 606 211 - 946 pg/mL    Folate 12.8 4.8 - 37.3 ng/mL   Vitamin D 25 Hydroxy   Result Value Ref Range    Vit D, 25-Hydroxy 33.1 >=30 ng/mL   Hemoglobin A1C   Result Value Ref Range    Hemoglobin A1C 5.0 4.0 - 6.0 %   Comprehensive Metabolic Panel   Result Value Ref Range    Sodium 142 136 - 145 mmol/L    Potassium 3.4 (L) 3.5 - 5.0 mmol/L    Chloride 102 98 - 111 mmol/L    CO2 25 22 - 29 mmol/L    Anion Gap 15 7 - 19 mmol/L    Glucose 88 74 - 109 mg/dL    BUN 28 (H) 8 - 23 mg/dL    CREATININE 0.7 0.5 - 0.9 mg/dL    GFR Non-African American >60 >60    GFR African American >59 >59    Calcium 9.9 8.8 - 10.2 mg/dL    Total Protein 7.0 6.6 - 8.7 g/dL    Albumin 4.4 3.5 - 5.2 g/dL    Total Bilirubin 0.4 0.2 - 1.2 mg/dL    Alkaline Phosphatase 64 35 - 104 U/L    ALT 23 5 - 33 U/L    AST 38 (H) 5 - 32 U/L   CBC Auto Differential   Result Value Ref Range    WBC 4.2 (L) 4.8 - 10.8 K/uL    RBC 3.39 (L) 4.20 - 5.40 M/uL    Hemoglobin 11.2 (L) 12.0 - 16.0 g/dL    Hematocrit 35.3 (L) 37.0 - 47.0 %    .1 (H) 81.0 - 99.0 fL    MCH 33.0 (H) 27.0 - 31.0 pg    MCHC 31.7 (L) 33.0 - 37.0 g/dL    RDW 16.8 (H) 11.5 - 14.5 %    Platelets 797 (L) 069 - 400 K/uL    MPV 11.1 9.4 - 12.3 fL    Neutrophils % 64.3 50.0 - 65.0 %    Lymphocytes % 24.9 20.0 - 40.0 %    Monocytes % 6.9 0.0 - 10.0 %    Eosinophils % 2.8 0.0 - 5.0 %    Basophils % 0.9 0.0 - 1.0 %    Neutrophils Absolute 2.7 1.5 - 7.5 K/uL    Immature Granulocytes # 0.0 K/uL    Lymphocytes Absolute 1.1 1.1 - 4.5 K/uL    Monocytes Absolute 0.30 0.00 - 0.90 K/uL    Eosinophils Absolute 0.10 0.00 - 0.60 K/uL    Basophils Absolute 0.00 0.00 - 0.20 K/uL       Return in about 6 months (around 3/24/2022). HPI  75-year-old female who presents for follow-up visit  She has history of right breast cancer status postmastectomy patient had lymphedema of her right arm which is now resolved  Has history of osteopenia on antiresorptive therapy with calcium and vitamin D replacement  Patient has history of prediabetes no longer a problem  She is also having borderline low potassiums encouraged to eat potassium rich food she offers no complaints today      Review of Systems   Constitutional: Negative for activity change, chills, fatigue and fever. HENT: Negative for hearing loss, postnasal drip, rhinorrhea, sinus pain and trouble swallowing. Eyes: Negative for visual disturbance. Respiratory: Negative for cough, chest tightness, shortness of breath and wheezing. Cardiovascular: Negative for chest pain, palpitations and leg swelling. Gastrointestinal: Negative for abdominal pain, blood in stool, constipation, diarrhea and vomiting. Endocrine: Negative for cold intolerance.    Genitourinary: Negative for frequency and urgency. Musculoskeletal: Negative for arthralgias, back pain and myalgias. R arm swelling   Allergic/Immunologic: Negative for environmental allergies. Neurological: Negative for light-headedness, numbness and headaches. Physical Exam  Vitals and nursing note reviewed. Constitutional:       General: She is not in acute distress. Appearance: Normal appearance. She is normal weight. HENT:      Head: Normocephalic. Right Ear: External ear normal.      Left Ear: External ear normal.      Nose: Nose normal.   Eyes:      General: No scleral icterus. Extraocular Movements: Extraocular movements intact. Conjunctiva/sclera: Conjunctivae normal.      Pupils: Pupils are equal, round, and reactive to light. Neck:      Thyroid: No thyroid mass, thyromegaly or thyroid tenderness. Vascular: No JVD. Cardiovascular:      Rate and Rhythm: Normal rate and regular rhythm. Pulses: Normal pulses. Heart sounds: Normal heart sounds, S1 normal and S2 normal. No murmur heard. Pulmonary:      Effort: Pulmonary effort is normal. No respiratory distress. Breath sounds: Normal breath sounds and air entry. No wheezing or rales. Chest:      Breasts:         Right: Absent. Abdominal:      General: Abdomen is flat. Bowel sounds are normal. There is no distension. Palpations: Abdomen is soft. Tenderness: There is no abdominal tenderness. Musculoskeletal:      Right shoulder: No swelling, deformity, effusion, tenderness or bony tenderness. Decreased range of motion. Cervical back: Full passive range of motion without pain, normal range of motion and neck supple. Thoracic back: Deformity present. Right lower leg: No edema. Left lower leg: No edema. Lymphadenopathy:      Cervical: No cervical adenopathy. Skin:     General: Skin is warm and dry. Findings: No rash.    Neurological:      Mental Status: She is alert and oriented to person, place, and time. Mental status is at baseline. Cranial Nerves: No cranial nerve deficit. Deep Tendon Reflexes: Reflexes normal.   Psychiatric:         Behavior: Behavior normal.         Thought Content:  Thought content normal.         Judgment: Judgment normal.           (Time Documentation Optional [id number])    An electronic signaturewaas used to authenticate this note  -Oscar Isbell MD on 9/24/2021 at 9:05 AM

## 2021-09-24 ENCOUNTER — OFFICE VISIT (OUTPATIENT)
Dept: INTERNAL MEDICINE | Age: 71
End: 2021-09-24
Payer: MEDICARE

## 2021-09-24 VITALS
HEART RATE: 75 BPM | OXYGEN SATURATION: 96 % | HEIGHT: 67 IN | SYSTOLIC BLOOD PRESSURE: 110 MMHG | WEIGHT: 115.6 LBS | DIASTOLIC BLOOD PRESSURE: 60 MMHG | BODY MASS INDEX: 18.15 KG/M2

## 2021-09-24 DIAGNOSIS — I89.0 CHRONIC ACQUIRED LYMPHEDEMA: ICD-10-CM

## 2021-09-24 DIAGNOSIS — I89.0 LYMPHEDEMA OF RIGHT ARM: ICD-10-CM

## 2021-09-24 DIAGNOSIS — C50.811 MALIGNANT NEOPLASM OF OVERLAPPING SITES OF RIGHT FEMALE BREAST, UNSPECIFIED ESTROGEN RECEPTOR STATUS (HCC): ICD-10-CM

## 2021-09-24 DIAGNOSIS — R73.03 PREDIABETES: ICD-10-CM

## 2021-09-24 DIAGNOSIS — M85.851 OSTEOPENIA OF BOTH HIPS: ICD-10-CM

## 2021-09-24 DIAGNOSIS — E87.6 HYPOKALEMIA: ICD-10-CM

## 2021-09-24 DIAGNOSIS — M40.03 POSTURAL KYPHOSIS OF CERVICOTHORACIC REGION: ICD-10-CM

## 2021-09-24 DIAGNOSIS — Z13.29 SCREENING FOR THYROID DISORDER: Primary | ICD-10-CM

## 2021-09-24 DIAGNOSIS — M85.852 OSTEOPENIA OF BOTH HIPS: ICD-10-CM

## 2021-09-24 PROBLEM — M40.209 KYPHOSIS: Status: ACTIVE | Noted: 2021-09-24

## 2021-09-24 PROCEDURE — 3017F COLORECTAL CA SCREEN DOC REV: CPT | Performed by: INTERNAL MEDICINE

## 2021-09-24 PROCEDURE — 4040F PNEUMOC VAC/ADMIN/RCVD: CPT | Performed by: INTERNAL MEDICINE

## 2021-09-24 PROCEDURE — G8399 PT W/DXA RESULTS DOCUMENT: HCPCS | Performed by: INTERNAL MEDICINE

## 2021-09-24 PROCEDURE — G8419 CALC BMI OUT NRM PARAM NOF/U: HCPCS | Performed by: INTERNAL MEDICINE

## 2021-09-24 PROCEDURE — 1036F TOBACCO NON-USER: CPT | Performed by: INTERNAL MEDICINE

## 2021-09-24 PROCEDURE — 1090F PRES/ABSN URINE INCON ASSESS: CPT | Performed by: INTERNAL MEDICINE

## 2021-09-24 PROCEDURE — 99214 OFFICE O/P EST MOD 30 MIN: CPT | Performed by: INTERNAL MEDICINE

## 2021-09-24 PROCEDURE — 1123F ACP DISCUSS/DSCN MKR DOCD: CPT | Performed by: INTERNAL MEDICINE

## 2021-09-24 PROCEDURE — G8427 DOCREV CUR MEDS BY ELIG CLIN: HCPCS | Performed by: INTERNAL MEDICINE

## 2021-09-24 ASSESSMENT — PATIENT HEALTH QUESTIONNAIRE - PHQ9
SUM OF ALL RESPONSES TO PHQ9 QUESTIONS 1 & 2: 0
2. FEELING DOWN, DEPRESSED OR HOPELESS: 0
SUM OF ALL RESPONSES TO PHQ9 QUESTIONS 1 & 2: 0
1. LITTLE INTEREST OR PLEASURE IN DOING THINGS: 0
SUM OF ALL RESPONSES TO PHQ QUESTIONS 1-9: 0
2. FEELING DOWN, DEPRESSED OR HOPELESS: 0
1. LITTLE INTEREST OR PLEASURE IN DOING THINGS: 0
SUM OF ALL RESPONSES TO PHQ QUESTIONS 1-9: 0

## 2021-09-24 ASSESSMENT — ENCOUNTER SYMPTOMS
WHEEZING: 0
TROUBLE SWALLOWING: 0
BACK PAIN: 0
DIARRHEA: 0
VOMITING: 0
RHINORRHEA: 0
CONSTIPATION: 0
SHORTNESS OF BREATH: 0
ABDOMINAL PAIN: 0
COUGH: 0
SINUS PAIN: 0
BLOOD IN STOOL: 0
CHEST TIGHTNESS: 0

## 2021-09-24 NOTE — ASSESSMENT & PLAN NOTE
Unclear control, continue current medications and patient does not like pills, she will use salt substiture and incoprporate potassium rich foods in her diet

## 2021-11-01 ENCOUNTER — OFFICE VISIT (OUTPATIENT)
Dept: INTERNAL MEDICINE | Age: 71
End: 2021-11-01

## 2021-11-01 VITALS — HEIGHT: 67 IN | BODY MASS INDEX: 18.68 KG/M2 | WEIGHT: 119 LBS

## 2021-11-01 DIAGNOSIS — Z00.00 ROUTINE GENERAL MEDICAL EXAMINATION AT A HEALTH CARE FACILITY: Primary | ICD-10-CM

## 2021-11-01 PROCEDURE — G0439 PPPS, SUBSEQ VISIT: HCPCS | Performed by: INTERNAL MEDICINE

## 2021-11-01 PROCEDURE — G8484 FLU IMMUNIZE NO ADMIN: HCPCS | Performed by: INTERNAL MEDICINE

## 2021-11-01 PROCEDURE — 3017F COLORECTAL CA SCREEN DOC REV: CPT | Performed by: INTERNAL MEDICINE

## 2021-11-01 PROCEDURE — 4040F PNEUMOC VAC/ADMIN/RCVD: CPT | Performed by: INTERNAL MEDICINE

## 2021-11-01 PROCEDURE — 1123F ACP DISCUSS/DSCN MKR DOCD: CPT | Performed by: INTERNAL MEDICINE

## 2021-11-01 ASSESSMENT — PATIENT HEALTH QUESTIONNAIRE - PHQ9
SUM OF ALL RESPONSES TO PHQ9 QUESTIONS 1 & 2: 0
SUM OF ALL RESPONSES TO PHQ QUESTIONS 1-9: 0
SUM OF ALL RESPONSES TO PHQ QUESTIONS 1-9: 0
2. FEELING DOWN, DEPRESSED OR HOPELESS: 0
SUM OF ALL RESPONSES TO PHQ QUESTIONS 1-9: 0
1. LITTLE INTEREST OR PLEASURE IN DOING THINGS: 0

## 2021-11-01 ASSESSMENT — LIFESTYLE VARIABLES
AUDIT-C TOTAL SCORE: INCOMPLETE
AUDIT TOTAL SCORE: INCOMPLETE
HOW OFTEN DO YOU HAVE A DRINK CONTAINING ALCOHOL: NEVER
HOW OFTEN DO YOU HAVE A DRINK CONTAINING ALCOHOL: 0

## 2021-11-01 NOTE — PROGRESS NOTES
Medicare Annual Wellness Visit  Name: Nino Pope Date: 2021   MRN: [id number] Sex: Female   Age: 66 y.o. Ethnicity: Non- / Non    : 1950 Race: White (non-)      Renetta Gruber is here for [facility] AWV    Ms. Rohan Kennedy is on the phone to complete her AWV. She seems to be doing very well overall. She states she stays active daily. Attempted to get vitals but her BP cough wasn't working. Pt had weighed this morning. Screenings for behavioral, psychosocial and functional/safety risks, and cognitive dysfunction are all negative except as indicated below. These results, as well as other patient data from the [address] form, are documented in Flowsheets linked to this Encounter. No Known Allergies    Prior to Visit Medications    Medication Sig Taking? Authorizing Provider   ibandronate (BONIVA) 150 MG tablet Take 1 tablet by mouth every 30 days Take one (1) tablet once per month in the morning with a full glass of water, on an empty stomach, and do not take anything else by mouth or lie down for the next 30 minutes.   GUNNER Garsia   letrozole (FEMARA) 2.5 MG tablet Take 1 tablet by mouth daily  GUNNER Garsia   Calcium Carbonate-Vitamin D (OYSTER SHELL CALCIUM/D) 500-200 MG-UNIT TABS Take 1 tablet by mouth daily  Alisa Sandra MD       Past Medical History:   Diagnosis Date    Malignant neoplasm of right breast in female, estrogen receptor positive ([facility] [address]     breast       Past Surgical History:   Procedure Laterality Date    BREAST BIOPSY Right 2019    COLONOSCOPY N/A 10/1/2019    Dr Rashel William, internal hemorrhoids-Grade 2 without bleeding stigmata and external hemorrhoids-TV x 1, AP x 1, 3 yr recall    MASTECTOMY Right 3/3/2020    RIGHT SIMPLE MASTECTOMY WITH SENTINEL NODE BIOPSY, FLAPS, PEC BLOCK performed by Zarina Harvey MD at [address] N/A 10/4/2019    PORT INSERTION WITH FLUORO performed by Sho Houston Elysia Olivares MD at [address] N/A 5/13/2021    PORT REMOVAL performed by Tony Qiu MD at [facility]. N/A 10/1/2019    Dr Anabela Luna hernia, [place] gastric polyps       Family History   Problem Relation Age of Onset    Breast Cancer Mother 39    Heart Disease Father     Colon Cancer Neg Hx     Colon Polyps Neg Hx        CareTeam (Including outside providers/suppliers regularly involved in providing care):   Patient Care Team:  Julieta Bhakta MD as PCP - General (Internal Medicine)  Julieta Bhakta MD as PCP - [facility] Empaneled Provider  Mariely Amaya RN as Nurse Navigator (Oncology)    Wt Readings from Last 3 Encounters:   11/01/21 119 lb (54 kg)   09/24/21 115 lb 9.6 oz (52.4 kg)   07/30/21 119 lb (54 kg)     Vitals:    11/01/21 0737   Weight: 119 lb (54 kg)   Height: 5' 7\" (1.702 m)     Body mass index is 18.64 kg/m². Based upon direct observation of the patient, evaluation of cognition reveals   Pt could recall all three words several minutes after they were given. Due to this being done over the phone we were unable to do the clock portion of the test.    Patient's complete Health Risk Assessment and screening values have been reviewed and are found in Flowsheets. The following problems were reviewed today and where indicated follow up appointments were made and/or referrals ordered. Positive Risk Factor Screenings with Interventions:          General Health and ACP:  General  In general, how would you say your health is?: Very Good  In the past 7 days, have you experienced any of the following?  New or Increased Pain, New or Increased Fatigue, Loneliness, Social Isolation, Stress or Anger?: None of These  Do you get the social and emotional support that you need?: Yes  Do you have a Living Will?: Yes  Advance Directives     Power of  Living Will ACP-Advance Directive ACP-Power of     Not on File Not on File Not on File Not on File      General Health Risk Interventions:  · Pt seems to be in good overall health. No concerns at this time. · Informed pt to bring in a copy of her Living Will to keep on file. Pt voiced understanding. Health Habits/Nutrition:  Health Habits/Nutrition  Do you exercise for at least 20 minutes 2-3 times per week?: Yes  Have you lost any weight without trying in the past 3 months?: No  Do you eat only one meal per day?: No  Have you seen the dentist within the past year?: (!) No  Body mass index: 18.64  Health Habits/Nutrition Interventions:  · Dental exam overdue:  patient encouraged to make appointment with his/her dentist  · Encouraged pt to get a dental exam even if she doesn't think she is having any dental issues. Safety:  Safety  Do you have working smoke detectors?: Yes  Have all throw rugs been removed or fastened?: Yes  Do you have non-slip mats or surfaces in all bathtubs/showers?: (!) No  Do all of your stairways have a railing or banister?: (!) No  Are your doorways, halls and stairs free of clutter?: Yes  Do you always fasten your seatbelt when you are in a car?: Yes  Safety Interventions:  · Home safety tips provided  · Went over importance of non-slip matts or surfaces. Did stated she does have a rough surface in shower/bath. Pt also said she doesn't have stairs.      Personalized Preventive Plan   Current Health Maintenance Status  Immunization History   Administered Date(s) Administered    COVID-19, Oniel Bran, Primary or Immunocompromised, PF, 100mcg/0.5mL 03/04/2021, 04/04/2021    Influenza, High Dose (Fluzone 65 yrs and older) 11/25/2019    Influenza, High-dose, Quadv, 65 yrs +, IM (Fluzone) 09/23/2020    Pneumococcal Polysaccharide ([id number]) 07/07/2021        Health Maintenance   Topic Date Due    Annual Wellness Visit (AWV)  04/24/2021    DTaP/Tdap/Td vaccine (1 - Tdap) 09/24/2022 (Originally 7/22/1967)    Flu vaccine (1) 09/24/2022 (Originally 9/1/2021)    Shingles Vaccine (1 of 2) 09/24/2022 (Originally 7/22/1998)    COVID-19 Vaccine (3 - Moderna risk 3-dose series) 09/26/2022 (Originally 5/2/2021)    Pneumococcal 65+ yrs at Risk Vaccine (2 of 2 - PCV13) 07/07/2022    Breast cancer screen  08/25/2022    A1C test (Diabetic or Prediabetic)  09/13/2022    Colon cancer screen colonoscopy  10/01/2022    Lipid screen  03/05/2026    DEXA (modify frequency per FRAX score)  Completed    Hepatitis C screen  Completed    Hepatitis A vaccine  Aged Out    Hepatitis B vaccine  Aged Out    Hib vaccine  Aged Out    Meningococcal (ACWY) vaccine  Aged Out     Recommendations for [employer] Due: see orders and patient instructions/AVS.    Recommended screening schedule for the next 5-10 years is provided to the patient in written form: see Patient Instructions/AVS.    Vianey MONTANEZ LPN, 24/3/6167, performed the documented evaluation under the direct supervision of the attending physicianAnjali Linton, was evaluated through a synchronous (real-time) audio-video encounter. The patient (or guardian if applicable) is aware that this is a billable service. Verbal consent to proceed has been obtained within the past 12 months. The visit was conducted pursuant to the emergency declaration under the [address] [place] [address], [address] [place] [address] authority and the [employer] General Act. Patient identification was verified, and a caregiver was present when appropriate. The patient was located in a state where the provider was credentialed to provide care. Total time spent for this encounter: 25 Min    --Vianey Ugarte LPN on 23/8/7138 at 0:44 AM    An electronic signature was used to authenticate this note.

## 2021-11-01 NOTE — PATIENT INSTRUCTIONS
Personalized Preventive Plan for Amy Linton - 11/1/2021  Medicare offers a range of preventive health benefits. Some of the tests and screenings are paid in full while other may be subject to a deductible, co-insurance, and/or copay. Some of these benefits include a comprehensive review of your medical history including lifestyle, illnesses that may run in your family, and various assessments and screenings as appropriate. After reviewing your medical record and screening and assessments performed today your provider may have ordered immunizations, labs, imaging, and/or referrals for you. A list of these orders (if applicable) as well as your Preventive Care list are included within your After Visit Summary for your review. Other Preventive Recommendations:    · A preventive eye exam performed by an eye specialist is recommended every 1-2 years to screen for glaucoma; cataracts, macular degeneration, and other eye disorders. · A preventive dental visit is recommended every 6 months. · Try to get at least 150 minutes of exercise per week or 10,000 steps per day on a pedometer . · Order or download the FREE \"Exercise & Physical Activity: Your Everyday Guide\" from [employer]. Call 9-437.908.3820 or search [employer] online. · You need 4622-4324 mg of calcium and 0203-7803 IU of vitamin D per day. It is possible to meet your calcium requirement with diet alone, but a vitamin D supplement is usually necessary to meet this goal.  · When exposed to the sun, use a sunscreen that protects against both UVA and UVB radiation with an SPF of 30 or greater. Reapply every 2 to 3 hours or after sweating, drying off with a towel, or swimming. · Always wear a seat belt when traveling in a car. Always wear a helmet when riding a bicycle or motorcycle. Personalized Preventive Plan for Amy Linton - 11/1/2021  Medicare offers a range of preventive health benefits.  Some of the tests and screenings are paid in full while other may be subject to a deductible, co-insurance, and/or copay. Some of these benefits include a comprehensive review of your medical history including lifestyle, illnesses that may run in your family, and various assessments and screenings as appropriate. After reviewing your medical record and screening and assessments performed today your provider may have ordered immunizations, labs, imaging, and/or referrals for you. A list of these orders (if applicable) as well as your Preventive Care list are included within your After Visit Summary for your review. Other Preventive Recommendations:    A preventive eye exam performed by an eye specialist is recommended every 1-2 years to screen for glaucoma; cataracts, macular degeneration, and other eye disorders. A preventive dental visit is recommended every 6 months. Try to get at least 150 minutes of exercise per week or 10,000 steps per day on a pedometer . Order or download the FREE \"Exercise & Physical Activity: Your Everyday Guide\" from [employer]. Call 5-751.628.4012 or search [employer] online. You need 0546-3510 mg of calcium and 8804-9238 IU of vitamin D per day. It is possible to meet your calcium requirement with diet alone, but a vitamin D supplement is usually necessary to meet this goal.  When exposed to the sun, use a sunscreen that protects against both UVA and UVB radiation with an SPF of 30 or greater. Reapply every 2 to 3 hours or after sweating, drying off with a towel, or swimming. Always wear a seat belt when traveling in a car. Always wear a helmet when riding a bicycle or motorcycle.

## 2021-11-23 ASSESSMENT — ENCOUNTER SYMPTOMS
EYES NEGATIVE: 1
VOMITING: 0
EYE REDNESS: 0
EYE DISCHARGE: 0
GASTROINTESTINAL NEGATIVE: 1
ABDOMINAL PAIN: 0
EYE PAIN: 0
SHORTNESS OF BREATH: 0
SORE THROAT: 0
BLOOD IN STOOL: 0
WHEEZING: 0
NAUSEA: 0
CONSTIPATION: 0
RESPIRATORY NEGATIVE: 1
COUGH: 0
BACK PAIN: 0
DIARRHEA: 0

## 2021-11-23 NOTE — PROGRESS NOTES
Progress Note      Pt Name: Tristian Sandy  YOB: 1950  MRN: [id number]    Date of evaluation: 11/30/2021  History Obtained From:  patient, electronic medical record    CHIEF COMPLAINT:    Chief Complaint   Patient presents with    Follow-up     Malignant neoplasm of overlapping sites of right breast in female, estrogen receptor positive ([facility] [address]    Breast Cancer    Anemia    Other     Thrombocytopenia    Results     Osteopenia     HISTORY OF PRESENT ILLNESS:    Tristian Sandy is a 66 y.o.  female who is currently being followed for a diagnosis of locally advanced HER-2 positive breast cancer. She is status post neoadjuvant chemotherapy with a partial response, right mastectomy and lymph node dissection. She obtained a complete pathologic response in the breast, unfortunately had residual disease in the axilla which was followed by adjuvant treatment to TDM 1. She is  current taking adjuvant endocrine therapy with letrozole 2.5 mg daily, anticipate 10-year dosing through 4/10/2030. Herminia Koo is also taking biphosphonate therapy with Boniva 150 mg p.o. every 4 weeks for osteopenia in the setting of aromatase inhibitor therapy. She returns today in scheduled follow-up for evaluation, side effect monitoring, lab monitoring and further recommendations. Herminia Koo presents today indicating that overall she is doing fairly well. She reports being compliant with letrozole 2.5 mg daily and denies any significant arthralgias or hot flashes. Denies any left breast or right chest wall complaints. Left breast and right chest wall examination today revealed no dominant masses, no skin and no axillary adenopathy. No suspicious abnormality to suggest recurrent breast cancer. Surgical scarring within normal limits. No concerning lymphedema noted to right upper extremity. She was last seen by Dr. Anuj Arguello and had a left mammogram on 8/25/2021.   Left mammogram was documented as no mammographic evidence of malignancy. I reviewed Dr. Huey Bolanos office note that reported no new concerning findings and a 6-month follow-up was requested. ONCOLOGIC HISTORY:   Diagnosis   · HER-2 IDC right breast, 2019  · [id number]  · ER 3%, MT 0%, HER-2/lea IHC 3+  · Mammaprint high risk Basal-like  · Osteopenia, Aug 2019-T score -1.5 lumbar spine     Treatment summary  · 10/18/2019 through  2020- 6 cycles of neoadjuvant Taxotere, Carboplatin and Herceptin and Perjeta  · 3/3/2020- right mastectomy/ lymph node dissection  · 3/20/2020-14 cycles of adjuvant TDM-1.   · 2020-2020 Completed adjuvant radiation 6040 cGy  · 4/10/2020-adjuvant endocrine therapy with letrozole x10 years.     Ms Louisa Benoit was first seen by Dr. Chanel Chávez on 10/03/2019. She felt a mass in the right upper breast and therefore contact her primary care provider. A diagnostic mammogram was performed. Her mother  of breast cancer at the age of 50. She has no other history of breast cancer or any other cancer in her family. · 2019- diagnostic mammogram showed a suspicious 2.2 cm hypoechoic spiculated right breast mass in the axillary tail within the internal calcifications had suggest malignancy. Ultrasound of the breast showed the lesion as well as a pathologic appearing right axillary adenopathy. · 2019-core needle biopsy consistent with high grade invasive ductal carcinoma. MammaPrint high risk basal type. ER 3%, MT 0.2%, HER-2/lea IHC 3+ Ki-67 22%. HER-2/lea FISH positive  · 2019- MRI breast showed a large right breast mass compatible with primary malignancy measuring 2.2 x 1.2 x 3.5 cm in size. Abnormal enhancement in the pectoralis major muscle suggesting extension into the muscle. Multiple abnormal lymph nodes are seen in the right axilla with the largest measuring 2 x 1.6 cm. A small lymph node is seen along the right internal mammary chain.   No suspicious left axillary adenopathy. · 9/27/2019-CT chest abdomen pelvis and bone scan showed no evidence of metastatic disease. · 10/3/2019- she was first seen by Dr. Norma Jackson. Recommended neoadjuvant chemotherapy approach with dose dense Adriamycin/cyclophosphamide x4 cycles followed by 12 weekly cycles of Taxol 80 mg/m² with Herceptin and Perjeta. · 10/14/2019- consultation at [facility]. Recommended Taxotere, carboplatin Herceptin Perjeta. · 10/17/2019- 1/31/2020 completion of 6 cycles of of neoadjuvant chemotherapy with Taxotere carboplatin Herceptin Perjeta. · 2/14/2020-MRI breast.  Showed findings consistent with response to therapy. No residual enhancement of the right breast. Residual right axillary lymph node concerning for residual disease. · 2/21/2020- maintenance Herceptin/Perjeta to complete 1 year of treatment. · 3/03/2020-she underwent a right mastectomy/axillary dissection by Dr. Ariana Anthony at [facility].  Primary tumor site identified with extensive fibrosis and nests of residual high-grade carcinoma present in lymphatic spaces. Tumor is present in a lymphatic space at the deep surgical excision margin. 3 out of 11 lymph nodes, positive for metastatic carcinoma, at least 1 metastasis greater than 2.0 mm . Largest focus of metastasis measures 0.5 cm in greatest dimension. AJCC STAGE: ypT0, pN1a, pMx  · 3/16/2020-repeat 2D echo EF 60%. · 4/20/2020-6/4/2020 Completion adjuvant radiation 6040 cGy  · 8/24/2020 Silverio Digital Screen Unilateral Left- No mammographic evidence of malignancy within the left breast. Continued annual unilateral screening mammography recommended. BI-RADS Category 2.  · 2/1/21 2D echo: ejection fraction estimated at 55%. · 02/22/2021 Xray right shoulder No acute osseous injury or malalignment at the shoulder. Underlying osteoarthritis changes at the shoulder appear quite mild. · 08/25/2021 Bone density Osteopenia. Low bone mass.  Lumbar spine T-score -1.7, left femoral neck T-score -2.2   · 08/25/2021 Left breast mammogram No mammographic evidence of malignancy    Hematologic history: Macrocytic anemia    Serology studies 6/28/2021 at [facility]  · D-Dimer 3.57  · CRP 5.78  · H/H 9.5 & 30.1; MCV 99.3  Iron 51  TIBC 207  Iron saturation 25  Ferritin 218.20  Folate 10.90  Transferrin 139    Serology studies on 09/13/2021  · Vitamin B12/Folate: 606/12.8  · Vitamin D 33.1    Past Medical History:    Past Medical History:   Diagnosis Date    Malignant neoplasm of right breast in female, estrogen receptor positive ([address]     breast       Past Surgical History:    Past Surgical History:   Procedure Laterality Date    BREAST BIOPSY Right 09/05/2019    COLONOSCOPY N/A 10/1/2019    Dr Bud Mata, internal hemorrhoids-Grade 2 without bleeding stigmata and external hemorrhoids-TV x 1, AP x 1, 3 yr recall    MASTECTOMY Right 3/3/2020    RIGHT SIMPLE MASTECTOMY WITH SENTINEL NODE BIOPSY, FLAPS, PEC BLOCK performed by Héctor Duckworth MD at [address] [place], [place] [address] N/A 10/4/2019    PORT INSERTION WITH FLUORO performed by Héctor Duckworth MD at [address] [place] [address] [address] N/A 5/13/2021    PORT REMOVAL performed by Héctor Duckworth MD at [place] [address] ENDOSCOPY N/A 10/1/2019    Dr Wiggins Elvie hernia, HP gastric polyps       Current Medications:    Current Outpatient Medications   Medication Sig Dispense Refill    ibandronate (BONIVA) 150 MG tablet Take 1 tablet by mouth every 30 days Take one (1) tablet once per month in the morning with a full glass of water, on an empty stomach, and do not take anything else by mouth or lie down for the next 30 minutes. 3 tablet 3    letrozole (FEMARA) 2.5 MG tablet Take 1 tablet by mouth daily 90 tablet 3    Calcium Carbonate-Vitamin D (OYSTER SHELL CALCIUM/D) 500-200 MG-UNIT TABS Take 1 tablet by mouth daily 360 tablet 0     No current facility-administered medications for this visit.         Allergies: No Known Allergies    Social History:    Social History     Tobacco Use    Smoking status: Never Smoker    Smokeless tobacco: Never Used   Vaping Use    Vaping Use: Never used   Substance Use Topics    Alcohol use: Never    Drug use: Never       Family History:   Family History   Problem Relation Age of Onset    Breast Cancer Mother 39    Heart Disease Father     Colon Cancer Neg Hx     Colon Polyps Neg Hx        Vitals:  Vitals:    11/30/21 0927   BP: 125/60   Pulse: 70   SpO2: 95%   Height: 5' 7\" (1.702 m)        Subjective   REVIEW OF SYSTEMS:   Review of Systems   Constitutional: Positive for fatigue. Negative for chills, diaphoresis and fever. HENT: Negative. Negative for congestion, ear pain, hearing loss, nosebleeds, sore throat and tinnitus. Eyes: Negative. Negative for pain, discharge and redness. Respiratory: Negative. Negative for cough, shortness of breath and wheezing. Cardiovascular: Negative. Negative for chest pain, palpitations and leg swelling. Gastrointestinal: Negative. Negative for abdominal pain, blood in stool, constipation, diarrhea, nausea and vomiting. Endocrine: Negative for polydipsia. Genitourinary: Negative for dysuria, flank pain, frequency, hematuria and urgency. Musculoskeletal: Negative. Negative for back pain, myalgias and neck pain. Skin: Negative. Negative for rash. Neurological: Negative. Negative for dizziness, tremors, seizures, weakness and headaches. Hematological: Does not bruise/bleed easily. Psychiatric/Behavioral: Negative. The patient is not nervous/anxious. Objective   PHYSICAL EXAM:  Physical Exam  Vitals reviewed. Constitutional:       General: She is not in acute distress. Appearance: She is well-developed. She is not diaphoretic. HENT:      Head: Normocephalic and atraumatic. Mouth/Throat:      Pharynx: Uvula midline. Tonsils: No tonsillar exudate. Eyes:      General: Lids are normal. No scleral icterus. Right eye: No discharge. Left eye: No discharge. Conjunctiva/sclera: Conjunctivae normal.      Pupils: Pupils are equal, round, and reactive to light. Neck:      Thyroid: No thyroid mass or thyromegaly. Vascular: No JVD. Trachea: Trachea normal. No tracheal deviation. Cardiovascular:      Rate and Rhythm: Normal rate and regular rhythm. Heart sounds: Normal heart sounds. No murmur heard. No friction rub. No gallop. Pulmonary:      Effort: Pulmonary effort is normal. No respiratory distress. Breath sounds: Normal breath sounds. No wheezing or rales. Chest:      Chest wall: No tenderness. Abdominal:      General: Bowel sounds are normal. There is no distension. Palpations: Abdomen is soft. There is no mass. Tenderness: There is no abdominal tenderness. There is no guarding or rebound. Hernia: No hernia is present. Musculoskeletal:         General: No tenderness or deformity. Cervical back: Normal range of motion and neck supple. Comments: Range of motion within normal limits x4 extremities   Skin:     General: Skin is warm. Coloration: Skin is not pale. Findings: No erythema or rash. Neurological:      Mental Status: She is alert and oriented to person, place, and time. Cranial Nerves: No cranial nerve deficit. Coordination: Coordination normal.   Psychiatric:         Behavior: Behavior normal.         Thought Content: Thought content normal.         Labs reviewed today:  Lab Results   Component Value Date    WBC 5.49 11/30/2021    HGB 12.3 11/30/2021    HCT 36.4 11/30/2021    .4 (H) 11/30/2021     (L) 11/30/2021     Lab Results   Component Value Date    NEUTROABS 4.02 11/30/2021       ASSESSMENT/PLAN:      1. Invasive ductal carcinoma of the right breast, node positive, HER-2 positive. Compliant with letrozole 2.5 mg daily and denies any significant arthralgias or hot flashes.   Denies any left breast or right chest wall complaints. Left breast and right chest wall examination today revealed no dominant masses, no skin and no axillary adenopathy. No suspicious abnormality to suggest recurrent breast cancer. Surgical scarring within normal limits. No concerning lymphedema noted to right upper extremity. She was last seen by Dr. Aly Ochoa and had a left mammogram on 8/25/2021. Left mammogram was documented as no mammographic evidence of malignancy. I reviewed Dr. Ole Donaldson office note that reported no new concerning findings and a 6-month follow-up was requested. - continue letrozole (FEMARA) 2.5 MG tablet daily  -Encourage routine self breast exams    2. Osteopenia, density study on 8/25/2021 needs to report osteopenia. Low bone mass. Lumbar spine T-score -1.7, left femoral neck T-score -2.2     -Continue Boniva 150 mg p.o. monthly  -Continue calcium 500 mg with vitamin D 200 units twice daily     The use of bisphosphonates as adjuvant therapy should be considered for all postmenopausal women with early breast cancer who are deemed to be candidates for adjuvant therapy, according to a joint clinical practice guideline from [address] [place] [address] and the [address] [place] [address],#[phone] of Clinical Oncology (ASCO). 3. Encounter for monitoring aromatase inhibitor therapy  Denies hot flashes or significant arthralgias    4. Care plan discussed with patient    5. Lymphedema of right arm, currently stable. Continue home exercises and follow-up with lymphedema clinic as recommended      6. Macrocytic anemia, chronic dating back to January 2020. Hemoglobin 12.3 with an MCV of 103.4    Serology studies 6/28/2021 at [facility]  · D-Dimer 3.57  · CRP 5.78  · H/H 9.5 & 30.1; MCV 99.3  Iron 51  TIBC 207  Iron saturation 25  Ferritin 218.20  Folate 10.90  Transferrin 139    Serology studies on 09/13/2021  · Vitamin B12/Folate: 606/12.8  · Vitamin D 33.1    -Will repeat iron panel, ferritin and CMP today      7.   Thrombocytopenia dating back to 2020, platelet count stable at 123,000. Denies any bleeding or excessive bruising.  -Will monitor conservatively, if platelet count drops below 100,000 will need to consider completed bone marrow aspiration biopsy for further evaluation      I discussed all of the above findings included in the assessment and plan with the patient and the patient is in agreement to move forward with current recommendations/treatment. I have addressed all of their questions and concerns that were verbalized. FOLLOW UP:  1. Follow-up appointment given for 6 months, sooner if needed  2. Continue to follow with other medical providers as recommended  3.  Labs at next visit: CBC, CMP, Iron, TIBC, Ferritin

## 2021-11-30 ENCOUNTER — IMMUNIZATION (OUTPATIENT)
Dept: INTERNAL MEDICINE | Age: 71
End: 2021-11-30
Payer: MEDICARE

## 2021-11-30 ENCOUNTER — OFFICE VISIT (OUTPATIENT)
Dept: HEMATOLOGY | Age: 71
End: 2021-11-30
Payer: MEDICARE

## 2021-11-30 ENCOUNTER — HOSPITAL ENCOUNTER (OUTPATIENT)
Dept: INFUSION THERAPY | Age: 71
Discharge: HOME | End: 2021-11-30
Payer: MEDICARE

## 2021-11-30 VITALS
HEART RATE: 70 BPM | DIASTOLIC BLOOD PRESSURE: 60 MMHG | OXYGEN SATURATION: 95 % | SYSTOLIC BLOOD PRESSURE: 125 MMHG | BODY MASS INDEX: 18.64 KG/M2 | HEIGHT: 67 IN

## 2021-11-30 DIAGNOSIS — Z17.0 MALIGNANT NEOPLASM OF OVERLAPPING SITES OF RIGHT BREAST IN FEMALE, ESTROGEN RECEPTOR POSITIVE (HCC): Primary | ICD-10-CM

## 2021-11-30 DIAGNOSIS — I89.0 LYMPHEDEMA OF RIGHT ARM: ICD-10-CM

## 2021-11-30 DIAGNOSIS — C50.811 MALIGNANT NEOPLASM OF OVERLAPPING SITES OF RIGHT FEMALE BREAST, UNSPECIFIED ESTROGEN RECEPTOR STATUS (HCC): ICD-10-CM

## 2021-11-30 DIAGNOSIS — D53.9 MACROCYTIC ANEMIA: ICD-10-CM

## 2021-11-30 DIAGNOSIS — D50.9 IRON DEFICIENCY ANEMIA, UNSPECIFIED IRON DEFICIENCY ANEMIA TYPE: ICD-10-CM

## 2021-11-30 DIAGNOSIS — Z79.811 ENCOUNTER FOR MONITORING AROMATASE INHIBITOR THERAPY: ICD-10-CM

## 2021-11-30 DIAGNOSIS — C50.811 MALIGNANT NEOPLASM OF OVERLAPPING SITES OF RIGHT BREAST IN FEMALE, ESTROGEN RECEPTOR POSITIVE (HCC): ICD-10-CM

## 2021-11-30 DIAGNOSIS — M85.88 OSTEOPENIA OF LUMBAR SPINE: ICD-10-CM

## 2021-11-30 DIAGNOSIS — Z51.81 ENCOUNTER FOR MONITORING AROMATASE INHIBITOR THERAPY: ICD-10-CM

## 2021-11-30 DIAGNOSIS — Z17.0 MALIGNANT NEOPLASM OF OVERLAPPING SITES OF RIGHT BREAST IN FEMALE, ESTROGEN RECEPTOR POSITIVE (HCC): ICD-10-CM

## 2021-11-30 DIAGNOSIS — Z23 NEED FOR VACCINATION: Primary | ICD-10-CM

## 2021-11-30 DIAGNOSIS — C50.811 MALIGNANT NEOPLASM OF OVERLAPPING SITES OF RIGHT BREAST IN FEMALE, ESTROGEN RECEPTOR POSITIVE (HCC): Primary | ICD-10-CM

## 2021-11-30 LAB
ALBUMIN SERPL-MCNC: 4.5 G/DL (ref 3.5–5.2)
ALP BLD-CCNC: 64 U/L (ref 35–104)
ALT SERPL-CCNC: 42 U/L (ref 9–52)
ANION GAP SERPL CALCULATED.3IONS-SCNC: 9 MMOL/L (ref 7–19)
AST SERPL-CCNC: 53 U/L (ref 14–36)
BASOPHILS ABSOLUTE: 0.02 K/UL (ref 0.01–0.08)
BASOPHILS RELATIVE PERCENT: 0.4 % (ref 0.1–1.2)
BILIRUB SERPL-MCNC: 1.1 MG/DL (ref 0.2–1.3)
BUN BLDV-MCNC: 29 MG/DL (ref 7–17)
CALCIUM SERPL-MCNC: 10 MG/DL (ref 8.4–10.2)
CHLORIDE BLD-SCNC: 102 MMOL/L (ref 98–111)
CO2: 29 MMOL/L (ref 22–29)
CREAT SERPL-MCNC: 0.8 MG/DL (ref 0.5–1)
EOSINOPHILS ABSOLUTE: 0.15 K/UL (ref 0.04–0.54)
EOSINOPHILS RELATIVE PERCENT: 2.7 % (ref 0.7–7)
FERRITIN: 40 NG/ML (ref 11.1–264)
GFR NON-AFRICAN AMERICAN: >60
GLOBULIN: 2.1 G/DL
GLUCOSE BLD-MCNC: 102 MG/DL (ref 74–106)
HCT VFR BLD CALC: 36.4 % (ref 34.1–44.9)
HEMOGLOBIN: 12.3 G/DL (ref 11.2–15.7)
IRON SATURATION: 24 % (ref 14–50)
IRON: 88 UG/DL (ref 37–170)
LYMPHOCYTES ABSOLUTE: 0.87 K/UL (ref 1.18–3.74)
LYMPHOCYTES RELATIVE PERCENT: 15.8 % (ref 19.3–53.1)
MCH RBC QN AUTO: 34.9 PG (ref 25.6–32.2)
MCHC RBC AUTO-ENTMCNC: 33.8 G/DL (ref 32.3–35.5)
MCV RBC AUTO: 103.4 FL (ref 79.4–94.8)
MONOCYTES ABSOLUTE: 0.43 K/UL (ref 0.24–0.82)
MONOCYTES RELATIVE PERCENT: 7.8 % (ref 4.7–12.5)
NEUTROPHILS ABSOLUTE: 4.02 K/UL (ref 1.56–6.13)
NEUTROPHILS RELATIVE PERCENT: 73.3 % (ref 34–71.1)
PDW BLD-RTO: 14 % (ref 11.7–14.4)
PLATELET # BLD: 123 K/UL (ref 182–369)
PMV BLD AUTO: 10 FL (ref 7.4–10.4)
POTASSIUM SERPL-SCNC: 3.3 MMOL/L (ref 3.5–5.1)
RBC # BLD: 3.52 M/UL (ref 3.93–5.22)
SODIUM BLD-SCNC: 140 MMOL/L (ref 137–145)
TOTAL IRON BINDING CAPACITY: 366 UG/DL (ref 265–497)
TOTAL PROTEIN: 6.6 G/DL (ref 6.3–8.2)
WBC # BLD: 5.49 K/UL (ref 3.98–10.04)

## 2021-11-30 PROCEDURE — 36415 COLL VENOUS BLD VENIPUNCTURE: CPT

## 2021-11-30 PROCEDURE — 80053 COMPREHEN METABOLIC PANEL: CPT

## 2021-11-30 PROCEDURE — 1123F ACP DISCUSS/DSCN MKR DOCD: CPT | Performed by: NURSE PRACTITIONER

## 2021-11-30 PROCEDURE — 3017F COLORECTAL CA SCREEN DOC REV: CPT | Performed by: NURSE PRACTITIONER

## 2021-11-30 PROCEDURE — G8399 PT W/DXA RESULTS DOCUMENT: HCPCS | Performed by: NURSE PRACTITIONER

## 2021-11-30 PROCEDURE — 99214 OFFICE O/P EST MOD 30 MIN: CPT | Performed by: NURSE PRACTITIONER

## 2021-11-30 PROCEDURE — 1090F PRES/ABSN URINE INCON ASSESS: CPT | Performed by: NURSE PRACTITIONER

## 2021-11-30 PROCEDURE — G0008 ADMIN INFLUENZA VIRUS VAC: HCPCS | Performed by: INTERNAL MEDICINE

## 2021-11-30 PROCEDURE — G8427 DOCREV CUR MEDS BY ELIG CLIN: HCPCS | Performed by: NURSE PRACTITIONER

## 2021-11-30 PROCEDURE — 83540 ASSAY OF IRON: CPT

## 2021-11-30 PROCEDURE — 90694 VACC AIIV4 NO PRSRV 0.5ML IM: CPT | Performed by: INTERNAL MEDICINE

## 2021-11-30 PROCEDURE — G8484 FLU IMMUNIZE NO ADMIN: HCPCS | Performed by: NURSE PRACTITIONER

## 2021-11-30 PROCEDURE — 1036F TOBACCO NON-USER: CPT | Performed by: NURSE PRACTITIONER

## 2021-11-30 PROCEDURE — G8420 CALC BMI NORM PARAMETERS: HCPCS | Performed by: NURSE PRACTITIONER

## 2021-11-30 PROCEDURE — 83550 IRON BINDING TEST: CPT

## 2021-11-30 PROCEDURE — 82728 ASSAY OF FERRITIN: CPT

## 2021-11-30 PROCEDURE — 99212 OFFICE O/P EST SF 10 MIN: CPT

## 2021-11-30 PROCEDURE — 4040F PNEUMOC VAC/ADMIN/RCVD: CPT | Performed by: NURSE PRACTITIONER

## 2021-11-30 PROCEDURE — 85025 COMPLETE CBC W/AUTO DIFF WBC: CPT

## 2021-12-01 ENCOUNTER — TELEPHONE (OUTPATIENT)
Dept: INFUSION THERAPY | Age: 71
End: 2021-12-01

## 2021-12-01 NOTE — TELEPHONE ENCOUNTER
----- Message from GUNNER Carranza sent at 12/1/2021  9:23 AM CST -----  Please call Abby Hopkins and inform her that her potassium is mildly low at 3.3 encouraged increasing potassium rich foods (ie bananas baked potatoes). Also encouraged her to increase iron rich foods.   Please schedule her for a repeat BMP in 1 week and if potassium does not improve will initiate with a potassium supplement

## 2021-12-01 NOTE — TELEPHONE ENCOUNTER
Spoke with Shiela Lindsey regarding potassium low. She will increase potassium rich foods. Tushare made to recheck BMP in one week.

## 2021-12-06 DIAGNOSIS — C50.811 MALIGNANT NEOPLASM OF OVERLAPPING SITES OF RIGHT BREAST IN FEMALE, ESTROGEN RECEPTOR POSITIVE (HCC): Primary | ICD-10-CM

## 2021-12-06 DIAGNOSIS — Z17.0 MALIGNANT NEOPLASM OF OVERLAPPING SITES OF RIGHT BREAST IN FEMALE, ESTROGEN RECEPTOR POSITIVE (HCC): Primary | ICD-10-CM

## 2021-12-07 ENCOUNTER — HOSPITAL ENCOUNTER (OUTPATIENT)
Dept: INFUSION THERAPY | Age: 71
Discharge: HOME | End: 2021-12-07

## 2021-12-07 DIAGNOSIS — C50.811 MALIGNANT NEOPLASM OF OVERLAPPING SITES OF RIGHT BREAST IN FEMALE, ESTROGEN RECEPTOR POSITIVE (HCC): ICD-10-CM

## 2021-12-07 DIAGNOSIS — Z17.0 MALIGNANT NEOPLASM OF OVERLAPPING SITES OF RIGHT BREAST IN FEMALE, ESTROGEN RECEPTOR POSITIVE (HCC): ICD-10-CM

## 2021-12-20 ENCOUNTER — HOSPITAL ENCOUNTER (OUTPATIENT)
Dept: CT IMAGING | Age: 71
Discharge: HOME | End: 2021-12-20
Payer: MEDICARE

## 2021-12-20 ENCOUNTER — OFFICE VISIT (OUTPATIENT)
Dept: INTERNAL MEDICINE | Age: 71
End: 2021-12-20
Payer: MEDICARE

## 2021-12-20 VITALS
WEIGHT: 118.6 LBS | DIASTOLIC BLOOD PRESSURE: 70 MMHG | BODY MASS INDEX: 18.62 KG/M2 | HEIGHT: 67 IN | OXYGEN SATURATION: 97 % | HEART RATE: 90 BPM | SYSTOLIC BLOOD PRESSURE: 110 MMHG

## 2021-12-20 DIAGNOSIS — R51.9 ACUTE NONINTRACTABLE HEADACHE, UNSPECIFIED HEADACHE TYPE: Primary | ICD-10-CM

## 2021-12-20 DIAGNOSIS — I15.9 SECONDARY HYPERTENSION: ICD-10-CM

## 2021-12-20 DIAGNOSIS — R51.9 ACUTE NONINTRACTABLE HEADACHE, UNSPECIFIED HEADACHE TYPE: ICD-10-CM

## 2021-12-20 DIAGNOSIS — G47.10 HYPERSOMNIA, UNSPECIFIED: ICD-10-CM

## 2021-12-20 DIAGNOSIS — G47.8 UNREFRESHED BY SLEEP: ICD-10-CM

## 2021-12-20 DIAGNOSIS — R03.0 ELEVATED BLOOD PRESSURE READING: ICD-10-CM

## 2021-12-20 PROBLEM — O16.1 ELEVATED BLOOD PRESSURE AFFECTING PREGNANCY IN FIRST TRIMESTER, ANTEPARTUM: Status: ACTIVE | Noted: 2021-12-20

## 2021-12-20 PROCEDURE — G8427 DOCREV CUR MEDS BY ELIG CLIN: HCPCS | Performed by: INTERNAL MEDICINE

## 2021-12-20 PROCEDURE — G8484 FLU IMMUNIZE NO ADMIN: HCPCS | Performed by: INTERNAL MEDICINE

## 2021-12-20 PROCEDURE — 1036F TOBACCO NON-USER: CPT | Performed by: INTERNAL MEDICINE

## 2021-12-20 PROCEDURE — G8399 PT W/DXA RESULTS DOCUMENT: HCPCS | Performed by: INTERNAL MEDICINE

## 2021-12-20 PROCEDURE — 4040F PNEUMOC VAC/ADMIN/RCVD: CPT | Performed by: INTERNAL MEDICINE

## 2021-12-20 PROCEDURE — G8420 CALC BMI NORM PARAMETERS: HCPCS | Performed by: INTERNAL MEDICINE

## 2021-12-20 PROCEDURE — 1090F PRES/ABSN URINE INCON ASSESS: CPT | Performed by: INTERNAL MEDICINE

## 2021-12-20 PROCEDURE — 6360000004 HC RX CONTRAST MEDICATION: Performed by: INTERNAL MEDICINE

## 2021-12-20 PROCEDURE — 99213 OFFICE O/P EST LOW 20 MIN: CPT | Performed by: INTERNAL MEDICINE

## 2021-12-20 PROCEDURE — 1123F ACP DISCUSS/DSCN MKR DOCD: CPT | Performed by: INTERNAL MEDICINE

## 2021-12-20 PROCEDURE — 3017F COLORECTAL CA SCREEN DOC REV: CPT | Performed by: INTERNAL MEDICINE

## 2021-12-20 PROCEDURE — 70470 CT HEAD/BRAIN W/O & W/DYE: CPT

## 2021-12-20 RX ORDER — AMLODIPINE BESYLATE 2.5 MG/1
2.5 TABLET ORAL DAILY
Qty: 90 TABLET | Refills: 1 | Status: SHIPPED | OUTPATIENT
Start: 2021-12-20 | End: 2022-05-26

## 2021-12-20 RX ADMIN — IOPAMIDOL 60 ML: 755 INJECTION, SOLUTION INTRAVENOUS at 15:03

## 2021-12-20 ASSESSMENT — PATIENT HEALTH QUESTIONNAIRE - PHQ9
SUM OF ALL RESPONSES TO PHQ QUESTIONS 1-9: 0
1. LITTLE INTEREST OR PLEASURE IN DOING THINGS: 0
SUM OF ALL RESPONSES TO PHQ9 QUESTIONS 1 & 2: 0
SUM OF ALL RESPONSES TO PHQ QUESTIONS 1-9: 0
2. FEELING DOWN, DEPRESSED OR HOPELESS: 0
SUM OF ALL RESPONSES TO PHQ QUESTIONS 1-9: 0

## 2021-12-20 ASSESSMENT — ENCOUNTER SYMPTOMS
VOMITING: 0
NAUSEA: 0

## 2021-12-20 NOTE — ASSESSMENT & PLAN NOTE
Uncontrolled, changes made today: unclear cause of nocturnal BP elevations, in view of HX of Breast cancer will evaluate with CT head to r/o intracranial pathology, start Amlodipine night time 2.5 mg , may repeat after 2 hours if BP remains elevated over 248 systollic

## 2021-12-20 NOTE — PROGRESS NOTES
Meera Saleem ( 1950) is a 66 y.o. female, Established , here for evaluation of the following chief complaint(s). Hypertension        ASSESSMENT/PLAN:  Problem List        Circulatory    Elevated blood pressure reading      Uncontrolled, changes made today: unclear cause of nocturnal BP elevations, in view of HX of Breast cancer will evaluate with CT head to r/o intracranial pathology, start Amlodipine night time 2.5 mg , may repeat after 2 hours if BP remains elevated over 972 systollic         Relevant Medications    amLODIPine (NORVASC) 2.5 MG tablet       Other    Acute nonintractable headache - Primary    Relevant Medications    amLODIPine (NORVASC) 2.5 MG tablet    Other Relevant Orders    CT HEAD W WO CONTRAST          Results for orders placed or performed in visit on    Basic Metabolic Panel   Result Value Ref Range    Glucose 88 65 - 99 mg/dL    BUN 26 8 - 27 mg/dL    CREATININE 0.99 0.57 - 1.00 mg/dL    GFR Non-African American 57 (L) >59 mL/min/1.73    GFR  65 >59 mL/min/1.73    BUN/Creatinine Ratio 26 12 - 28    Sodium 146 (H) 134 - 144 mmol/L    Potassium 3.8 3.5 - 5.2 mmol/L    Chloride 104 96 - 106 mmol/L    CO2 27 20 - 29 mmol/L    Calcium 9.4 8.7 - 10.3 mg/dL       Return in about 4 weeks (around 2022). HPI  61-year-old female known to have right breast cancer status postmastectomy and chemotherapy known to have hypokalemia taking potassium rich foods prediabetes comes with acute onset of headaches  Headaches were noted to be 3 days generalized headache worse when she moves her head states her blood pressure was elevated in the 150s and it is normal throughout the day occurring repeatedly every night lately she denies it to be throbbing no visual complaints no weakness of the lower extremities or the she denies any nausea or vomiting elevated no visual complaints  Review of Systems   Eyes: Negative for visual disturbance.    Gastrointestinal: Negative for nausea and vomiting. Neurological: Positive for headaches. Negative for weakness. Physical Exam  Constitutional:       Appearance: She is normal weight. HENT:      Nose: Nose normal.   Eyes:      General:         Right eye: No discharge. Left eye: No discharge. Conjunctiva/sclera: Conjunctivae normal.   Cardiovascular:      Rate and Rhythm: Normal rate. Pulses: Normal pulses. Pulmonary:      Effort: Pulmonary effort is normal.      Breath sounds: Normal breath sounds. Abdominal:      Palpations: Abdomen is soft. Musculoskeletal:         General: Normal range of motion. Cervical back: Normal range of motion. Skin:     General: Skin is warm. Neurological:      General: No focal deficit present. Mental Status: She is alert and oriented to person, place, and time. Cranial Nerves: No cranial nerve deficit. Sensory: No sensory deficit. Motor: No weakness.       Gait: Gait normal.   Psychiatric:         Mood and Affect: Mood normal.           (Time Documentation Optional [id number])    An electronic signaturewaas used to authenticate this note  -Nicky Willoughby MD on 12/20/2021 at 2:29 PM

## 2022-01-19 PROBLEM — D69.6 THROMBOCYTOPENIA (HCC): Status: ACTIVE | Noted: 2022-01-19

## 2022-01-20 ENCOUNTER — OFFICE VISIT (OUTPATIENT)
Dept: INTERNAL MEDICINE | Age: 72
End: 2022-01-20
Payer: MEDICARE

## 2022-01-20 VITALS
HEIGHT: 67 IN | WEIGHT: 123.8 LBS | HEART RATE: 74 BPM | BODY MASS INDEX: 19.43 KG/M2 | SYSTOLIC BLOOD PRESSURE: 120 MMHG | OXYGEN SATURATION: 98 % | DIASTOLIC BLOOD PRESSURE: 80 MMHG

## 2022-01-20 DIAGNOSIS — I10 BENIGN ESSENTIAL HTN: ICD-10-CM

## 2022-01-20 DIAGNOSIS — M85.859 OSTEOPENIA OF NECK OF FEMUR, UNSPECIFIED LATERALITY: ICD-10-CM

## 2022-01-20 DIAGNOSIS — R03.0 ELEVATED BLOOD PRESSURE READING: Primary | ICD-10-CM

## 2022-01-20 DIAGNOSIS — D69.6 THROMBOCYTOPENIA (HCC): ICD-10-CM

## 2022-01-20 DIAGNOSIS — C77.9 REGIONAL LYMPH NODE METASTASIS PRESENT (HCC): ICD-10-CM

## 2022-01-20 DIAGNOSIS — Z13.220 SCREENING FOR LIPID DISORDERS: ICD-10-CM

## 2022-01-20 DIAGNOSIS — Z13.29 SCREENING FOR THYROID DISORDER: ICD-10-CM

## 2022-01-20 DIAGNOSIS — D53.9 MACROCYTIC ANEMIA: ICD-10-CM

## 2022-01-20 DIAGNOSIS — R73.03 PREDIABETES: ICD-10-CM

## 2022-01-20 PROBLEM — R51.9 ACUTE NONINTRACTABLE HEADACHE: Status: RESOLVED | Noted: 2021-12-20 | Resolved: 2022-01-20

## 2022-01-20 PROBLEM — M85.80 OSTEOPENIA: Status: ACTIVE | Noted: 2021-03-08

## 2022-01-20 PROBLEM — L03.113 RIGHT ARM CELLULITIS: Status: RESOLVED | Noted: 2021-06-28 | Resolved: 2022-01-20

## 2022-01-20 PROCEDURE — 4040F PNEUMOC VAC/ADMIN/RCVD: CPT | Performed by: INTERNAL MEDICINE

## 2022-01-20 PROCEDURE — G8420 CALC BMI NORM PARAMETERS: HCPCS | Performed by: INTERNAL MEDICINE

## 2022-01-20 PROCEDURE — 1123F ACP DISCUSS/DSCN MKR DOCD: CPT | Performed by: INTERNAL MEDICINE

## 2022-01-20 PROCEDURE — 1036F TOBACCO NON-USER: CPT | Performed by: INTERNAL MEDICINE

## 2022-01-20 PROCEDURE — G8399 PT W/DXA RESULTS DOCUMENT: HCPCS | Performed by: INTERNAL MEDICINE

## 2022-01-20 PROCEDURE — 1090F PRES/ABSN URINE INCON ASSESS: CPT | Performed by: INTERNAL MEDICINE

## 2022-01-20 PROCEDURE — 99214 OFFICE O/P EST MOD 30 MIN: CPT | Performed by: INTERNAL MEDICINE

## 2022-01-20 PROCEDURE — 3017F COLORECTAL CA SCREEN DOC REV: CPT | Performed by: INTERNAL MEDICINE

## 2022-01-20 PROCEDURE — G8484 FLU IMMUNIZE NO ADMIN: HCPCS | Performed by: INTERNAL MEDICINE

## 2022-01-20 PROCEDURE — G8427 DOCREV CUR MEDS BY ELIG CLIN: HCPCS | Performed by: INTERNAL MEDICINE

## 2022-01-20 ASSESSMENT — ENCOUNTER SYMPTOMS
CHEST TIGHTNESS: 0
ABDOMINAL PAIN: 0
VOMITING: 0
APNEA: 0
COUGH: 0
SINUS PAIN: 0
BACK PAIN: 0
RHINORRHEA: 0
TROUBLE SWALLOWING: 0
ABDOMINAL DISTENTION: 0
BLOOD IN STOOL: 0
DIARRHEA: 0
SHORTNESS OF BREATH: 0
CONSTIPATION: 0
WHEEZING: 0
NAUSEA: 0
SORE THROAT: 0

## 2022-01-20 NOTE — PROGRESS NOTES
Aime Dominguez ( 1950) is a 66 y.o. female,  Established , here for evaluation of the following chief complaint(s). Follow-up        ASSESSMENT/PLAN:  Problem List        Circulatory    Benign essential HTN - Primary      Well-controlled, continue current medications, medication adherence emphasized and lifestyle modifications recommended         Relevant Medications    amLODIPine (NORVASC) 2.5 MG tablet       Musculoskeletal and Integument    Osteopenia      Well-controlled, continue current medications, medication adherence emphasized and lifestyle modifications recommended            Other    Thrombocytopenia (HCC)      Well-controlled, continue current medications, medication adherence emphasized and lifestyle modifications recommended         Regional lymph node metastasis present ([facility]      Monitored by specialist- no acute findings meriting change in the plan         Prediabetes      Well-controlled, lifestyle modifications recommended         Relevant Orders    Basic Metabolic Panel    Hemoglobin A1C    Macrocytic anemia    Relevant Orders    CBC          Results for orders placed or performed in visit on    Basic Metabolic Panel   Result Value Ref Range    Glucose 88 65 - 99 mg/dL    BUN 26 8 - 27 mg/dL    CREATININE 0.99 0.57 - 1.00 mg/dL    GFR Non-African American 57 (L) >59 mL/min/1.73    GFR  65 >59 mL/min/1.73    BUN/Creatinine Ratio 26 12 - 28    Sodium 146 (H) 134 - 144 mmol/L    Potassium 3.8 3.5 - 5.2 mmol/L    Chloride 104 96 - 106 mmol/L    CO2 27 20 - 29 mmol/L    Calcium 9.4 8.7 - 10.3 mg/dL       Return in about 6 months (around 2022).     HPI  77-year-old female who presents for follow-up visit she has history of status post right mastectomy with regional lymph node metastasis she had she is currently managed by oncology with Femara  Osteopenia on Boniva she is not on calcium  Hypertension well-controlled with amlodipine  She had lymphedema of the right upper extremity which is resolved after treatment  She has thrombocytopenia which is asymptomatic history of prediabetes  Presented recently for acute headache which is now resolved    Review of Systems   Constitutional: Negative for activity change, chills, fatigue and fever. HENT: Negative for congestion, ear pain, hearing loss, mouth sores, nosebleeds, postnasal drip, rhinorrhea, sinus pain, sore throat and trouble swallowing. Eyes: Negative for visual disturbance. Respiratory: Negative for apnea, cough, chest tightness, shortness of breath and wheezing. Cardiovascular: Negative for chest pain, palpitations and leg swelling. Gastrointestinal: Negative for abdominal distention, abdominal pain, blood in stool, constipation, diarrhea, nausea and vomiting. Endocrine: Negative for cold intolerance, heat intolerance and polyuria. Genitourinary: Negative for difficulty urinating, dysuria, flank pain, frequency and urgency. Musculoskeletal: Negative for arthralgias, back pain and myalgias. Skin: Negative for rash and wound. Allergic/Immunologic: Negative for environmental allergies and food allergies. Neurological: Negative for dizziness, tremors, seizures, syncope, speech difficulty, weakness, light-headedness, numbness and headaches. Hematological: Negative for adenopathy. Does not bruise/bleed easily. Psychiatric/Behavioral: Negative for confusion, decreased concentration, dysphoric mood, sleep disturbance and suicidal ideas. The patient is not nervous/anxious and is not hyperactive. Physical Exam  Vitals and nursing note reviewed. Constitutional:       General: She is not in acute distress. Appearance: Normal appearance. HENT:      Head: Normocephalic. Right Ear: External ear normal.      Left Ear: External ear normal.      Nose: Nose normal. No congestion or rhinorrhea.       Mouth/Throat:      Mouth: Mucous membranes are moist.      Pharynx: No oropharyngeal exudate or posterior oropharyngeal erythema. Eyes:      General: No scleral icterus. Right eye: No discharge. Left eye: No discharge. Extraocular Movements: Extraocular movements intact. Conjunctiva/sclera: Conjunctivae normal.      Pupils: Pupils are equal, round, and reactive to light. Neck:      Thyroid: No thyromegaly. Vascular: No carotid bruit or JVD. Cardiovascular:      Rate and Rhythm: Normal rate and regular rhythm. Chest Wall: PMI is not displaced. Pulses: Normal pulses. Heart sounds: Normal heart sounds, S1 normal and S2 normal. No murmur heard. Pulmonary:      Effort: Pulmonary effort is normal. No respiratory distress. Breath sounds: Normal breath sounds and air entry. No decreased air movement or transmitted upper airway sounds. No decreased breath sounds, wheezing or rales. Abdominal:      General: Abdomen is flat. Bowel sounds are normal. There is no distension. Palpations: Abdomen is soft. There is no shifting dullness, hepatomegaly, splenomegaly or mass. Tenderness: There is no abdominal tenderness. There is no right CVA tenderness, left CVA tenderness, guarding or rebound. Hernia: No hernia is present. Musculoskeletal:         General: No deformity. Normal range of motion. Right shoulder: Normal.      Left shoulder: Normal.      Right elbow: Normal.      Left elbow: Normal.      Right wrist: Normal.      Left wrist: Normal.      Cervical back: Normal, normal range of motion and neck supple. No rigidity. Thoracic back: Normal.      Lumbar back: Normal.      Right knee: Normal.      Left knee: Normal.      Right lower leg: No edema. Left lower leg: No edema. Right ankle: Normal.      Left ankle: Normal.   Lymphadenopathy:      Cervical: No cervical adenopathy. Skin:     General: Skin is warm and moist.      Findings: No lesion or rash. Neurological:      Mental Status: She is alert.  Mental status is at baseline. Cranial Nerves: Cranial nerves are intact. No cranial nerve deficit. Motor: Motor function is intact. Coordination: Coordination is intact. Gait: Gait is intact. Deep Tendon Reflexes: Reflexes normal.   Psychiatric:         Attention and Perception: Attention normal.         Speech: Speech normal.         Behavior: Behavior normal.         Thought Content:  Thought content normal.         Judgment: Judgment normal.           (Time Documentation Optional [id number])    An electronic signaturewaas used to authenticate this note  -Kalyani Barrios MD on 1/20/2022 at 10:06 AM

## 2022-03-03 ENCOUNTER — HOSPITAL ENCOUNTER (OUTPATIENT)
Dept: SLEEP CENTER | Age: 72
Discharge: HOME | End: 2022-03-05
Payer: MEDICARE

## 2022-03-03 PROCEDURE — 95810 POLYSOM 6/> YRS 4/> PARAM: CPT

## 2022-03-07 NOTE — PROGRESS NOTES
[place] [address]  [place], [address]  Phone [phone] Fax [phone]    Polysomnography Report  Patient Name Delon Dobson Account Number [de-identified]    1950 Referring Provider Marline Robles M.D.   Mila Rodriguez Gender 66 years/F Interpreting physician Christiano Lance M.D., [facility]   Neck circumference/  Mallampati classification 13.0 in/class 3 Night Technician Terry Gardner, RPSGT   Chelan score 10/24 Scoring Technician Maritza Guido, CRT, RPSGT   Height 67.0 in Indications for the test excessive daytime somnolence   Weight 118.0 lbs Test Diagnostic Polysomnogram   BMI 18.5 Date of test 3/3/2022     Procedure  A Diagnostic Polysomnogram was conducted on the night of 3/3/2022. The study was performed and scored per AASM guidelines. The following were monitored: frontal, central, and occipital EEG, electrooculogram (EOG), submentalis EMG, nasal and oral airflow, intranasal pressure, thoracic plethysmography, abdominal plethysmography, anterior tibialis EMG, electrocardiogram, body position, and positive airway pressure (PAP). Arterial oxygen saturation was monitored with a pulse oximeter. The study was scored utilizing 30 second epochs. Hypopneas were scored using per AASM definition VIII, D, 1B.     Sleep Scoring Data  Lights out 9:38:37 PM Sleep latency 0.1 min Time in N1 103.5 min N1% 26.8%   Lights on 4:46:01 AM WASO 40.8 min Time in N2 283.0 min N2% 73.2%   TIB/.4 min Sleep efficiency 90.5% Time in N3 0.0 min N3% 0.0%   .5 min REM latency N/A min Time in R 0.0 min R% 0.0%     Respiratory Events Summary   NREM REM Total   Hypopnea index 30.6    30.9   Apnea index 5.1    5.1   RERA index 0.0    0.0   AHI 35.7    36.0   RDI (AHI + RERA index) 35.7    36.0     Respiratory Events by Sleep Stage   Obstructive Apneas OA Index Central Apneas CA Index Mixed Apneas MA Index   Hypopneas H Index   RERAs   R Index   NREM 29 4.5 3 0.5 1 0.2 197 30.6 0 0.0   REM                                 Total 29 4.5 3 0.5 1 0.2 199 30.9 0 0.0     Respiratory Events by Body Position   Time (min) Obstructive Apneas OA Index Central Apneas CA Index Mixed Apneas MA Index   Hypopneas H Index   RERAs RERA  Index Total  AHI Total RDI   Supine 272.5  29 6.8 2 0.5 1 0.2 179 42.2 0 0.0 49.8 49.8   R/Supine                                           L/Supine                                           Right 43.4  0 0.0 0 0.0 0 0.0 1 1.4 0 0.0 1.4 1.4   Left 98.5  0 0.0 1 0.7 0 0.0 19 12.6 0 0.0 13.2 13.2   Prone                                           R/Prone                                           L/Prone                                           Up                                           Snoring  Snoring Rating (loudness 0-4) 1   Snoring Amount (% of total sleep time with snoring) 3.9%     Oximetry Data              Wake NREM REM TST   Average Saturation  96% 96%   % 96%   Desaturation Index (#/hour)  10.1    10.1   Desaturation Max Duration (sec)  79.5 N/A 79.5   Minimum O2 Saturation    86%     Oximetry Distribution              WaKe REM NREM TOTAL %TIB   <90% (min) 0.1 0.0 0.5 0.6 0.1%   <85% (min) 0.0 0.0 0.0 0.0 0.0%   <80% (min) 0.0 0.0 0.0 0.0 0.0%   <75% (min) 0.0 0.0 0.0 0.0 0.0%   <70% (min) 0.0 0.0 0.0 0.0 0.0%   <60% (min) 0.0 0.0 0.0 0.0 0.0%   <50% (min) 0.0 0.0 0.0 0.0 0.0%   Fail    (min) 14.3 0.0 1.1 15.4      Respiratory Pattern   Present Absent Duration   Hypoventilation  ? N/A   Cheyne Kidd Respirations  ? N/A   Periodic Breathing  ? N/A     Heart Rate   Mean heart rate (bmp) Maximum heart rate (bmp)   During recording       94   During sleep 68.4 94     Heart Rhythm Summary  Normal sinus rhythm     Heart Rhythm Events  Type of events Present Absent Rate-Duration/Total Duration   Bradycardia  ? N/A   Asystole  ? N/A   Sinus Tachycardia During Sleep  ? N/A   Narrow Complex Tachycardia  ? N/A   Wide Complex Tachycardia  ?  N/A   Atrial

## 2022-03-08 NOTE — PROGRESS NOTES
HISTORY OF PRESENT ILLNESS:    Ms. Rhonda Hitchcock presents today for a 6-month breast exam after right mastectomy and axillary node dissection 3/3/2020. 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 Fine-needle biopsy of axillary node was cytologically conclusive for malignancy. It is ER positive at 3%. MI is negative. HER-2 is positive at 3+. Ki-67 is high at 22%. MammaPrint demonstrates a high risk basal phenotype    She has been receiving neoadjuvant chemotherapy with Herceptin, Perjeta, Taxotere, and carboplatin. She has had an excellent clinical response. MRI-2/14/2020  FINDINGS:   Amount of Fibroglandular Tissue: Scattered fibroglandular tissue. Background Parenchymal Enhancement:  Minimal.   Right upper outer breast with decreased size of biopsy-proven   malignancy. No residual measurable enhancing mass. There is   susceptibility artifact at the right slightly upper outer breast,   middle to posterior third, likely a biopsy marker. No suspicious enhancement of the left breast.   Pectoralis musculature appears symmetric and nonenhancing. Right axillary lymph node measures 0.9 cm in short axis on axial   series 3, image 58. While this is not enlarged by cross-sectional size   criteria, it is abnormally rounded and asymmetric to the left side,   concerning for residual disease. There is a T1 hypointensity within   the lymph node, which was more evident on prior exam 9/19/2019,   favored to represent a biopsy marker. There is a second prominent   right axillary lymph node, just lateral to the above described noted   with biopsy marker. No enlarged internal mammary lymph node. Left axillary lymph nodes   appear symmetric. Port at the left chest wall. Cardiomegaly.  Minimal right-sided pleural   fluid. Heterogeneous appearance of the liver compared to prior exam.   Consider further evaluation with CT abdomen and pelvis with contrast.       Impression   1. Findings consistent with response to therapy. No residual   enhancement of the right breast. Normal appearance of the right   pectoralis musculature. 2. Residual prominent right axillary lymph node, which is concerning   for residual disease. There appears to be a biopsy marker in this   lymph node. There is also a lymph node slightly lateral which is also   prominent. 3. Heterogeneous appearance of the liver compared to the prior. Consider CT abdomen and pelvis with contrast for further evaluation. 4. Cardiomegaly and small right pleural fluid. BI-RADS Final Assessment Category 6: Known Biopsy-Proven Malignancy     She had a Right modified radical mastectomy     PATHOLOGY REVEALS    FINAL DIAGNOSIS  Breast, right modified radical mastectomy:  1.  Primary tumor site identified with extensive fibrosis and nests of  residual high-grade carcinoma present in lymphatic spaces. 2.  Tumor is present in a lymphatic space at the deep surgical excision  margin. 3.  Sections of nipple identified, negative for evidence of malignancy. 4.  Sections of benign skin with benign seborrheic keratoses. 5.  3 out of 11 lymph nodes, positive for metastatic carcinoma. 6.  Multiple discrete nest of cells identified within the nodes. 7.  Largest focus of metastasis measures 0.5 cm in greatest dimension. 8.  Negative for extracapsular spread. She completed Radiation 6/4/2020 and is doing well. She has finished her TDM-1. Unilateral Left Screening Mammogram-8/25/2021  FINDINGS:   No suspicious masses or calcifications are identified. There are no  developing densities or areas of architectural distortion. IMPRESSION AND RECOMMENDATION:   No mammographic evidence of malignancy.  Recommendation is for the  patient to return for routine mammography in one year or sooner, if clinically indicated. BIRADS Category 2 - Benign findings      PHYSICAL EXAM:  The  wounds look good with no evidence of infection, fluid accumulation, or skin necrosis. She has minimal radiation changes. She has no palpable masses, skin or nipple changes, or axillary adenopathy on the left breast    IMPRESSION:    Doing well s/p right mastectomy and axillary node dissection 3/3/2020    PLAN: I will see her in about 6-months for a physical exam followed by a Unilateral Left Mammogram. She will call us with any new concerns. I have seen, examined and reviewed this patient medication list, appropriate labs and imaging studies. I reviewed relevant medical records and others physicians notes. I discussed the plans of care with the patient. I answered all the questions to the patients satisfaction. I, Dr Bharat Meyers, personally performed the services described in this documentation as scribed by Mayo Murguia MA in my presence and is both accurate and complete. (Please note that portions of this note were completed with a voice recognition program. Efforts were made to edit the dictations but occasionally words are mis-transcribed.)  Over 50% of the total visit time of 15 minutes in face to face encounter with the patient, out of which more than 50% of the time was spent in counseling patient or family and coordination of care. Counseling included but was not limited to time spent reviewing labs, imaging studies/ treatment plan and answering questions.

## 2022-03-10 ENCOUNTER — OFFICE VISIT (OUTPATIENT)
Dept: SURGERY | Age: 72
End: 2022-03-10
Payer: MEDICARE

## 2022-03-10 VITALS
TEMPERATURE: 98.6 F | DIASTOLIC BLOOD PRESSURE: 78 MMHG | BODY MASS INDEX: 18.83 KG/M2 | SYSTOLIC BLOOD PRESSURE: 128 MMHG | WEIGHT: 120 LBS | HEIGHT: 67 IN

## 2022-03-10 DIAGNOSIS — Z12.31 VISIT FOR SCREENING MAMMOGRAM: Primary | ICD-10-CM

## 2022-03-10 DIAGNOSIS — Z90.11 S/P RIGHT MASTECTOMY: ICD-10-CM

## 2022-03-10 DIAGNOSIS — C50.812 MALIGNANT NEOPLASM OF OVERLAPPING SITES OF LEFT FEMALE BREAST, UNSPECIFIED ESTROGEN RECEPTOR STATUS (HCC): ICD-10-CM

## 2022-03-10 PROCEDURE — 1123F ACP DISCUSS/DSCN MKR DOCD: CPT | Performed by: SURGERY

## 2022-03-10 PROCEDURE — 1036F TOBACCO NON-USER: CPT | Performed by: SURGERY

## 2022-03-10 PROCEDURE — 4040F PNEUMOC VAC/ADMIN/RCVD: CPT | Performed by: SURGERY

## 2022-03-10 PROCEDURE — 99213 OFFICE O/P EST LOW 20 MIN: CPT | Performed by: SURGERY

## 2022-03-10 PROCEDURE — G8427 DOCREV CUR MEDS BY ELIG CLIN: HCPCS | Performed by: SURGERY

## 2022-03-10 PROCEDURE — 1090F PRES/ABSN URINE INCON ASSESS: CPT | Performed by: SURGERY

## 2022-03-10 PROCEDURE — G8399 PT W/DXA RESULTS DOCUMENT: HCPCS | Performed by: SURGERY

## 2022-03-10 PROCEDURE — 3017F COLORECTAL CA SCREEN DOC REV: CPT | Performed by: SURGERY

## 2022-03-10 PROCEDURE — G8484 FLU IMMUNIZE NO ADMIN: HCPCS | Performed by: SURGERY

## 2022-03-10 PROCEDURE — G8420 CALC BMI NORM PARAMETERS: HCPCS | Performed by: SURGERY

## 2022-03-13 DIAGNOSIS — G47.33 OSA (OBSTRUCTIVE SLEEP APNEA): Primary | ICD-10-CM

## 2022-03-13 PROCEDURE — 95810 POLYSOM 6/> YRS 4/> PARAM: CPT | Performed by: INTERNAL MEDICINE

## 2022-03-15 PROBLEM — G47.33 SEVERE OBSTRUCTIVE SLEEP APNEA: Status: ACTIVE | Noted: 2022-03-15

## 2022-04-26 ENCOUNTER — HOSPITAL ENCOUNTER (OUTPATIENT)
Dept: SLEEP CENTER | Age: 72
Discharge: HOME | End: 2022-04-28
Payer: MEDICARE

## 2022-04-26 PROCEDURE — 95810 POLYSOM 6/> YRS 4/> PARAM: CPT | Performed by: INTERNAL MEDICINE

## 2022-04-26 PROCEDURE — 95811 POLYSOM 6/>YRS CPAP 4/> PARM: CPT

## 2022-04-27 NOTE — PROGRESS NOTES
[place] [address]  [place], [address]  Phone [phone] Fax [phone]     Sleep Study Technician Review    Patient Name:  Isma Galo  :   1950  Referring Provider: Sandy Real *  Brief History:  Isma Galo is a 66 y.o. female with a history of Snoring and Headaches who was referred for a sleep study. Headaches were noted to be 3 days generalized headache worse when she moves her head states her blood pressure was elevated in the 150s and it is normal throughout the day occurring repeatedly every night lately she denies it to be throbbing no visual complaints no weakness of the lower extremities or the she denies any nausea or vomiting elevated no visual complaints. Height: 5'7\"  Weight:  118 lbs  BMI:     18.58  Neck Circ:       12.75\"  MALLAMPATI: Type 3  ESS:    10/24                  Type of Study: Titration Sleep Study  Time Stage Position Snore Hypopnea Obs Apnea Shadi Apnea PAP O2   2200 2 supine no yes no no +8 RA   2300 2 Right side no yes no no +10 RA   2400 2 Right side no no no no +10 RA   0100 2 Right side no no no no +10 RA   0200 2 Right side no no no no +10 RA   0300 2 Right side no no no no +10 RA   0400 2 supine no no no no +10 RA     Summary:  Patient tolerated mask and pressure well. She stated that she is claustrophobic but felt ok using a full face mask. She voiced no complaints. DME: [facility]   Final PAP settings: +10 cmh2o  Mask Type: Full face  Mask: Vitera  Mask Size: small     The study was reviewed briefly with Isma Galo. She will be notified of the formal results and recommendations after the study is scored and interpreted. The report will be sent to her referring provider.     Technician: Diana Hollis

## 2022-05-07 DIAGNOSIS — G47.33 OSA (OBSTRUCTIVE SLEEP APNEA): Primary | ICD-10-CM

## 2022-05-22 NOTE — PROGRESS NOTES
Progress Note      Pt Name: Eh Oneil  YOB: 1950  MRN: [id number]    Date of evaluation: 05/24/2022  History Obtained From:  patient, electronic medical record    CHIEF COMPLAINT:    Chief Complaint   Patient presents with    Follow-up     Malignant neoplasm of overlapping sites of right breast in female, estrogen receptor positive ([address]    Other     Osteopenia    Anemia    Discuss Labs     HISTORY OF PRESENT ILLNESS:    Eh Oneil is a 66 y.o.  female who is currently being followed for a diagnosis of locally advanced HER-2 positive breast cancer. She is status post neoadjuvant chemotherapy with a partial response, right mastectomy and lymph node dissection. She obtained a complete pathologic response in the breast, unfortunately had residual disease in the axilla which was followed by adjuvant treatment to TDM 1. She is currently taking adjuvant endocrine therapy with letrozole 2.5 mg daily, anticipate 10-year dosing through 4/10/2030. Luther Davenport is also taking biphosphonate therapy with Boniva 150 mg p.o. every 4 weeks for osteopenia in the setting of aromatase inhibitor therapy. She returns today in scheduled follow-up for evaluation, side effect monitoring, lab monitoring and further recommendations. Today's clinic visit to include physical assessment, review of systems, any lab or radiographic findings that were available and plan of care are documented below.       ONCOLOGIC HISTORY:   Diagnosis   · HER-2 IDC right breast, September 2019  · [id number]  · ER 3%, ND 0%, HER-2/lea IHC 3+  · Mammaprint high risk Basal-like  · Osteopenia, Aug 2019-T score -1.5 lumbar spine     Treatment summary  · 10/18/2019 through  March 2020- 6 cycles of neoadjuvant Taxotere, Carboplatin and Herceptin and Perjeta  · 3/3/2020- right mastectomy/ lymph node dissection  · 3/20/2020-14 cycles of adjuvant TDM-1.   · 4/20/2020-6/4/2020 Completed adjuvant radiation 8174 cGy  · 4/10/2020-adjuvant endocrine therapy with letrozole x10 years.     Ms Denton Del Toro was first seen by Dr. Fernanda Dave on 10/03/2019. She felt a mass in the right upper breast and therefore contact her primary care provider. A diagnostic mammogram was performed. Her mother  of breast cancer at the age of 50. She has no other history of breast cancer or any other cancer in her family. · 2019- diagnostic mammogram showed a suspicious 2.2 cm hypoechoic spiculated right breast mass in the axillary tail within the internal calcifications had suggest malignancy. Ultrasound of the breast showed the lesion as well as a pathologic appearing right axillary adenopathy. · 2019-core needle biopsy consistent with high grade invasive ductal carcinoma. MammaPrint high risk basal type. ER 3%, W[place] 0.2%, HER-2/lea IHC 3+ Ki-67 22%. HER-2/lea FISH positive  · 2019- MRI breast showed a large right breast mass compatible with primary malignancy measuring 2.2 x 1.2 x 3.5 cm in size. Abnormal enhancement in the pectoralis major muscle suggesting extension into the muscle. Multiple abnormal lymph nodes are seen in the right axilla with the largest measuring 2 x 1.6 cm. A small lymph node is seen along the right internal mammary chain. No suspicious left axillary adenopathy. · 2019-CT chest abdomen pelvis and bone scan showed no evidence of metastatic disease. · 10/3/2019- she was first seen by Dr. Fernanda Dave. Recommended neoadjuvant chemotherapy approach with dose dense Adriamycin/cyclophosphamide x4 cycles followed by 12 weekly cycles of Taxol 80 mg/m² with Herceptin and Perjeta. · 10/14/2019- [facility]. Recommended Taxotere, carboplatin Herceptin Perjeta. · 10/17/2019- 2020 completion of 6 cycles of of neoadjuvant chemotherapy with Taxotere carboplatin Herceptin Perjeta. · 2020-MRI breast.  Showed findings consistent with response to therapy.   No residual enhancement of the right breast. Residual right axillary lymph node concerning for residual disease. · 2/21/2020- maintenance Herceptin/Perjeta to complete 1 year of treatment. · 3/03/2020-she underwent a right mastectomy/axillary dissection by Dr. Gildardo Mcneill at [facility].  Primary tumor site identified with extensive fibrosis and nests of residual high-grade carcinoma present in lymphatic spaces. Tumor is present in a lymphatic space at the deep surgical excision margin. 3 out of 11 lymph nodes, positive for metastatic carcinoma, at least 1 metastasis greater than 2.0 mm . Largest focus of metastasis measures 0.5 cm in greatest dimension. AJCC STAGE: ypT0, pN1a, pMx  · 3/16/2020-repeat 2D echo EF 60%. · 4/20/2020-6/4/2020 Completion adjuvant radiation 6040 cGy  · 8/24/2020 Silverio Digital Screen Unilateral Left- No mammographic evidence of malignancy within the left breast. Continued annual unilateral screening mammography recommended. BI-RADS Category 2.  · 2/1/21 2D echo: ejection fraction estimated at 55%. · 02/22/2021 Xray right shoulder No acute osseous injury or malalignment at the shoulder. Underlying osteoarthritis changes at the shoulder appear quite mild. · 08/25/2021 Bone density Osteopenia. Low bone mass.  Lumbar spine T-score -1.7, left femoral neck T-score -2.2   · 08/25/2021 Left breast mammogram No mammographic evidence of malignancy    Hematologic history: Macrocytic anemia    Serology [facility] 6/28/2021 at [facility]  · D-Dimer 3.57  · CRP 5.78  · H/H 9.5 & 30.1; MCV 99.3  Iron 51  TIBC 207  Iron saturation 25  Ferritin 218.20  Folate 10.90  Transferrin 139    Serology studies on 09/13/2021  · Vitamin B12/Folate: 606/12.8  · Vitamin D 33.1    11/30/2021 Serology results  · Iron 88  · TIBC 366  · Saturation 24%  · Ferritin 40    Past Medical History:    Past Medical History:   Diagnosis Date    Acute nonintractable headache 12/20/2021    Malignant neoplasm of right breast in female, estrogen receptor positive ([facility] [address]     breast    Right arm cellulitis 6/28/2021       Past Surgical History:    Past Surgical History:   Procedure Laterality Date    BREAST BIOPSY Right 09/05/2019    COLONOSCOPY N/A 10/1/2019    Dr Arielle Finnegan, internal hemorrhoids-Grade 2 without bleeding stigmata and external hemorrhoids-TV x 1, AP x 1, 3 yr recall    MASTECTOMY Right 3/3/2020    RIGHT SIMPLE MASTECTOMY WITH SENTINEL NODE BIOPSY, FLAPS, PEC BLOCK performed by Gregorio Augustine MD at [address] N/A 10/4/2019    PORT INSERTION WITH FLUORO performed by Gregorio Augustine MD at [address] N/A 5/13/2021    PORT REMOVAL performed by Gregorio Augustine MD at [place] [address] ENDOSCOPY N/A 10/1/2019    Dr Herlinda Resendezet hernia, HP gastric polyps       Current Medications:    Current Outpatient Medications   Medication Sig Dispense Refill    letrozole (FEMARA) 2.5 MG tablet Take 1 tablet by mouth daily 90 tablet 1    ibandronate (BONIVA) 150 MG tablet Take 1 tablet by mouth every 30 days Take one (1) tablet once per month in the morning with a full glass of water, on an empty stomach, and do not take anything else by mouth or lie down for the next 30 minutes. 3 tablet 3    amLODIPine (NORVASC) 2.5 MG tablet Take 1 tablet by mouth daily (Patient not taking: Reported on 3/10/2022) 90 tablet 1    Calcium Carbonate-Vitamin D (OYSTER SHELL CALCIUM/D) 500-200 MG-UNIT TABS Take 1 tablet by mouth daily (Patient not taking: Reported on 1/20/2022) 360 tablet 0     No current facility-administered medications for this visit.         Allergies: No Known Allergies    Social History:    Social History     Tobacco Use    Smoking status: Never Smoker    Smokeless tobacco: Never Used   Vaping Use    Vaping Use: Never used   Substance Use Topics    Alcohol use: Never    Drug use: Never       Family History:   Family History   Problem Relation Age of Onset    Breast Cancer Mother 39    Heart Disease Father     Colon Cancer Neg Hx     Colon Polyps Neg Hx        Vitals:  Vitals:    05/24/22 1004   BP: 122/78   Pulse: 77   SpO2: 97%   Weight: 122 lb 9.6 oz (55.6 kg)        Subjective   REVIEW OF SYSTEMS:   Review of Systems   Constitutional: Positive for fatigue. Negative for chills, diaphoresis and fever. HENT: Negative. Negative for congestion, ear pain, hearing loss, nosebleeds, sore throat and tinnitus. Eyes: Negative. Negative for pain, discharge and redness. Respiratory: Negative. Negative for cough, shortness of breath and wheezing. Cardiovascular: Negative. Negative for chest pain, palpitations and leg swelling. Gastrointestinal: Negative. Negative for abdominal pain, blood in stool, constipation, diarrhea, nausea and vomiting. Endocrine: Negative for polydipsia. Genitourinary: Negative for dysuria, flank pain, frequency, hematuria and urgency. Musculoskeletal: Negative. Negative for back pain, myalgias and neck pain. Skin: Negative. Negative for rash. Neurological: Negative. Negative for dizziness, tremors, seizures, weakness and headaches. Hematological: Does not bruise/bleed easily. Psychiatric/Behavioral: Negative. The patient is not nervous/anxious. Objective   PHYSICAL EXAM:  Physical Exam  Vitals reviewed. Constitutional:       General: She is not in acute distress. Appearance: She is well-developed. HENT:      Head: Normocephalic and atraumatic. Mouth/Throat:      Pharynx: Uvula midline. Tonsils: No tonsillar exudate. Eyes:      General: Lids are normal.      Conjunctiva/sclera: Conjunctivae normal.      Pupils: Pupils are equal, round, and reactive to light. Neck:      Thyroid: No thyroid mass or thyromegaly. Vascular: No JVD. Trachea: Trachea normal. No tracheal deviation. Cardiovascular:      Rate and Rhythm: Normal rate and regular rhythm. Pulses: Normal pulses.       Heart sounds: Normal heart sounds. Pulmonary:      Effort: Pulmonary effort is normal. No respiratory distress. Breath sounds: Normal breath sounds. No wheezing or rales. Chest:      Chest wall: No tenderness. Abdominal:      General: Bowel sounds are normal. There is no distension. Palpations: Abdomen is soft. There is no mass. Tenderness: There is no abdominal tenderness. There is no guarding. Musculoskeletal:         General: No tenderness or deformity. Cervical back: Normal range of motion and neck supple. Comments: Range of motion within normal limits x4 extremities   Skin:     General: Skin is warm. Findings: No bruising, erythema or rash. Neurological:      Mental Status: She is alert and oriented to person, place, and time. Cranial Nerves: No cranial nerve deficit. Coordination: Coordination normal.   Psychiatric:         Behavior: Behavior normal.         Thought Content: Thought content normal.         Labs reviewed today:  Lab Results   Component Value Date    WBC 6.13 05/24/2022    HGB 12.9 05/24/2022    HCT 41.6 05/24/2022    .3 (H) 05/24/2022     (L) 05/24/2022     Lab Results   Component Value Date    NEUTROABS 4.59 05/24/2022       ASSESSMENT/PLAN:      1. Invasive ductal carcinoma of the right breast, node positive, HER-2 positive. Recommendation is for adjuvant letrozole 2.5 mg daily. Chato Lazar states that she stopped taking Letrozole about February (2022) due to nausea. Discussed importance of taking adjuvant endocrine therapy and contacting clinic if she is experiencing issues between her visits. She agreed to rechallenge the letrozole and will take at bedtime to see if this helps with the nausea, if no improvement will contact the clinic. She declined a prescription for antiemetic, Zofran. She denies any new left breast or right chest wall complaints. She declined a breast examination today.     Chato Lazar was seen in scheduled follow-up by Dr Dianne Rodríguez on 03/10/2022, I reviewed the progress note from that office visit which documented bilateral breast exam with no new findings or concerns of recurrence. - Resume  letrozole ([address] [facility]) 2.5 MG tablet daily  - Encourage routine self breast exams  - Annual mammogram scheduled for 8/29/2022 and follow-up with Dr. Dianne Rodríguez    Discussed with Nora Watts and her daughter that she needs to avoid any oral supplements that contain estrogen based on her tumor being estrogen fed, 3%. I discussed the importance of compliance with Femara/letrozole. Decreased compliance with adjuvant endocrine therapy has been linked to decreased survival. We discussed the various barriers and side effects of endocrine therapy and ways to improve compliance. She acknowledged understanding. 2.Osteopenia, density study on 8/25/2021 reported osteopenia. Low bone mass. Lumbar spine T-score -1.7, left femoral neck T-score -2.2 .  Reports compliant with Boniva 150 mg monthly and daily calcium supplement    -Continue Boniva 150 mg p.o. monthly  -Continue calcium 500 mg with vitamin D 200 units twice daily  -Check CMP and vitamin D level upon return recently evaluated by another provider    The use of bisphosphonates as adjuvant therapy should be considered for all postmenopausal women with early breast cancer who are deemed to be candidates for adjuvant therapy, according to a joint clinical practice guideline from [address] [place] [address] and the [employer] of [employer] ([employer]). 3. Encounter for monitoring aromatase inhibitor therapy  Denies denies any hot flashes or vaginal dryness. Nausea-recommended trial of taking letrozole at bedtime. Declined the need for antiemetic    4. Care plan discussed with patient    5. Lymphedema of right arm, currently stable. Signs of lymphedema in right upper extremity.    -Continue home exercises and follow-up with lymphedema clinic as recommended    6.   Macrocytic anemia, chronic dating back to January 2020. Hemoglobin 12.9 with an MCV of 110.3    11/30/2021 Serology results  · Iron 88  · TIBC 366  · Saturation 24%  · Ferritin 40    -Repeat iron panel, ferritin and vitamin B12 upon return    7. Thrombocytopenia dating back to 2020, platelet count stable at 109,000. Denies any excessive bruising or bleeding to include melena, epistaxis, hemoptysis, hematuria or hematochezia. -Will monitor conservatively, if platelet count drops below 100,000 persistently will need to consider completed bone marrow aspiration biopsy for further evaluation    I discussed all of the above findings included in the assessment and plan with the patient and the patient is in agreement to move forward with current recommendations/treatment. I have addressed all of their questions and concerns that were verbalized. FOLLOW UP:  1. Follow-up appointment given for 2 months, sooner if needed  2. Continue to follow with other medical providers as recommended  3. Labs at next visit: CBC, CMP, Iron, TIBC, Ferritin, vitamin B12. Consider vitamin D level if not recently evaluated by another provider    EMR Dragon/Transcription disclaimer:   Much of this encounter note is an electronic transcription/translation of spoken language to printed text. The electronic translation of spoken language may permit erroneous, or at times, nonsensical words or phrases to be inadvertently transcribed; although attempts have made to review the note for such errors, some may still exist.  Please excuse any unrecognized transcription errors and contact us if the air is unintelligible or needs documented correction. Also, portions of this note have been copied forward, however, changed to reflect the most current clinical status of this patient. IBashir, am pre-charting as a registered nurse for GUNNER Sorensen.   Electronically signed by GUNNER Parra on 5/26/2022 at 09:06 AM

## 2022-05-24 ENCOUNTER — OFFICE VISIT (OUTPATIENT)
Dept: HEMATOLOGY | Age: 72
End: 2022-05-24
Payer: MEDICARE

## 2022-05-24 ENCOUNTER — HOSPITAL ENCOUNTER (OUTPATIENT)
Dept: INFUSION THERAPY | Age: 72
Discharge: HOME | End: 2022-05-24
Payer: MEDICARE

## 2022-05-24 VITALS
DIASTOLIC BLOOD PRESSURE: 78 MMHG | WEIGHT: 122.6 LBS | OXYGEN SATURATION: 97 % | SYSTOLIC BLOOD PRESSURE: 122 MMHG | HEART RATE: 77 BPM | BODY MASS INDEX: 19.2 KG/M2

## 2022-05-24 DIAGNOSIS — C50.811 MALIGNANT NEOPLASM OF OVERLAPPING SITES OF RIGHT FEMALE BREAST, UNSPECIFIED ESTROGEN RECEPTOR STATUS (HCC): ICD-10-CM

## 2022-05-24 DIAGNOSIS — Z79.811 ENCOUNTER FOR MONITORING AROMATASE INHIBITOR THERAPY: ICD-10-CM

## 2022-05-24 DIAGNOSIS — Z51.81 ENCOUNTER FOR MONITORING AROMATASE INHIBITOR THERAPY: ICD-10-CM

## 2022-05-24 DIAGNOSIS — D50.9 IRON DEFICIENCY ANEMIA, UNSPECIFIED IRON DEFICIENCY ANEMIA TYPE: ICD-10-CM

## 2022-05-24 DIAGNOSIS — C50.811 MALIGNANT NEOPLASM OF OVERLAPPING SITES OF RIGHT BREAST IN FEMALE, ESTROGEN RECEPTOR POSITIVE (HCC): Primary | ICD-10-CM

## 2022-05-24 DIAGNOSIS — Z17.0 MALIGNANT NEOPLASM OF OVERLAPPING SITES OF RIGHT BREAST IN FEMALE, ESTROGEN RECEPTOR POSITIVE (HCC): Primary | ICD-10-CM

## 2022-05-24 DIAGNOSIS — I89.0 LYMPHEDEMA OF RIGHT ARM: ICD-10-CM

## 2022-05-24 DIAGNOSIS — D69.6 THROMBOCYTOPENIA (HCC): ICD-10-CM

## 2022-05-24 DIAGNOSIS — M85.88 OSTEOPENIA OF LUMBAR SPINE: ICD-10-CM

## 2022-05-24 DIAGNOSIS — D53.9 MACROCYTIC ANEMIA: ICD-10-CM

## 2022-05-24 LAB
BASOPHILS ABSOLUTE: 0.06 K/UL (ref 0.01–0.08)
BASOPHILS RELATIVE PERCENT: 1 % (ref 0.1–1.2)
EOSINOPHILS ABSOLUTE: 0.06 K/UL (ref 0.04–0.54)
EOSINOPHILS RELATIVE PERCENT: 1 % (ref 0.7–7)
HCT VFR BLD CALC: 41.6 % (ref 34.1–44.9)
HEMOGLOBIN: 12.9 G/DL (ref 11.2–15.7)
LYMPHOCYTES ABSOLUTE: 0.98 K/UL (ref 1.18–3.74)
LYMPHOCYTES RELATIVE PERCENT: 16 % (ref 19.3–53.1)
MCH RBC QN AUTO: 34.2 PG (ref 25.6–32.2)
MCHC RBC AUTO-ENTMCNC: 31 G/DL (ref 32.3–35.5)
MCV RBC AUTO: 110.3 FL (ref 79.4–94.8)
MONOCYTES ABSOLUTE: 0.43 K/UL (ref 0.24–0.82)
MONOCYTES RELATIVE PERCENT: 7 % (ref 4.7–12.5)
NEUTROPHILS ABSOLUTE: 4.59 K/UL (ref 1.56–6.13)
NEUTROPHILS RELATIVE PERCENT: 74.8 % (ref 34–71.1)
PDW BLD-RTO: 14.7 % (ref 11.7–14.4)
PLATELET # BLD: 109 K/UL (ref 182–369)
PMV BLD AUTO: 11.2 FL (ref 7.4–10.4)
RBC # BLD: 3.77 M/UL (ref 3.93–5.22)
WBC # BLD: 6.13 K/UL (ref 3.98–10.04)

## 2022-05-24 PROCEDURE — 99211 OFF/OP EST MAY X REQ PHY/QHP: CPT

## 2022-05-24 PROCEDURE — G8420 CALC BMI NORM PARAMETERS: HCPCS | Performed by: NURSE PRACTITIONER

## 2022-05-24 PROCEDURE — 1090F PRES/ABSN URINE INCON ASSESS: CPT | Performed by: NURSE PRACTITIONER

## 2022-05-24 PROCEDURE — G8427 DOCREV CUR MEDS BY ELIG CLIN: HCPCS | Performed by: NURSE PRACTITIONER

## 2022-05-24 PROCEDURE — G8399 PT W/DXA RESULTS DOCUMENT: HCPCS | Performed by: NURSE PRACTITIONER

## 2022-05-24 PROCEDURE — 85025 COMPLETE CBC W/AUTO DIFF WBC: CPT

## 2022-05-24 PROCEDURE — 3017F COLORECTAL CA SCREEN DOC REV: CPT | Performed by: NURSE PRACTITIONER

## 2022-05-24 PROCEDURE — 1123F ACP DISCUSS/DSCN MKR DOCD: CPT | Performed by: NURSE PRACTITIONER

## 2022-05-24 PROCEDURE — 1036F TOBACCO NON-USER: CPT | Performed by: NURSE PRACTITIONER

## 2022-05-24 PROCEDURE — 99214 OFFICE O/P EST MOD 30 MIN: CPT | Performed by: NURSE PRACTITIONER

## 2022-05-24 RX ORDER — LETROZOLE 2.5 MG/1
2.5 TABLET, FILM COATED ORAL DAILY
Qty: 90 TABLET | Refills: 1 | Status: SHIPPED | OUTPATIENT
Start: 2022-05-24 | End: 2022-07-19 | Stop reason: SINTOL

## 2022-05-26 ASSESSMENT — ENCOUNTER SYMPTOMS
EYE REDNESS: 0
EYE PAIN: 0
ABDOMINAL PAIN: 0
COUGH: 0
EYE DISCHARGE: 0
SORE THROAT: 0
DIARRHEA: 0
BACK PAIN: 0
BLOOD IN STOOL: 0
SHORTNESS OF BREATH: 0
CONSTIPATION: 0
RESPIRATORY NEGATIVE: 1
NAUSEA: 0
EYES NEGATIVE: 1
GASTROINTESTINAL NEGATIVE: 1
VOMITING: 0
WHEEZING: 0

## 2022-05-28 ENCOUNTER — HOSPITAL ENCOUNTER (EMERGENCY)
Age: 72
Discharge: HOME | End: 2022-05-29
Attending: EMERGENCY MEDICINE
Payer: MEDICARE

## 2022-05-28 ENCOUNTER — APPOINTMENT (OUTPATIENT)
Dept: CT IMAGING | Age: 72
End: 2022-05-28
Payer: MEDICARE

## 2022-05-28 ENCOUNTER — APPOINTMENT (OUTPATIENT)
Dept: GENERAL RADIOLOGY | Age: 72
End: 2022-05-28
Payer: MEDICARE

## 2022-05-28 DIAGNOSIS — R07.89 ATYPICAL CHEST PAIN: Primary | ICD-10-CM

## 2022-05-28 LAB
ALBUMIN SERPL-MCNC: 4.6 G/DL (ref 3.5–5.2)
ALP BLD-CCNC: 68 U/L (ref 35–104)
ALT SERPL-CCNC: 28 U/L (ref 5–33)
ANION GAP SERPL CALCULATED.3IONS-SCNC: 10 MMOL/L (ref 7–19)
AST SERPL-CCNC: 37 U/L (ref 5–32)
BASOPHILS ABSOLUTE: 0 K/UL (ref 0–0.2)
BASOPHILS RELATIVE PERCENT: 0.3 % (ref 0–1)
BILIRUB SERPL-MCNC: 0.9 MG/DL (ref 0.2–1.2)
BUN BLDV-MCNC: 22 MG/DL (ref 8–23)
CALCIUM SERPL-MCNC: 9.9 MG/DL (ref 8.8–10.2)
CHLORIDE BLD-SCNC: 100 MMOL/L (ref 98–111)
CO2: 29 MMOL/L (ref 22–29)
CREAT SERPL-MCNC: 0.7 MG/DL (ref 0.5–0.9)
EOSINOPHILS ABSOLUTE: 0 K/UL (ref 0–0.6)
EOSINOPHILS RELATIVE PERCENT: 0.3 % (ref 0–5)
GFR AFRICAN AMERICAN: >59
GFR NON-AFRICAN AMERICAN: >60
GLUCOSE BLD-MCNC: 133 MG/DL (ref 74–109)
HCT VFR BLD CALC: 41.5 % (ref 37–47)
HEMOGLOBIN: 13.5 G/DL (ref 12–16)
IMMATURE GRANULOCYTES #: 0 K/UL
LYMPHOCYTES ABSOLUTE: 1 K/UL (ref 1.1–4.5)
LYMPHOCYTES RELATIVE PERCENT: 11.3 % (ref 20–40)
MCH RBC QN AUTO: 34.1 PG (ref 27–31)
MCHC RBC AUTO-ENTMCNC: 32.5 G/DL (ref 33–37)
MCV RBC AUTO: 104.8 FL (ref 81–99)
MONOCYTES ABSOLUTE: 0.9 K/UL (ref 0–0.9)
MONOCYTES RELATIVE PERCENT: 9.7 % (ref 0–10)
NEUTROPHILS ABSOLUTE: 6.8 K/UL (ref 1.5–7.5)
NEUTROPHILS RELATIVE PERCENT: 77.9 % (ref 50–65)
PDW BLD-RTO: 14.5 % (ref 11.5–14.5)
PLATELET # BLD: 127 K/UL (ref 130–400)
PMV BLD AUTO: 10.7 FL (ref 9.4–12.3)
POTASSIUM SERPL-SCNC: 3.3 MMOL/L (ref 3.5–5)
RBC # BLD: 3.96 M/UL (ref 4.2–5.4)
SARS-COV-2, NAAT: NOT DETECTED
SODIUM BLD-SCNC: 139 MMOL/L (ref 136–145)
TOTAL PROTEIN: 7.5 G/DL (ref 6.6–8.7)
TROPONIN: <0.01 NG/ML (ref 0–0.03)
WBC # BLD: 8.8 K/UL (ref 4.8–10.8)

## 2022-05-28 PROCEDURE — 87635 SARS-COV-2 COVID-19 AMP PRB: CPT

## 2022-05-28 PROCEDURE — 99285 EMERGENCY DEPT VISIT HI MDM: CPT

## 2022-05-28 PROCEDURE — 70450 CT HEAD/BRAIN W/O DYE: CPT

## 2022-05-28 PROCEDURE — 36415 COLL VENOUS BLD VENIPUNCTURE: CPT

## 2022-05-28 PROCEDURE — 84484 ASSAY OF TROPONIN QUANT: CPT

## 2022-05-28 PROCEDURE — 93005 ELECTROCARDIOGRAM TRACING: CPT | Performed by: EMERGENCY MEDICINE

## 2022-05-28 PROCEDURE — 80053 COMPREHEN METABOLIC PANEL: CPT

## 2022-05-28 PROCEDURE — 71045 X-RAY EXAM CHEST 1 VIEW: CPT

## 2022-05-28 PROCEDURE — 85025 COMPLETE CBC W/AUTO DIFF WBC: CPT

## 2022-05-29 ENCOUNTER — APPOINTMENT (OUTPATIENT)
Dept: CT IMAGING | Age: 72
End: 2022-05-29
Payer: MEDICARE

## 2022-05-29 VITALS
RESPIRATION RATE: 17 BRPM | DIASTOLIC BLOOD PRESSURE: 81 MMHG | TEMPERATURE: 98.9 F | BODY MASS INDEX: 18.83 KG/M2 | OXYGEN SATURATION: 93 % | SYSTOLIC BLOOD PRESSURE: 144 MMHG | HEIGHT: 67 IN | HEART RATE: 79 BPM | WEIGHT: 120 LBS

## 2022-05-29 LAB
D DIMER: 0.5 UG/ML FEU (ref 0–0.48)
TROPONIN: <0.01 NG/ML (ref 0–0.03)

## 2022-05-29 PROCEDURE — 71275 CT ANGIOGRAPHY CHEST: CPT

## 2022-05-29 PROCEDURE — 84484 ASSAY OF TROPONIN QUANT: CPT

## 2022-05-29 PROCEDURE — 36415 COLL VENOUS BLD VENIPUNCTURE: CPT

## 2022-05-29 PROCEDURE — 85379 FIBRIN DEGRADATION QUANT: CPT

## 2022-05-29 PROCEDURE — 6360000004 HC RX CONTRAST MEDICATION: Performed by: EMERGENCY MEDICINE

## 2022-05-29 RX ADMIN — IOPAMIDOL 90 ML: 755 INJECTION, SOLUTION INTRAVENOUS at 01:40

## 2022-05-29 NOTE — ED PROVIDER NOTES
[facility] EMERGENCY DEPT  eMERGENCY dEPARTMENT eNCOUnter      Pt Name: Ludivina Mendosa  MRN: [id number]  [place] 1950  Date of evaluation: 5/28/2022  Provider: Ligia Fowler MD    CHIEF COMPLAINT       Chief Complaint   Patient presents with    Chest Pain     Pt states sshe woke up and just was not feeling right. Pt states generalized fatigue, ams, and chest pain with breathing         HISTORY OF PRESENT ILLNESS   (Location/Symptom, Timing/Onset,Context/Setting, Quality, Duration, Modifying Factors, Severity)  Note limiting factors. Ludivina Mendosa is a 66 y.o. female who presents to the emergency department for evaluation regarding episode of chest pain. Patient states that today when she woke up she \"just did not feel right. \"  She has been having some feelings of chest tightness that is been fairly intermittent in nature and associated with taking a deep breath. She denies any prior history of pulmonary embolism or DVT. No prior history of coronary artery disease. She has not had recent illnesses such as fever, congestion or cough. Denies any episodes of vomiting or diarrhea. The symptoms do not seem to be related to any kind of exertion. There have been no relieving factors. HPI    NursingNotes were reviewed. REVIEW OF SYSTEMS    (2-9 systems for level 4, 10 or more for level 5)     Review of Systems   Constitutional: Negative for chills and fever. Respiratory: Positive for shortness of breath. Cardiovascular: Positive for chest pain. Negative for palpitations. Gastrointestinal: Negative for abdominal pain, diarrhea, nausea and vomiting. Neurological: Positive for dizziness. Negative for syncope. All other systems reviewed and are negative.            PAST MEDICALHISTORY     Past Medical History:   Diagnosis Date    Acute nonintractable headache 12/20/2021    Malignant neoplasm of right breast in female, estrogen receptor positive ([facility] [address]     breast    Right arm cellulitis 6/28/2021 SURGICAL HISTORY       Past Surgical History:   Procedure Laterality Date    BREAST BIOPSY Right 09/05/2019    COLONOSCOPY N/A 10/1/2019    Dr Julio Galdamez, internal hemorrhoids-Grade 2 without bleeding stigmata and external hemorrhoids-TV x 1, AP x 1, 3 yr recall    MASTECTOMY Right 3/3/2020    RIGHT SIMPLE MASTECTOMY WITH SENTINEL NODE BIOPSY, FLAPS, PEC BLOCK performed by Mychal Bob MD at [address] N/A 10/4/2019    PORT INSERTION WITH FLUORO performed by Mychal Bob MD at [address] N/A 5/13/2021    PORT REMOVAL performed by Mychal Bob MD at [facility] N/A 10/1/2019    Dr Bernard Solders hernia, HP gastric polyps         CURRENT MEDICATIONS     Discharge Medication List as of 5/29/2022  4:00 AM      CONTINUE these medications which have NOT CHANGED    Details   letrozole (FEMARA) 2.5 MG tablet Take 1 tablet by mouth daily, Disp-90 tablet, R-1Normal      ibandronate (BONIVA) 150 MG tablet Take 1 tablet by mouth every 30 days Take one (1) tablet once per month in the morning with a full glass of water, on an empty stomach, and do not take anything else by mouth or lie down for the next 30 minutes. , Disp-3 tablet, R-3Normal      Calcium Carbonate-Vitamin D (OYSTER SHELL CALCIUM/D) 500-200 MG-UNIT TABS Take 1 tablet by mouth daily, Disp-360 tablet, R-0Normal             ALLERGIES     Patient has no known allergies.     FAMILY HISTORY       Family History   Problem Relation Age of Onset    Breast Cancer Mother 39    Heart Disease Father     Colon Cancer Neg Hx     Colon Polyps Neg Hx           SOCIAL HISTORY       Social History     Socioeconomic History    Marital status: Single     Spouse name: Not on file    Number of children: Not on file    Years of education: Not on file    Highest education level: Not on file   Occupational History    Not on file   Tobacco Use    Smoking status: Never Smoker  Smokeless tobacco: Never Used   Vaping Use    Vaping Use: Never used   Substance and Sexual Activity    Alcohol use: Never    Drug use: Never    Sexual activity: Yes   Other Topics Concern    Not on file   Social History Narrative    Not on file     Social Determinants of Health     Financial Resource Strain: Low Risk     Difficulty of Paying Living Expenses: Not hard at all   Food Insecurity: No Food Insecurity    Worried About Running Out of Food in the Last Year: Never true    [address] in the Last Year: Never true   Transportation Needs:     Lack of Transportation (Medical): Not on file    Lack of Transportation (Non-Medical):  Not on file   Physical Activity:     Days of Exercise per Week: Not on file    Minutes of Exercise per Session: Not on file   Stress:     Feeling of Stress : Not on file   Social Connections:     Frequency of Communication with Friends and Family: Not on file    Frequency of Social Gatherings with Friends and Family: Not on file    Attends Sabianism Services: Not on file    Active Member of [address] [place] [address] or Organizations: Not on file    Attends Club or Organization Meetings: Not on file    Marital Status: Not on file   Intimate Partner Violence:     Fear of Current or Ex-Partner: Not on file    Emotionally Abused: Not on file    Physically Abused: Not on file    Sexually Abused: Not on file   Housing Stability:     Unable to Pay for Housing in the Last Year: Not on file    Number of [place] in the Last Year: Not on file    Unstable Housing in the Last Year: Not on file       SCREENINGS    Mobile Coma Scale  Eye Opening: Spontaneous  Best Verbal Response: Oriented  Best Motor Response: Obeys commands  Mobile Coma Scale Score: 15        PHYSICAL EXAM    (up to 7 for level 4, 8 or more for level 5)     ED Triage Vitals [05/28/22 2102]   BP Temp Temp Source Heart Rate Resp SpO2 Height Weight   (!) 156/91 98.9 °F (37.2 °C) Oral 87 18 91 % 5' 7\" (1.702 m) 120 lb (54.4 kg)       Physical Exam  Vitals and nursing note reviewed. HENT:      Head: Atraumatic. Mouth/Throat:      Mouth: Mucous membranes are moist. Mucous membranes are not dry. Eyes:      General: No scleral icterus. Pupils: Pupils are equal, round, and reactive to light. Neck:      Trachea: No tracheal deviation. Cardiovascular:      Rate and Rhythm: Normal rate and regular rhythm. Pulses: Normal pulses. Heart sounds: Normal heart sounds. No murmur heard. Pulmonary:      Effort: Pulmonary effort is normal. No respiratory distress. Breath sounds: Normal breath sounds. No stridor. Abdominal:      General: There is no distension. Palpations: Abdomen is soft. Tenderness: There is no abdominal tenderness. There is no guarding. Musculoskeletal:      Right lower leg: No edema. Left lower leg: No edema. Skin:     Capillary Refill: Capillary refill takes less than 2 seconds. Coloration: Skin is not pale. Findings: No rash. Neurological:      General: No focal deficit present. Mental Status: She is alert and oriented to person, place, and time. Psychiatric:         Behavior: Behavior is cooperative. DIAGNOSTIC RESULTS     EKG: All EKG's areinterpreted by the Emergency Department Physician who either signs or Co-signs this chart in the absence of a cardiologist.    2118: Normal sinus rhythm at a rate of 85, no evidence of acute ST elevation is identified. QTc: 423 MS. RADIOLOGY:  Non-plain film images such as CT, Ultrasound and MRI are read by the radiologist. Plain radiographic images are visualized and preliminarily interpreted bythe emergency physician with the below findings:      CTA PULMONARY W CONTRAST   Final Result   1. No evidence of pulmonary embolism or acute intrathoracic pathology. 2. Mild chronic lung changes, including subpleural scarring in the   apices, stable. No evidence of pneumonia.    3. Mild cardiomegaly without evidence of congestive heart failure. A preliminary report was provided by the [facility] radiology service. There is no significant discrepancy with the preliminary report. Signed by Dr Valeriy Mckeon. [place]      CT HEAD WO CONTRAST   Final Result   Impression: No acute intracranial abnormality. There are chronic   findings associated with aging. A preliminary report was provided by the [facility] radiology service. There is no significant discrepancy with the preliminary report. Signed by Dr Valeriy Mckeon. [place]      XR CHEST PORTABLE   Final Result   1. No radiographic evidence of acute cardiopulmonary process. Signed by Dr Darcie Jade        CT HEAD:  COMPARISON: 12/20/21    No acute territorial infarct. No acute intracranial hemorrhage, herniation, or hydrocephalus. Chronic and involutional changes. CTA CHEST:    Mild scarring of the lung apices. No infiltrate, effusion or pneumothorax. Mediastinum is intact. The heart is large. No aortic aneurysm or dissection. Atherosclerosis. No PE. Small hiatal hernia. DJD. Impression: No PE. LABS:  Labs Reviewed   CBC WITH AUTO DIFFERENTIAL - Abnormal; Notable for the following components:       Result Value    RBC 3.96 (*)     .8 (*)     MCH 34.1 (*)     MCHC 32.5 (*)     Platelets 154 (*)     Neutrophils % 77.9 (*)     Lymphocytes % 11.3 (*)     Lymphocytes Absolute 1.0 (*)     All other components within normal limits   COMPREHENSIVE METABOLIC PANEL - Abnormal; Notable for the following components:    Potassium 3.3 (*)     Glucose 133 (*)     AST 37 (*)     All other components within normal limits   D-DIMER, QUANTITATIVE - Abnormal; Notable for the following components:    D-Dimer, Quant 0.50 (*)     All other components within normal limits   COVID-19, RAPID   TROPONIN   TROPONIN       All other labs were within normal range or not returned as of this dictation.     EMERGENCY DEPARTMENT COURSE and DIFFERENTIAL DIAGNOSIS/MDM:   Vitals: Vitals:    05/28/22 2102 05/28/22 2230 05/29/22 0044 05/29/22 0404   BP: (!) 156/91 (!) 150/85 (!) 151/83 (!) 144/81   Pulse: 87 80 84 79   Resp: 18 18  17   Temp: 98.9 °F (37.2 °C)      TempSrc: Oral      SpO2: 91% 93%  93%   Weight: 120 lb (54.4 kg)      Height: 5' 7\" (1.702 m)          MDM    Reassessment    Patient serial cardiac biomarkers are unremarkable along with EKG that does not demonstrate evidence of cardiac ischemia. Her serum D-dimer is slightly elevated at 0.50. She underwent a CT angiogram of the chest that was negative for evidence of pulmonary embolism. Other laboratory studies look okay. Her vital signs have remained stable. We will plan to discharge her home at this time with plans for outpatient follow-up with her PMD.  Return precautions were discussed and reviewed and all questions were answered. PROCEDURES:  Unless otherwise noted below, none     Procedures    FINAL IMPRESSION      1.  Atypical chest pain          DISPOSITION/PLAN   DISPOSITION Decision To Discharge 05/29/2022 03:55:46 AM      PATIENT REFERRED TO:  Marina Pena MD  [address] [address]            DISCHARGE MEDICATIONS:  Discharge Medication List as of 5/29/2022  4:00 AM             (Please note that portions of this note were completed with a voice recognition program.  Efforts were made to edit thedictations but occasionally words are mis-transcribed.)    Ann Wallace MD (electronically signed)  Attending Emergency Physician         Ann Wallace MD  06/07/22 0037

## 2022-05-31 ENCOUNTER — TELEPHONE (OUTPATIENT)
Dept: HEMATOLOGY | Age: 72
End: 2022-05-31

## 2022-05-31 ENCOUNTER — CARE COORDINATION (OUTPATIENT)
Dept: CARE COORDINATION | Age: 72
End: 2022-05-31

## 2022-05-31 LAB
EKG P AXIS: 75 DEGREES
EKG P AXIS: 77 DEGREES
EKG P-R INTERVAL: 134 MS
EKG P-R INTERVAL: 152 MS
EKG Q-T INTERVAL: 380 MS
EKG Q-T INTERVAL: 382 MS
EKG QRS DURATION: 90 MS
EKG QRS DURATION: 92 MS
EKG QTC CALCULATION (BAZETT): 418 MS
EKG QTC CALCULATION (BAZETT): 423 MS
EKG T AXIS: 73 DEGREES
EKG T AXIS: 79 DEGREES

## 2022-05-31 PROCEDURE — 93010 ELECTROCARDIOGRAM REPORT: CPT | Performed by: INTERNAL MEDICINE

## 2022-05-31 NOTE — TELEPHONE ENCOUNTER
MRS. JUAREZ CALLED TO LET US KNOW THAT SHE HAS QUIT TAKING THE [address] [place] [address].

## 2022-05-31 NOTE — CARE COORDINATION
Ambulatory Care Coordination  ED Follow up Call    Reason for ED visit:  Chest Pain   Status:     improved    Did you call your PCP prior to going to the ED? No      Did you receive a discharge instructions from the Emergency Room? Yes  Review of Instructions:     Understands what to report/when to return?:  Yes   Understands discharge instructions?:  Yes   Following discharge instructions?:  Yes   If not why? Are there any new complaints of pain? No  New Pain Meds? No    Constipation prophylaxis needed? No    If you have a wound is the dressing clean, dry, and intact? N/A  Understands wound care regimen? N/A    Are there any other complaints/concerns that you wish to tell your provider? No    FU appts/Provider:    Future Appointments   Date Time Provider Ebenezer Vogt   7/19/2022 10:30 AM GUNNER Fregoso PAD HEMONC [facility]   7/19/2022 10:55 AM SCHEDULE, [facility] MED ONC MA [facility] MED ONC Eva HOD   7/21/2022  9:00 AM [facility]vers, [facility]   8/29/2022  1:20 PM [facility] MAMMO RM 2 [facility] WOMENS L Women's   8/29/2022  2:15 PM Franck Fonseca MD N LPS Gen SG [facility]   11/7/2022  8:30 AM Kita Severs, MD [facility]     Pt is feeling a lot better. She denied feeling short of breath or weak today. She said her CP resolved while at ED. She has an oximeter at home. Patient was not able to check her oxygen during the call because she was enroute to her CPAP fitting. ACM encouraged pt to check her oxygen at least once per day to verify her SpO2 is 93% or above. Also suggested that patient check her BP several times this week as it was elevated in the ER. Patient voiced understanding. Patient declined my offer to schedule a follow up appointment with her primary provider today. She gave permission for a follow up call at a later time. New Medications?:   No      Medication Reconciliation by phone - Yes  Understands Medications? Yes  Taking Medications? Yes  Can you swallow your pills? Yes    Any further needs in the home i.e. Equipment?   No    Link to services in community?:  No   Which services:

## 2022-06-07 ENCOUNTER — CARE COORDINATION (OUTPATIENT)
Dept: CARE COORDINATION | Age: 72
End: 2022-06-07

## 2022-06-07 ASSESSMENT — ENCOUNTER SYMPTOMS
NAUSEA: 0
DIARRHEA: 0
SHORTNESS OF BREATH: 1
ABDOMINAL PAIN: 0
VOMITING: 0

## 2022-06-07 NOTE — CARE COORDINATION
ACM attempted contact with patient and unable to reach. Left voicemail with my cell number for call back. Will await return call from patient.     Carmen Neumann 07, [address]

## 2022-06-07 NOTE — CARE COORDINATION
Ambulatory Care Coordination Note  6/7/2022  CM Risk Score: 0  Charlson 10 Year Mortality Risk Score: 100%     ACC: [place] Zamorano RN    Summary Note: Ambulatory Care Manager [facility]) spoke with patient gathering information for admission intake for ambulatory care management. Patient verbalized desire to work with ACM to improve symptom management. ACM with patients assistance identified patients' need for self-monitoring and lifestyle changes to improve symptom management. Please note initial goals for ambulatory care:    Goals      Conditions and Symptoms      I will schedule office visits, as directed by my provider. I will keep my appointment or reschedule if I have to cancel. I will notify my provider of any barriers to my plan of care. I will follow my Zone Management tool to seek urgent or emergent care. I will notify my provider of any symptoms that indicate a worsening of my condition.     Barriers: overwhelmed by complexity of regimen and stress  Plan for overcoming my barriers:     Patient Goal (What steps will patient take to achieve goal?):   Patient is able to discuss self-management of condition(s):  Pt demonstrates adherence to medications  Pt demonstrates understanding of self-monitoring  Patient is able to identify Red Flags:  Alert to potential adverse drug reactions(s) or side effects and actions to take should they arise  Discuss target symptoms and actions to take should they arise  Identify problems that require immediate PCP or specialist visit  Patient demonstrates understanding of access to PCP/Specialist:  Understands about scheduling routine Follow Up appointments   Understands about sick day appointment options for worsening of symptoms/progression (Same Day, E Visits)  Confidence: 5/10  Anticipated Goal Completion Date: 8/2022                Ambulatory Care Coordination Assessment    Care Coordination Protocol  Referral from Primary Care Provider: No  Week 1 - Initial Assessment     Do you have all of your prescriptions and are they filled?: No  Are you able to afford your medications?: Yes  How often do you have trouble taking your medications the way you have been told to take them?: I always take them as prescribed. Do you have Home O2 Therapy?: No      Ability to seek help/take action for Emergent Urgent situations i.e. fire, crime, inclement weather or health crisis. : Independent  Ability to ambulate to restroom: Independent  Ability handle personal hygeine needs (bathing/dressing/grooming): Independent  Ability to manage Medications: Independent  Ability to prepare Food Preparation: Independent  Ability to maintain home (clean home, laundry): Independent  Ability to drive and/or has transportation: Independent  Ability to do shopping: Independent  Ability to manage finances: Independent  Is patient able to live independently?: Yes     Current Housing: Private Residence                 Suggested Interventions and Community Resources   Disease Specific Clinic: In Process (Comment: Oncology- Con MARTINO, )         Set up/Review Goals, Schedule an appointment with the patient's PCP, Set up/Review an Education Plan          Ambulatory Care Nurse contacted the family via telephone to perform admission intake assessment for ambulatory care management. ACM Verified name and  with family as identifiers. Provided introduction to self, and explanation of the ACM role.  Patient reports feeling well at this time states she began logging self-monitoring results and stopped, ACM reviewed log patient reported the following results:    Patient-Reported Vitals 6/3/2022 2022   Patient-Reported Weight - -   Patient-Reported Height - -   Patient-Reported Systolic 129 238   Patient-Reported Diastolic 83 78   Patient-Reported Pulse 83 81   Patient-Reported Temperature - -   Patient-Reported SpO2 98 97        Patient reports stopping Letrozole due to chest pain and headache, patient confirms contacting Oncologist office to notify of change and ED visit and awaiting providers recommendation. Education provided during encounter   Self-monitoring logging results   Contacting provider with new or worsening symptoms   Plan  ACM to complete admission   ACM to follow-up   Patient to follow-up with PCP with new or worsening symptoms   Patient to report new or worsening symptoms to PCP          Prior to Admission medications    Medication Sig Start Date End Date Taking? Authorizing Provider   ibandronate (BONIVA) 150 MG tablet Take 1 tablet by mouth every 30 days Take one (1) tablet once per month in the morning with a full glass of water, on an empty stomach, and do not take anything else by mouth or lie down for the next 30 minutes.  6/14/21  Yes GUNNER Garsia   letrozole [facility]) 2.5 MG tablet Take 1 tablet by mouth daily  Patient not taking: Reported on 6/7/2022 5/24/22   GUNNER Conrad       Future Appointments   Date Time Provider Ebenezer Vogt   7/19/2022 10:30 AM GUNNER Bean N PAD HEMONC [facility]   7/19/2022 10:55 AM SCHEDULE, L MED ONC MA L MED ONC [facility]   7/21/2022  9:00 AM MD BIRGIT Watson [facility]   8/23/2022  9:00 AM MD Elyse Hadley [facility]   8/29/2022  1:20 PM L MAMMO RM 2 L WOMENS L [facility]   8/29/2022  2:15 PM Gregorio Augustine MD N BIRGIT Gen SG [facility]   11/7/2022  8:30 AM Tammy Valdez MD [facility]. [address]

## 2022-07-19 ENCOUNTER — HOSPITAL ENCOUNTER (OUTPATIENT)
Dept: INFUSION THERAPY | Age: 72
Discharge: HOME | End: 2022-07-19
Payer: MEDICARE

## 2022-07-19 ENCOUNTER — OFFICE VISIT (OUTPATIENT)
Dept: HEMATOLOGY | Age: 72
End: 2022-07-19
Payer: MEDICARE

## 2022-07-19 VITALS
WEIGHT: 121.1 LBS | DIASTOLIC BLOOD PRESSURE: 70 MMHG | HEIGHT: 67 IN | SYSTOLIC BLOOD PRESSURE: 112 MMHG | BODY MASS INDEX: 19.01 KG/M2 | OXYGEN SATURATION: 96 % | HEART RATE: 73 BPM

## 2022-07-19 DIAGNOSIS — D50.9 IRON DEFICIENCY ANEMIA, UNSPECIFIED IRON DEFICIENCY ANEMIA TYPE: ICD-10-CM

## 2022-07-19 DIAGNOSIS — C50.811 MALIGNANT NEOPLASM OF OVERLAPPING SITES OF RIGHT FEMALE BREAST, UNSPECIFIED ESTROGEN RECEPTOR STATUS (HCC): Primary | ICD-10-CM

## 2022-07-19 DIAGNOSIS — I89.0 LYMPHEDEMA OF RIGHT ARM: ICD-10-CM

## 2022-07-19 DIAGNOSIS — M85.80 OSTEOPENIA, UNSPECIFIED LOCATION: ICD-10-CM

## 2022-07-19 DIAGNOSIS — D69.6 THROMBOCYTOPENIA (HCC): ICD-10-CM

## 2022-07-19 DIAGNOSIS — C50.811 MALIGNANT NEOPLASM OF OVERLAPPING SITES OF RIGHT FEMALE BREAST, UNSPECIFIED ESTROGEN RECEPTOR STATUS (HCC): ICD-10-CM

## 2022-07-19 DIAGNOSIS — Z71.89 CARE PLAN DISCUSSED WITH PATIENT: ICD-10-CM

## 2022-07-19 LAB
ALBUMIN SERPL-MCNC: 4.3 G/DL (ref 3.5–5.2)
ALP BLD-CCNC: 72 U/L (ref 35–104)
ALT SERPL-CCNC: 25 U/L (ref 5–33)
ANION GAP SERPL CALCULATED.3IONS-SCNC: 8 MMOL/L (ref 7–19)
AST SERPL-CCNC: 36 U/L (ref 5–32)
BASOPHILS ABSOLUTE: 0.05 K/UL (ref 0.01–0.08)
BASOPHILS RELATIVE PERCENT: 1 % (ref 0.1–1.2)
BILIRUB SERPL-MCNC: 0.4 MG/DL (ref 0.2–1.2)
BUN BLDV-MCNC: 30 MG/DL (ref 8–23)
CALCIUM SERPL-MCNC: 9.6 MG/DL (ref 8.8–10.2)
CHLORIDE BLD-SCNC: 99 MMOL/L (ref 98–111)
CO2: 31 MMOL/L (ref 22–29)
CREAT SERPL-MCNC: 0.7 MG/DL (ref 0.5–0.9)
EOSINOPHILS ABSOLUTE: 0.11 K/UL (ref 0.04–0.54)
EOSINOPHILS RELATIVE PERCENT: 2.1 % (ref 0.7–7)
FERRITIN: 149.1 NG/ML (ref 13–150)
FOLATE: >20 NG/ML (ref 4.8–37.3)
GFR AFRICAN AMERICAN: >59
GFR NON-AFRICAN AMERICAN: >60
GLUCOSE BLD-MCNC: 97 MG/DL (ref 74–109)
HCT VFR BLD CALC: 35.3 % (ref 34.1–44.9)
HCT VFR BLD CALC: 36.5 % (ref 34.1–44.9)
HEMOGLOBIN: 11.4 G/DL (ref 11.2–15.7)
IRON SATURATION: 37 % (ref 14–50)
IRON: 97 UG/DL (ref 37–145)
LYMPHOCYTES ABSOLUTE: 0.95 K/UL (ref 1.18–3.74)
LYMPHOCYTES RELATIVE PERCENT: 18.4 % (ref 19.3–53.1)
MCH RBC QN AUTO: 33.6 PG (ref 25.6–32.2)
MCHC RBC AUTO-ENTMCNC: 31.2 G/DL (ref 32.3–35.5)
MCV RBC AUTO: 107.7 FL (ref 79.4–94.8)
MONOCYTES ABSOLUTE: 0.37 K/UL (ref 0.24–0.82)
MONOCYTES RELATIVE PERCENT: 7.2 % (ref 4.7–12.5)
NEUTROPHILS ABSOLUTE: 3.66 K/UL (ref 1.56–6.13)
NEUTROPHILS RELATIVE PERCENT: 71.1 % (ref 34–71.1)
PDW BLD-RTO: 14.7 % (ref 11.7–14.4)
PLATELET # BLD: 131 K/UL (ref 182–369)
PMV BLD AUTO: 10.6 FL (ref 7.4–10.4)
POTASSIUM SERPL-SCNC: 3.8 MMOL/L (ref 3.5–5)
RBC # BLD: 3.39 M/UL (ref 3.93–5.22)
SODIUM BLD-SCNC: 138 MMOL/L (ref 136–145)
TOTAL IRON BINDING CAPACITY: 265 UG/DL (ref 250–400)
TOTAL PROTEIN: 6.8 G/DL (ref 6.6–8.7)
VITAMIN B-12: 1028 PG/ML (ref 211–946)
VITAMIN D 25-HYDROXY: 40.8 NG/ML
WBC # BLD: 5.15 K/UL (ref 3.98–10.04)

## 2022-07-19 PROCEDURE — 36415 COLL VENOUS BLD VENIPUNCTURE: CPT

## 2022-07-19 PROCEDURE — 85025 COMPLETE CBC W/AUTO DIFF WBC: CPT

## 2022-07-19 PROCEDURE — 3017F COLORECTAL CA SCREEN DOC REV: CPT | Performed by: NURSE PRACTITIONER

## 2022-07-19 PROCEDURE — 1090F PRES/ABSN URINE INCON ASSESS: CPT | Performed by: NURSE PRACTITIONER

## 2022-07-19 PROCEDURE — 1036F TOBACCO NON-USER: CPT | Performed by: NURSE PRACTITIONER

## 2022-07-19 PROCEDURE — 99214 OFFICE O/P EST MOD 30 MIN: CPT | Performed by: NURSE PRACTITIONER

## 2022-07-19 PROCEDURE — 99212 OFFICE O/P EST SF 10 MIN: CPT

## 2022-07-19 PROCEDURE — 1123F ACP DISCUSS/DSCN MKR DOCD: CPT | Performed by: NURSE PRACTITIONER

## 2022-07-19 PROCEDURE — G8399 PT W/DXA RESULTS DOCUMENT: HCPCS | Performed by: NURSE PRACTITIONER

## 2022-07-19 PROCEDURE — G8427 DOCREV CUR MEDS BY ELIG CLIN: HCPCS | Performed by: NURSE PRACTITIONER

## 2022-07-19 PROCEDURE — G8420 CALC BMI NORM PARAMETERS: HCPCS | Performed by: NURSE PRACTITIONER

## 2022-07-19 RX ORDER — IBANDRONATE SODIUM 150 MG/1
150 TABLET, FILM COATED ORAL
Qty: 3 TABLET | Refills: 5 | Status: SHIPPED | OUTPATIENT
Start: 2022-07-19

## 2022-07-19 ASSESSMENT — ENCOUNTER SYMPTOMS
EYES NEGATIVE: 1
COUGH: 0
SORE THROAT: 0
EYE REDNESS: 0
BACK PAIN: 0
EYE DISCHARGE: 0
WHEEZING: 0
GASTROINTESTINAL NEGATIVE: 1
NAUSEA: 0
RESPIRATORY NEGATIVE: 1
ABDOMINAL PAIN: 0
DIARRHEA: 0
VOMITING: 0
SHORTNESS OF BREATH: 0
CONSTIPATION: 0
EYE PAIN: 0
BLOOD IN STOOL: 0

## 2022-07-19 NOTE — PROGRESS NOTES
Progress Note      Pt Name: Briana Alba  YOB: 1950  MRN: [id number]    Date of evaluation: 7/19/2022  History Obtained From:  patient, electronic medical record    CHIEF COMPLAINT:    Chief Complaint   Patient presents with    Follow-up     Malignant neoplasm of overlapping sites of right breast in female, estrogen receptor positive ([facility] [address]    Anemia    Discuss Labs     HISTORY OF PRESENT ILLNESS:    Briana Alba is a 66 y.o.  female who is currently being followed for a diagnosis of locally advanced HER-2 positive breast cancer. She obtained a complete pathologic response in the breast, unfortunately had residual disease in the axilla which was followed by adjuvant treatment to TD 1. Recommendation was given for adjuvant endocrine therapy, unfortunately she was intolerant to letrozole due to severe arthralgias, bone and chest pain. She presented to the emergency room at [facility] on 5/28/2022 for the chest pain and cardiac work-up was negative, reports she stopped taking the letrozole and her symptoms completely resolved and have not returned. Mathew Boateng has also taking biphosphonate therapy with Boniva 150 mg p.o. every 4 weeks for osteopenia in the setting of aromatase inhibitor therapy. She returns today in scheduled follow-up for evaluation, side effect monitoring, lab monitoring and further recommendations. Today's clinic visit to include physical assessment, review of systems, any lab or radiographic findings that were available and plan of care are documented below.       ONCOLOGIC HISTORY:   Diagnosis   HER-2 IDC right breast, September 2019  [id number]  ER 3%, MO 0%, HER-2/lea IHC 3+  Mammaprint high risk Basal-like  Osteopenia, Aug 2019-T score -1.5 lumbar spine     Treatment summary  10/18/2019 through  March 2020- 6 cycles of neoadjuvant Taxotere, Carboplatin and Herceptin and Perjeta  3/3/2020- right mastectomy/ lymph node dissection  3/20/2020-14 cycles of adjuvant TDM-1.   2020-2020 Completed adjuvant radiation 6040 cGy  4/10/2020-2022 adjuvant endocrine therapy with letrozole discontinued due to intolerance, severe bone and chest pain. Declined trial of other adjuvant endocrine therapy    Ms Jonny Leach was first seen by Dr. Viv Hansen on 10/03/2019. She felt a mass in the right upper breast and therefore contact her primary care provider. A diagnostic mammogram was performed. Her mother  of breast cancer at the age of 50. She has no other history of breast cancer or any other cancer in her family. 2019- diagnostic mammogram showed a suspicious 2.2 cm hypoechoic spiculated right breast mass in the axillary tail within the internal calcifications had suggest malignancy. Ultrasound of the breast showed the lesion as well as a pathologic appearing right axillary adenopathy. 2019-core needle biopsy consistent with high grade invasive ductal carcinoma. MammaPrint high risk basal type. ER 3%, IN 0.2%, HER-2/lea IHC 3+ Ki-67 22%. HER-2/lea FISH positive  2019- MRI breast showed a large right breast mass compatible with primary malignancy measuring 2.2 x 1.2 x 3.5 cm in size. Abnormal enhancement in the pectoralis major muscle suggesting extension into the muscle. Multiple abnormal lymph nodes are seen in the right axilla with the largest measuring 2 x 1.6 cm. A small lymph node is seen along the right internal mammary chain. No suspicious left axillary adenopathy. 2019-CT chest abdomen pelvis and bone scan showed no evidence of metastatic disease. 10/3/2019- she was first seen by Dr. Viv Hansen. Recommended neoadjuvant chemotherapy approach with dose dense Adriamycin/cyclophosphamide x4 cycles followed by 12 weekly cycles of Taxol 80 mg/m² with Herceptin and Perjeta. 10/14/2019- consultation at [facility] [address] [place] [address]. Recommended Taxotere, carboplatin Herceptin Perjeta.   10/17/2019- 2020 completion of 6 cycles of of neoadjuvant chemotherapy with Taxotere carboplatin Herceptin Perjeta. 2/14/2020-MRI breast.  Showed findings consistent with response to therapy. No residual enhancement of the right breast. Residual right axillary lymph node concerning for residual disease.  2/21/2020- maintenance Herceptin/Perjeta to complete 1 year of treatment. 3/03/2020-she underwent a right mastectomy/axillary dissection by Dr. Vidhya Leggett at [facility].  Primary tumor site identified with extensive fibrosis and nests of residual high-grade carcinoma present in lymphatic spaces. Tumor is present in a lymphatic space at the deep surgical excision margin. 3 out of 11 lymph nodes, positive for metastatic carcinoma, at least 1 metastasis greater than 2.0 mm . Largest focus of metastasis measures 0.5 cm in greatest dimension. AJCC STAGE: ypT0, pN1a, pMx  3/16/2020-repeat 2D echo EF 60%. 4/20/2020-6/4/2020 Completion adjuvant radiation 6040 cGy  8/24/2020 Silverio Digital Screen Unilateral Left- No mammographic evidence of malignancy within the left breast. Continued annual unilateral screening mammography recommended. BI-RADS Category 2.  2/1/21 2D echo: ejection fraction estimated at 55%. 02/22/2021 Xray right shoulder No acute osseous injury or malalignment at the shoulder. Underlying osteoarthritis changes at the shoulder appear quite mild. 08/25/2021 Bone density Osteopenia. Low bone mass.  Lumbar spine T-score -1.7, left femoral neck T-score -2.2   08/25/2021 Left breast mammogram No mammographic evidence of malignancy    Hematologic history: Macrocytic anemia    Serology studies 6/28/2021 at [facility]  D-Dimer 3.57  CRP 5.78  H/H 9.5 & 30.1; MCV 99.3  Iron 51  TIBC 207  Iron saturation 25  Ferritin 218.20  Folate 10.90  Transferrin 139    Serology studies on 09/13/2021  Vitamin B12/Folate: 606/12.8  Vitamin D 33.1    11/30/2021 Serology results  Iron 88  TIBC 366  Saturation 24%  Ferritin 40    Past Medical History:    Past Medical History:   Diagnosis Date    Acute nonintractable headache 12/20/2021    Malignant neoplasm of right breast in female, estrogen receptor positive ([facility] [address]     breast    Right arm cellulitis 6/28/2021       Past Surgical History:    Past Surgical History:   Procedure Laterality Date    BREAST BIOPSY Right 09/05/2019    COLONOSCOPY N/A 10/1/2019    Dr Turner Safe, internal hemorrhoids-Grade 2 without bleeding stigmata and external hemorrhoids-TV x 1, AP x 1, 3 yr recall    MASTECTOMY Right 3/3/2020    RIGHT SIMPLE MASTECTOMY WITH SENTINEL NODE BIOPSY, FLAPS, PEC BLOCK performed by Josias Chau MD at [address] [place] [address] N/A 10/4/2019    PORT INSERTION WITH FLUORO performed by Josias Chau MD at [address] [place] [address] N/A 5/13/2021    PORT REMOVAL performed by Josias Chau MD at [address] N/A 10/1/2019    Dr Aydin Benson hernia, HP gastric polyps       Current Medications:    Current Outpatient Medications   Medication Sig Dispense Refill    ibandronate (BONIVA) 150 MG tablet Take 1 tablet by mouth every 30 days Take one (1) tablet once per month in the morning with a full glass of water, on an empty stomach, and do not take anything else by mouth or lie down for the next 30 minutes. 3 tablet 5     No current facility-administered medications for this visit.         Allergies: No Known Allergies    Social History:    Social History     Tobacco Use    Smoking status: Never    Smokeless tobacco: Never   Vaping Use    Vaping Use: Never used   Substance Use Topics    Alcohol use: Never    Drug use: Never       Family History:   Family History   Problem Relation Age of Onset    Breast Cancer Mother 39    Heart Disease Father     Colon Cancer Neg Hx     Colon Polyps Neg Hx        Vitals:  Vitals:    07/19/22 1034   BP: 112/70   Site: Left Upper Arm   Position: Sitting   Pulse: 73   SpO2: 96%   Weight: 121 lb 1.6 oz (54.9 kg)   Height: 5' 7\" (1.702 m) Subjective   REVIEW OF SYSTEMS:   Review of Systems   Constitutional:  Positive for fatigue. Negative for chills, diaphoresis and fever. HENT: Negative. Negative for congestion, ear pain, hearing loss, nosebleeds, sore throat and tinnitus. Eyes: Negative. Negative for pain, discharge and redness. Respiratory: Negative. Negative for cough, shortness of breath and wheezing. Cardiovascular: Negative. Negative for chest pain, palpitations and leg swelling. Gastrointestinal: Negative. Negative for abdominal pain, blood in stool, constipation, diarrhea, nausea and vomiting. Endocrine: Negative for polydipsia. Genitourinary:  Negative for dysuria, flank pain, frequency, hematuria and urgency. Musculoskeletal: Negative. Negative for back pain, myalgias and neck pain. Skin: Negative. Negative for rash. Neurological: Negative. Negative for dizziness, tremors, seizures, weakness and headaches. Hematological:  Does not bruise/bleed easily. Psychiatric/Behavioral: Negative. The patient is not nervous/anxious. Objective   PHYSICAL EXAM:  Physical Exam  Vitals reviewed. Constitutional:       General: She is not in acute distress. Appearance: She is well-developed. HENT:      Head: Normocephalic and atraumatic. Mouth/Throat:      Pharynx: Uvula midline. Tonsils: No tonsillar exudate. Eyes:      General: Lids are normal.      Conjunctiva/sclera: Conjunctivae normal.      Pupils: Pupils are equal, round, and reactive to light. Neck:      Thyroid: No thyroid mass or thyromegaly. Vascular: No JVD. Trachea: Trachea normal. No tracheal deviation. Cardiovascular:      Rate and Rhythm: Normal rate and regular rhythm. Pulses: Normal pulses. Heart sounds: Normal heart sounds. Pulmonary:      Effort: Pulmonary effort is normal. No respiratory distress. Breath sounds: Normal breath sounds. No wheezing or rales.    Chest:      Chest wall: No tenderness. Abdominal:      General: Bowel sounds are normal. There is no distension. Palpations: Abdomen is soft. There is no mass. Tenderness: There is no abdominal tenderness. There is no guarding. Musculoskeletal:         General: No tenderness or deformity. Cervical back: Normal range of motion and neck supple. Comments: Range of motion within normal limits x4 extremities   Skin:     General: Skin is warm. Findings: No bruising, erythema or rash. Neurological:      Mental Status: She is alert and oriented to person, place, and time. Cranial Nerves: No cranial nerve deficit. Coordination: Coordination normal.   Psychiatric:         Behavior: Behavior normal.         Thought Content: Thought content normal.       Labs reviewed today:  Lab Results   Component Value Date    WBC 5.15 07/19/2022    HGB 11.4 07/19/2022    HCT 35.3 07/19/2022    .7 (H) 07/19/2022     (L) 07/19/2022     Lab Results   Component Value Date    NEUTROABS 3.66 07/19/2022       ASSESSMENT/PLAN:      1. Invasive ductal carcinoma of the right breast, node positive, HER-2 positive. She obtained a complete pathologic response in the breast, unfortunately had residual disease in the axilla which was followed by adjuvant treatment to TDM 1. Recommendation was given for adjuvant endocrine therapy, unfortunately she was intolerant to letrozole due to severe arthralgias, bone and chest pain. She presented to the emergency room at [facility] on 5/28/2022 for the chest pain and cardiac work-up was negative, reports she stopped taking the letrozole and her symptoms completely resolved and have not returned. She denied any left breast or right chest wall complaints.   Declined breast exam today  Declined  rechallenging adjuvant endocrine therapy    - Encourage routine self breast exams  - Annual mammogram scheduled for 8/29/2022 and follow-up with Dr. Christiano Prince    Discussed with Abby Hopkins and her daughter that she needs to avoid any oral supplements that contain estrogen based on her tumor being estrogen fed, 3%. 2.Osteopenia, density study on 8/25/2021 reported osteopenia. Low bone mass. Lumbar spine T-score -1.7, left femoral neck T-score -2.2 . Reports compliant with Boniva 150 mg monthly although not taking calcium supplement as recommended      -Okay to continue Boniva 150 mg p.o. monthly until completed supply of medication. Discontinue Boniva at that time, she is no longer taking adjuvant endocrine therapy  -Recommended calcium 1200 mg with vitamin D 1000 units daily  -Check CMP and vitamin D level today    3. Lymphedema of right arm, currently stable. No signs of lymphedema in the right upper extremity. Reports routinely does home exercises  -Continue home exercises     4. Macrocytic anemia, chronic dating back to January 2020. Hemoglobin 11.4 with an MCV of 107.7    -Repeat iron panel, ferritin and vitamin B12 today    5. Thrombocytopenia dating back to 2020, platelet count stable at 131,000. Denies any excessive bruising or bleeding to include melena, epistaxis, hemoptysis, hematuria or hematochezia. -Will monitor conservatively, if platelet count drops below 100,000 persistently will need to consider completed bone marrow aspiration biopsy for further evaluation    I discussed all of the above findings included in the assessment and plan with the patient and the patient is in agreement to move forward with current recommendations/treatment. I have addressed all of their questions and concerns that were verbalized. FOLLOW UP:  Follow-up appointment given for 4 months, sooner if needed  Continue to follow with other medical providers as recommended  Labs at next visit: CBC    EMR Dragon/Transcription disclaimer:   Much of this encounter note is an electronic transcription/translation of spoken language to printed text.  The electronic translation of spoken language may permit erroneous, or at times, nonsensical words or phrases to be inadvertently transcribed; although attempts have made to review the note for such errors, some may still exist.  Please excuse any unrecognized transcription errors and contact us if the air is unintelligible or needs documented correction. Also, portions of this note have been copied forward, however, changed to reflect the most current clinical status of this patient. Erlin MONTANEZ am pre-charting as a registered nurse for [employer].   Electronically signed by G[employer] Beckett on 5/26/2022 at 09:06 AM

## 2022-07-20 ASSESSMENT — ENCOUNTER SYMPTOMS
BLOOD IN STOOL: 0
SINUS PAIN: 0
BACK PAIN: 0
CONSTIPATION: 0
WHEEZING: 0
ABDOMINAL PAIN: 0
SHORTNESS OF BREATH: 0
COUGH: 0
RHINORRHEA: 0
TROUBLE SWALLOWING: 0
DIARRHEA: 0
CHEST TIGHTNESS: 0
VOMITING: 0

## 2022-07-21 ENCOUNTER — OFFICE VISIT (OUTPATIENT)
Dept: PRIMARY CARE CLINIC | Age: 72
End: 2022-07-21
Payer: MEDICARE

## 2022-07-21 VITALS
SYSTOLIC BLOOD PRESSURE: 120 MMHG | HEART RATE: 68 BPM | OXYGEN SATURATION: 98 % | WEIGHT: 120.6 LBS | HEIGHT: 67 IN | DIASTOLIC BLOOD PRESSURE: 80 MMHG | BODY MASS INDEX: 18.93 KG/M2

## 2022-07-21 DIAGNOSIS — G47.33 SEVERE OBSTRUCTIVE SLEEP APNEA: ICD-10-CM

## 2022-07-21 DIAGNOSIS — R73.03 PREDIABETES: ICD-10-CM

## 2022-07-21 DIAGNOSIS — M85.821 OTHER SPECIFIED DISORDERS OF BONE DENSITY AND STRUCTURE, RIGHT UPPER ARM: ICD-10-CM

## 2022-07-21 DIAGNOSIS — D69.6 THROMBOCYTOPENIA (HCC): ICD-10-CM

## 2022-07-21 DIAGNOSIS — C50.812 MALIGNANT NEOPLASM OF OVERLAPPING SITES OF LEFT FEMALE BREAST, UNSPECIFIED ESTROGEN RECEPTOR STATUS (HCC): ICD-10-CM

## 2022-07-21 PROBLEM — I10 BENIGN ESSENTIAL HTN: Status: RESOLVED | Noted: 2021-12-20 | Resolved: 2022-07-21

## 2022-07-21 PROCEDURE — 1123F ACP DISCUSS/DSCN MKR DOCD: CPT | Performed by: INTERNAL MEDICINE

## 2022-07-21 PROCEDURE — 99214 OFFICE O/P EST MOD 30 MIN: CPT | Performed by: INTERNAL MEDICINE

## 2022-07-21 SDOH — ECONOMIC STABILITY: FOOD INSECURITY: WITHIN THE PAST 12 MONTHS, YOU WORRIED THAT YOUR FOOD WOULD RUN OUT BEFORE YOU GOT MONEY TO BUY MORE.: NEVER TRUE

## 2022-07-21 SDOH — ECONOMIC STABILITY: FOOD INSECURITY: WITHIN THE PAST 12 MONTHS, THE FOOD YOU BOUGHT JUST DIDN'T LAST AND YOU DIDN'T HAVE MONEY TO GET MORE.: NEVER TRUE

## 2022-07-21 ASSESSMENT — PATIENT HEALTH QUESTIONNAIRE - PHQ9
SUM OF ALL RESPONSES TO PHQ QUESTIONS 1-9: 0
2. FEELING DOWN, DEPRESSED OR HOPELESS: 0
SUM OF ALL RESPONSES TO PHQ QUESTIONS 1-9: 0
1. LITTLE INTEREST OR PLEASURE IN DOING THINGS: 0
SUM OF ALL RESPONSES TO PHQ9 QUESTIONS 1 & 2: 0

## 2022-07-21 ASSESSMENT — SOCIAL DETERMINANTS OF HEALTH (SDOH): HOW HARD IS IT FOR YOU TO PAY FOR THE VERY BASICS LIKE FOOD, HOUSING, MEDICAL CARE, AND HEATING?: NOT HARD AT ALL

## 2022-07-21 NOTE — PROGRESS NOTES
Heriberto Pierson ( 1950) is a 66 y.o. female,  Established , here for evaluation of the following chief complaint(s). 6 Month Follow-Up        ASSESSMENT/PLAN:  Problem List          High    Thrombocytopenia (HCC)      Borderline controlled, continue current treatment plan         Prediabetes      Well-controlled, continue current medications         Other specified disorders of bone density and structure, right upper arm       Asymptomatic, continue current treatment plan         Malignant neoplasm of unspecified site of unspecified female breast ([facility] [address]      Monitored by specialist- no acute findings meriting change in the plan            Unprioritized    Severe obstructive sleep apnea      Monitored by specialist- no acute findings meriting change in the plan          Results for orders placed or performed during the hospital encounter of 22   CBC with Auto Differential   Result Value Ref Range    WBC 5.15 3.98 - 10.04 K/uL    RBC 3.39 (L) 3.93 - 5.22 M/uL    Hemoglobin 11.4 11.2 - 15.7 g/dL    Hematocrit 36.5 34.1 - 44.9 %    .7 (H) 79.4 - 94.8 fL    MCH 33.6 (H) 25.6 - 32.2 pg    MCHC 31.2 (L) 32.3 - 35.5 g/dL    RDW 14.7 (H) 11.7 - 14.4 %    Platelets 928 (L) 082 - 369 K/uL    MPV 10.6 (H) 7.4 - 10.4 fL    Neutrophils % 71.1 34.0 - 71.1 %    Lymphocytes % 18.4 (L) 19.3 - 53.1 %    Monocytes % 7.2 4.7 - 12.5 %    Eosinophils % 2.1 0.7 - 7.0 %    Basophils % 1.0 0.1 - 1.2 %    Neutrophils Absolute 3.66 1.56 - 6.13 K/uL    Lymphocytes Absolute 0.95 (L) 1.18 - 3.74 K/uL    Monocytes Absolute 0.37 0.24 - 0.82 K/uL    Eosinophils Absolute 0.11 0.04 - 0.54 K/uL    Basophils Absolute 0.05 0.01 - 0.08 K/uL       Return in about 6 months (around 2023).     HPI  78-year-old female who presents for follow-up visit  She has history of right breast cancer status postmastectomy patient had lymphedema of her right arm which is now resolved  Has history of osteopenia on antiresorptive therapy with calcium and vitamin D replacement  Patient has history of prediabetes no longer a problem  She is also having borderline low potassiums encouraged to eat potassium rich food she offers no complaints today  Thrombocytopenia remains unchanged      Review of Systems   Constitutional:  Negative for activity change, chills, fatigue and fever. HENT:  Negative for hearing loss, postnasal drip, rhinorrhea, sinus pain and trouble swallowing. Eyes:  Negative for visual disturbance. Respiratory:  Negative for cough, chest tightness, shortness of breath and wheezing. Cardiovascular:  Negative for chest pain, palpitations and leg swelling. Gastrointestinal:  Negative for abdominal pain, blood in stool, constipation, diarrhea and vomiting. Endocrine: Negative for cold intolerance. Genitourinary:  Negative for frequency and urgency. Musculoskeletal:  Negative for arthralgias, back pain and myalgias. R arm swelling   Allergic/Immunologic: Negative for environmental allergies. Neurological:  Negative for light-headedness, numbness and headaches. Physical Exam  Vitals and nursing note reviewed. Constitutional:       General: She is not in acute distress. Appearance: Normal appearance. She is normal weight. HENT:      Head: Normocephalic. Right Ear: External ear normal.      Left Ear: External ear normal.      Nose: Nose normal.   Eyes:      General: No scleral icterus. Extraocular Movements: Extraocular movements intact. Conjunctiva/sclera: Conjunctivae normal.      Pupils: Pupils are equal, round, and reactive to light. Neck:      Thyroid: No thyroid mass, thyromegaly or thyroid tenderness. Vascular: No JVD. Cardiovascular:      Rate and Rhythm: Normal rate and regular rhythm. Pulses: Normal pulses. Heart sounds: Normal heart sounds, S1 normal and S2 normal. No murmur heard. Pulmonary:      Effort: Pulmonary effort is normal. No respiratory distress.       Breath sounds: Normal breath sounds and air entry. No wheezing or rales. Chest:   Breasts:     Right: Absent. Abdominal:      General: Abdomen is flat. Bowel sounds are normal. There is no distension. Palpations: Abdomen is soft. Tenderness: There is no abdominal tenderness. Musculoskeletal:      Right shoulder: No swelling, deformity, effusion, tenderness or bony tenderness. Decreased range of motion. Cervical back: Full passive range of motion without pain, normal range of motion and neck supple. Thoracic back: Deformity present. Right lower leg: No edema. Left lower leg: No edema. Lymphadenopathy:      Cervical: No cervical adenopathy. Skin:     General: Skin is warm and dry. Findings: No rash. Neurological:      Mental Status: She is alert and oriented to person, place, and time. Mental status is at baseline. Cranial Nerves: No cranial nerve deficit. Deep Tendon Reflexes: Reflexes normal.   Psychiatric:         Behavior: Behavior normal.         Thought Content:  Thought content normal.         Judgment: Judgment normal.         (Time Documentation Optional [id number])    An electronic signaturewaas used to authenticate this note  -Jose J Nava MD on 7/21/2022 at 2:18 PM

## 2022-08-17 ENCOUNTER — TELEPHONE (OUTPATIENT)
Dept: GASTROENTEROLOGY | Age: 72
End: 2022-08-17

## 2022-08-17 NOTE — TELEPHONE ENCOUNTER
Ion Self requests that office return their call. The best time to reach her is Anytime. Patient received her 3 YR CLN RECALL FROM PREVIOUS (10/1/19) CM. Please called the patient to scheduled. Thank you.

## 2022-08-23 ENCOUNTER — OFFICE VISIT (OUTPATIENT)
Dept: PULMONOLOGY | Age: 72
End: 2022-08-23
Payer: MEDICARE

## 2022-08-23 VITALS
SYSTOLIC BLOOD PRESSURE: 124 MMHG | HEIGHT: 67 IN | DIASTOLIC BLOOD PRESSURE: 82 MMHG | BODY MASS INDEX: 19.24 KG/M2 | WEIGHT: 122.6 LBS | HEART RATE: 75 BPM | OXYGEN SATURATION: 97 % | TEMPERATURE: 97.9 F

## 2022-08-23 DIAGNOSIS — G47.33 SEVERE OBSTRUCTIVE SLEEP APNEA: Primary | ICD-10-CM

## 2022-08-23 DIAGNOSIS — I45.10 RIGHT BUNDLE BRANCH BLOCK (RBBB): ICD-10-CM

## 2022-08-23 DIAGNOSIS — R06.83 SNORING: ICD-10-CM

## 2022-08-23 DIAGNOSIS — G47.8 NON-RESTORATIVE SLEEP: ICD-10-CM

## 2022-08-23 PROCEDURE — G8427 DOCREV CUR MEDS BY ELIG CLIN: HCPCS | Performed by: INTERNAL MEDICINE

## 2022-08-23 PROCEDURE — G8399 PT W/DXA RESULTS DOCUMENT: HCPCS | Performed by: INTERNAL MEDICINE

## 2022-08-23 PROCEDURE — 3017F COLORECTAL CA SCREEN DOC REV: CPT | Performed by: INTERNAL MEDICINE

## 2022-08-23 PROCEDURE — G8420 CALC BMI NORM PARAMETERS: HCPCS | Performed by: INTERNAL MEDICINE

## 2022-08-23 PROCEDURE — 1123F ACP DISCUSS/DSCN MKR DOCD: CPT | Performed by: INTERNAL MEDICINE

## 2022-08-23 PROCEDURE — 99204 OFFICE O/P NEW MOD 45 MIN: CPT | Performed by: INTERNAL MEDICINE

## 2022-08-23 PROCEDURE — 1036F TOBACCO NON-USER: CPT | Performed by: INTERNAL MEDICINE

## 2022-08-23 PROCEDURE — 1090F PRES/ABSN URINE INCON ASSESS: CPT | Performed by: INTERNAL MEDICINE

## 2022-08-23 ASSESSMENT — ENCOUNTER SYMPTOMS
APNEA: 1
CHEST TIGHTNESS: 0
WHEEZING: 0
ABDOMINAL DISTENTION: 0
BACK PAIN: 0
ABDOMINAL PAIN: 0
COUGH: 0
ANAL BLEEDING: 0
RHINORRHEA: 0
SHORTNESS OF BREATH: 0

## 2022-08-23 NOTE — PROGRESS NOTES
Pulmonary and Sleep Medicine    Rebeca Briones (:  1950) is a 74 y.o. female,New patient, here for evaluation of the following chief complaint(s):  New Patient (Patient here to establish care for CARMELO. Patient is using a CPAP. )      Referring physician:  No referring provider defined for this encounter. ASSESSMENT/PLAN:  1. Severe obstructive sleep apnea  2. Right bundle branch block (RBBB)  3. Non-restorative sleep  4. Snoring      The patient does have a mask that she had a titration with at the lab namely Viteral small full face mask. I instructed the patient to go back to using that mask since it was demonstrated in the lab that she did well with it. We will reevaluate her again in 4 weeks to assess for leak and AHI. Ibeth Abad MD, [facility], [facility]    Return in about 4 weeks (around 2022). SUBJECTIVE/OBJECTIVE:  The patient is here for evaluation of obstructive sleep apnea. She had a sleep study earlier this year that showed severe obstructive sleep apnea with an apnea-hypopnea index of 36 events per hour. She underwent CPAP titration and she required to be on CPAP at 10 cm pressure. She currently is on CPAP she is compliant with the CPAP she feels it helps her symptoms significantly. However upon review of her CPAP compliance data she continues to have an apnea-hypopnea index of about 13 events per hour. The patient is using the CPAP mask and that was given to her by the DME company. She is having substantial leak as reported by the CPAP machine which makes the CPAP less effective. She did well with the Vitera small mask in the lab. Prior to Visit Medications    Medication Sig Taking? Authorizing Provider   ibandronate (BONIVA) 150 MG tablet Take 1 tablet by mouth every 30 days Take one (1) tablet once per month in the morning with a full glass of water, on an empty stomach, and do not take anything else by mouth or lie down for the next 30 minutes.  Yes Ina OSEGUERA GUNNER Yap        Review of Systems   Constitutional:  Negative for activity change, appetite change, chills, diaphoresis and fatigue. HENT:  Negative for congestion, dental problem, drooling, ear discharge, postnasal drip and rhinorrhea. Eyes:  Negative for visual disturbance. Respiratory:  Positive for apnea. Negative for cough, chest tightness, shortness of breath and wheezing. Gastrointestinal:  Negative for abdominal distention, abdominal pain and anal bleeding. Endocrine: Negative for cold intolerance, heat intolerance and polydipsia. Genitourinary:  Negative for difficulty urinating, dysuria, enuresis and flank pain. Musculoskeletal:  Negative for arthralgias, back pain and gait problem. Allergic/Immunologic: Negative for environmental allergies. Neurological:  Negative for dizziness, facial asymmetry, light-headedness and headaches. Vitals:    08/23/22 0841   BP: 124/82   Pulse: 75   Temp: 97.9 °F (36.6 °C)   SpO2: 97%     BMI Readings from Last 1 Encounters:   08/23/22 19.20 kg/m²         Physical Exam  Vitals reviewed. Constitutional:       Appearance: Normal appearance. HENT:      Head: Normocephalic and atraumatic. Nose: Nose normal.   Eyes:      Extraocular Movements: Extraocular movements intact. Conjunctiva/sclera: Conjunctivae normal.   Cardiovascular:      Rate and Rhythm: Normal rate and regular rhythm. Heart sounds: No murmur heard. No friction rub. Pulmonary:      Effort: Pulmonary effort is normal. No respiratory distress. Breath sounds: Normal breath sounds. No stridor. No wheezing, rhonchi or rales. Abdominal:      General: There is no distension. Palpations: There is no mass. Tenderness: There is no abdominal tenderness. There is no guarding or rebound. Musculoskeletal:      Cervical back: Normal range of motion and neck supple. Neurological:      Mental Status: She is alert and oriented to person, place, and time. This note was generated used a voice recognition software. Errors in voice recognition may have occurred. An electronic signature was used to authenticate this note.     --Juan C Olivier MD

## 2022-08-26 NOTE — PROGRESS NOTES
HISTORY OF PRESENT ILLNESS:    Ms. Madelaine Marino presents today for a 5-month breast exam following her annual mammogram screening. This is followed by a right mastectomy and axillary node dissection 3/3/2020. 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 Fine-needle biopsy of axillary node was cytologically conclusive for malignancy. It is ER positive at 3%. MD is negative. HER-2 is positive at 3+. Ki-67 is high at 22%. MammaPrint demonstrates a high risk basal phenotype    She has been receiving neoadjuvant chemotherapy with Herceptin, Perjeta, Taxotere, and carboplatin. She has had an excellent clinical response. MRI-2/14/2020  FINDINGS:   Amount of Fibroglandular Tissue: Scattered fibroglandular tissue. Background Parenchymal Enhancement:  Minimal.   Right upper outer breast with decreased size of biopsy-proven   malignancy. No residual measurable enhancing mass. There is   susceptibility artifact at the right slightly upper outer breast,   middle to posterior third, likely a biopsy marker. No suspicious enhancement of the left breast.   Pectoralis musculature appears symmetric and nonenhancing. Right axillary lymph node measures 0.9 cm in short axis on axial   series 3, image 58. While this is not enlarged by cross-sectional size   criteria, it is abnormally rounded and asymmetric to the left side,   concerning for residual disease. There is a T1 hypointensity within   the lymph node, which was more evident on prior exam 9/19/2019,   favored to represent a biopsy marker. There is a second prominent   right axillary lymph node, just lateral to the above described noted   with biopsy marker. No enlarged internal mammary lymph node. Left axillary lymph nodes   appear symmetric. Port at the left chest wall. Cardiomegaly. Minimal right-sided pleural   fluid. Heterogeneous appearance of the liver compared to prior exam.   Consider further evaluation with CT abdomen and pelvis with contrast.       Impression   1. Findings consistent with response to therapy. No residual   enhancement of the right breast. Normal appearance of the right   pectoralis musculature. 2. Residual prominent right axillary lymph node, which is concerning   for residual disease. There appears to be a biopsy marker in this   lymph node. There is also a lymph node slightly lateral which is also   prominent. 3. Heterogeneous appearance of the liver compared to the prior. Consider CT abdomen and pelvis with contrast for further evaluation. 4. Cardiomegaly and small right pleural fluid. BI-RADS Final Assessment Category 6: Known Biopsy-Proven Malignancy     She had a Right modified radical mastectomy     PATHOLOGY REVEALS    FINAL DIAGNOSIS  Breast, right modified radical mastectomy:  1. Primary tumor site identified with extensive fibrosis and nests of  residual high-grade carcinoma present in lymphatic spaces. 2.  Tumor is present in a lymphatic space at the deep surgical excision  margin. 3.  Sections of nipple identified, negative for evidence of malignancy. 4.  Sections of benign skin with benign seborrheic keratoses. 5.  3 out of 11 lymph nodes, positive for metastatic carcinoma. 6.  Multiple discrete nest of cells identified within the nodes. 7.  Largest focus of metastasis measures 0.5 cm in greatest dimension. 8.  Negative for extracapsular spread. She completed Radiation 6/4/2020 and is doing well. She has finished her TDM-1. Unilateral Left Screening Mammogram-8/29/2022  There are benign vascular as well centrally lucent calcifications of   the left breast.   No suspicious masses or calcifications are identified. There has been   no interval change. No skin changes are noted. IMPRESSION AND RECOMMENDATION:    No mammographic evidence of malignancy. Recommendation is for the   patient to return for routine mammography in one year or sooner, if   clinically indicated. BI-RADS CATEGORY 2: BENIGN FINDINGS. PHYSICAL EXAM:  The  wounds look good with no evidence of infection, fluid accumulation, or skin necrosis. She has minimal radiation changes. She has no palpable masses, skin or nipple changes, or axillary adenopathy on the left breast    IMPRESSION:    Doing well s/p right mastectomy and axillary node dissection 3/3/2020    PLAN: I will see her back in 6 months for a physical exam.     I have seen, examined and reviewed this patient medication list, appropriate labs and imaging studies. I reviewed relevant medical records and others physicians notes. I discussed the plans of care with the patient. I answered all the questions to the patients satisfaction. I, Dr Dotty Ignacio, personally performed the services described in this documentation as scribed by Randi Mandel MA in my presence and is both accurate and complete. (Please note that portions of this note were completed with a voice recognition program. Efforts were made to edit the dictations but occasionally words are mis-transcribed.)  Over 50% of the total visit time of 20 minutes in face to face encounter with the patient, out of which more than 50% of the time was spent in counseling patient or family and coordination of care. Counseling included but was not limited to time spent reviewing labs, imaging studies/ treatment plan and answering questions.

## 2022-08-29 ENCOUNTER — HOSPITAL ENCOUNTER (OUTPATIENT)
Dept: WOMENS IMAGING | Age: 72
Discharge: HOME | End: 2022-08-29
Payer: MEDICARE

## 2022-08-29 ENCOUNTER — OFFICE VISIT (OUTPATIENT)
Dept: SURGERY | Age: 72
End: 2022-08-29
Payer: MEDICARE

## 2022-08-29 VITALS — SYSTOLIC BLOOD PRESSURE: 132 MMHG | HEART RATE: 76 BPM | DIASTOLIC BLOOD PRESSURE: 70 MMHG

## 2022-08-29 DIAGNOSIS — Z90.11 S/P RIGHT MASTECTOMY: ICD-10-CM

## 2022-08-29 DIAGNOSIS — C50.811 MALIGNANT NEOPLASM OF OVERLAPPING SITES OF RIGHT FEMALE BREAST, UNSPECIFIED ESTROGEN RECEPTOR STATUS (HCC): Primary | ICD-10-CM

## 2022-08-29 DIAGNOSIS — Z12.31 VISIT FOR SCREENING MAMMOGRAM: ICD-10-CM

## 2022-08-29 PROCEDURE — 1123F ACP DISCUSS/DSCN MKR DOCD: CPT | Performed by: SURGERY

## 2022-08-29 PROCEDURE — G8427 DOCREV CUR MEDS BY ELIG CLIN: HCPCS | Performed by: SURGERY

## 2022-08-29 PROCEDURE — 77063 BREAST TOMOSYNTHESIS BI: CPT

## 2022-08-29 PROCEDURE — G8420 CALC BMI NORM PARAMETERS: HCPCS | Performed by: SURGERY

## 2022-08-29 PROCEDURE — 1090F PRES/ABSN URINE INCON ASSESS: CPT | Performed by: SURGERY

## 2022-08-29 PROCEDURE — 1036F TOBACCO NON-USER: CPT | Performed by: SURGERY

## 2022-08-29 PROCEDURE — G8399 PT W/DXA RESULTS DOCUMENT: HCPCS | Performed by: SURGERY

## 2022-08-29 PROCEDURE — 3017F COLORECTAL CA SCREEN DOC REV: CPT | Performed by: SURGERY

## 2022-08-29 PROCEDURE — 99213 OFFICE O/P EST LOW 20 MIN: CPT | Performed by: SURGERY

## 2022-09-20 ENCOUNTER — OFFICE VISIT (OUTPATIENT)
Dept: PULMONOLOGY | Age: 72
End: 2022-09-20
Payer: MEDICARE

## 2022-09-20 VITALS
BODY MASS INDEX: 19.78 KG/M2 | HEART RATE: 76 BPM | HEIGHT: 67 IN | WEIGHT: 126 LBS | SYSTOLIC BLOOD PRESSURE: 128 MMHG | DIASTOLIC BLOOD PRESSURE: 68 MMHG | TEMPERATURE: 97.8 F | OXYGEN SATURATION: 96 %

## 2022-09-20 DIAGNOSIS — G47.8 NON-RESTORATIVE SLEEP: ICD-10-CM

## 2022-09-20 DIAGNOSIS — G47.33 SEVERE OBSTRUCTIVE SLEEP APNEA: Primary | ICD-10-CM

## 2022-09-20 DIAGNOSIS — I45.10 RIGHT BUNDLE BRANCH BLOCK (RBBB): ICD-10-CM

## 2022-09-20 DIAGNOSIS — R06.83 SNORING: ICD-10-CM

## 2022-09-20 PROCEDURE — G8420 CALC BMI NORM PARAMETERS: HCPCS | Performed by: INTERNAL MEDICINE

## 2022-09-20 PROCEDURE — G8399 PT W/DXA RESULTS DOCUMENT: HCPCS | Performed by: INTERNAL MEDICINE

## 2022-09-20 PROCEDURE — 1036F TOBACCO NON-USER: CPT | Performed by: INTERNAL MEDICINE

## 2022-09-20 PROCEDURE — 1123F ACP DISCUSS/DSCN MKR DOCD: CPT | Performed by: INTERNAL MEDICINE

## 2022-09-20 PROCEDURE — 99213 OFFICE O/P EST LOW 20 MIN: CPT | Performed by: INTERNAL MEDICINE

## 2022-09-20 PROCEDURE — 3017F COLORECTAL CA SCREEN DOC REV: CPT | Performed by: INTERNAL MEDICINE

## 2022-09-20 PROCEDURE — G8427 DOCREV CUR MEDS BY ELIG CLIN: HCPCS | Performed by: INTERNAL MEDICINE

## 2022-09-20 PROCEDURE — 1090F PRES/ABSN URINE INCON ASSESS: CPT | Performed by: INTERNAL MEDICINE

## 2022-09-20 ASSESSMENT — ENCOUNTER SYMPTOMS
RHINORRHEA: 0
COUGH: 0
SHORTNESS OF BREATH: 0
WHEEZING: 0
ABDOMINAL DISTENTION: 0
ANAL BLEEDING: 0
CHEST TIGHTNESS: 0
APNEA: 1
ABDOMINAL PAIN: 0
BACK PAIN: 0

## 2022-09-20 NOTE — PROGRESS NOTES
Pulmonary and Sleep Medicine    Flako Rivera (:  1950) is a 74 y.o. female,Established patient, here for evaluation of the following chief complaint(s):  Follow-up (Follow up -[facility])      Referring physician:  No referring provider defined for this encounter. ASSESSMENT/PLAN:  1. Severe obstructive sleep apnea  2. Non-restorative sleep  3. Snoring  4. Right bundle branch block (RBBB)      Continue current management with the CPAP and is compliant with the CPAP and feels the CPAP is helping her. Dez Faust MD, [facility], [facility]    Return in about 6 months (around 3/20/2023). SUBJECTIVE/OBJECTIVE:  She is here for follow up on sleep apnea. She is using the CPAP and is compliant with the CPAP and feels the CPAP is helping her. Denies any new complaints. Prior to Visit Medications    Medication Sig Taking? Authorizing Provider   ibandronate (BONIVA) 150 MG tablet Take 1 tablet by mouth every 30 days Take one (1) tablet once per month in the morning with a full glass of water, on an empty stomach, and do not take anything else by mouth or lie down for the next 30 minutes. Yes GUNNER Garsia        Review of Systems   Constitutional:  Negative for activity change, appetite change, chills, diaphoresis and fatigue. HENT:  Negative for congestion, dental problem, drooling, ear discharge, postnasal drip and rhinorrhea. Eyes:  Negative for visual disturbance. Respiratory:  Positive for apnea. Negative for cough, chest tightness, shortness of breath and wheezing. Gastrointestinal:  Negative for abdominal distention, abdominal pain and anal bleeding. Endocrine: Negative for cold intolerance, heat intolerance and polydipsia. Genitourinary:  Negative for difficulty urinating, dysuria, enuresis and flank pain. Musculoskeletal:  Negative for arthralgias, back pain and gait problem. Allergic/Immunologic: Negative for environmental allergies.    Neurological:  Negative for dizziness, facial asymmetry, light-headedness and headaches. Vitals:    09/20/22 1139   BP: 128/68   Pulse: 76   Temp: 97.8 °F (36.6 °C)   SpO2: 96%     BMI Readings from Last 1 Encounters:   09/20/22 19.73 kg/m²         Physical Exam  Vitals reviewed. Constitutional:       Appearance: Normal appearance. HENT:      Head: Normocephalic and atraumatic. Nose: Nose normal.   Eyes:      Extraocular Movements: Extraocular movements intact. Conjunctiva/sclera: Conjunctivae normal.   Cardiovascular:      Rate and Rhythm: Normal rate and regular rhythm. Heart sounds: No murmur heard. No friction rub. Pulmonary:      Effort: Pulmonary effort is normal. No respiratory distress. Breath sounds: Normal breath sounds. No stridor. No wheezing, rhonchi or rales. Abdominal:      General: There is no distension. Palpations: There is no mass. Tenderness: There is no abdominal tenderness. There is no guarding or rebound. Musculoskeletal:      Cervical back: Normal range of motion and neck supple. Neurological:      Mental Status: She is alert and oriented to person, place, and time. This note was generated used a voice recognition software. Errors in voice recognition may have occurred. An electronic signature was used to authenticate this note.     --Yeny Breen MD

## 2022-10-03 ENCOUNTER — ANESTHESIA EVENT (OUTPATIENT)
Dept: OPERATING ROOM | Age: 72
End: 2022-10-03

## 2022-10-04 ENCOUNTER — HOSPITAL ENCOUNTER (OUTPATIENT)
Age: 72
Setting detail: OUTPATIENT SURGERY
Discharge: HOME | End: 2022-10-04
Attending: INTERNAL MEDICINE | Admitting: INTERNAL MEDICINE
Payer: MEDICARE

## 2022-10-04 ENCOUNTER — ANESTHESIA (OUTPATIENT)
Dept: OPERATING ROOM | Age: 72
End: 2022-10-04

## 2022-10-04 ENCOUNTER — HOSPITAL ENCOUNTER (OUTPATIENT)
Age: 72
Setting detail: SPECIMEN
Discharge: HOME | End: 2022-10-04
Payer: MEDICARE

## 2022-10-04 ENCOUNTER — APPOINTMENT (OUTPATIENT)
Dept: OPERATING ROOM | Age: 72
End: 2022-10-04

## 2022-10-04 VITALS
WEIGHT: 125 LBS | OXYGEN SATURATION: 97 % | DIASTOLIC BLOOD PRESSURE: 83 MMHG | TEMPERATURE: 97.7 F | SYSTOLIC BLOOD PRESSURE: 140 MMHG | HEIGHT: 67 IN | BODY MASS INDEX: 19.62 KG/M2 | HEART RATE: 67 BPM | RESPIRATION RATE: 16 BRPM

## 2022-10-04 PROCEDURE — 45384 COLONOSCOPY W/LESION REMOVAL: CPT | Performed by: INTERNAL MEDICINE

## 2022-10-04 PROCEDURE — 88305 TISSUE EXAM BY PATHOLOGIST: CPT

## 2022-10-04 PROCEDURE — G8918 PT W/O PREOP ORDER IV AB PRO: HCPCS

## 2022-10-04 PROCEDURE — 45384 COLONOSCOPY W/LESION REMOVAL: CPT

## 2022-10-04 PROCEDURE — G8907 PT DOC NO EVENTS ON DISCHARG: HCPCS

## 2022-10-04 RX ORDER — PROPOFOL 10 MG/ML
INJECTION, EMULSION INTRAVENOUS PRN
Status: DISCONTINUED | OUTPATIENT
Start: 2022-10-04 | End: 2022-10-04 | Stop reason: SDUPTHER

## 2022-10-04 RX ORDER — SODIUM CHLORIDE 0.9 % (FLUSH) 0.9 %
5-40 SYRINGE (ML) INJECTION PRN
Status: CANCELLED | OUTPATIENT
Start: 2022-10-04

## 2022-10-04 RX ORDER — SODIUM CHLORIDE 9 MG/ML
INJECTION, SOLUTION INTRAVENOUS PRN
Status: CANCELLED | OUTPATIENT
Start: 2022-10-04

## 2022-10-04 RX ORDER — SODIUM CHLORIDE 9 MG/ML
INJECTION, SOLUTION INTRAVENOUS CONTINUOUS
Status: DISCONTINUED | OUTPATIENT
Start: 2022-10-04 | End: 2022-10-04 | Stop reason: HOSPADM

## 2022-10-04 RX ORDER — DIPHENHYDRAMINE HYDROCHLORIDE 50 MG/ML
12.5 INJECTION INTRAMUSCULAR; INTRAVENOUS
Status: CANCELLED | OUTPATIENT
Start: 2022-10-04 | End: 2022-10-05

## 2022-10-04 RX ORDER — LIDOCAINE HYDROCHLORIDE 10 MG/ML
INJECTION, SOLUTION EPIDURAL; INFILTRATION; INTRACAUDAL; PERINEURAL PRN
Status: DISCONTINUED | OUTPATIENT
Start: 2022-10-04 | End: 2022-10-04 | Stop reason: SDUPTHER

## 2022-10-04 RX ORDER — ONDANSETRON 2 MG/ML
4 INJECTION INTRAMUSCULAR; INTRAVENOUS
Status: CANCELLED | OUTPATIENT
Start: 2022-10-04 | End: 2022-10-05

## 2022-10-04 RX ORDER — SODIUM CHLORIDE 0.9 % (FLUSH) 0.9 %
5-40 SYRINGE (ML) INJECTION EVERY 12 HOURS SCHEDULED
Status: CANCELLED | OUTPATIENT
Start: 2022-10-04

## 2022-10-04 RX ADMIN — SODIUM CHLORIDE: 9 INJECTION, SOLUTION INTRAVENOUS at 07:46

## 2022-10-04 RX ADMIN — PROPOFOL 200 MG: 10 INJECTION, EMULSION INTRAVENOUS at 08:36

## 2022-10-04 RX ADMIN — LIDOCAINE HYDROCHLORIDE 30 MG: 10 INJECTION, SOLUTION EPIDURAL; INFILTRATION; INTRACAUDAL; PERINEURAL at 08:36

## 2022-10-04 NOTE — DISCHARGE INSTRUCTIONS
1. Repeat colonoscopy: pending pathology -if biopsies from today reveal adenomas, in 3 years for surveillance; otherwise in 5 years due to her previous history   2. Await biopsy results-you will receive a letter with your results within 7-10 days    - Resume previous meds and diet  - GI clinic f/u PRN   - Keep scheduled f/u appts with other MDs     - NO ASA/NSAIDs x 2 weeks     NSAIDS Non-steroidal Anti-Inflammatory  You have been directed by your physician to avoid any NSAID's; the following medications are a list of those to avoid. If you think that you are taking any NSAID's notify your physician.        Over The Counter  Advil                      Motrin  Nuprin                   Ibuprofen  Midol                     Aleve  Naproxen              Orudis  Aspirin                   [place]      Prescriptions and Generics    Cataflam              Relafen  Voltaren               Clinoril  Indocin                 Naproxen  Arthrotec              Lodine  Daypro                 Nalfon  Toradol                Ansaid  Feldene               Meclofenamate  Fenoprofen          Ponstel  Mobic                   Celebrex  Vioxx

## 2022-10-04 NOTE — OP NOTE
Patient: Karma Jo : 1950  Med Rec#: [id number] Acc#: [id number]   Primary Care Provider Deborah Damico MD    Date of Procedure:  10/4/2022    Endoscopist: Adriana Worthington MD, MD    Referring Provider: Deborah Damico MD,     Operation Performed: Colonoscopy up to the cecum with hot biopsy removal of 3 small rectal polyps    Indications: Personal history of tubulovillous and tubular adenomatous polyps; History of positive Cologuard test in 2019; also history of breast cancer    Anesthesia:  Sedation was administered by anesthesia who monitored the patient during the procedure. I met with Karma Jo prior to procedure. We discussed the procedure itself, and I have discussed the risks of endoscopy (including-- but not limited to-- pain, discomfort, bleeding potentially requiring second endoscopic procedure and/or blood transfusion, organ perforation requiring operative repair, damage to organs near the colon, infection, aspiration, cardiopulmonary/allergic reaction), benefits, indications to endoscopy. Additionally, we discussed options other than colonoscopy. The patient expressed understanding. All questions answered. The patient decided to proceed with the procedure. Signed informed consent was placed on the chart. Blood Loss: minimal    Withdrawal time: More than 8 minutes  Bowel Prep: adequate and good    Complications: no immediate complications    DESCRIPTION OF PROCEDURE:     A time out was performed. After written informed consent was obtained, the patient was placed in the left lateral position. The perianal area was inspected, and a digital rectal exam was performed. A rectal exam was performed: normal tone, no palpable lesions. At this point, a forward viewing Olympus colonoscope was inserted into the anus and carefully advanced to the cecum. The cecum was identified by the ileocecal valve and the appendiceal orifice.  The colonoscope was then slowly withdrawn with careful inspection of the mucosa in a linear and circumferential fashion. The scope was retroflexed in the rectum. Suction was utilized during the procedure to remove as much air as possible from the bowel. The colonoscope was removed from the patient, and the procedure was terminated. Findings are listed below. Findings:   3 small sessile flat  polyps ranging in size between 3 to 4 mm in the rectum were removed by hot biopsy polypectomy  A tortuous redundant colon. Moderate Diverticulosis in the left colon  Internal hemorrhoids-Grade 2 without bleeding stigmata and external hemorrhoids    Otherwise the mucosa appeared normal throughout the entire examined colon and retroflexion in the rectum was normal without any further abnormalities     Recommendations:  1. Repeat colonoscopy: pending pathology -if biopsies from today reveal adenomas, in 3 years for surveillance; otherwise in 5 years due to her previous history   2. Await biopsy results-you will receive a letter with your results within 7-10 days    - Resume previous meds and diet  - GI clinic f/u PRN   - Keep scheduled f/u appts with other MDs     - NO ASA/NSAIDs x 2 weeks     Findings and recommendations were discussed w/ the patient. A copy of the images was provided.     (Please note that portions of this note were completed with a voice recognition program. Efforts were made to edit the dictations but occasionally words may be mis-transcribed.)     Anais Muller MD, MD  10/4/2022  8:41 AM

## 2022-10-04 NOTE — H&P
Patient Name: Abigail Russell  : 1950  MRN: [id number]  DATE: 10/04/22    Allergies: No Known Allergies     ENDOSCOPY  History and Physical    Procedure:    [] Diagnostic Colonoscopy       [x] Screening Colonoscopy  [] EGD      [] ERCP      [] EUS       [] Other    [x] Previous office notes/History and Physical reviewed from the patients chart. Please see EMR for further details of HPI. I have examined the patient's status immediately prior to the procedure and:      Indications/HPI: History of tubulovillous and tubular adenomatous polyps; History of positive Cologuard test in 2019; also history of breast cancer  []Abdominal Pain   []Cancer- GI/Lung     []Fhx of colon CA/polyps  []History of Polyps  []Barretts            []Melena  []Abnormal Imaging              []Dysphagia              []Persistent Pneumonia   []Anemia                            []Food Impaction        [x]History of tubulovillous and tubular adenomatous polyps  [] GI Bleed             []Pulmonary nodule/Mass   []Change in bowel habits []Heartburn/Reflux  []Rectal Bleed (BRBPR)  []Chest Pain - Non Cardiac []Heme (+) Stool []Ulcers  []Constipation  []Hemoptysis  []Varices  []Diarrhea  []Hypoxemia    []Nausea/Vomiting   []Screening   []Crohns/Colitis  [x]Other: History of positive Cologuard test in 2019; also history of breast cancer    Anesthesia:   [x] MAC [] Moderate Sedation   [] General   [] None     ROS: 12 pt Review of Symptoms was negative unless mentioned above    Medications:   Prior to Admission medications    Medication Sig Start Date End Date Taking? Authorizing Provider   ibandronate (BONIVA) 150 MG tablet Take 1 tablet by mouth every 30 days Take one (1) tablet once per month in the morning with a full glass of water, on an empty stomach, and do not take anything else by mouth or lie down for the next 30 minutes.  22   Susanna Boxer, APRN       Past Medical History:  Past Medical History:   Diagnosis Date    Acute nonintractable headache 12/20/2021    Benign essential HTN 12/20/2021    Malignant neoplasm of right breast in female, estrogen receptor positive ([address]     breast    Right arm cellulitis 6/28/2021       Past Surgical History:  Past Surgical History:   Procedure Laterality Date    BREAST BIOPSY Right 09/05/2019    COLONOSCOPY N/A 10/1/2019    Dr Bola Jaime, internal hemorrhoids-Grade 2 without bleeding stigmata and external hemorrhoids-TV x 1, AP x 1, 3 yr recall    MASTECTOMY Right 3/3/2020    RIGHT SIMPLE MASTECTOMY WITH SENTINEL NODE BIOPSY, FLAPS, PEC BLOCK performed by Luz Menendez MD at [address] [place] [address] N/A 10/4/2019    PORT INSERTION WITH FLUORO performed by Luz Menendez MD at [address] [place] [address] N/A 5/13/2021    PORT REMOVAL performed by Luz Menendez MD at [address] [place] [address]  ENDOSCOPY N/A 10/1/2019    Dr Syl Wilks hernia, [place] gastric polyps       Social History:  Social History     Tobacco Use    Smoking status: Never    Smokeless tobacco: Never   Vaping Use    Vaping Use: Never used   Substance Use Topics    Alcohol use: Never    Drug use: Never       Vital Signs:   Vitals:    10/04/22 0737   BP: (!) 141/73   Pulse: 74   Resp: 14   Temp: 98.5 °F (36.9 °C)   SpO2: 97%        Physical Exam:  Cardiac:  [x]WNL  []Comments:  Pulmonary:  [x]WNL   []Comments:  Neuro/Mental Status:  [x]WNL  []Comments:  Abdominal:  [x]WNL    []Comments:  Other:   []WNL  []Comments:    Informed Consent:  The risks and benefits of the procedure have been discussed with either the patient or if they cannot consent, their representative. Assessment:  Patient examined and appropriate for planned sedation and procedure. Plan:  Proceed with planned sedation and procedure as above.          Colin Tiwari MD

## 2022-10-04 NOTE — ANESTHESIA POSTPROCEDURE EVALUATION
Department of Anesthesiology  Postprocedure Note    Patient: Amparo Delaney  MRN: [id number]  YOB: 1950  Date of evaluation: 10/4/2022      Procedure Summary     Date: 10/04/22 Room / Location: [facility] ENDO 02 / [address]    Anesthesia Start: 1963 Anesthesia Stop: 0902    Procedure: COLONOSCOPY POLYPECTOMY HOT BIOPSY (Abdomen) Diagnosis:       Screen for colon cancer      (Screen for colon cancer [Z12.11])    Surgeons: Lio Ozuna MD Responsible Provider: GUNNER Dumont CRNA    Anesthesia Type: General ASA Status: 3          Anesthesia Type: General    Peter Phase I:      Peter Phase II:        Anesthesia Post Evaluation    Patient location during evaluation: bedside  Patient participation: complete - patient participated  Level of consciousness: awake  Pain score: 0  Airway patency: patent  Nausea & Vomiting: no nausea and no vomiting  Complications: no  Cardiovascular status: hemodynamically stable  Respiratory status: acceptable, room air and spontaneous ventilation  Hydration status: euvolemic

## 2022-10-04 NOTE — ANESTHESIA PRE PROCEDURE
Department of Anesthesiology  Preprocedure Note       Name:  Johnathan Calloway   Age:  74 y.o.  :  1950                                          MRN:  [id number]         Date:  10/4/2022      Surgeon: Thanh Marin):  Rodrigo Cohen MD    Procedure: Procedure(s):  COLORECTAL CANCER SCREENING, NOT HIGH RISK    Medications prior to admission:   Prior to Admission medications    Medication Sig Start Date End Date Taking? Authorizing Provider   ibandronate (BONIVA) 150 MG tablet Take 1 tablet by mouth every 30 days Take one (1) tablet once per month in the morning with a full glass of water, on an empty stomach, and do not take anything else by mouth or lie down for the next 30 minutes. 22   GUNNER Garsia       Current medications:    No current facility-administered medications for this visit. No current outpatient medications on file.      Facility-Administered Medications Ordered in Other Visits   Medication Dose Route Frequency Provider Last Rate Last Admin    0.9 % sodium chloride infusion   IntraVENous Continuous Rodrigo Cohen MD           Allergies:  No Known Allergies    Problem List:    Patient Active Problem List   Diagnosis Code    Fatigue R53.83    Right bundle branch block (RBBB) I45.10    Menopause Z78.0    Other specified disorders of bone density and structure, right upper arm  M85.821    Macrocytic anemia D53.9    Malignant neoplasm of overlapping sites of right female breast ([address] C50.811    Malignant neoplasm of unspecified site of unspecified female breast ([address] C50.919    Preop testing Z01.818    S/P right mastectomy and AND 3/3/2020 Z90.11    Hypovitaminosis D E55.9    Prediabetes R73.03    Osteopenia M85.80    Regional lymph node metastasis present ([address] C77.9    Lymphedema of right arm I89.0    Chronic acquired lymphedema I89.0    Hypokalemia E87.6    Kyphosis M40.209    Thrombocytopenia (HCC) D69.6    Severe obstructive sleep apnea G47.33    Non-restorative sleep G47.8    Snoring R06.83       Past Medical History:        Diagnosis Date    Acute nonintractable headache 12/20/2021    Benign essential HTN 12/20/2021    Malignant neoplasm of right breast in female, estrogen receptor positive [facility]     breast    Right arm cellulitis 6/28/2021       Past Surgical History:        Procedure Laterality Date    BREAST BIOPSY Right 09/05/2019    COLONOSCOPY N/A 10/1/2019    Dr Mina Hawthorne, internal hemorrhoids-Grade 2 without bleeding stigmata and external hemorrhoids-TV x 1, AP x 1, 3 yr recall    MASTECTOMY Right 3/3/2020    RIGHT SIMPLE MASTECTOMY WITH SENTINEL NODE BIOPSY, FLAPS, PEC BLOCK performed by Frannie Zamorano MD at [address] N/A 10/4/2019    PORT INSERTION WITH FLUORO performed by Frannie Zamorano MD at [address] N/A 5/13/2021    PORT REMOVAL performed by Frannie Zamorano MD at [address] N/A 10/1/2019    Dr Talavera Loron hernia, HP gastric polyps       Social History:    Social History     Tobacco Use    Smoking status: Never    Smokeless tobacco: Never   Substance Use Topics    Alcohol use: Never                                Counseling given: Not Answered      Vital Signs (Current): There were no vitals filed for this visit.                                            BP Readings from Last 3 Encounters:   10/04/22 (!) 141/73   09/20/22 128/68   08/29/22 132/70       NPO Status:                                                                                 BMI:   Wt Readings from Last 3 Encounters:   10/04/22 125 lb (56.7 kg)   09/20/22 126 lb (57.2 kg)   08/23/22 122 lb 9.6 oz (55.6 kg)     There is no height or weight on file to calculate BMI.    CBC:   Lab Results   Component Value Date/Time    WBC 5.15 07/19/2022 10:45 AM    RBC 3.39 07/19/2022 10:45 AM    HGB 11.4 07/19/2022 10:45 AM    HCT 35.3 07/19/2022 11:27 AM    .7 07/19/2022 10:45 AM    RDW 14.7 07/19/2022 10:45 AM     07/19/2022 10:45 AM       CMP:   Lab Results   Component Value Date/Time     07/19/2022 11:27 AM    K 3.8 07/19/2022 11:27 AM    CL 99 07/19/2022 11:27 AM    CO2 31 07/19/2022 11:27 AM    BUN 30 07/19/2022 11:27 AM    CREATININE 0.7 07/19/2022 11:27 AM    GFRAA >59 07/19/2022 11:27 AM    LABGLOM >60 07/19/2022 11:27 AM    GLUCOSE 97 07/19/2022 11:27 AM    PROT 6.8 07/19/2022 11:27 AM    CALCIUM 9.6 07/19/2022 11:27 AM    BILITOT 0.4 07/19/2022 11:27 AM    ALKPHOS 72 07/19/2022 11:27 AM    AST 36 07/19/2022 11:27 AM    ALT 25 07/19/2022 11:27 AM       POC Tests: No results for input(s): POCGLU, POCNA, POCK, POCCL, POCBUN, POCHEMO, POCHCT in the last 72 hours.     Coags: No results found for: PROTIME, INR, APTT    HCG (If Applicable): No results found for: PREGTESTUR, PREGSERUM, HCG, HCGQUANT     ABGs: No results found for: PHART, PO2ART, AVE2NIU, CFN3KPZ, BEART, A3MNOHJT     Type & Screen (If Applicable):  No results found for: LABABO, LABRH    Drug/Infectious Status (If Applicable):  No results found for: HIV, HEPCAB    COVID-19 Screening (If Applicable):   Lab Results   Component Value Date/Time    COVID19 Not Detected 05/28/2022 09:16 PM    COVID19 Not Detected 05/10/2021 07:58 AM           Anesthesia Evaluation  Patient summary reviewed and Nursing notes reviewed  Airway: Mallampati: II  TM distance: >3 FB   Neck ROM: full  Mouth opening: > = 3 FB   Dental:          Pulmonary:Negative Pulmonary ROS and normal exam    (+) sleep apnea: on CPAP,                             Cardiovascular:Negative CV ROS  Exercise tolerance: poor (<4 METS),   (+) hypertension:,       ECG reviewed  Rhythm: regular  Rate: normal           Beta Blocker:  Not on Beta Blocker      ROS comment: ECG 5/28/21:  Sinus rhythm  Possible left atrial abnormality  Possible left anterior fascicular block  rSr'(V1) - probable normal variant  QRS changes V3/V4 may be due to LVH but cannot rule out anterior infarct  Inferior ST elevation suggests early repolarization       Neuro/Psych:   Negative Neuro/Psych ROS              GI/Hepatic/Renal: Neg GI/Hepatic/Renal ROS            Endo/Other: Negative Endo/Other ROS   (+) blood dyscrasia: anemia and thrombocytopenia:., malignancy/cancer. Pt had no PAT visit        ROS comment: Kyphosis  Right mastectomy for breast cancer  History of osteopenia Abdominal:       Abdomen: soft. Vascular: negative vascular ROS. Other Findings:             Anesthesia Plan      general and TIVA     ASA 3       Induction: intravenous. Anesthetic plan and risks discussed with patient.                         Nicole Brown, APRN - CRNA   10/4/2022

## 2022-10-06 PROBLEM — K57.30 DIVERTICULOSIS OF COLON: Status: ACTIVE | Noted: 2022-10-06

## 2022-10-06 PROBLEM — D12.6 ADENOMATOUS COLON POLYP: Status: ACTIVE | Noted: 2022-10-06

## 2022-10-06 PROBLEM — K64.8 INTERNAL HEMORRHOID: Status: ACTIVE | Noted: 2022-10-06

## 2022-11-06 ASSESSMENT — ENCOUNTER SYMPTOMS
BLOOD IN STOOL: 0
WHEEZING: 0
DIARRHEA: 0
SINUS PAIN: 0
BACK PAIN: 0
CONSTIPATION: 0
CHEST TIGHTNESS: 0
ABDOMINAL PAIN: 0
VOMITING: 0
RHINORRHEA: 0
SHORTNESS OF BREATH: 0
TROUBLE SWALLOWING: 0
COUGH: 0

## 2022-11-06 NOTE — PATIENT INSTRUCTIONS
Please schedule FOLLOW UP VISIT in  6  months  Have lab work 2 weeks before scheduled Follow up Visit  The medication list included in this document is our record of what you are currently taking, including any changes that were made at today's visit. If you find any differences when compared to your medications at home, or have any questions that were not answered at your visit, please contact the office. Patient Education        Heart-Healthy Diet: Care Instructions  Your Care Instructions     A heart-healthy diet has lots of vegetables, fruits, nuts, beans, and whole grains, and is low in salt. It limits foods that are high in saturated fat, such as meats, cheeses, and fried foods. It may be hard to change your diet, but even small changes can lower your risk of heart attack and heart disease. Follow-up care is a key part of your treatment and safety. Be sure to make and go to all appointments, and call your doctor if you are having problems. It's also a good idea to know your test results and keep a list of the medicines you take. How can you care for yourself at home? Watch your portions  Use food labels to learn what the recommended servings are for the foods you eat. Eat only the number of calories you need to stay at a healthy weight. If you need to lose weight, eat fewer calories than your body burns (through exercise and other physical activity). Eat more fruits and vegetables  Eat a variety of fruit and vegetables every day. Dark green, deep orange, red, or yellow fruits and vegetables are especially good for you. Examples include spinach, carrots, peaches, and berries. Keep carrots, celery, and other veggies handy for snacks. Buy fruit that is in season and store it where you can see it so that you will be tempted to eat it. Cook dishes that have a lot of veggies in them, such as stir-fries and soups. Limit saturated fat  Read food labels, and try to avoid saturated fats.  They increase your risk of heart disease. Use olive or canola oil when you cook. Bake, broil, grill, or steam foods instead of frying them. Choose lean meats instead of high-fat meats such as hot dogs and sausages. Cut off all visible fat when you prepare meat. Eat fish, skinless poultry, and meat alternatives such as soy products instead of high-fat meats. Soy products, such as tofu, may be especially good for your heart. Choose low-fat or fat-free milk and dairy products. Eat foods high in fiber  Eat a variety of grain products every day. Include whole-grain foods that have lots of fiber and nutrients. Examples of whole-grain foods include oats, whole wheat bread, and brown rice. Buy whole-grain breads and cereals, instead of white bread or pastries. Limit salt and sodium  Limit how much salt and sodium you eat to help lower your blood pressure. Taste food before you salt it. Add only a little salt when you think you need it. With time, your taste buds will adjust to less salt. Eat fewer snack items, fast foods, and other high-salt, processed foods. Check food labels for the amount of sodium in packaged foods. Choose low-sodium versions of canned goods (such as soups, vegetables, and beans). Limit sugar  Limit drinks and foods with added sugar. These include candy, desserts, and soda pop. Limit alcohol  Limit alcohol to no more than 2 drinks a day for men and 1 drink a day for women. Too much alcohol can cause health problems. When should you call for help? Watch closely for changes in your health, and be sure to contact your doctor if:    You would like help planning heart-healthy meals. Where can you learn more? Go to https://cedrick.healthCanonical. org and sign in to your [employer] account. Enter V137 in the [employer] Search box to learn more about \"Heart-Healthy Diet: Care Instructions. \"     If you do not have an account, please click on the \"Sign Up Now\" link.   Current as of: October 6, 2021 Content Version: 13.4  © 2006-2022 [employer]. Care instructions adapted under license by [facility] ([facility]). If you have questions about a medical condition or this instruction, always ask your healthcare professional. [address] any warranty or liability for your use of this information. Patient Education        Learning About Healthy Eating During Menopause  Why is healthy eating important during menopause? Healthy eating is an important part of caring for yourself during menopause. It gives your body nutrients you need, like calcium and vitamin D. It helps you stay at a healthy weight. And it can reduce your risk for health problems that are more common after menopause, such as heart disease and osteoporosis. Eating healthy during menopause  Here are some things you can do to eat healthy during menopause. Eat a heart-healthy diet. Choose foods like vegetables, fruits, nuts, beans, fish, and whole grains. Limit foods that have a lot of salt, fat, and sugar. Choose foods that have a lot of calcium. These include milk, cheese, yogurt, and calcium-fortified orange juice, soy milk, and tofu. Other sources of calcium include canned sardines, canned salmon with bones, and leafy green vegetables such as broccoli, kale, and Chinese cabbage. Between the ages of 23 and 48, you need 1,000 milligrams (mg) of calcium a day. At 46 and older, you need 1,200 mg a day. Eat foods that are good sources of vitamin D.   Vitamin D helps your body use calcium. Foods that have vitamin D include salmon, tuna, and mackerel. Vitamin D-fortified foods like milk, soy milk, orange juice, and cereal are also good sources. Between the ages of 23 and 79, you need 600 international units (IU) of vitamin D a day. At age 70 and older, you need 800 IU a day. Talk to your doctor about taking a calcium and vitamin D supplement.    It may be a good idea for you if you don't get enough of these nutrients from the foods you eat. Limit caffeine. Caffeine can cause sleep problems. It can also make you feel anxious. If you are bothered by symptoms like these, pay attention to how much caffeine you are getting. Caffeine is found in coffee, tea, energy drinks, and foods and drinks that contain chocolate. Limit your intake of alcohol. Drinking may make menopause symptoms worse. Follow-up care is a key part of your treatment and safety. Be sure to make and go to all appointments, and call your doctor if you are having problems. It's also a good idea to know your test results and keep a list of the medicines you take. Where can you learn more? Go to https://[employer]ighbor[employer]Groove Customer Support.[employer]. org and sign in to your [employer] account. Enter F870 in the [employer] box to learn more about \"Learning About Healthy Eating During Menopause. \"     If you do not have an account, please click on the \"Sign Up Now\" link. Current as of: May 9, 2022               Content Version: 13.4  © 4551-0248 Healthwise, Inc[employer]. Care instructions adapted under license by [facility] ([facility]). If you have questions about a medical condition or this instruction, always ask your healthcare professional. [place] [address] any warranty or liability for your use of this information. Personalized Preventive Plan for Johnathan Calloway - 11/7/2022  Medicare offers a range of preventive health benefits. Some of the tests and screenings are paid in full while other may be subject to a deductible, co-insurance, and/or copay. Some of these benefits include a comprehensive review of your medical history including lifestyle, illnesses that may run in your family, and various assessments and screenings as appropriate. After reviewing your medical record and screening and assessments performed today your provider may have ordered immunizations, labs, imaging, and/or referrals for you.   A list of these orders (if applicable) as well as your Preventive Care list are included within your After Visit Summary for your review. Other Preventive Recommendations:    A preventive eye exam performed by an eye specialist is recommended every 1-2 years to screen for glaucoma; cataracts, macular degeneration, and other eye disorders. A preventive dental visit is recommended every 6 months. Try to get at least 150 minutes of exercise per week or 10,000 steps per day on a pedometer . Order or download the FREE \"Exercise & Physical Activity: Your Everyday Guide\" from [employer]. Call 4-915.371.8186 or search [employer] online. You need 8119-0258 mg of calcium and 3690-0592 IU of vitamin D per day. It is possible to meet your calcium requirement with diet alone, but a vitamin D supplement is usually necessary to meet this goal.  When exposed to the sun, use a sunscreen that protects against both UVA and UVB radiation with an SPF of 30 or greater. Reapply every 2 to 3 hours or after sweating, drying off with a towel, or swimming. Always wear a seat belt when traveling in a car. Always wear a helmet when riding a bicycle or motorcycle.

## 2022-11-06 NOTE — PROGRESS NOTES
Fidencio Espinoza ( 1950) is a 74 y.o. female,  Established , here for evaluation of the following chief complaint(s). No chief complaint on file.         ASSESSMENT/PLAN:  Problem List          Musculoskeletal and Integument    Osteopenia      Asymptomatic, continue current medications   On bisphosphonate therapy         Relevant Medications    ibandronate (BONIVA) 150 MG tablet       Hematopoietic and Hemostatic    Thrombocytopenia (HCC)      Borderline controlled, continue to monitor            Other    Malignant neoplasm of overlapping sites of right female breast ([address]      Monitored by specialist- no acute findings meriting change in the plan   Patient had residual disease in her axilla she did refuse la and comple de therapy due to intolerance was in the emergency room and she has not returned for completion of treatment she is being monitored by oncology group         Macrocytic anemia      ferrikinetics and B!@ normal   Lab Results   Component Value Date    IRON 97 2022    TIBC 265 2022    FERRITIN 149.1 2022     Lab Results   Component Value Date    [id number] 1028 (H) 2022     Lab Results   Component Value Date    FOLATE >20.0 2022             Results for orders placed or performed during the hospital encounter of 22   CBC with Auto Differential   Result Value Ref Range    WBC 5.15 3.98 - 10.04 K/uL    RBC 3.39 (L) 3.93 - 5.22 M/uL    Hemoglobin 11.4 11.2 - 15.7 g/dL    Hematocrit 36.5 34.1 - 44.9 %    .7 (H) 79.4 - 94.8 fL    MCH 33.6 (H) 25.6 - 32.2 pg    MCHC 31.2 (L) 32.3 - 35.5 g/dL    RDW 14.7 (H) 11.7 - 14.4 %    Platelets 823 (L) 297 - 369 K/uL    MPV 10.6 (H) 7.4 - 10.4 fL    Neutrophils % 71.1 34.0 - 71.1 %    Lymphocytes % 18.4 (L) 19.3 - 53.1 %    Monocytes % 7.2 4.7 - 12.5 %    Eosinophils % 2.1 0.7 - 7.0 %    Basophils % 1.0 0.1 - 1.2 %    Neutrophils Absolute 3.66 1.56 - 6.13 K/uL    Lymphocytes Absolute 0.95 (L) 1.18 - 3.74 K/uL    Monocytes Absolute 0.37 0.24 - 0.82 K/uL    Eosinophils Absolute 0.11 0.04 - 0.54 K/uL    Basophils Absolute 0.05 0.01 - 0.08 K/uL       No follow-ups on file. HPI  75-year-old female who presents for follow-up visit  She has history of right breast cancer status postmastectomy patient had lymphedema of her right arm which is now resolved  Has history of osteopenia on antiresorptive therapy with calcium and vitamin D replacement  Patient has history of prediabetes no longer a problem  She is also having borderline low potassiums encouraged to eat potassium rich food she offers no complaints today  Thrombocytopenia remains unchanged      Review of Systems   Constitutional:  Negative for activity change, chills, fatigue and fever. HENT:  Negative for hearing loss, postnasal drip, rhinorrhea, sinus pain and trouble swallowing. Eyes:  Negative for visual disturbance. Respiratory:  Negative for cough, chest tightness, shortness of breath and wheezing. Cardiovascular:  Negative for chest pain, palpitations and leg swelling. Gastrointestinal:  Negative for abdominal pain, blood in stool, constipation, diarrhea and vomiting. Endocrine: Negative for cold intolerance. Genitourinary:  Negative for frequency and urgency. Musculoskeletal:  Negative for arthralgias, back pain and myalgias. R arm swelling   Allergic/Immunologic: Negative for environmental allergies. Neurological:  Negative for light-headedness, numbness and headaches. Physical Exam  Vitals and nursing note reviewed. Constitutional:       General: She is not in acute distress. Appearance: Normal appearance. She is normal weight. HENT:      Head: Normocephalic. Right Ear: External ear normal.      Left Ear: External ear normal.      Nose: Nose normal.   Eyes:      General: No scleral icterus. Extraocular Movements: Extraocular movements intact.       Conjunctiva/sclera: Conjunctivae normal.      Pupils: Pupils are equal, round, and reactive to light. Neck:      Thyroid: No thyroid mass, thyromegaly or thyroid tenderness. Vascular: No JVD. Cardiovascular:      Rate and Rhythm: Normal rate and regular rhythm. Pulses: Normal pulses. Heart sounds: Normal heart sounds, S1 normal and S2 normal. No murmur heard. Pulmonary:      Effort: Pulmonary effort is normal. No respiratory distress. Breath sounds: Normal breath sounds and air entry. No wheezing or rales. Chest:   Breasts:     Right: Absent. Abdominal:      General: Abdomen is flat. Bowel sounds are normal. There is no distension. Palpations: Abdomen is soft. Tenderness: There is no abdominal tenderness. Musculoskeletal:      Right shoulder: No swelling, deformity, effusion, tenderness or bony tenderness. Decreased range of motion. Cervical back: Full passive range of motion without pain, normal range of motion and neck supple. Thoracic back: Deformity present. Right lower leg: No edema. Left lower leg: No edema. Lymphadenopathy:      Cervical: No cervical adenopathy. Skin:     General: Skin is warm and dry. Findings: No rash. Neurological:      Mental Status: She is alert and oriented to person, place, and time. Mental status is at baseline. Cranial Nerves: No cranial nerve deficit. Deep Tendon Reflexes: Reflexes normal.   Psychiatric:         Behavior: Behavior normal.         Thought Content:  Thought content normal.         Judgment: Judgment normal.         (Time Documentation Optional [id number])    An electronic signaturewaas used to authenticate this note  -Rd Maher MD on 11/6/2022 at 2:59 [address] [facility] is here for Medicare AWV    Assessment & Plan   Thrombocytopenia ([address]  Assessment & Plan:   Borderline controlled, continue to monitor  Macrocytic anemia  Assessment & Plan:   ferrikinetics and B!@ normal   Lab Results   Component Value Date    IRON 97 07/19/2022    TIBC 265 07/19/2022    FERRITIN 149.1 07/19/2022     Lab Results   Component Value Date    [id number] 4776 (H) 07/19/2022     Lab Results   Component Value Date    FOLATE >20.0 07/19/2022     Malignant neoplasm of overlapping sites of right female breast, unspecified estrogen receptor status ([facility] [address] [address]  Assessment & Plan:   Monitored by specialist- no acute findings meriting change in the plan   Patient had residual disease in her axilla she did refuse la and comple de therapy due to intolerance was in the emergency room and she has not returned for completion of treatment she is being monitored by oncology group  Osteopenia of neck of femur, unspecified laterality  Assessment & Plan:   Asymptomatic, continue current medications   On bisphosphonate therapy    Recommendations for Preventive Services Due: see orders and patient instructions/AVS.  Recommended screening schedule for the next 5-10 years is provided to the patient in written form: see Patient Instructions/AVS.     Return in about 6 months (around 5/7/2023). Subjective       Patient's complete Health Risk Assessment and screening values have been reviewed and are found in Flowsheets. The following problems were reviewed today and where indicated follow up appointments were made and/or referrals ordered.     Positive Risk Factor Screenings with Interventions:      Depression:  Depression Unable to Assess: Functional capacity motivation limits accuracy  PHQ-2 Score: 0  PHQ-9 Total Score: 0    Severity:1-4 = minimal depression, 5-9 = mild depression, 10-14 = moderate depression, 15-19 = moderately severe depression, 20-27 = severe depression        General Health and ACP:  General  In general, how would you say your health is?: Very Good  In the past 7 days, have you experienced any of the following: New or Increased Pain, New or Increased Fatigue, Loneliness, Social Isolation, Stress or Anger?: No  Do you get the social and emotional support that you need?: Yes  Do you have a Living Will?: Yes    Advance Directives       Power of  Living Will ACP-Advance Directive ACP-Power of     Not on File Not on File Not on File Not on File        General Health Risk Interventions:  none    Health Habits/Nutrition:  Physical Activity: Insufficiently Active    Days of Exercise per Week: 4 days    Minutes of Exercise per Session: 10 min     Have you lost any weight without trying in the past 3 months?: No  Body mass index: 19.58  Have you seen the dentist within the past year?: (!) No  Health Habits/Nutrition Interventions:  none             Objective   Vitals:    11/07/22 0801   BP: 116/82   Pulse: 87   Resp: 20   Temp: (!) 96.7 °F (35.9 °C)   TempSrc: Temporal   SpO2: 97%   Weight: 125 lb (56.7 kg)   Height: 5' 7\" (1.702 m)      Body mass index is 19.58 kg/m². No Known Allergies  Prior to Visit Medications    Medication Sig Taking? Authorizing Provider   ibandronate (BONIVA) 150 MG tablet Take 1 tablet by mouth every 30 days Take one (1) tablet once per month in the morning with a full glass of water, on an empty stomach, and do not take anything else by mouth or lie down for the next 30 minutes.  Yes GUNNER Christian (Including outside providers/suppliers regularly involved in providing care):   Patient Care Team:  Manpreet Sanderson MD as PCP - General (Internal Medicine)  Manpreet Sanderson MD as PCP - [facility] Empaneled Provider  Des Ocampo RN as Nurse Navigator (Oncology)  Sanna Torres RN as Ambulatory Care Manager     Reviewed and updated this visit:  Tobacco  Allergies  Meds  Problems  Med Hx  Surg Hx  Soc Hx  Fam Hx

## 2022-11-06 NOTE — ASSESSMENT & PLAN NOTE
Monitored by specialist- no acute findings meriting change in the plan   Patient had residual disease in her axilla she did refuse la and comple de therapy due to intolerance was in the emergency room and she has not returned for completion of treatment she is being monitored by oncology group

## 2022-11-06 NOTE — ASSESSMENT & PLAN NOTE
ferrikinetics and B!@ normal   Lab Results   Component Value Date    IRON 97 07/19/2022    TIBC 265 07/19/2022    FERRITIN 149.1 07/19/2022     Lab Results   Component Value Date    [id number] 1028 (H) 07/19/2022     Lab Results   Component Value Date    FOLATE >20.0 07/19/2022

## 2022-11-07 ENCOUNTER — OFFICE VISIT (OUTPATIENT)
Dept: PRIMARY CARE CLINIC | Age: 72
End: 2022-11-07

## 2022-11-07 VITALS
DIASTOLIC BLOOD PRESSURE: 82 MMHG | WEIGHT: 125 LBS | OXYGEN SATURATION: 97 % | TEMPERATURE: 96.7 F | SYSTOLIC BLOOD PRESSURE: 116 MMHG | HEART RATE: 87 BPM | RESPIRATION RATE: 20 BRPM | HEIGHT: 67 IN | BODY MASS INDEX: 19.62 KG/M2

## 2022-11-07 DIAGNOSIS — M85.859 OSTEOPENIA OF NECK OF FEMUR, UNSPECIFIED LATERALITY: ICD-10-CM

## 2022-11-07 DIAGNOSIS — Z00.00 MEDICARE ANNUAL WELLNESS VISIT, SUBSEQUENT: Primary | ICD-10-CM

## 2022-11-07 DIAGNOSIS — D53.9 MACROCYTIC ANEMIA: ICD-10-CM

## 2022-11-07 DIAGNOSIS — D69.6 THROMBOCYTOPENIA (HCC): ICD-10-CM

## 2022-11-07 DIAGNOSIS — C50.811 MALIGNANT NEOPLASM OF OVERLAPPING SITES OF RIGHT FEMALE BREAST, UNSPECIFIED ESTROGEN RECEPTOR STATUS (HCC): ICD-10-CM

## 2022-11-07 PROCEDURE — 3017F COLORECTAL CA SCREEN DOC REV: CPT | Performed by: INTERNAL MEDICINE

## 2022-11-07 PROCEDURE — G8484 FLU IMMUNIZE NO ADMIN: HCPCS | Performed by: INTERNAL MEDICINE

## 2022-11-07 PROCEDURE — G0439 PPPS, SUBSEQ VISIT: HCPCS | Performed by: INTERNAL MEDICINE

## 2022-11-07 PROCEDURE — 1123F ACP DISCUSS/DSCN MKR DOCD: CPT | Performed by: INTERNAL MEDICINE

## 2022-11-07 ASSESSMENT — PATIENT HEALTH QUESTIONNAIRE - PHQ9
1. LITTLE INTEREST OR PLEASURE IN DOING THINGS: 0
SUM OF ALL RESPONSES TO PHQ QUESTIONS 1-9: 0
SUM OF ALL RESPONSES TO PHQ9 QUESTIONS 1 & 2: 0
SUM OF ALL RESPONSES TO PHQ QUESTIONS 1-9: 0
SUM OF ALL RESPONSES TO PHQ QUESTIONS 1-9: 0
2. FEELING DOWN, DEPRESSED OR HOPELESS: 0
SUM OF ALL RESPONSES TO PHQ QUESTIONS 1-9: 0
DEPRESSION UNABLE TO ASSESS: FUNCTIONAL CAPACITY MOTIVATION LIMITS ACCURACY

## 2022-11-07 ASSESSMENT — LIFESTYLE VARIABLES
HOW MANY STANDARD DRINKS CONTAINING ALCOHOL DO YOU HAVE ON A TYPICAL DAY: PATIENT DOES NOT DRINK
HOW OFTEN DO YOU HAVE A DRINK CONTAINING ALCOHOL: NEVER

## 2023-01-12 DIAGNOSIS — D50.9 IRON DEFICIENCY ANEMIA, UNSPECIFIED IRON DEFICIENCY ANEMIA TYPE: Primary | ICD-10-CM

## 2023-01-12 DIAGNOSIS — D69.6 THROMBOCYTOPENIA (HCC): ICD-10-CM

## 2023-01-12 NOTE — PROGRESS NOTES
Progress Note      Pt Name: Cyndi Velez  YOB: 1950  MRN: [id number]    Date of evaluation: 01/13/2023  History Obtained From:  patient, electronic medical record    CHIEF COMPLAINT:    Chief Complaint   Patient presents with    Follow-up     Malignant neoplasm of overlapping sites of right female breast, unspecified estrogen receptor status ([facility]    Anemia    Discuss Labs     HISTORY OF PRESENT ILLNESS:    Cyndi Velez is a 74 y.o.  female who is currently being followed for a diagnosis of locally advanced HER-2 positive breast cancer diagnosed September 2019. She obtained a complete pathologic response in the breast, unfortunately had residual disease in the axilla which was followed by adjuvant treatment to TDM 1. Recommendation was given for adjuvant endocrine therapy, unfortunately she was intolerant to letrozole due to severe arthralgias, bone and chest pain. She has had no known evidence of recurrent disease. Pratibha House returns today in scheduled follow-up for evaluation, side effect monitoring, lab monitoring and further recommendations. She reports overall doing well and has no new left breast or right chest wall complaints. Today's clinic visit to include physical assessment, review of systems, any lab or radiographic findings that were available and plan of care are documented below.       ONCOLOGIC HISTORY:   Diagnosis   HER-2 IDC right breast, September 2019  [id number]  ER 3%, SD 0%, HER-2/lea IHC 3+  Mammaprint high risk Basal-like  Osteopenia, Aug 2019-T score -1.5 lumbar spine     Treatment summary  10/18/2019 through  March 2020- 6 cycles of neoadjuvant Taxotere, Carboplatin and Herceptin and Perjeta  3/3/2020- right mastectomy/ lymph node dissection  3/20/2020-14 cycles of adjuvant TDM-1.   4/20/2020-6/4/2020 Completed adjuvant radiation 6040 cGy  4/10/2020-5/28/2022 adjuvant endocrine therapy with letrozole discontinued due to intolerance, severe bone and chest pain. Declined trial of other adjuvant endocrine therapy    Ms Yusra Ontiveros was first seen by Dr. Tessa Tai on 10/03/2019. She felt a mass in the right upper breast and therefore contact her primary care provider. A diagnostic mammogram was performed. Her mother  of breast cancer at the age of 50. She has no other history of breast cancer or any other cancer in her family. 2019- diagnostic mammogram showed a suspicious 2.2 cm hypoechoic spiculated right breast mass in the axillary tail within the internal calcifications had suggest malignancy. Ultrasound of the breast showed the lesion as well as a pathologic appearing right axillary adenopathy. 2019-core needle biopsy consistent with high grade invasive ductal carcinoma. MammaPrint high risk basal type. ER 3%, UT 0.2%, HER-2/lea IHC 3+ Ki-67 22%. HER-2/lea FISH positive  2019- MRI breast showed a large right breast mass compatible with primary malignancy measuring 2.2 x 1.2 x 3.5 cm in size. Abnormal enhancement in the pectoralis major muscle suggesting extension into the muscle. Multiple abnormal lymph nodes are seen in the right axilla with the largest measuring 2 x 1.6 cm. A small lymph node is seen along the right internal mammary chain. No suspicious left axillary adenopathy. 2019-CT chest abdomen pelvis and bone scan showed no evidence of metastatic disease. 10/3/2019- she was first seen by Dr. Tessa Tai. Recommended neoadjuvant chemotherapy approach with dose dense Adriamycin/cyclophosphamide x4 cycles followed by 12 weekly cycles of Taxol 80 mg/m² with Herceptin and Perjeta. 10/14/2019- consultation at  [address] [place] [address]. Recommended Taxotere, carboplatin Herceptin Perjeta. 10/17/2019- 2020 completion of 6 cycles of of neoadjuvant chemotherapy with Taxotere carboplatin Herceptin Perjeta. 2020-MRI breast.  Showed findings consistent with response to therapy.   No residual enhancement of the right breast. Residual right axillary lymph node concerning for residual disease.  2/21/2020- maintenance Herceptin/Perjeta to complete 1 year of treatment. 3/03/2020-she underwent a right mastectomy/axillary dissection by Dr. Amarilis Miguel at [facility].  Primary tumor site identified with extensive fibrosis and nests of residual high-grade carcinoma present in lymphatic spaces. Tumor is present in a lymphatic space at the deep surgical excision margin. 3 out of 11 lymph nodes, positive for metastatic carcinoma, at least 1 metastasis greater than 2.0 mm . Largest focus of metastasis measures 0.5 cm in greatest dimension. AJCC STAGE: ypT0, pN1a, pMx  3/16/2020-repeat 2D echo EF 60%. 4/20/2020-6/4/2020 Completion adjuvant radiation 6040 cGy  8/24/2020 Silver[employer] Digital Screen Unilateral Left- No mammographic evidence of malignancy within the left breast. Continued annual unilateral screening mammography recommended. BI-RADS Category 2.  2/1/21 2D echo: ejection fraction estimated at 55%. 02/22/2021 Xray right shoulder No acute osseous injury or malalignment at the shoulder. Underlying osteoarthritis changes at the shoulder appear quite mild. 08/25/2021 Bone density Osteopenia. Low bone mass.  Lumbar spine T-score -1.7, left femoral neck T-score -2.2   08/25/2021 Left breast mammogram No mammographic evidence of malignancy  08/29/2022 Left mammogram- no mammographic evidence of malignancy    Hematologic history: Macrocytic anemia    Serology studies 6/28/2021 at \[facility]\""  D-Dimer 3.57  CRP 5.78  H/H 9.5 & 30.1; MCV 99.3  Iron 51  TIBC 207  Iron saturation 25  Ferritin 218.20  Folate 10.90  Transferrin 139    Serology studies on 09/13/2021  Vitamin B12/Folate: 606/12.8  Vitamin D 33.1    11/30/2021 Serology results  Iron 88  TIBC 366  Saturation 24%  Ferritin 40    Age Appropriate Health Screening:  10/04/2022 Colonoscopy- 3 small polyps-hyperplastic polyps; 3 year recall    Past Medical History:    Past Medical History:   Diagnosis Date    Acute nonintractable headache 12/20/2021    Benign essential HTN 12/20/2021    Malignant neoplasm of right breast in female, estrogen receptor positive ([address]     breast    Right arm cellulitis 6/28/2021       Past Surgical History:    Past Surgical History:   Procedure Laterality Date    BREAST BIOPSY Right 09/05/2019    COLONOSCOPY N/A 10/01/2019    Dr Terrence Caraballo, internal hemorrhoids-Grade 2 without bleeding stigmata and external hemorrhoids-TV x 1, AP x 1, 3 yr recall    COLONOSCOPY N/A 10/04/2022    Dr Indigo Giron, tortuous redundant colon, mod diverticulosis left colon, int hem Gr 2 wo bleeding, external hemorrhoids-HP x 3, 3 year recall    MASTECTOMY Right 03/03/2020    RIGHT SIMPLE MASTECTOMY WITH SENTINEL NODE BIOPSY, FLAPS, PEC BLOCK performed by Jeanette Bryan MD at [address] [place] [address] N/A 10/04/2019    PORT INSERTION WITH FLUORO performed by Jeanette Bryan MD at [address] [place] [address] N/A 05/13/2021    PORT REMOVAL performed by Jeanette Bryan MD at [address] [place] [address] Dr ENDOSCOPY N/A 10/01/2019    Dr Real Saltryne hernia, HP gastric polyps       Current Medications:    Current Outpatient Medications   Medication Sig Dispense Refill    ibandronate (BONIVA) 150 MG tablet Take 1 tablet by mouth every 30 days Take one (1) tablet once per month in the morning with a full glass of water, on an empty stomach, and do not take anything else by mouth or lie down for the next 30 minutes. 3 tablet 5     No current facility-administered medications for this visit.         Allergies: No Known Allergies    Social History:    Social History     Tobacco Use    Smoking status: Never    Smokeless tobacco: Never   Vaping Use    Vaping Use: Never used   Substance Use Topics    Alcohol use: Never    Drug use: Never       Family History:   Family History   Problem Relation Age of Onset    Breast Cancer Mother 39    Heart Disease Father     Colon Cancer Neg Hx     Colon Polyps Neg Hx        Vitals:  Vitals:    01/13/23 0954   BP: 120/62   Pulse: 78   SpO2: 96%   Weight: 124 lb 14.4 oz (56.7 kg)        Subjective   REVIEW OF SYSTEMS:   Review of Systems   Constitutional:  Positive for fatigue. Negative for chills, diaphoresis and fever. HENT: Negative. Negative for congestion, ear pain, hearing loss, nosebleeds, sore throat and tinnitus. Eyes: Negative. Negative for pain, discharge and redness. Respiratory: Negative. Negative for cough, shortness of breath and wheezing. Cardiovascular: Negative. Negative for chest pain, palpitations and leg swelling. Gastrointestinal: Negative. Negative for abdominal pain, blood in stool, constipation, diarrhea, nausea and vomiting. Endocrine: Positive for cold intolerance. Negative for polydipsia. Genitourinary:  Negative for dysuria, flank pain, frequency, hematuria and urgency. Musculoskeletal: Negative. Negative for back pain, myalgias and neck pain. Skin: Negative. Negative for rash. Neurological: Negative. Negative for dizziness, tremors, seizures, weakness and headaches. Hematological:  Does not bruise/bleed easily. Psychiatric/Behavioral: Negative. The patient is not nervous/anxious. Objective   PHYSICAL EXAM:  Physical Exam  Vitals reviewed. Constitutional:       General: She is not in acute distress. Appearance: She is well-developed. HENT:      Head: Normocephalic and atraumatic. Mouth/Throat:      Pharynx: Uvula midline. Tonsils: No tonsillar exudate. Eyes:      General: Lids are normal.      Conjunctiva/sclera: Conjunctivae normal.      Pupils: Pupils are equal, round, and reactive to light. Neck:      Thyroid: No thyroid mass or thyromegaly. Vascular: No JVD. Trachea: Trachea normal. No tracheal deviation. Cardiovascular:      Rate and Rhythm: Normal rate and regular rhythm. Pulses: Normal pulses.       Heart sounds: Normal heart sounds. Pulmonary:      Effort: Pulmonary effort is normal. No respiratory distress. Breath sounds: Normal breath sounds. No wheezing or rales. Chest:      Chest wall: No tenderness. Abdominal:      General: Bowel sounds are normal. There is no distension. Palpations: Abdomen is soft. There is no mass. Tenderness: There is no abdominal tenderness. There is no guarding. Musculoskeletal:         General: No tenderness or deformity. Cervical back: Normal range of motion and neck supple. Comments: Range of motion within normal limits x4 extremities   Skin:     General: Skin is warm. Findings: No bruising, erythema or rash. Neurological:      Mental Status: She is alert and oriented to person, place, and time. Cranial Nerves: No cranial nerve deficit. Coordination: Coordination normal.   Psychiatric:         Behavior: Behavior normal.         Thought Content: Thought content normal.       Labs reviewed today:  Lab Results   Component Value Date    WBC 7.30 01/13/2023    HGB 12.2 01/13/2023    HCT 38.8 01/13/2023    .7 (H) 01/13/2023     (L) 01/13/2023     Lab Results   Component Value Date    NEUTROABS 5.81 01/13/2023       ASSESSMENT/PLAN:      1. Invasive ductal carcinoma of the right breast, node positive, HER-2 positive, diagnosed September 2019. She obtained a complete pathologic response in the breast, unfortunately had residual disease in the axilla which was followed by adjuvant treatment to TDM 1. Recommendation was given for adjuvant endocrine therapy, unfortunately she was intolerant to letrozole due to severe arthralgias, bone and chest pain. She has had no known evidence of recurrent disease. She has no new left breast or right chest wall complaints.   Declined breast exam today    Candy Buck was seen in scheduled follow-up by Dr Anderson Streeter on 08/29/2022, I reviewed the progress note from that office visit which documented exam with no new findings or concerns of recurrence. 08/29/2022 Left mammogram- no mammographic evidence of malignancy    - Encourage routine self breast exams    2. Osteopenia, density study on 8/25/2021 reported osteopenia. Low bone mass. Lumbar spine T-score -1.7, left femoral neck T-score -2.2 . Vitamin D of 40.8 on 07/19/2022    -Recommended calcium 1200 mg with vitamin D 1000 units daily     3. Lymphedema of right arm, currently stable. No signs of lymphedema in the right upper extremity. Continue home exercises     4. Macrocytic anemia, chronic dating back to January 2020. Hemoglobin 12.2 with an MCV of 105.7    07/19/2022 Serology results  Iron 97  TIBC 265  Saturation 37%  Ferritin 149.1  B12/Folate 1028/>20    -Continue to conservatively monitor    5. Thrombocytopenia dating back to 2020, platelet count stable at 157,000. Denies any excessive bruising or bleeding to include melena, epistaxis, hemoptysis, hematuria or hematochezia. -Will monitor conservatively, if platelet count drops below 100,000 persistently will need to consider completed bone marrow aspiration biopsy for further evaluation    I discussed all of the above findings included in the assessment and plan with the patient and the patient is in agreement to move forward with current recommendations/treatment. I have addressed all of their questions and concerns that were verbalized. FOLLOW UP:  Follow-up appointment given for 6 months, sooner if needed  Continue to follow with other medical providers as recommended  Labs at next visit: CBC    EMR Dragon/Transcription disclaimer:   Much of this encounter note is an electronic transcription/translation of spoken language to printed text.  The electronic translation of spoken language may permit erroneous, or at times, nonsensical words or phrases to be inadvertently transcribed; although attempts have made to review the note for such errors, some may still exist.  Please excuse any unrecognized transcription errors and contact us if the error is unintelligible or needs documented correction. Also, portions of this note have been copied forward, however, changed to reflect the most current clinical status of this patient. Chato MONTANEZ am pre-charting as a registered nurse for [employer], APRN.   Electronically signed by GUNNER Harrison on 5/26/2022 at 09:06 AM

## 2023-01-13 ENCOUNTER — OFFICE VISIT (OUTPATIENT)
Dept: HEMATOLOGY | Age: 73
End: 2023-01-13
Payer: MEDICARE

## 2023-01-13 ENCOUNTER — HOSPITAL ENCOUNTER (OUTPATIENT)
Dept: INFUSION THERAPY | Age: 73
Discharge: HOME | End: 2023-01-13
Payer: MEDICARE

## 2023-01-13 VITALS
SYSTOLIC BLOOD PRESSURE: 120 MMHG | HEART RATE: 78 BPM | WEIGHT: 124.9 LBS | DIASTOLIC BLOOD PRESSURE: 62 MMHG | BODY MASS INDEX: 19.56 KG/M2 | OXYGEN SATURATION: 96 %

## 2023-01-13 DIAGNOSIS — D50.9 IRON DEFICIENCY ANEMIA, UNSPECIFIED IRON DEFICIENCY ANEMIA TYPE: ICD-10-CM

## 2023-01-13 DIAGNOSIS — D69.6 THROMBOCYTOPENIA (HCC): ICD-10-CM

## 2023-01-13 DIAGNOSIS — C50.811 MALIGNANT NEOPLASM OF OVERLAPPING SITES OF RIGHT FEMALE BREAST, UNSPECIFIED ESTROGEN RECEPTOR STATUS (HCC): Primary | ICD-10-CM

## 2023-01-13 DIAGNOSIS — I89.0 LYMPHEDEMA OF RIGHT ARM: ICD-10-CM

## 2023-01-13 DIAGNOSIS — M85.88 OSTEOPENIA OF LUMBAR SPINE: ICD-10-CM

## 2023-01-13 LAB
BASOPHILS ABSOLUTE: 0.05 K/UL (ref 0.01–0.08)
BASOPHILS RELATIVE PERCENT: 0.7 % (ref 0.1–1.2)
EOSINOPHILS ABSOLUTE: 0.11 K/UL (ref 0.04–0.54)
EOSINOPHILS RELATIVE PERCENT: 1.5 % (ref 0.7–7)
HCT VFR BLD CALC: 38.8 % (ref 34.1–44.9)
HEMOGLOBIN: 12.2 G/DL (ref 11.2–15.7)
LYMPHOCYTES ABSOLUTE: 0.82 K/UL (ref 1.18–3.74)
LYMPHOCYTES RELATIVE PERCENT: 11.2 % (ref 19.3–53.1)
MCH RBC QN AUTO: 33.2 PG (ref 25.6–32.2)
MCHC RBC AUTO-ENTMCNC: 31.4 G/DL (ref 32.3–35.5)
MCV RBC AUTO: 105.7 FL (ref 79.4–94.8)
MONOCYTES ABSOLUTE: 0.5 K/UL (ref 0.24–0.82)
MONOCYTES RELATIVE PERCENT: 6.8 % (ref 4.7–12.5)
NEUTROPHILS ABSOLUTE: 5.81 K/UL (ref 1.56–6.13)
NEUTROPHILS RELATIVE PERCENT: 79.7 % (ref 34–71.1)
PDW BLD-RTO: 16.1 % (ref 11.7–14.4)
PLATELET # BLD: 157 K/UL (ref 182–369)
PMV BLD AUTO: 11.2 FL (ref 7.4–10.4)
RBC # BLD: 3.67 M/UL (ref 3.93–5.22)
WBC # BLD: 7.3 K/UL (ref 3.98–10.04)

## 2023-01-13 PROCEDURE — 99211 OFF/OP EST MAY X REQ PHY/QHP: CPT

## 2023-01-13 PROCEDURE — 1123F ACP DISCUSS/DSCN MKR DOCD: CPT | Performed by: NURSE PRACTITIONER

## 2023-01-13 PROCEDURE — 1036F TOBACCO NON-USER: CPT | Performed by: NURSE PRACTITIONER

## 2023-01-13 PROCEDURE — G8484 FLU IMMUNIZE NO ADMIN: HCPCS | Performed by: NURSE PRACTITIONER

## 2023-01-13 PROCEDURE — 99213 OFFICE O/P EST LOW 20 MIN: CPT | Performed by: NURSE PRACTITIONER

## 2023-01-13 PROCEDURE — 36415 COLL VENOUS BLD VENIPUNCTURE: CPT

## 2023-01-13 PROCEDURE — 1090F PRES/ABSN URINE INCON ASSESS: CPT | Performed by: NURSE PRACTITIONER

## 2023-01-13 PROCEDURE — G8427 DOCREV CUR MEDS BY ELIG CLIN: HCPCS | Performed by: NURSE PRACTITIONER

## 2023-01-13 PROCEDURE — G8399 PT W/DXA RESULTS DOCUMENT: HCPCS | Performed by: NURSE PRACTITIONER

## 2023-01-13 PROCEDURE — 3017F COLORECTAL CA SCREEN DOC REV: CPT | Performed by: NURSE PRACTITIONER

## 2023-01-13 PROCEDURE — G8420 CALC BMI NORM PARAMETERS: HCPCS | Performed by: NURSE PRACTITIONER

## 2023-01-13 PROCEDURE — 85025 COMPLETE CBC W/AUTO DIFF WBC: CPT

## 2023-01-15 ASSESSMENT — ENCOUNTER SYMPTOMS
WHEEZING: 0
EYE PAIN: 0
CONSTIPATION: 0
EYE REDNESS: 0
DIARRHEA: 0
NAUSEA: 0
RESPIRATORY NEGATIVE: 1
EYES NEGATIVE: 1
BLOOD IN STOOL: 0
GASTROINTESTINAL NEGATIVE: 1
SHORTNESS OF BREATH: 0
SORE THROAT: 0
BACK PAIN: 0
EYE DISCHARGE: 0
COUGH: 0
ABDOMINAL PAIN: 0
VOMITING: 0

## 2023-01-22 ASSESSMENT — ENCOUNTER SYMPTOMS
CHEST TIGHTNESS: 0
RHINORRHEA: 0
BACK PAIN: 0
TROUBLE SWALLOWING: 0
VOMITING: 0
SINUS PAIN: 0
COUGH: 0
ABDOMINAL PAIN: 0
DIARRHEA: 0
CONSTIPATION: 0
SHORTNESS OF BREATH: 0
WHEEZING: 0
BLOOD IN STOOL: 0

## 2023-01-22 NOTE — PROGRESS NOTES
Sean Rodriguez ( 1950) is a 74 y.o. female,  Established , here for evaluation of the following chief complaint(s). 6 Month Follow-Up        ASSESSMENT/PLAN:  Problem List          Respiratory    Severe obstructive sleep apnea      Well-controlled, continue current medications            Digestive    Diverticulosis of colon      Well-controlled, continue current medications            Musculoskeletal and Integument    Osteopenia      Well-controlled, continue current medications         Relevant Medications    ibandronate (BONIVA) 150 MG tablet       Hematopoietic and Hemostatic    Thrombocytopenia (HCC)      Asymptomatic, lifestyle modifications recommendedavoid NSaids          Results for orders placed or performed during the hospital encounter of 23   CBC with Auto Differential   Result Value Ref Range    WBC 7.30 3.98 - 10.04 K/uL    RBC 3.67 (L) 3.93 - 5.22 M/uL    Hemoglobin 12.2 11.2 - 15.7 g/dL    Hematocrit 38.8 34.1 - 44.9 %    .7 (H) 79.4 - 94.8 fL    MCH 33.2 (H) 25.6 - 32.2 pg    MCHC 31.4 (L) 32.3 - 35.5 g/dL    RDW 16.1 (H) 11.7 - 14.4 %    Platelets 894 (L) 939 - 369 K/uL    MPV 11.2 (H) 7.4 - 10.4 fL    Neutrophils % 79.7 (H) 34.0 - 71.1 %    Lymphocytes % 11.2 (L) 19.3 - 53.1 %    Monocytes % 6.8 4.7 - 12.5 %    Eosinophils % 1.5 0.7 - 7.0 %    Basophils % 0.7 0.1 - 1.2 %    Neutrophils Absolute 5.81 1.56 - 6.13 K/uL    Lymphocytes Absolute 0.82 (L) 1.18 - 3.74 K/uL    Monocytes Absolute 0.50 0.24 - 0.82 K/uL    Eosinophils Absolute 0.11 0.04 - 0.54 K/uL    Basophils Absolute 0.05 0.01 - 0.08 K/uL       Return in about 6 months (around 2023).     HPI  26-year-old female who presents for follow-up visit  She has history of right breast cancer status postmastectomy patient had lymphedema of her right arm which is now resolved  Has history of osteopenia on antiresorptive therapy with calcium and vitamin D replacement  Patient has history of prediabetes no longer a problem  She is also having borderline low potassiums encouraged to eat potassium rich food she offers no complaints today  Thrombocytopenia remains unchanged  Patient may have cataract surgery she is thinking about that she says she was told by her ophthalmologist that her cataracts are mature    Review of Systems   Constitutional:  Negative for activity change, chills, fatigue and fever. HENT:  Negative for hearing loss, postnasal drip, rhinorrhea, sinus pain and trouble swallowing. Eyes:  Negative for visual disturbance. Respiratory:  Negative for cough, chest tightness, shortness of breath and wheezing. Cardiovascular:  Negative for chest pain, palpitations and leg swelling. Gastrointestinal:  Negative for abdominal pain, blood in stool, constipation, diarrhea and vomiting. Endocrine: Negative for cold intolerance. Genitourinary:  Negative for frequency and urgency. Musculoskeletal:  Negative for arthralgias, back pain and myalgias. R arm swelling   Allergic/Immunologic: Negative for environmental allergies. Neurological:  Negative for light-headedness, numbness and headaches. Physical Exam  Vitals and nursing note reviewed. Constitutional:       General: She is not in acute distress. Appearance: Normal appearance. She is normal weight. HENT:      Head: Normocephalic. Right Ear: External ear normal.      Left Ear: External ear normal.      Nose: Nose normal.   Eyes:      General: No scleral icterus. Extraocular Movements: Extraocular movements intact. Conjunctiva/sclera: Conjunctivae normal.      Pupils: Pupils are equal, round, and reactive to light. Neck:      Thyroid: No thyroid mass, thyromegaly or thyroid tenderness. Vascular: No JVD. Cardiovascular:      Rate and Rhythm: Normal rate and regular rhythm. Pulses: Normal pulses. Heart sounds: Normal heart sounds, S1 normal and S2 normal. No murmur heard.   Pulmonary:      Effort: Pulmonary effort is normal. No respiratory distress. Breath sounds: Normal breath sounds and air entry. No wheezing or rales. Chest:   Breasts:     Right: Absent. Abdominal:      General: Abdomen is flat. Bowel sounds are normal. There is no distension. Palpations: Abdomen is soft. Tenderness: There is no abdominal tenderness. Musculoskeletal:      Right shoulder: No swelling, deformity, effusion, tenderness or bony tenderness. Decreased range of motion. Cervical back: Full passive range of motion without pain, normal range of motion and neck supple. Thoracic back: Deformity present. Right lower leg: No edema. Left lower leg: No edema. Lymphadenopathy:      Cervical: No cervical adenopathy. Skin:     General: Skin is warm and dry. Findings: No rash. Neurological:      Mental Status: She is alert and oriented to person, place, and time. Mental status is at baseline. Cranial Nerves: No cranial nerve deficit. Deep Tendon Reflexes: Reflexes normal.   Psychiatric:         Behavior: Behavior normal.         Thought Content:  Thought content normal.         Judgment: Judgment normal.         (Time Documentation Optional [id number])    An electronic signaturewaas used to authenticate this note  -Jose J Nava MD on 1/23/2023 at 8:45 AM

## 2023-01-23 ENCOUNTER — OFFICE VISIT (OUTPATIENT)
Dept: PRIMARY CARE CLINIC | Age: 73
End: 2023-01-23
Payer: MEDICARE

## 2023-01-23 VITALS
RESPIRATION RATE: 20 BRPM | HEIGHT: 67 IN | OXYGEN SATURATION: 97 % | WEIGHT: 126 LBS | TEMPERATURE: 97.4 F | BODY MASS INDEX: 19.78 KG/M2 | HEART RATE: 73 BPM | SYSTOLIC BLOOD PRESSURE: 130 MMHG | DIASTOLIC BLOOD PRESSURE: 86 MMHG

## 2023-01-23 DIAGNOSIS — K57.30 DIVERTICULOSIS OF COLON: ICD-10-CM

## 2023-01-23 DIAGNOSIS — R73.03 PREDIABETES: ICD-10-CM

## 2023-01-23 DIAGNOSIS — Z13.220 SCREENING FOR LIPID DISORDERS: ICD-10-CM

## 2023-01-23 DIAGNOSIS — D69.6 THROMBOCYTOPENIA (HCC): ICD-10-CM

## 2023-01-23 DIAGNOSIS — G47.33 SEVERE OBSTRUCTIVE SLEEP APNEA: ICD-10-CM

## 2023-01-23 DIAGNOSIS — R03.0 ELEVATED BLOOD PRESSURE READING: ICD-10-CM

## 2023-01-23 DIAGNOSIS — D53.9 MACROCYTIC ANEMIA: ICD-10-CM

## 2023-01-23 DIAGNOSIS — Z13.29 SCREENING FOR THYROID DISORDER: ICD-10-CM

## 2023-01-23 DIAGNOSIS — M85.859 OSTEOPENIA OF NECK OF FEMUR, UNSPECIFIED LATERALITY: ICD-10-CM

## 2023-01-23 PROBLEM — C77.9 REGIONAL LYMPH NODE METASTASIS PRESENT (HCC): Status: RESOLVED | Noted: 2021-03-23 | Resolved: 2023-01-23

## 2023-01-23 PROBLEM — Z53.20 REFUSAL OF STATIN MEDICATION BY PATIENT: Status: ACTIVE | Noted: 2023-01-23

## 2023-01-23 LAB
ALBUMIN SERPL-MCNC: 4.4 G/DL (ref 3.5–5.2)
ALP BLD-CCNC: 65 U/L (ref 35–104)
ALT SERPL-CCNC: 18 U/L (ref 5–33)
ANION GAP SERPL CALCULATED.3IONS-SCNC: 10 MMOL/L (ref 7–19)
AST SERPL-CCNC: 27 U/L (ref 5–32)
BILIRUB SERPL-MCNC: 0.4 MG/DL (ref 0.2–1.2)
BILIRUBIN DIRECT: 0.1 MG/DL (ref 0–0.3)
BILIRUBIN, INDIRECT: 0.3 MG/DL (ref 0.1–1)
BUN BLDV-MCNC: 26 MG/DL (ref 8–23)
CALCIUM SERPL-MCNC: 9.4 MG/DL (ref 8.8–10.2)
CHLORIDE BLD-SCNC: 100 MMOL/L (ref 98–111)
CHOLESTEROL, TOTAL: 264 MG/DL (ref 160–199)
CO2: 28 MMOL/L (ref 22–29)
CREAT SERPL-MCNC: 0.7 MG/DL (ref 0.5–0.9)
GFR SERPL CREATININE-BSD FRML MDRD: >60 ML/MIN/{1.73_M2}
GLUCOSE BLD-MCNC: 108 MG/DL (ref 74–109)
HBA1C MFR BLD: 5.5 % (ref 4–6)
HCT VFR BLD CALC: 35.4 % (ref 37–47)
HDLC SERPL-MCNC: 54 MG/DL (ref 65–121)
HEMOGLOBIN: 11.5 G/DL (ref 12–16)
LDL CHOLESTEROL CALCULATED: 192 MG/DL
MCH RBC QN AUTO: 33.8 PG (ref 27–31)
MCHC RBC AUTO-ENTMCNC: 32.5 G/DL (ref 33–37)
MCV RBC AUTO: 104.1 FL (ref 81–99)
PDW BLD-RTO: 15.9 % (ref 11.5–14.5)
PLATELET # BLD: 168 K/UL (ref 130–400)
PMV BLD AUTO: 11.2 FL (ref 9.4–12.3)
POTASSIUM SERPL-SCNC: 3.8 MMOL/L (ref 3.5–5)
RBC # BLD: 3.4 M/UL (ref 4.2–5.4)
SODIUM BLD-SCNC: 138 MMOL/L (ref 136–145)
TOTAL PROTEIN: 6.6 G/DL (ref 6.6–8.7)
TRIGL SERPL-MCNC: 89 MG/DL (ref 0–149)
TSH REFLEX FT4: 3.88 UIU/ML (ref 0.35–5.5)
WBC # BLD: 6.5 K/UL (ref 4.8–10.8)

## 2023-01-23 PROCEDURE — 3017F COLORECTAL CA SCREEN DOC REV: CPT | Performed by: INTERNAL MEDICINE

## 2023-01-23 PROCEDURE — G8484 FLU IMMUNIZE NO ADMIN: HCPCS | Performed by: INTERNAL MEDICINE

## 2023-01-23 PROCEDURE — G8427 DOCREV CUR MEDS BY ELIG CLIN: HCPCS | Performed by: INTERNAL MEDICINE

## 2023-01-23 PROCEDURE — 99214 OFFICE O/P EST MOD 30 MIN: CPT | Performed by: INTERNAL MEDICINE

## 2023-01-23 PROCEDURE — 1123F ACP DISCUSS/DSCN MKR DOCD: CPT | Performed by: INTERNAL MEDICINE

## 2023-01-23 PROCEDURE — 1036F TOBACCO NON-USER: CPT | Performed by: INTERNAL MEDICINE

## 2023-01-23 PROCEDURE — 1090F PRES/ABSN URINE INCON ASSESS: CPT | Performed by: INTERNAL MEDICINE

## 2023-01-23 PROCEDURE — G8399 PT W/DXA RESULTS DOCUMENT: HCPCS | Performed by: INTERNAL MEDICINE

## 2023-01-23 PROCEDURE — G8420 CALC BMI NORM PARAMETERS: HCPCS | Performed by: INTERNAL MEDICINE

## 2023-01-23 ASSESSMENT — PATIENT HEALTH QUESTIONNAIRE - PHQ9
SUM OF ALL RESPONSES TO PHQ QUESTIONS 1-9: 0
2. FEELING DOWN, DEPRESSED OR HOPELESS: 0
1. LITTLE INTEREST OR PLEASURE IN DOING THINGS: 0
SUM OF ALL RESPONSES TO PHQ9 QUESTIONS 1 & 2: 0

## 2023-03-21 ENCOUNTER — OFFICE VISIT (OUTPATIENT)
Dept: PULMONOLOGY | Age: 73
End: 2023-03-21
Payer: MEDICARE

## 2023-03-21 VITALS
HEART RATE: 68 BPM | WEIGHT: 132 LBS | HEIGHT: 67 IN | TEMPERATURE: 97.7 F | OXYGEN SATURATION: 98 % | DIASTOLIC BLOOD PRESSURE: 76 MMHG | SYSTOLIC BLOOD PRESSURE: 127 MMHG | BODY MASS INDEX: 20.72 KG/M2

## 2023-03-21 DIAGNOSIS — R06.83 SNORING: ICD-10-CM

## 2023-03-21 DIAGNOSIS — G47.33 SEVERE OBSTRUCTIVE SLEEP APNEA: Primary | ICD-10-CM

## 2023-03-21 DIAGNOSIS — G47.8 NON-RESTORATIVE SLEEP: ICD-10-CM

## 2023-03-21 DIAGNOSIS — I45.10 RIGHT BUNDLE BRANCH BLOCK (RBBB): ICD-10-CM

## 2023-03-21 PROCEDURE — G8420 CALC BMI NORM PARAMETERS: HCPCS | Performed by: INTERNAL MEDICINE

## 2023-03-21 PROCEDURE — G8399 PT W/DXA RESULTS DOCUMENT: HCPCS | Performed by: INTERNAL MEDICINE

## 2023-03-21 PROCEDURE — 3017F COLORECTAL CA SCREEN DOC REV: CPT | Performed by: INTERNAL MEDICINE

## 2023-03-21 PROCEDURE — 99214 OFFICE O/P EST MOD 30 MIN: CPT | Performed by: INTERNAL MEDICINE

## 2023-03-21 PROCEDURE — G8427 DOCREV CUR MEDS BY ELIG CLIN: HCPCS | Performed by: INTERNAL MEDICINE

## 2023-03-21 PROCEDURE — 1090F PRES/ABSN URINE INCON ASSESS: CPT | Performed by: INTERNAL MEDICINE

## 2023-03-21 PROCEDURE — 1036F TOBACCO NON-USER: CPT | Performed by: INTERNAL MEDICINE

## 2023-03-21 PROCEDURE — 1123F ACP DISCUSS/DSCN MKR DOCD: CPT | Performed by: INTERNAL MEDICINE

## 2023-03-21 PROCEDURE — G8484 FLU IMMUNIZE NO ADMIN: HCPCS | Performed by: INTERNAL MEDICINE

## 2023-03-21 ASSESSMENT — ENCOUNTER SYMPTOMS
WHEEZING: 0
BACK PAIN: 0
COUGH: 0
ABDOMINAL DISTENTION: 0
SHORTNESS OF BREATH: 0
CHEST TIGHTNESS: 0
RHINORRHEA: 0
ANAL BLEEDING: 0
APNEA: 1
ABDOMINAL PAIN: 0

## 2023-03-21 NOTE — PROGRESS NOTES
Pulmonary and Sleep Medicine    Consuelo Rausch (:  1950) is a 74 y.o. female,Established patient, here for evaluation of the following chief complaint(s):  Follow-up (Follow up- CARMELO )      Referring physician:  No referring provider defined for this encounter. ASSESSMENT/PLAN:  1. Severe obstructive sleep apnea  2. Non-restorative sleep  3. Snoring  4. Right bundle branch block (RBBB)      Apnea-hypopnea index is higher than before at 15 events per hour most likely due to the large as documented on her CPAP compliance. She will need a proper CPAP mask fit. She would benefit from a chinstrap. Follow-up after 6 weeks with a download       Anai Valles MD, [facility], [facility]    Return in about 6 weeks (around 2023). SUBJECTIVE/OBJECTIVE:  Patient is here for follow-up on obstructive sleep apnea. CPAP compliance report was reviewed. She is having significant apneas while on CPAP. Her apnea-hypopnea index is 15 events per hour which is a dramatic change since prior. She does have significant leak. She says that the mask she has does not fit properly. She also opens her mouth during sleep. She is on a full facemask. Prior to Visit Medications    Medication Sig Taking? Authorizing Provider   ibandronate (BONIVA) 150 MG tablet Take 1 tablet by mouth every 30 days Take one (1) tablet once per month in the morning with a full glass of water, on an empty stomach, and do not take anything else by mouth or lie down for the next 30 minutes. Yes GUNNER Garsia        Review of Systems   Constitutional:  Negative for activity change, appetite change, chills, diaphoresis and fatigue. HENT:  Negative for congestion, dental problem, drooling, ear discharge, postnasal drip and rhinorrhea. Eyes:  Negative for visual disturbance. Respiratory:  Positive for apnea. Negative for cough, chest tightness, shortness of breath and wheezing.     Gastrointestinal:  Negative for abdominal distention,

## 2023-05-02 ENCOUNTER — OFFICE VISIT (OUTPATIENT)
Dept: PULMONOLOGY | Age: 73
End: 2023-05-02
Payer: MEDICARE

## 2023-05-02 VITALS
SYSTOLIC BLOOD PRESSURE: 128 MMHG | HEIGHT: 67 IN | DIASTOLIC BLOOD PRESSURE: 78 MMHG | BODY MASS INDEX: 20.72 KG/M2 | WEIGHT: 132 LBS | OXYGEN SATURATION: 97 % | TEMPERATURE: 97.7 F | HEART RATE: 73 BPM

## 2023-05-02 DIAGNOSIS — G47.8 NON-RESTORATIVE SLEEP: ICD-10-CM

## 2023-05-02 DIAGNOSIS — R06.83 SNORING: ICD-10-CM

## 2023-05-02 DIAGNOSIS — G47.33 SEVERE OBSTRUCTIVE SLEEP APNEA: Primary | ICD-10-CM

## 2023-05-02 DIAGNOSIS — I45.10 RIGHT BUNDLE BRANCH BLOCK (RBBB): ICD-10-CM

## 2023-05-02 PROCEDURE — 1123F ACP DISCUSS/DSCN MKR DOCD: CPT | Performed by: INTERNAL MEDICINE

## 2023-05-02 PROCEDURE — G8420 CALC BMI NORM PARAMETERS: HCPCS | Performed by: INTERNAL MEDICINE

## 2023-05-02 PROCEDURE — G8399 PT W/DXA RESULTS DOCUMENT: HCPCS | Performed by: INTERNAL MEDICINE

## 2023-05-02 PROCEDURE — 1090F PRES/ABSN URINE INCON ASSESS: CPT | Performed by: INTERNAL MEDICINE

## 2023-05-02 PROCEDURE — 99214 OFFICE O/P EST MOD 30 MIN: CPT | Performed by: INTERNAL MEDICINE

## 2023-05-02 PROCEDURE — 3017F COLORECTAL CA SCREEN DOC REV: CPT | Performed by: INTERNAL MEDICINE

## 2023-05-02 PROCEDURE — 1036F TOBACCO NON-USER: CPT | Performed by: INTERNAL MEDICINE

## 2023-05-02 PROCEDURE — G8427 DOCREV CUR MEDS BY ELIG CLIN: HCPCS | Performed by: INTERNAL MEDICINE

## 2023-05-02 ASSESSMENT — ENCOUNTER SYMPTOMS
ABDOMINAL DISTENTION: 0
SHORTNESS OF BREATH: 0
WHEEZING: 0
ANAL BLEEDING: 0
COUGH: 0
ABDOMINAL PAIN: 0
APNEA: 1
RHINORRHEA: 0
BACK PAIN: 0
CHEST TIGHTNESS: 0

## 2023-05-02 NOTE — PROGRESS NOTES
Pulmonary and Sleep Medicine    Cristy Van (:  1950) is a 74 y.o. female,Established patient, here for evaluation of the following chief complaint(s):  Follow-up (Follow up- CARMELO )      Referring physician:  No referring provider defined for this encounter. ASSESSMENT/PLAN:  1. Severe obstructive sleep apnea  2. Non-restorative sleep  3. Snoring  4. Right bundle branch block (RBBB)        Continue with the CPAP she is compliant with the CPAP she feels it helps. We will increase her CPAP pressure to 14. She continues to have a large leak. We recommended the CPAP nose pads to help with the seal and alleviate pressure. Chen Daniel MD, [facility], [facility]    Return in about 6 weeks (around 2023). SUBJECTIVE/OBJECTIVE:  Her CPAP she is compliant with the CPAP she feels a CPAP helps her. She continues to have a large leak her apnea-hypopnea index is about 15 events per hour. She continues to feel some leak from her mask. Prior to Visit Medications    Medication Sig Taking? Authorizing Provider   ibandronate (BONIVA) 150 MG tablet Take 1 tablet by mouth every 30 days Take one (1) tablet once per month in the morning with a full glass of water, on an empty stomach, and do not take anything else by mouth or lie down for the next 30 minutes. Yes GUNNER Garsia        Review of Systems   Constitutional:  Negative for activity change, appetite change, chills, diaphoresis and fatigue. HENT:  Negative for congestion, dental problem, drooling, ear discharge, postnasal drip and rhinorrhea. Eyes:  Negative for visual disturbance. Respiratory:  Positive for apnea. Negative for cough, chest tightness, shortness of breath and wheezing. Gastrointestinal:  Negative for abdominal distention, abdominal pain and anal bleeding. Endocrine: Negative for cold intolerance, heat intolerance and polydipsia.    Genitourinary:  Negative for difficulty urinating, dysuria, enuresis and flank

## 2023-07-13 NOTE — PROGRESS NOTES
Progress Note      Pt Name: Vinicio Tipton  YOB: 1950  MRN: [id number]    Date of evaluation: 07/14/2023  History Obtained From:  patient, electronic medical record    CHIEF COMPLAINT:    Chief Complaint   Patient presents with    Follow-up     Malignant neoplasm of overlapping sites of right female breast, unspecified estrogen receptor status ([address])    Anemia     HISTORY OF PRESENT ILLNESS:    Vinicio Tipton is a 74 y.o.  female who is currently being followed for a diagnosis of locally advanced HER-2 positive breast cancer diagnosed September 2019. She obtained a complete pathologic response in the breast, unfortunately had residual disease in the axilla which was followed by adjuvant treatment to TDM 1. Recommendation was given for adjuvant endocrine therapy, unfortunately she was intolerant to letrozole due to severe arthralgias, bone and chest pain. She has had no known evidence of recurrent disease. Elliot Lynn returns today in scheduled follow-up for evaluation, side effect monitoring, lab monitoring and further recommendations. She presents today indicating that overall she is doing fairly well. She has no right chest wall or left breast complaints. She does voice concern of a new skin lesion on her left forearm that is darkened and has a change in skin texture. Today's clinic visit to include physical assessment, review of systems, any lab or radiographic findings that were available and plan of care are documented below.       ONCOLOGIC HISTORY:   Diagnosis   HER-2 IDC right breast, September 2019  [id number]  ER 3%, ND 0%, HER-2/lea IHC 3+  Mammaprint high risk Basal-like  Osteopenia, Aug 2019-T score -1.5 lumbar spine     Treatment summary  10/18/2019 through  March 2020- 6 cycles of neoadjuvant Taxotere, Carboplatin and Herceptin and Perjeta  3/3/2020- right mastectomy/ lymph node dissection  3/20/2020-14 cycles of adjuvant TDM-1.   4/20/2020-6/4/2020 Completed

## 2023-07-14 ENCOUNTER — HOSPITAL ENCOUNTER (OUTPATIENT)
Dept: INFUSION THERAPY | Age: 73
Discharge: HOME | End: 2023-07-14
Payer: MEDICARE

## 2023-07-14 ENCOUNTER — OFFICE VISIT (OUTPATIENT)
Dept: HEMATOLOGY | Age: 73
End: 2023-07-14
Payer: MEDICARE

## 2023-07-14 VITALS
OXYGEN SATURATION: 96 % | DIASTOLIC BLOOD PRESSURE: 60 MMHG | WEIGHT: 132 LBS | BODY MASS INDEX: 20.67 KG/M2 | SYSTOLIC BLOOD PRESSURE: 120 MMHG | HEART RATE: 73 BPM

## 2023-07-14 DIAGNOSIS — M85.89 OSTEOPENIA OF MULTIPLE SITES: ICD-10-CM

## 2023-07-14 DIAGNOSIS — E55.9 VITAMIN D DEFICIENCY: ICD-10-CM

## 2023-07-14 DIAGNOSIS — C50.811 MALIGNANT NEOPLASM OF OVERLAPPING SITES OF RIGHT FEMALE BREAST, UNSPECIFIED ESTROGEN RECEPTOR STATUS (HCC): ICD-10-CM

## 2023-07-14 DIAGNOSIS — Z78.0 POSTMENOPAUSAL STATE: ICD-10-CM

## 2023-07-14 DIAGNOSIS — D69.6 THROMBOCYTOPENIA (HCC): ICD-10-CM

## 2023-07-14 DIAGNOSIS — C50.811 MALIGNANT NEOPLASM OF OVERLAPPING SITES OF RIGHT FEMALE BREAST, UNSPECIFIED ESTROGEN RECEPTOR STATUS (HCC): Primary | ICD-10-CM

## 2023-07-14 DIAGNOSIS — D53.8 OTHER SPECIFIED NUTRITIONAL ANEMIAS: ICD-10-CM

## 2023-07-14 DIAGNOSIS — R23.4 SKIN TEXTURE CHANGES: ICD-10-CM

## 2023-07-14 DIAGNOSIS — D50.9 IRON DEFICIENCY ANEMIA, UNSPECIFIED IRON DEFICIENCY ANEMIA TYPE: ICD-10-CM

## 2023-07-14 LAB
25(OH)D3 SERPL-MCNC: 43.1 NG/ML
ALBUMIN SERPL-MCNC: 4.4 G/DL (ref 3.5–5.2)
ALP SERPL-CCNC: 71 U/L (ref 35–104)
ALT SERPL-CCNC: 24 U/L (ref 9–52)
ANION GAP SERPL CALCULATED.3IONS-SCNC: 13 MMOL/L (ref 7–19)
AST SERPL-CCNC: 39 U/L (ref 14–36)
BASOPHILS # BLD: 0.07 K/UL (ref 0.01–0.08)
BASOPHILS NFR BLD: 0.9 % (ref 0.1–1.2)
BILIRUB SERPL-MCNC: 0.3 MG/DL (ref 0.2–1.3)
BUN SERPL-MCNC: 30 MG/DL (ref 7–17)
CALCIUM SERPL-MCNC: 9.3 MG/DL (ref 8.4–10.2)
CHLORIDE SERPL-SCNC: 106 MMOL/L (ref 98–111)
CO2 SERPL-SCNC: 26 MMOL/L (ref 22–29)
CREAT SERPL-MCNC: 0.7 MG/DL (ref 0.5–1)
EOSINOPHIL # BLD: 0.2 K/UL (ref 0.04–0.54)
EOSINOPHIL NFR BLD: 2.6 % (ref 0.7–7)
ERYTHROCYTE [DISTWIDTH] IN BLOOD BY AUTOMATED COUNT: 15.4 % (ref 11.7–14.4)
FERRITIN SERPL-MCNC: 88.1 NG/ML (ref 13–150)
GLOBULIN: 3.1 G/DL
GLUCOSE SERPL-MCNC: 105 MG/DL (ref 74–106)
HCT VFR BLD AUTO: 37.1 % (ref 34.1–44.9)
HGB BLD-MCNC: 11.1 G/DL (ref 11.2–15.7)
IRON SATN MFR SERPL: 18 % (ref 14–50)
IRON SERPL-MCNC: 53 UG/DL (ref 37–145)
LYMPHOCYTES # BLD: 0.87 K/UL (ref 1.18–3.74)
LYMPHOCYTES NFR BLD: 11.1 % (ref 19.3–53.1)
MCH RBC QN AUTO: 32.6 PG (ref 25.6–32.2)
MCHC RBC AUTO-ENTMCNC: 29.9 G/DL (ref 32.3–35.5)
MCV RBC AUTO: 108.8 FL (ref 79.4–94.8)
MONOCYTES # BLD: 0.58 K/UL (ref 0.24–0.82)
MONOCYTES NFR BLD: 7.4 % (ref 4.7–12.5)
NEUTROPHILS # BLD: 6.07 K/UL (ref 1.56–6.13)
NEUTS SEG NFR BLD: 77.7 % (ref 34–71.1)
PLATELET # BLD AUTO: 148 K/UL (ref 182–369)
PMV BLD AUTO: 11 FL (ref 7.4–10.4)
POTASSIUM SERPL-SCNC: 3.6 MMOL/L (ref 3.5–5.1)
PROT SERPL-MCNC: 7.5 G/DL (ref 6.3–8.2)
RBC # BLD AUTO: 3.41 M/UL (ref 3.93–5.22)
SODIUM SERPL-SCNC: 145 MMOL/L (ref 137–145)
TIBC SERPL-MCNC: 293 UG/DL (ref 250–400)
WBC # BLD AUTO: 7.81 K/UL (ref 3.98–10.04)

## 2023-07-14 PROCEDURE — G8399 PT W/DXA RESULTS DOCUMENT: HCPCS | Performed by: NURSE PRACTITIONER

## 2023-07-14 PROCEDURE — 85025 COMPLETE CBC W/AUTO DIFF WBC: CPT

## 2023-07-14 PROCEDURE — 36415 COLL VENOUS BLD VENIPUNCTURE: CPT

## 2023-07-14 PROCEDURE — 80053 COMPREHEN METABOLIC PANEL: CPT

## 2023-07-14 PROCEDURE — 1036F TOBACCO NON-USER: CPT | Performed by: NURSE PRACTITIONER

## 2023-07-14 PROCEDURE — 1123F ACP DISCUSS/DSCN MKR DOCD: CPT | Performed by: NURSE PRACTITIONER

## 2023-07-14 PROCEDURE — 1090F PRES/ABSN URINE INCON ASSESS: CPT | Performed by: NURSE PRACTITIONER

## 2023-07-14 PROCEDURE — G8427 DOCREV CUR MEDS BY ELIG CLIN: HCPCS | Performed by: NURSE PRACTITIONER

## 2023-07-14 PROCEDURE — G8420 CALC BMI NORM PARAMETERS: HCPCS | Performed by: NURSE PRACTITIONER

## 2023-07-14 PROCEDURE — 99212 OFFICE O/P EST SF 10 MIN: CPT

## 2023-07-14 PROCEDURE — 3017F COLORECTAL CA SCREEN DOC REV: CPT | Performed by: NURSE PRACTITIONER

## 2023-07-14 PROCEDURE — 99214 OFFICE O/P EST MOD 30 MIN: CPT | Performed by: NURSE PRACTITIONER

## 2023-07-16 ASSESSMENT — ENCOUNTER SYMPTOMS
SORE THROAT: 0
BACK PAIN: 0
COUGH: 0
RESPIRATORY NEGATIVE: 1
WHEEZING: 0
COLOR CHANGE: 1
NAUSEA: 0
ABDOMINAL PAIN: 0
DIARRHEA: 0
SHORTNESS OF BREATH: 0
EYE PAIN: 0
EYE REDNESS: 0
GASTROINTESTINAL NEGATIVE: 1
BLOOD IN STOOL: 0
EYE DISCHARGE: 0
VOMITING: 0
EYES NEGATIVE: 1
CONSTIPATION: 0

## 2023-07-20 PROBLEM — R06.83 SNORING: Status: RESOLVED | Noted: 2022-08-23 | Resolved: 2023-07-20

## 2023-07-20 PROBLEM — R73.03 PREDIABETES: Status: RESOLVED | Noted: 2020-09-22 | Resolved: 2023-07-20

## 2023-07-20 PROBLEM — E87.6 HYPOKALEMIA: Status: RESOLVED | Noted: 2021-09-23 | Resolved: 2023-07-20

## 2023-07-20 ASSESSMENT — ENCOUNTER SYMPTOMS
VOMITING: 0
SHORTNESS OF BREATH: 0
WHEEZING: 0
TROUBLE SWALLOWING: 0
DIARRHEA: 0
SINUS PAIN: 0
CONSTIPATION: 0
COUGH: 0
RHINORRHEA: 0
CHEST TIGHTNESS: 0
ABDOMINAL PAIN: 0
BLOOD IN STOOL: 0
BACK PAIN: 0

## 2023-07-20 NOTE — PROGRESS NOTES
Paulette Cox ( 1950) is a 74 y.o. female,  Established , here for evaluation of the following chief complaint(s).   6 Month Follow-Up        ASSESSMENT/PLAN:  Problem List       Severe obstructive sleep apnea      Well-controlled, continue current medications   Continues to Benefit from CPAP           S/P right mastectomy and AND 3/3/2020      Monitored by specialist- no acute findings meriting change in the plan           Other specified disorders of bone density and structure, right upper arm       Asymptomatic, continue current medications and lifestyle modifications recommended           Malignant neoplasm of overlapping sites of right female breast (HCC)    Macrocytic anemia      Asymptomatic, on B 12 supplement, elevated neutrophils on CBC, reviwed by hematology NP, no recomendations           Impacted cerumen of right ear      Asymptomatic, irrigated with succesful evacuation of ear wax           Hypovitaminosis D      Well-controlled, continue current medications           Age-related cataract of both eyes      Monitored by specialist- no acute findings meriting change in the plan            Results for orders placed or performed during the hospital encounter of 23   CBC with Auto Differential   Result Value Ref Range    WBC 7.81 3.98 - 10.04 K/uL    RBC 3.41 (L) 3.93 - 5.22 M/uL    Hemoglobin 11.1 (L) 11.2 - 15.7 g/dL    Hematocrit 37.1 34.1 - 44.9 %    .8 (H) 79.4 - 94.8 fL    MCH 32.6 (H) 25.6 - 32.2 pg    MCHC 29.9 (L) 32.3 - 35.5 g/dL    RDW 15.4 (H) 11.7 - 14.4 %    Platelets 258 (L) 120 - 369 K/uL    MPV 11.0 (H) 7.4 - 10.4 fL    Neutrophils % 77.7 (H) 34.0 - 71.1 %    Lymphocytes % 11.1 (L) 19.3 - 53.1 %    Monocytes % 7.4 4.7 - 12.5 %    Eosinophils % 2.6 0.7 - 7.0 %    Basophils % 0.9 0.1 - 1.2 %    Neutrophils Absolute 6.07 1.56 - 6.13 K/uL    Lymphocytes Absolute 0.87 (L) 1.18 - 3.74 K/uL    Monocytes Absolute 0.58 0.24 - 0.82 K/uL    Eosinophils Absolute 0.20 0.04 - 0.54 K/uL

## 2023-07-23 SDOH — ECONOMIC STABILITY: FOOD INSECURITY: WITHIN THE PAST 12 MONTHS, THE FOOD YOU BOUGHT JUST DIDN'T LAST AND YOU DIDN'T HAVE MONEY TO GET MORE.: NEVER TRUE

## 2023-07-23 SDOH — ECONOMIC STABILITY: HOUSING INSECURITY
IN THE LAST 12 MONTHS, WAS THERE A TIME WHEN YOU DID NOT HAVE A STEADY PLACE TO SLEEP OR SLEPT IN A SHELTER (INCLUDING NOW)?: NO

## 2023-07-23 SDOH — ECONOMIC STABILITY: FOOD INSECURITY: WITHIN THE PAST 12 MONTHS, YOU WORRIED THAT YOUR FOOD WOULD RUN OUT BEFORE YOU GOT MONEY TO BUY MORE.: NEVER TRUE

## 2023-07-23 SDOH — ECONOMIC STABILITY: TRANSPORTATION INSECURITY
IN THE PAST 12 MONTHS, HAS LACK OF TRANSPORTATION KEPT YOU FROM MEETINGS, WORK, OR FROM GETTING THINGS NEEDED FOR DAILY LIVING?: NO

## 2023-07-23 SDOH — ECONOMIC STABILITY: INCOME INSECURITY: HOW HARD IS IT FOR YOU TO PAY FOR THE VERY BASICS LIKE FOOD, HOUSING, MEDICAL CARE, AND HEATING?: NOT HARD AT ALL

## 2023-07-24 ENCOUNTER — OFFICE VISIT (OUTPATIENT)
Dept: PRIMARY CARE CLINIC | Age: 73
End: 2023-07-24
Payer: MEDICARE

## 2023-07-24 VITALS
WEIGHT: 133 LBS | RESPIRATION RATE: 16 BRPM | DIASTOLIC BLOOD PRESSURE: 80 MMHG | OXYGEN SATURATION: 98 % | HEART RATE: 63 BPM | SYSTOLIC BLOOD PRESSURE: 120 MMHG | HEIGHT: 67 IN | TEMPERATURE: 97.7 F | BODY MASS INDEX: 20.88 KG/M2

## 2023-07-24 DIAGNOSIS — M85.821 OTHER SPECIFIED DISORDERS OF BONE DENSITY AND STRUCTURE, RIGHT UPPER ARM: ICD-10-CM

## 2023-07-24 DIAGNOSIS — C50.811 MALIGNANT NEOPLASM OF OVERLAPPING SITES OF RIGHT FEMALE BREAST, UNSPECIFIED ESTROGEN RECEPTOR STATUS (HCC): ICD-10-CM

## 2023-07-24 DIAGNOSIS — E55.9 HYPOVITAMINOSIS D: ICD-10-CM

## 2023-07-24 DIAGNOSIS — H61.21 IMPACTED CERUMEN OF RIGHT EAR: ICD-10-CM

## 2023-07-24 DIAGNOSIS — Z90.11 S/P RIGHT MASTECTOMY: ICD-10-CM

## 2023-07-24 DIAGNOSIS — E78.00 HYPERCHOLESTEREMIA: Primary | ICD-10-CM

## 2023-07-24 DIAGNOSIS — G47.33 SEVERE OBSTRUCTIVE SLEEP APNEA: ICD-10-CM

## 2023-07-24 DIAGNOSIS — H25.9 AGE-RELATED CATARACT OF BOTH EYES, UNSPECIFIED AGE-RELATED CATARACT TYPE: ICD-10-CM

## 2023-07-24 DIAGNOSIS — D53.9 MACROCYTIC ANEMIA: ICD-10-CM

## 2023-07-24 PROBLEM — R53.83 FATIGUE: Status: RESOLVED | Noted: 2019-08-21 | Resolved: 2023-07-24

## 2023-07-24 PROCEDURE — 1036F TOBACCO NON-USER: CPT | Performed by: INTERNAL MEDICINE

## 2023-07-24 PROCEDURE — 3017F COLORECTAL CA SCREEN DOC REV: CPT | Performed by: INTERNAL MEDICINE

## 2023-07-24 PROCEDURE — 99214 OFFICE O/P EST MOD 30 MIN: CPT | Performed by: INTERNAL MEDICINE

## 2023-07-24 PROCEDURE — G8399 PT W/DXA RESULTS DOCUMENT: HCPCS | Performed by: INTERNAL MEDICINE

## 2023-07-24 PROCEDURE — 1123F ACP DISCUSS/DSCN MKR DOCD: CPT | Performed by: INTERNAL MEDICINE

## 2023-07-24 PROCEDURE — G8420 CALC BMI NORM PARAMETERS: HCPCS | Performed by: INTERNAL MEDICINE

## 2023-07-24 PROCEDURE — G8427 DOCREV CUR MEDS BY ELIG CLIN: HCPCS | Performed by: INTERNAL MEDICINE

## 2023-07-24 PROCEDURE — 1090F PRES/ABSN URINE INCON ASSESS: CPT | Performed by: INTERNAL MEDICINE

## 2023-07-24 SDOH — ECONOMIC STABILITY: FOOD INSECURITY: WITHIN THE PAST 12 MONTHS, THE FOOD YOU BOUGHT JUST DIDN'T LAST AND YOU DIDN'T HAVE MONEY TO GET MORE.: NEVER TRUE

## 2023-07-24 SDOH — ECONOMIC STABILITY: INCOME INSECURITY: HOW HARD IS IT FOR YOU TO PAY FOR THE VERY BASICS LIKE FOOD, HOUSING, MEDICAL CARE, AND HEATING?: NOT HARD AT ALL

## 2023-07-24 SDOH — ECONOMIC STABILITY: FOOD INSECURITY: WITHIN THE PAST 12 MONTHS, YOU WORRIED THAT YOUR FOOD WOULD RUN OUT BEFORE YOU GOT MONEY TO BUY MORE.: NEVER TRUE

## 2023-07-24 NOTE — ASSESSMENT & PLAN NOTE
Asymptomatic, on B 12 supplement, elevated neutrophils on CBC, reviwed by hematology NP, no recomendations

## 2023-07-25 DIAGNOSIS — E78.00 HYPERCHOLESTEREMIA: ICD-10-CM

## 2023-07-26 ENCOUNTER — TELEPHONE (OUTPATIENT)
Dept: PRIMARY CARE CLINIC | Age: 73
End: 2023-07-26

## 2023-07-26 LAB
CHOLEST SERPL-MCNC: 256 MG/DL (ref 160–199)
HDLC SERPL-MCNC: 54 MG/DL (ref 65–121)
LDLC SERPL CALC-MCNC: 188 MG/DL
TRIGL SERPL-MCNC: 68 MG/DL (ref 0–149)

## 2023-07-26 NOTE — TELEPHONE ENCOUNTER
Patient notified of results with understanding verbalized. Patient still declines taking any cholesterol lowering agents.

## 2023-07-26 NOTE — TELEPHONE ENCOUNTER
----- Message from Timbo Okeefe MD sent at 7/26/2023  1:58 PM CDT -----  Cholesterol panel continues to be out of range very elevated patient's please asked patient if she is still refusing cholesterol-lowering

## 2023-08-29 ENCOUNTER — HOSPITAL ENCOUNTER (OUTPATIENT)
Dept: WOMENS IMAGING | Age: 73
Discharge: HOME | End: 2023-08-29
Payer: MEDICARE

## 2023-08-29 DIAGNOSIS — Z78.0 POSTMENOPAUSAL STATE: ICD-10-CM

## 2023-08-29 DIAGNOSIS — M85.89 OSTEOPENIA OF MULTIPLE SITES: ICD-10-CM

## 2023-08-29 PROCEDURE — 77080 DXA BONE DENSITY AXIAL: CPT

## 2023-09-05 ENCOUNTER — TELEPHONE (OUTPATIENT)
Dept: HEMATOLOGY | Age: 73
End: 2023-09-05

## 2023-09-05 NOTE — TELEPHONE ENCOUNTER
Called patient and reviewed lab results. Instructed that it is showing osteopenia and that GUNNER Smith would like for her to get over there counter calcium with vitamin D and take one tablet twice a day. Instructed to call with any problems. Patient v/u and is agreeable to plan.

## 2023-09-28 ENCOUNTER — HOSPITAL ENCOUNTER (OUTPATIENT)
Dept: WOMENS IMAGING | Age: 73
Discharge: HOME | End: 2023-09-28
Payer: MEDICARE

## 2023-09-28 DIAGNOSIS — Z12.31 ENCOUNTER FOR SCREENING MAMMOGRAM FOR MALIGNANT NEOPLASM OF BREAST: ICD-10-CM

## 2023-09-28 PROCEDURE — 77067 SCR MAMMO BI INCL CAD: CPT

## 2023-10-02 ENCOUNTER — HOSPITAL ENCOUNTER (OUTPATIENT)
Dept: GENERAL RADIOLOGY | Age: 73
Discharge: HOME | End: 2023-10-02
Payer: MEDICARE

## 2023-10-02 ENCOUNTER — OFFICE VISIT (OUTPATIENT)
Dept: PRIMARY CARE CLINIC | Age: 73
End: 2023-10-02
Payer: MEDICARE

## 2023-10-02 VITALS
RESPIRATION RATE: 18 BRPM | HEART RATE: 113 BPM | SYSTOLIC BLOOD PRESSURE: 118 MMHG | OXYGEN SATURATION: 92 % | HEIGHT: 67 IN | DIASTOLIC BLOOD PRESSURE: 80 MMHG | WEIGHT: 126 LBS | TEMPERATURE: 100 F | BODY MASS INDEX: 19.78 KG/M2

## 2023-10-02 DIAGNOSIS — Z90.11 S/P RIGHT MASTECTOMY: ICD-10-CM

## 2023-10-02 DIAGNOSIS — R41.0 DELIRIUM: Primary | ICD-10-CM

## 2023-10-02 DIAGNOSIS — R41.0 DELIRIUM: ICD-10-CM

## 2023-10-02 DIAGNOSIS — R91.8 MASS OF HILUM: ICD-10-CM

## 2023-10-02 DIAGNOSIS — R05.2 SUBACUTE COUGH: Primary | ICD-10-CM

## 2023-10-02 LAB
ANION GAP SERPL CALCULATED.3IONS-SCNC: 13 MMOL/L (ref 7–19)
BILIRUB UR STRIP.AUTO-MCNC: ABNORMAL MG/DL
BUN SERPL-MCNC: 18 MG/DL (ref 8–23)
CALCIUM SERPL-MCNC: 9.4 MG/DL (ref 8.8–10.2)
CHLORIDE SERPL-SCNC: 99 MMOL/L (ref 98–111)
CLARITY UR: ABNORMAL
CO2 SERPL-SCNC: 28 MMOL/L (ref 22–29)
COLOR UR: YELLOW
CREAT SERPL-MCNC: 0.8 MG/DL (ref 0.5–0.9)
GLUCOSE SERPL-MCNC: 135 MG/DL (ref 74–109)
GLUCOSE UR STRIP.AUTO-MCNC: NEGATIVE MG/DL
HGB UR STRIP.AUTO-MCNC: NEGATIVE MG/L
INFLUENZA A ANTIBODY: NEGATIVE
INFLUENZA B ANTIBODY: NEGATIVE
KETONES UR STRIP.AUTO-MCNC: ABNORMAL MG/DL
LEUKOCYTE ESTERASE UR QL STRIP.AUTO: NEGATIVE
NITRITE UR QL STRIP.AUTO: NEGATIVE
PH UR STRIP.AUTO: 6 [PH] (ref 5–8)
POTASSIUM SERPL-SCNC: 3.8 MMOL/L (ref 3.5–5)
PROT UR STRIP.AUTO-MCNC: >=300 MG/DL
REASON FOR REJECTION: NORMAL
REJECTED TEST: NORMAL
SARS-COV-2 N GENE RESP QL NAA+PROBE: NOT DETECTED
SODIUM SERPL-SCNC: 140 MMOL/L (ref 136–145)
SP GR UR STRIP.AUTO: 1.02 (ref 1–1.03)
UROBILINOGEN UR STRIP.AUTO-MCNC: 0.2 E.U./DL

## 2023-10-02 PROCEDURE — 71046 X-RAY EXAM CHEST 2 VIEWS: CPT

## 2023-10-02 PROCEDURE — G8399 PT W/DXA RESULTS DOCUMENT: HCPCS | Performed by: INTERNAL MEDICINE

## 2023-10-02 PROCEDURE — 1123F ACP DISCUSS/DSCN MKR DOCD: CPT | Performed by: INTERNAL MEDICINE

## 2023-10-02 PROCEDURE — G8484 FLU IMMUNIZE NO ADMIN: HCPCS | Performed by: INTERNAL MEDICINE

## 2023-10-02 PROCEDURE — G8420 CALC BMI NORM PARAMETERS: HCPCS | Performed by: INTERNAL MEDICINE

## 2023-10-02 PROCEDURE — 1036F TOBACCO NON-USER: CPT | Performed by: INTERNAL MEDICINE

## 2023-10-02 PROCEDURE — G8427 DOCREV CUR MEDS BY ELIG CLIN: HCPCS | Performed by: INTERNAL MEDICINE

## 2023-10-02 PROCEDURE — 93000 ELECTROCARDIOGRAM COMPLETE: CPT | Performed by: INTERNAL MEDICINE

## 2023-10-02 PROCEDURE — 3017F COLORECTAL CA SCREEN DOC REV: CPT | Performed by: INTERNAL MEDICINE

## 2023-10-02 PROCEDURE — 99213 OFFICE O/P EST LOW 20 MIN: CPT | Performed by: INTERNAL MEDICINE

## 2023-10-02 PROCEDURE — 1090F PRES/ABSN URINE INCON ASSESS: CPT | Performed by: INTERNAL MEDICINE

## 2023-10-02 RX ORDER — CEFDINIR 300 MG/1
300 CAPSULE ORAL 2 TIMES DAILY
Qty: 14 CAPSULE | Refills: 0 | Status: SHIPPED | OUTPATIENT
Start: 2023-10-02 | End: 2023-10-09

## 2023-10-02 ASSESSMENT — ENCOUNTER SYMPTOMS
SORE THROAT: 0
SINUS PAIN: 0
SHORTNESS OF BREATH: 1
EYES NEGATIVE: 1
DIARRHEA: 0
COUGH: 1
NAUSEA: 0
TROUBLE SWALLOWING: 0
BLOOD IN STOOL: 0
CONSTIPATION: 0

## 2023-10-02 NOTE — PROGRESS NOTES
Bud Park ( 1950) is a 74 y.o. female, Established , here for evaluation of the following chief complaint(s). Cough and Fever      Patient was encouraged and advised to be compliant with all  medications leads an active lifestyle and promote maintaining a healthy weight, encouraged not to use cigarettes, laboratory results discussed and reviewed with patient's during this visit   ASSESSMENT/PLAN:  Problem List          Nervous and Auditory    Delirium - Primary      Unclear control, Patient advised to go for chest x-ray urine urine culture and lab work to assess focus for the source of infection will await for results before empirically starting antibiotics at this time         Relevant Orders    COVID-19    POCT Influenza A/B (Completed)    Urinalysis    Culture, Urine    Basic Metabolic Panel    XR CHEST STANDARD (2 VW)    EKG 12 lead (Completed)              No data to display                    2023     8:23 AM 2022     8:02 AM 2022     9:15 AM 2021     1:46 PM 2021     7:02 AM   PHQ Scores   PHQ2 Score 0 0 0 0 0   PHQ9 Score 0 0 0 0 0       Results for orders placed or performed in visit on 10/02/23   POCT Influenza A/B   Result Value Ref Range    Influenza A Ab Negative     Influenza B Ab Negative        No follow-ups on file. HPI  15-year-old female presents for urgent care visit  Patient daughter says that patient has been a little delirious does not appear so during the visit  As per history the patient had COVID 3 weeks ago, the cough persisted and she is now having fever and worsening delirium the daughter is concerned and she is here  No dysuria no diarrhea no vomiting    Review of Systems   Constitutional:  Positive for fatigue and fever. Negative for activity change, appetite change, chills and diaphoresis. HENT: Negative. Negative for ear pain, sinus pain, sore throat and trouble swallowing. Eyes: Negative.     Respiratory:  Positive for cough and

## 2023-10-02 NOTE — ASSESSMENT & PLAN NOTE
Unclear control, Patient advised to go for chest x-ray urine urine culture and lab work to assess focus for the source of infection will await for results before empirically starting antibiotics at this time

## 2023-10-04 LAB
BACTERIA UR CULT: ABNORMAL
BACTERIA UR CULT: ABNORMAL
ORGANISM: ABNORMAL

## 2023-10-09 ASSESSMENT — ENCOUNTER SYMPTOMS
EYES NEGATIVE: 1
COUGH: 1
SINUS PAIN: 0
CONSTIPATION: 0
DIARRHEA: 0
TROUBLE SWALLOWING: 0
NAUSEA: 0
BLOOD IN STOOL: 0
SORE THROAT: 0
SHORTNESS OF BREATH: 1

## 2023-10-09 NOTE — PROGRESS NOTES
Jimmy Waldron ( 1950) is a 74 y.o. female, Established , here for evaluation of the following chief complaint(s).   Fever      Patient was encouraged and advised to be compliant with all  medications leads an active lifestyle and promote maintaining a healthy weight, encouraged not to use cigarettes, laboratory results discussed and reviewed with patient's during this visit   ASSESSMENT/PLAN:  Problem List          Nervous and Auditory    Delirium    Relevant Orders    POCT Urinalysis no Micro    CBC with Auto Differential (Completed)    Comprehensive Metabolic Panel (Completed)    Culture, Urine    Urinalysis       Other    Mass of hilum      Patient has a mass in the right hilum awaiting CT scan to image her right lung however today she does have diminished breath sounds on her right and appearing to be tachypneic and cannot rule out postobstructive pneumonia will await CT scan may need to refer patient inpatient for bronchoscopy with pulmonology swetha being involved may need to start patient on a broader spectrum antibiotic                 No data to display                    2023     8:23 AM 2022     8:02 AM 2022     9:15 AM 2021     1:46 PM 2021     7:02 AM   PHQ Scores   PHQ2 Score 0 0 0 0 0   PHQ9 Score 0 0 0 0 0       Results for orders placed or performed in visit on 10/02/23   Culture, Urine    Specimen: Urine, clean catch   Result Value Ref Range    Urine Culture, Routine >100,000 CFU/ml  Mixed skin denia present   (A)     Organism Escherichia coli (A)     Urine Culture, Routine Heavy growth        Susceptibility    Escherichia coli - BACTERIAL SUSCEPTIBILITY PANEL BY BENITO     ampicillin 4 Sensitive mcg/mL     ampicillin-sulbactam 4 Sensitive mcg/mL     aztreonam <=1 Sensitive mcg/mL     ceFAZolin <=4 Sensitive mcg/mL     cefepime <=1 Sensitive mcg/mL     cefTRIAXone <=1 Sensitive mcg/mL     ertapenem <=0.5 Sensitive mcg/mL     gentamicin <=1 Sensitive mcg/mL

## 2023-10-10 ENCOUNTER — OFFICE VISIT (OUTPATIENT)
Dept: PRIMARY CARE CLINIC | Age: 73
End: 2023-10-10
Payer: MEDICARE

## 2023-10-10 ENCOUNTER — HOSPITAL ENCOUNTER (OUTPATIENT)
Dept: GENERAL RADIOLOGY | Age: 73
Discharge: HOME | End: 2023-10-10
Payer: MEDICARE

## 2023-10-10 VITALS
OXYGEN SATURATION: 94 % | BODY MASS INDEX: 20.25 KG/M2 | SYSTOLIC BLOOD PRESSURE: 138 MMHG | TEMPERATURE: 98.8 F | RESPIRATION RATE: 18 BRPM | HEIGHT: 67 IN | DIASTOLIC BLOOD PRESSURE: 84 MMHG | WEIGHT: 129 LBS | HEART RATE: 82 BPM

## 2023-10-10 DIAGNOSIS — R41.0 DELIRIUM: ICD-10-CM

## 2023-10-10 DIAGNOSIS — E87.6 HYPOKALEMIA: Primary | ICD-10-CM

## 2023-10-10 DIAGNOSIS — Z90.11 S/P RIGHT MASTECTOMY: ICD-10-CM

## 2023-10-10 DIAGNOSIS — R50.9 FEVER, UNSPECIFIED FEVER CAUSE: Primary | ICD-10-CM

## 2023-10-10 DIAGNOSIS — R05.2 SUBACUTE COUGH: ICD-10-CM

## 2023-10-10 DIAGNOSIS — E78.00 HYPERCHOLESTEREMIA: ICD-10-CM

## 2023-10-10 DIAGNOSIS — R91.8 MASS OF HILUM: ICD-10-CM

## 2023-10-10 DIAGNOSIS — R50.9 FEVER, UNSPECIFIED FEVER CAUSE: ICD-10-CM

## 2023-10-10 DIAGNOSIS — J18.9 PNEUMONIA OF RIGHT LOWER LOBE DUE TO INFECTIOUS ORGANISM: Primary | ICD-10-CM

## 2023-10-10 LAB
ALBUMIN SERPL-MCNC: 3.6 G/DL (ref 3.5–5.2)
ALP SERPL-CCNC: 104 U/L (ref 35–104)
ALT SERPL-CCNC: 20 U/L (ref 5–33)
ANION GAP SERPL CALCULATED.3IONS-SCNC: 11 MMOL/L (ref 7–19)
AST SERPL-CCNC: 22 U/L (ref 5–32)
BASOPHILS # BLD: 0.1 K/UL (ref 0–0.2)
BASOPHILS NFR BLD: 0.4 % (ref 0–1)
BILIRUB SERPL-MCNC: 0.4 MG/DL (ref 0.2–1.2)
BUN SERPL-MCNC: 17 MG/DL (ref 8–23)
CALCIUM SERPL-MCNC: 9.5 MG/DL (ref 8.8–10.2)
CHLORIDE SERPL-SCNC: 98 MMOL/L (ref 98–111)
CHOLEST SERPL-MCNC: 195 MG/DL (ref 160–199)
CO2 SERPL-SCNC: 29 MMOL/L (ref 22–29)
CREAT SERPL-MCNC: 0.8 MG/DL (ref 0.5–0.9)
EOSINOPHIL # BLD: 0.1 K/UL (ref 0–0.6)
EOSINOPHIL NFR BLD: 0.4 % (ref 0–5)
ERYTHROCYTE [DISTWIDTH] IN BLOOD BY AUTOMATED COUNT: 14.9 % (ref 11.5–14.5)
GLUCOSE SERPL-MCNC: 112 MG/DL (ref 74–109)
HCT VFR BLD AUTO: 30 % (ref 37–47)
HDLC SERPL-MCNC: 35 MG/DL (ref 65–121)
HGB BLD-MCNC: 10.3 G/DL (ref 12–16)
IMM GRANULOCYTES # BLD: 0.1 K/UL
LDLC SERPL CALC-MCNC: 140 MG/DL
LYMPHOCYTES # BLD: 0.7 K/UL (ref 1.1–4.5)
LYMPHOCYTES NFR BLD: 4.1 % (ref 20–40)
MCH RBC QN AUTO: 35.4 PG (ref 27–31)
MCHC RBC AUTO-ENTMCNC: 34.3 G/DL (ref 33–37)
MCV RBC AUTO: 103.1 FL (ref 81–99)
MONOCYTES # BLD: 1 K/UL (ref 0–0.9)
MONOCYTES NFR BLD: 6 % (ref 0–10)
NEUTROPHILS # BLD: 14.7 K/UL (ref 1.5–7.5)
NEUTS SEG NFR BLD: 88.3 % (ref 50–65)
PLATELET # BLD AUTO: 256 K/UL (ref 130–400)
PMV BLD AUTO: 10.7 FL (ref 9.4–12.3)
POTASSIUM SERPL-SCNC: 3.3 MMOL/L (ref 3.5–5)
PROT SERPL-MCNC: 7.5 G/DL (ref 6.6–8.7)
RBC # BLD AUTO: 2.91 M/UL (ref 4.2–5.4)
SODIUM SERPL-SCNC: 138 MMOL/L (ref 136–145)
TRIGL SERPL-MCNC: 99 MG/DL (ref 0–149)
WBC # BLD AUTO: 16.7 K/UL (ref 4.8–10.8)

## 2023-10-10 PROCEDURE — 1123F ACP DISCUSS/DSCN MKR DOCD: CPT | Performed by: INTERNAL MEDICINE

## 2023-10-10 PROCEDURE — G8427 DOCREV CUR MEDS BY ELIG CLIN: HCPCS | Performed by: INTERNAL MEDICINE

## 2023-10-10 PROCEDURE — G8399 PT W/DXA RESULTS DOCUMENT: HCPCS | Performed by: INTERNAL MEDICINE

## 2023-10-10 PROCEDURE — 99213 OFFICE O/P EST LOW 20 MIN: CPT | Performed by: INTERNAL MEDICINE

## 2023-10-10 PROCEDURE — G8484 FLU IMMUNIZE NO ADMIN: HCPCS | Performed by: INTERNAL MEDICINE

## 2023-10-10 PROCEDURE — 3017F COLORECTAL CA SCREEN DOC REV: CPT | Performed by: INTERNAL MEDICINE

## 2023-10-10 PROCEDURE — 81002 URINALYSIS NONAUTO W/O SCOPE: CPT | Performed by: INTERNAL MEDICINE

## 2023-10-10 PROCEDURE — G8420 CALC BMI NORM PARAMETERS: HCPCS | Performed by: INTERNAL MEDICINE

## 2023-10-10 PROCEDURE — 71270 CT THORAX DX C-/C+: CPT

## 2023-10-10 PROCEDURE — 1090F PRES/ABSN URINE INCON ASSESS: CPT | Performed by: INTERNAL MEDICINE

## 2023-10-10 PROCEDURE — 1036F TOBACCO NON-USER: CPT | Performed by: INTERNAL MEDICINE

## 2023-10-10 PROCEDURE — 6360000004 HC RX CONTRAST MEDICATION: Performed by: INTERNAL MEDICINE

## 2023-10-10 RX ORDER — POTASSIUM CHLORIDE 750 MG/1
10 TABLET, EXTENDED RELEASE ORAL 2 TIMES DAILY
Qty: 14 TABLET | Refills: 0 | Status: SHIPPED | OUTPATIENT
Start: 2023-10-10 | End: 2023-10-17

## 2023-10-10 RX ORDER — LEVOFLOXACIN 500 MG/1
500 TABLET, FILM COATED ORAL DAILY
Qty: 7 TABLET | Refills: 0 | Status: SHIPPED | OUTPATIENT
Start: 2023-10-10 | End: 2023-10-17

## 2023-10-10 RX ORDER — ERGOCALCIFEROL 1.25 MG/1
1 CAPSULE ORAL WEEKLY
COMMUNITY
Start: 2019-08-24

## 2023-10-10 RX ADMIN — IOPAMIDOL 75 ML: 755 INJECTION, SOLUTION INTRAVENOUS at 12:56

## 2023-10-10 NOTE — ASSESSMENT & PLAN NOTE
Patient has a mass in the right hilum awaiting CT scan to image her right lung however today she does have diminished breath sounds on her right and appearing to be tachypneic and cannot rule out postobstructive pneumonia will await CT scan may need to refer patient inpatient for bronchoscopy with pulmonology swetha being involved may need to start patient on a broader spectrum antibiotic

## 2023-10-17 ENCOUNTER — TELEPHONE (OUTPATIENT)
Dept: PRIMARY CARE CLINIC | Age: 73
End: 2023-10-17

## 2023-10-17 NOTE — TELEPHONE ENCOUNTER
Called and spoke with patient to inquire about this. Patient reports that she obtained the urine specimen at home and dropped the specimen off at the [facility] lab in [place]. She states that the specimen was NOT labeled and states she informed the staff that took the specimen of this and was told it would be ok and they would take care of it. No results have been entered in patient's chart. Patient was instructed to report to the lab at her earliest convenience to give a specimen while at the facility so that it can be properly labeled and sent for testing. Patient verbalized understanding.

## 2023-10-18 LAB
BILIRUB UR QL STRIP: NEGATIVE
CLARITY UR: CLEAR
COLOR UR: YELLOW
GLUCOSE UR STRIP.AUTO-MCNC: NEGATIVE MG/DL
HGB UR STRIP.AUTO-MCNC: NEGATIVE MG/L
KETONES UR STRIP.AUTO-MCNC: NEGATIVE MG/DL
LEUKOCYTE ESTERASE UR QL STRIP.AUTO: NEGATIVE
NITRITE UR QL STRIP.AUTO: NEGATIVE
PH UR STRIP.AUTO: 6 [PH] (ref 5–8)
PROT UR STRIP.AUTO-MCNC: ABNORMAL MG/DL
SP GR UR STRIP.AUTO: 1.02 (ref 1–1.03)
UROBILINOGEN UR STRIP.AUTO-MCNC: 0.2 E.U./DL

## 2023-11-12 SDOH — HEALTH STABILITY: PHYSICAL HEALTH: ON AVERAGE, HOW MANY MINUTES DO YOU ENGAGE IN EXERCISE AT THIS LEVEL?: 0 MIN

## 2023-11-12 SDOH — HEALTH STABILITY: PHYSICAL HEALTH
ON AVERAGE, HOW MANY DAYS PER WEEK DO YOU ENGAGE IN MODERATE TO STRENUOUS EXERCISE (LIKE A BRISK WALK)?: PATIENT DECLINED

## 2023-11-12 ASSESSMENT — PATIENT HEALTH QUESTIONNAIRE - PHQ9
2. FEELING DOWN, DEPRESSED OR HOPELESS: 0
1. LITTLE INTEREST OR PLEASURE IN DOING THINGS: 0
SUM OF ALL RESPONSES TO PHQ9 QUESTIONS 1 & 2: 0
SUM OF ALL RESPONSES TO PHQ QUESTIONS 1-9: 0

## 2023-11-12 ASSESSMENT — LIFESTYLE VARIABLES
HOW MANY STANDARD DRINKS CONTAINING ALCOHOL DO YOU HAVE ON A TYPICAL DAY: 0
HOW MANY STANDARD DRINKS CONTAINING ALCOHOL DO YOU HAVE ON A TYPICAL DAY: PATIENT DOES NOT DRINK
HOW OFTEN DO YOU HAVE A DRINK CONTAINING ALCOHOL: NEVER
HOW OFTEN DO YOU HAVE A DRINK CONTAINING ALCOHOL: 1
HOW OFTEN DO YOU HAVE SIX OR MORE DRINKS ON ONE OCCASION: 1

## 2023-11-13 ASSESSMENT — ENCOUNTER SYMPTOMS
TROUBLE SWALLOWING: 0
VOMITING: 0
BACK PAIN: 0
CONSTIPATION: 0
SINUS PAIN: 0
DIARRHEA: 0
ABDOMINAL PAIN: 0
SHORTNESS OF BREATH: 0
COUGH: 0
CHEST TIGHTNESS: 0
BLOOD IN STOOL: 0
RHINORRHEA: 0
WHEEZING: 0

## 2023-11-14 ENCOUNTER — OFFICE VISIT (OUTPATIENT)
Dept: PRIMARY CARE CLINIC | Age: 73
End: 2023-11-14

## 2023-11-14 VITALS
BODY MASS INDEX: 19.93 KG/M2 | DIASTOLIC BLOOD PRESSURE: 86 MMHG | OXYGEN SATURATION: 97 % | WEIGHT: 127 LBS | HEIGHT: 67 IN | SYSTOLIC BLOOD PRESSURE: 130 MMHG | RESPIRATION RATE: 18 BRPM | TEMPERATURE: 98.2 F | HEART RATE: 55 BPM

## 2023-11-14 DIAGNOSIS — M85.80 OSTEOPENIA, UNSPECIFIED LOCATION: ICD-10-CM

## 2023-11-14 DIAGNOSIS — D72.820 LYMPHOCYTOSIS: Primary | ICD-10-CM

## 2023-11-14 DIAGNOSIS — Z87.01 HISTORY OF PNEUMONIA: ICD-10-CM

## 2023-11-14 DIAGNOSIS — Z00.00 MEDICARE ANNUAL WELLNESS VISIT, SUBSEQUENT: ICD-10-CM

## 2023-11-14 DIAGNOSIS — Z23 FLU VACCINE NEED: ICD-10-CM

## 2023-11-14 DIAGNOSIS — G47.33 SEVERE OBSTRUCTIVE SLEEP APNEA: ICD-10-CM

## 2023-11-14 DIAGNOSIS — Z23 NEED FOR VACCINATION WITH 20-POLYVALENT PNEUMOCOCCAL CONJUGATE VACCINE: ICD-10-CM

## 2023-11-14 DIAGNOSIS — Z13.1 DIABETES MELLITUS SCREENING: ICD-10-CM

## 2023-11-14 PROBLEM — R91.8 MASS OF HILUM: Status: RESOLVED | Noted: 2023-10-10 | Resolved: 2023-11-14

## 2023-11-14 PROBLEM — D69.6 THROMBOCYTOPENIA (HCC): Status: RESOLVED | Noted: 2022-01-19 | Resolved: 2023-11-14

## 2023-11-14 PROCEDURE — G0439 PPPS, SUBSEQ VISIT: HCPCS | Performed by: INTERNAL MEDICINE

## 2023-11-14 NOTE — PATIENT INSTRUCTIONS
trouble using their hands or who have dementia may need this extra help. How can you help with dental care? Normal dental care  To keep the teeth and gums healthy:  Brush the teeth with fluoride toothpaste twice a day--in the morning and at night--and floss at least once a day. Plaque can quickly build up on the teeth of older adults. Watch for the signs of gum disease. These signs include gums that bleed after brushing or after eating hard foods, such as apples. See a dentist regularly. Many experts recommend checkups every 6 months. Keep the dentist up to date on any new medications the person is taking. Encourage a balanced diet that includes whole grains, vegetables, and fruits, and that is low in saturated fat and sodium. Encourage the person you're caring for not to use tobacco products. They can affect dental and general health. Many older adults have a fixed income and feel that they can't afford dental care. But most towns and cities have programs in which dentists help older adults by lowering fees. Contact your area's public health offices or  for information about dental care in your area. Using a toothbrush  Older adults with arthritis sometimes have trouble brushing their teeth because they can't easily hold the toothbrush. Their hands and fingers may be stiff, painful, or weak. If this is the case, you can: Offer an electric toothbrush. Enlarge the handle of a non-electric toothbrush by wrapping a sponge, an elastic bandage, or adhesive tape around it. Push the toothbrush handle through a ball made of rubber or soft foam.  Make the handle longer and thicker by taping Popsicle sticks or tongue depressors to it. You may also be able to buy special toothbrushes, toothpaste dispensers, and floss holders. Your doctor may recommend a soft-bristle toothbrush if the person you care for bleeds easily. Bleeding can happen because of a health problem or from certain medicines.   A

## 2023-11-14 NOTE — PROGRESS NOTES
arthralgias, back pain and myalgias. R arm swelling   Allergic/Immunologic: Negative for environmental allergies. Neurological:  Negative for light-headedness, numbness and headaches. Physical Exam  Vitals and nursing note reviewed. Constitutional:       General: She is not in acute distress. Appearance: Normal appearance. She is normal weight. HENT:      Head: Normocephalic. Right Ear: External ear normal. There is impacted cerumen. Left Ear: External ear normal. There is no impacted cerumen. Nose: Nose normal.   Eyes:      General: No scleral icterus. Extraocular Movements: Extraocular movements intact. Conjunctiva/sclera: Conjunctivae normal.      Pupils: Pupils are equal, round, and reactive to light. Neck:      Thyroid: No thyroid mass, thyromegaly or thyroid tenderness. Vascular: No JVD. Cardiovascular:      Rate and Rhythm: Normal rate and regular rhythm. Pulses: Normal pulses. Heart sounds: Normal heart sounds, S1 normal and S2 normal. No murmur heard. Pulmonary:      Effort: Pulmonary effort is normal. No respiratory distress. Breath sounds: Normal breath sounds and air entry. No wheezing or rales. Chest:   Breasts:     Right: Absent. Abdominal:      General: Abdomen is flat. Bowel sounds are normal. There is no distension. Palpations: Abdomen is soft. Tenderness: There is no abdominal tenderness. Musculoskeletal:      Right shoulder: No swelling, deformity, effusion, tenderness or bony tenderness. Decreased range of motion. Cervical back: Full passive range of motion without pain, normal range of motion and neck supple. Thoracic back: Deformity present. Right lower leg: No edema. Left lower leg: No edema. Lymphadenopathy:      Cervical: No cervical adenopathy. Skin:     General: Skin is warm and dry. Findings: No rash.    Neurological:      Mental Status: She is alert and oriented to

## 2023-12-13 ENCOUNTER — OFFICE VISIT (OUTPATIENT)
Dept: PULMONOLOGY | Age: 73
End: 2023-12-13
Payer: MEDICARE

## 2023-12-13 VITALS
DIASTOLIC BLOOD PRESSURE: 76 MMHG | SYSTOLIC BLOOD PRESSURE: 123 MMHG | BODY MASS INDEX: 20.25 KG/M2 | TEMPERATURE: 97.2 F | OXYGEN SATURATION: 97 % | WEIGHT: 129 LBS | HEIGHT: 67 IN | HEART RATE: 84 BPM

## 2023-12-13 DIAGNOSIS — R06.83 SNORING: ICD-10-CM

## 2023-12-13 DIAGNOSIS — G47.33 SEVERE OBSTRUCTIVE SLEEP APNEA: Primary | ICD-10-CM

## 2023-12-13 DIAGNOSIS — G47.8 NON-RESTORATIVE SLEEP: ICD-10-CM

## 2023-12-13 DIAGNOSIS — I45.10 RIGHT BUNDLE BRANCH BLOCK (RBBB): ICD-10-CM

## 2023-12-13 PROCEDURE — G8399 PT W/DXA RESULTS DOCUMENT: HCPCS | Performed by: INTERNAL MEDICINE

## 2023-12-13 PROCEDURE — G8484 FLU IMMUNIZE NO ADMIN: HCPCS | Performed by: INTERNAL MEDICINE

## 2023-12-13 PROCEDURE — 99213 OFFICE O/P EST LOW 20 MIN: CPT | Performed by: INTERNAL MEDICINE

## 2023-12-13 PROCEDURE — 1036F TOBACCO NON-USER: CPT | Performed by: INTERNAL MEDICINE

## 2023-12-13 PROCEDURE — 1123F ACP DISCUSS/DSCN MKR DOCD: CPT | Performed by: INTERNAL MEDICINE

## 2023-12-13 PROCEDURE — 1090F PRES/ABSN URINE INCON ASSESS: CPT | Performed by: INTERNAL MEDICINE

## 2023-12-13 PROCEDURE — 3017F COLORECTAL CA SCREEN DOC REV: CPT | Performed by: INTERNAL MEDICINE

## 2023-12-13 PROCEDURE — G8420 CALC BMI NORM PARAMETERS: HCPCS | Performed by: INTERNAL MEDICINE

## 2023-12-13 PROCEDURE — G8427 DOCREV CUR MEDS BY ELIG CLIN: HCPCS | Performed by: INTERNAL MEDICINE

## 2023-12-13 ASSESSMENT — ENCOUNTER SYMPTOMS
SHORTNESS OF BREATH: 0
APNEA: 1
ABDOMINAL DISTENTION: 0
WHEEZING: 0
ABDOMINAL PAIN: 0
BACK PAIN: 0
ANAL BLEEDING: 0
CHEST TIGHTNESS: 0
COUGH: 0
RHINORRHEA: 0

## 2023-12-13 NOTE — PROGRESS NOTES
Pulmonary and Sleep Medicine    Denis Hernandez (:  1950) is a 74 y.o. female,Established patient, here for evaluation of the following chief complaint(s):  Follow-up (6 mo f/u    C[facility]EL[facility])      Referring physician:  No referring provider defined for this encounter. ASSESSMENT/PLAN:  1. Severe obstructive sleep apnea  2. Non-restorative sleep  3. Right bundle branch block (RBBB)  4. Snoring        Continue current management with the CPAP she is compliant with the CPAP she feels the CPAP helps it. Armin Hodges MD, [facility], [facility]    Return in about 1 year (around 2024). SUBJECTIVE/OBJECTIVE:  She is here for follow-up on severe obstructive sleep apnea. Her CPAP compliance data was reviewed. My interpretation of her CPAP download is that her average use of the CPAP is about 5.5 hours. Her apnea-hypopnea index while on CPAP is about 4.5 events per hour. Denies new complaints. She developed COVID and pneumonia Labor Day. She was treated conservatively at home. Prior to Visit Medications    Medication Sig Taking? Authorizing Provider   Vitamin D, Ergocalciferol, 70694 units CAPS Take 1 capsule by mouth once a week Yes Provider, MD Hermila   potassium chloride (KLOR-CON M) 10 MEQ extended release tablet Take 1 tablet by mouth 2 times daily for 7 days Yes La Freire MD        Review of Systems   Constitutional:  Negative for activity change, appetite change, chills, diaphoresis and fatigue. HENT:  Negative for congestion, dental problem, drooling, ear discharge, postnasal drip and rhinorrhea. Eyes:  Negative for visual disturbance. Respiratory:  Positive for apnea. Negative for cough, chest tightness, shortness of breath and wheezing. Gastrointestinal:  Negative for abdominal distention, abdominal pain and anal bleeding. Endocrine: Negative for cold intolerance, heat intolerance and polydipsia.    Genitourinary:  Negative for difficulty urinating,

## 2024-01-16 ENCOUNTER — TELEPHONE (OUTPATIENT)
Dept: HEMATOLOGY | Age: 74
End: 2024-01-16

## 2024-01-17 DIAGNOSIS — C50.811 MALIGNANT NEOPLASM OF OVERLAPPING SITES OF RIGHT FEMALE BREAST, UNSPECIFIED ESTROGEN RECEPTOR STATUS (HCC): Primary | ICD-10-CM

## 2024-01-17 DIAGNOSIS — D50.8 IRON DEFICIENCY ANEMIA SECONDARY TO INADEQUATE DIETARY IRON INTAKE: ICD-10-CM

## 2024-01-17 NOTE — PROGRESS NOTES
Progress Note      Pt Name: Shabnam Rincon  YOB: 1950  MRN: [id number]    Date of evaluation: 01/19/2024  History Obtained From:  patient, electronic medical record    CHIEF COMPLAINT:    Chief Complaint   Patient presents with    Follow-up     Malignant neoplasm of overlapping sites of right female breast, unspecified estrogen receptor status (HCC)         HISTORY OF PRESENT ILLNESS:    Shabnam Rincon is a 73 y.o.  female who is currently being followed for a diagnosis of locally advanced HER-2 positive breast cancer diagnosed September 2019.  She obtained a complete pathologic response in the breast, unfortunately had residual disease in the axilla which was followed by adjuvant treatment to TDM 1.  Recommendation was given for adjuvant endocrine therapy, unfortunately she was intolerant to letrozole due to severe arthralgias, bone and chest pain.  She has had no known evidence of recurrent disease.  Shabnam returns today in scheduled follow-up for evaluation, side effect monitoring, lab monitoring and further recommendations.      Today's clinic visit to include physical assessment, review of systems, any lab or radiographic findings that were available and plan of care are documented below.      ONCOLOGIC HISTORY:   Diagnosis   HER-2 IDC right breast, September 2019  [id number]  ER 3%, OR 0%, HER-2/lea IHC 3+  Mammaprint high risk Basal-like  Osteopenia, Aug 2019-T score -1.5 lumbar spine     Treatment summary  10/18/2019 through  March 2020- 6 cycles of neoadjuvant Taxotere, Carboplatin and Herceptin and Perjeta  3/3/2020- right mastectomy/ lymph node dissection  3/20/2020-14 cycles of adjuvant TDM-1.   4/20/2020-6/4/2020 Completed adjuvant radiation 6040 cGy  4/10/2020-5/28/2022 adjuvant endocrine therapy with letrozole discontinued due to intolerance, severe bone and chest pain.  Declined trial of other adjuvant endocrine therapy    Ms Shabnam Rincon was first seen by Dr. Ray on

## 2024-01-19 ENCOUNTER — TELEPHONE (OUTPATIENT)
Dept: HEMATOLOGY | Age: 74
End: 2024-01-19

## 2024-01-19 ENCOUNTER — OFFICE VISIT (OUTPATIENT)
Dept: HEMATOLOGY | Age: 74
End: 2024-01-19
Payer: MEDICARE

## 2024-01-19 ENCOUNTER — HOSPITAL ENCOUNTER (OUTPATIENT)
Dept: INFUSION THERAPY | Age: 74
Discharge: HOME | End: 2024-01-19
Payer: MEDICARE

## 2024-01-19 VITALS
BODY MASS INDEX: 20.78 KG/M2 | HEART RATE: 73 BPM | OXYGEN SATURATION: 96 % | TEMPERATURE: 97.6 F | WEIGHT: 132.4 LBS | HEIGHT: 67 IN | DIASTOLIC BLOOD PRESSURE: 90 MMHG | SYSTOLIC BLOOD PRESSURE: 136 MMHG

## 2024-01-19 DIAGNOSIS — D69.6 THROMBOCYTOPENIA (HCC): ICD-10-CM

## 2024-01-19 DIAGNOSIS — D50.9 IRON DEFICIENCY ANEMIA, UNSPECIFIED IRON DEFICIENCY ANEMIA TYPE: ICD-10-CM

## 2024-01-19 DIAGNOSIS — D50.8 IRON DEFICIENCY ANEMIA SECONDARY TO INADEQUATE DIETARY IRON INTAKE: ICD-10-CM

## 2024-01-19 DIAGNOSIS — E87.6 HYPOKALEMIA: Primary | ICD-10-CM

## 2024-01-19 DIAGNOSIS — M85.89 OSTEOPENIA OF MULTIPLE SITES: ICD-10-CM

## 2024-01-19 DIAGNOSIS — C50.811 MALIGNANT NEOPLASM OF OVERLAPPING SITES OF RIGHT FEMALE BREAST, UNSPECIFIED ESTROGEN RECEPTOR STATUS (HCC): Primary | ICD-10-CM

## 2024-01-19 DIAGNOSIS — C50.811 MALIGNANT NEOPLASM OF OVERLAPPING SITES OF RIGHT FEMALE BREAST, UNSPECIFIED ESTROGEN RECEPTOR STATUS (HCC): ICD-10-CM

## 2024-01-19 DIAGNOSIS — Z71.89 CARE PLAN DISCUSSED WITH PATIENT: ICD-10-CM

## 2024-01-19 LAB
ALBUMIN SERPL-MCNC: 4.4 G/DL (ref 3.5–5.2)
ALP SERPL-CCNC: 66 U/L (ref 35–104)
ALT SERPL-CCNC: 18 U/L (ref 9–52)
ANION GAP SERPL CALCULATED.3IONS-SCNC: 5 MMOL/L (ref 7–19)
AST SERPL-CCNC: 32 U/L (ref 14–36)
BASOPHILS # BLD: 0.04 K/UL (ref 0.01–0.08)
BASOPHILS NFR BLD: 0.8 % (ref 0.1–1.2)
BILIRUB SERPL-MCNC: 0.4 MG/DL (ref 0.2–1.3)
BUN SERPL-MCNC: 28 MG/DL (ref 7–17)
CALCIUM SERPL-MCNC: 9.5 MG/DL (ref 8.4–10.2)
CHLORIDE SERPL-SCNC: 108 MMOL/L (ref 98–111)
CO2 SERPL-SCNC: 32 MMOL/L (ref 22–29)
CREAT SERPL-MCNC: 0.7 MG/DL (ref 0.5–1)
EOSINOPHIL # BLD: 0.14 K/UL (ref 0.04–0.54)
EOSINOPHIL NFR BLD: 2.6 % (ref 0.7–7)
ERYTHROCYTE [DISTWIDTH] IN BLOOD BY AUTOMATED COUNT: 14.2 % (ref 11.7–14.4)
FERRITIN SERPL-MCNC: 50.9 NG/ML (ref 13–150)
GLOBULIN: 3.1 G/DL
GLUCOSE SERPL-MCNC: 71 MG/DL (ref 74–106)
HCT VFR BLD AUTO: 37.4 % (ref 34.1–44.9)
HGB BLD-MCNC: 12.4 G/DL (ref 11.2–15.7)
IRON SATN MFR SERPL: 28 % (ref 14–50)
IRON SERPL-MCNC: 85 UG/DL (ref 37–145)
LYMPHOCYTES # BLD: 0.85 K/UL (ref 1.18–3.74)
LYMPHOCYTES NFR BLD: 16.1 % (ref 19.3–53.1)
MCH RBC QN AUTO: 32.4 PG (ref 25.6–32.2)
MCHC RBC AUTO-ENTMCNC: 33.2 G/DL (ref 32.3–35.5)
MCV RBC AUTO: 97.7 FL (ref 79.4–94.8)
MONOCYTES # BLD: 0.35 K/UL (ref 0.24–0.82)
MONOCYTES NFR BLD: 6.6 % (ref 4.7–12.5)
NEUTROPHILS # BLD: 3.9 K/UL (ref 1.56–6.13)
NEUTS SEG NFR BLD: 73.7 % (ref 34–71.1)
PLATELET # BLD AUTO: 176 K/UL (ref 182–369)
PMV BLD AUTO: 10.6 FL (ref 7.4–10.4)
POTASSIUM SERPL-SCNC: 3.2 MMOL/L (ref 3.5–5.1)
PROT SERPL-MCNC: 7.4 G/DL (ref 6.3–8.2)
RBC # BLD AUTO: 3.83 M/UL (ref 3.93–5.22)
SODIUM SERPL-SCNC: 145 MMOL/L (ref 137–145)
TIBC SERPL-MCNC: 307 UG/DL (ref 250–400)
WBC # BLD AUTO: 5.29 K/UL (ref 3.98–10.04)

## 2024-01-19 PROCEDURE — 80053 COMPREHEN METABOLIC PANEL: CPT

## 2024-01-19 PROCEDURE — 36415 COLL VENOUS BLD VENIPUNCTURE: CPT

## 2024-01-19 PROCEDURE — 1036F TOBACCO NON-USER: CPT | Performed by: NURSE PRACTITIONER

## 2024-01-19 PROCEDURE — 3017F COLORECTAL CA SCREEN DOC REV: CPT | Performed by: NURSE PRACTITIONER

## 2024-01-19 PROCEDURE — 99214 OFFICE O/P EST MOD 30 MIN: CPT | Performed by: NURSE PRACTITIONER

## 2024-01-19 PROCEDURE — G8484 FLU IMMUNIZE NO ADMIN: HCPCS | Performed by: NURSE PRACTITIONER

## 2024-01-19 PROCEDURE — 1090F PRES/ABSN URINE INCON ASSESS: CPT | Performed by: NURSE PRACTITIONER

## 2024-01-19 PROCEDURE — G8427 DOCREV CUR MEDS BY ELIG CLIN: HCPCS | Performed by: NURSE PRACTITIONER

## 2024-01-19 PROCEDURE — G8399 PT W/DXA RESULTS DOCUMENT: HCPCS | Performed by: NURSE PRACTITIONER

## 2024-01-19 PROCEDURE — 99212 OFFICE O/P EST SF 10 MIN: CPT

## 2024-01-19 PROCEDURE — G8420 CALC BMI NORM PARAMETERS: HCPCS | Performed by: NURSE PRACTITIONER

## 2024-01-19 PROCEDURE — 85025 COMPLETE CBC W/AUTO DIFF WBC: CPT

## 2024-01-19 PROCEDURE — 1123F ACP DISCUSS/DSCN MKR DOCD: CPT | Performed by: NURSE PRACTITIONER

## 2024-01-19 RX ORDER — POTASSIUM CHLORIDE 20 MEQ/1
20 TABLET, EXTENDED RELEASE ORAL 2 TIMES DAILY
Qty: 14 TABLET | Refills: 0 | Status: SHIPPED | OUTPATIENT
Start: 2024-01-19 | End: 2024-01-26

## 2024-01-19 ASSESSMENT — ENCOUNTER SYMPTOMS
RESPIRATORY NEGATIVE: 1
EYES NEGATIVE: 1
SORE THROAT: 0
DIARRHEA: 0
WHEEZING: 0
EYE PAIN: 0
CONSTIPATION: 0
BACK PAIN: 0
GASTROINTESTINAL NEGATIVE: 1
BLOOD IN STOOL: 0
SHORTNESS OF BREATH: 0
EYE REDNESS: 0
VOMITING: 0
EYE DISCHARGE: 0
COUGH: 0
ABDOMINAL PAIN: 0
NAUSEA: 0

## 2024-01-19 NOTE — TELEPHONE ENCOUNTER
Called patient and reviewed lab results, K+ 3.2.  Patient states that she is only taking an over the counter potassium supplement daily.  Instructed that GUNNER Cain wants her to take one potassium pill twice a day for 7 days-instructed that this will be a prescription that we will send to her pharmacy.  Informed that Ina sent Dr Freire a message letting her know about the medication and she will follow up on this at 01/25/2024 appointment.  Patient v/u and is agreeable to plan.  Prescription sent to [facility] [address].

## 2024-01-19 NOTE — TELEPHONE ENCOUNTER
----- Message from GUNNER Garsia sent at 1/19/2024 10:29 AM CST -----  Please call Ms. Rincon and see if she is still taking a potassium supplement, her potassium is 3.2.  She needs to take potassium 20 mEQ p.o. twice daily for 7 days, she is scheduled to follow-up with Dr. Chapin Friedman on 1/25/2024 and follow-up for further recommendations.  If she needs prescription please send  I sent Dr. Friedman a message.

## 2024-02-08 DIAGNOSIS — E87.6 HYPOKALEMIA: Primary | ICD-10-CM

## 2024-02-08 NOTE — PROGRESS NOTES
Shabnam Rincon ( 1950) is a 73 y.o. female, Established  here for evaluation of the following chief complaint(s).  6 Month Follow-Up      Patient was encouraged and advised to be compliant with all  medications leads an active lifestyle and promote maintaining a healthy weight, encouraged not to use cigarettes, laboratory results discussed and reviewed with patient's during this visit   ASSESSMENT/PLAN:  Problem List          Musculoskeletal and Integument    Osteopenia      Unclear control, Patient was advised and to reconsider starting antiresorptive therapy along with calcium and vitamin D in her diet patient has the concept that bisphosphonates did not work            Other    Malignant neoplasm of unspecified site of unspecified female breast (HCC)      Monitored by specialist- no acute findings meriting change in the plan         Malignant neoplasm of overlapping sites of right female breast (HCC)    Hypokalemia, inadequate intake      Unclear control, lifestyle modifications recommended and patient was advised to repeat lab work, as she has been having recurrent Hypokalemia         Relevant Medications    potassium chloride (KLOR-CON M) 20 MEQ extended release tablet              No data to display                    2024     7:39 AM 2023     7:51 AM 2023     8:23 AM 2022     8:02 AM 2022     9:15 AM   PHQ Scores   PHQ2 Score 0 0 0 0 0   PHQ9 Score 0 0 0 0 0       Results for orders placed or performed during the hospital encounter of 24   CBC with Auto Differential   Result Value Ref Range    WBC 5.29 3.98 - 10.04 K/uL    RBC 3.83 (L) 3.93 - 5.22 M/uL    Hemoglobin 12.4 11.2 - 15.7 g/dL    Hematocrit 37.4 34.1 - 44.9 %    MCV 97.7 (H) 79.4 - 94.8 fL    MCH 32.4 (H) 25.6 - 32.2 pg    MCHC 33.2 32.3 - 35.5 g/dL    RDW 14.2 11.7 - 14.4 %    Platelets 176 (L) 182 - 369 K/uL    MPV 10.6 (H) 7.4 - 10.4 fL    Neutrophils % 73.7 (H) 34.0 - 71.1 %    Lymphocytes % 16.1 (L) 19.3 -

## 2024-02-09 ENCOUNTER — OFFICE VISIT (OUTPATIENT)
Dept: PRIMARY CARE CLINIC | Age: 74
End: 2024-02-09

## 2024-02-09 VITALS
TEMPERATURE: 98 F | RESPIRATION RATE: 20 BRPM | SYSTOLIC BLOOD PRESSURE: 130 MMHG | BODY MASS INDEX: 21.03 KG/M2 | HEIGHT: 67 IN | HEART RATE: 64 BPM | DIASTOLIC BLOOD PRESSURE: 86 MMHG | OXYGEN SATURATION: 98 % | WEIGHT: 134 LBS

## 2024-02-09 DIAGNOSIS — E87.6 HYPOKALEMIA: ICD-10-CM

## 2024-02-09 DIAGNOSIS — E87.6 HYPOKALEMIA, INADEQUATE INTAKE: ICD-10-CM

## 2024-02-09 DIAGNOSIS — C50.812 MALIGNANT NEOPLASM OF OVERLAPPING SITES OF LEFT FEMALE BREAST, UNSPECIFIED ESTROGEN RECEPTOR STATUS (HCC): ICD-10-CM

## 2024-02-09 DIAGNOSIS — M85.859 OSTEOPENIA OF NECK OF FEMUR, UNSPECIFIED LATERALITY: ICD-10-CM

## 2024-02-09 DIAGNOSIS — C50.811 MALIGNANT NEOPLASM OF OVERLAPPING SITES OF RIGHT FEMALE BREAST, UNSPECIFIED ESTROGEN RECEPTOR STATUS (HCC): ICD-10-CM

## 2024-02-09 LAB
ANION GAP SERPL CALCULATED.3IONS-SCNC: 7 MMOL/L (ref 7–19)
BUN SERPL-MCNC: 28 MG/DL (ref 8–23)
CALCIUM SERPL-MCNC: 9.3 MG/DL (ref 8.8–10.2)
CHLORIDE SERPL-SCNC: 104 MMOL/L (ref 98–111)
CO2 SERPL-SCNC: 29 MMOL/L (ref 22–29)
CREAT SERPL-MCNC: 0.6 MG/DL (ref 0.5–0.9)
GLUCOSE SERPL-MCNC: 100 MG/DL (ref 74–109)
MAGNESIUM SERPL-MCNC: 2.2 MG/DL (ref 1.6–2.4)
PHOSPHATE SERPL-MCNC: 2.8 MG/DL (ref 2.5–4.5)
POTASSIUM SERPL-SCNC: 3.6 MMOL/L (ref 3.5–5)
SODIUM SERPL-SCNC: 140 MMOL/L (ref 136–145)

## 2024-02-09 RX ORDER — POTASSIUM CHLORIDE 20 MEQ/1
20 TABLET, EXTENDED RELEASE ORAL
Qty: 36 TABLET | Refills: 1 | Status: SHIPPED | OUTPATIENT
Start: 2024-02-09 | End: 2024-08-07

## 2024-02-09 ASSESSMENT — ENCOUNTER SYMPTOMS
COUGH: 0
SINUS PAIN: 0
ABDOMINAL PAIN: 0
DIARRHEA: 0
BLOOD IN STOOL: 0
SINUS PRESSURE: 0
BACK PAIN: 0
ABDOMINAL DISTENTION: 0
SHORTNESS OF BREATH: 0
NAUSEA: 0
EYE REDNESS: 0
RHINORRHEA: 0
VOMITING: 0
WHEEZING: 0
TROUBLE SWALLOWING: 0
SORE THROAT: 0
CHEST TIGHTNESS: 0
VOICE CHANGE: 0

## 2024-02-09 ASSESSMENT — PATIENT HEALTH QUESTIONNAIRE - PHQ9
SUM OF ALL RESPONSES TO PHQ QUESTIONS 1-9: 0
1. LITTLE INTEREST OR PLEASURE IN DOING THINGS: 0
SUM OF ALL RESPONSES TO PHQ QUESTIONS 1-9: 0
SUM OF ALL RESPONSES TO PHQ9 QUESTIONS 1 & 2: 0
SUM OF ALL RESPONSES TO PHQ QUESTIONS 1-9: 0
2. FEELING DOWN, DEPRESSED OR HOPELESS: 0
SUM OF ALL RESPONSES TO PHQ QUESTIONS 1-9: 0

## 2024-02-09 NOTE — PATIENT INSTRUCTIONS
The medication list included in this document is our record of what you are currently taking, including any changes that were made at today's visit.  If you find any differences when compared to your medications at home, or have any questions that were not answered at your visit, please contact the office.Patient Education        Learning About Healthy Eating During Menopause  Why is healthy eating important during menopause?     Healthy eating is an important part of caring for yourself during menopause. It gives your body nutrients you need, like calcium and vitamin D. It helps you stay at a healthy weight. And it can reduce your risk for health problems that are more common after menopause, such as heart disease and osteoporosis.  Eating healthy during menopause  Here are some things you can do to eat healthy during menopause.  Eat a heart-healthy diet.   Choose foods like vegetables, fruits, nuts, beans, fish, and whole grains. Limit foods that have a lot of salt, fat, and sugar.  Choose foods that have a lot of calcium.   These include milk, cheese, yogurt, and calcium-fortified orange juice, soy milk, and tofu. Other sources of calcium include canned sardines, canned salmon with bones, and leafy green vegetables such as broccoli, kale, and Chinese cabbage. Between the ages of 19 and 50, you need 1,000 milligrams (mg) of calcium a day. At 51 and older, you need 1,200 mg a day.  Eat foods that are good sources of vitamin D.   Vitamin D helps your body use calcium. Foods that have vitamin D include salmon, tuna, and mackerel. Vitamin D-fortified foods like milk, soy milk, orange juice, and cereal are also good sources. Between the ages of 19 and 70, you need 600 international units (IU) of vitamin D a day. At age 71 and older, you need 800 IU a day.  Talk to your doctor about taking a calcium and vitamin D supplement.   It may be a good idea for you if you don't get enough of these nutrients from the foods you

## 2024-02-09 NOTE — ASSESSMENT & PLAN NOTE
Unclear control, Patient was advised and to reconsider starting antiresorptive therapy along with calcium and vitamin D in her diet patient has the concept that bisphosphonates did not work

## 2024-02-09 NOTE — ASSESSMENT & PLAN NOTE
Unclear control, lifestyle modifications recommended and patient was advised to repeat lab work, as she has been having recurrent Hypokalemia     Potsssium continues to be low, will start Potassium supplement 3X/week

## 2024-05-15 NOTE — ASSESSMENT & PLAN NOTE
Well-controlled, continue current medications  Patient is compliant with use of CPAP and continues to benefit  CPAP compliance report reviewed

## 2024-05-15 NOTE — PROGRESS NOTES
Pupils: Pupils are equal, round, and reactive to light.   Neck:      Thyroid: No thyroid mass, thyromegaly or thyroid tenderness.      Vascular: No JVD.   Cardiovascular:      Rate and Rhythm: Normal rate and regular rhythm.      Pulses: Normal pulses.      Heart sounds: Normal heart sounds, S1 normal and S2 normal. No murmur heard.  Pulmonary:      Effort: Pulmonary effort is normal. No respiratory distress.      Breath sounds: Normal breath sounds and air entry. No wheezing or rales.   Chest:   Breasts:     Right: Absent.       Abdominal:      General: Abdomen is flat. Bowel sounds are normal. There is no distension.      Palpations: Abdomen is soft.      Tenderness: There is no abdominal tenderness.   Musculoskeletal:      Right shoulder: No swelling, deformity, effusion, tenderness or bony tenderness. Decreased range of motion.      Cervical back: Full passive range of motion without pain, normal range of motion and neck supple.      Thoracic back: Deformity present.      Right lower leg: No edema.      Left lower leg: No edema.      Comments: kyphosis   Lymphadenopathy:      Cervical: No cervical adenopathy.   Skin:     General: Skin is warm and dry.      Findings: No rash.   Neurological:      Mental Status: She is alert and oriented to person, place, and time. Mental status is at baseline.      Cranial Nerves: No cranial nerve deficit.      Deep Tendon Reflexes: Reflexes normal.   Psychiatric:         Behavior: Behavior normal.         Thought Content: Thought content normal.         Judgment: Judgment normal.           MINICO/5      @I continue to be the focal point for all needed health care services and/or with medical care services that are part of ongoing care related to Shabnam Rincon    (Time Documentation Optional [id number])    An electronic signaturewaas used to authenticate this note  -Ekaterina Freire MD on 2024 at 8:01 AM

## 2024-05-16 ENCOUNTER — OFFICE VISIT (OUTPATIENT)
Dept: PRIMARY CARE CLINIC | Age: 74
End: 2024-05-16

## 2024-05-16 VITALS
RESPIRATION RATE: 18 BRPM | SYSTOLIC BLOOD PRESSURE: 130 MMHG | HEIGHT: 67 IN | DIASTOLIC BLOOD PRESSURE: 88 MMHG | OXYGEN SATURATION: 99 % | BODY MASS INDEX: 21.03 KG/M2 | TEMPERATURE: 97.2 F | WEIGHT: 134 LBS | HEART RATE: 56 BPM

## 2024-05-16 DIAGNOSIS — M85.859 OSTEOPENIA OF NECK OF FEMUR, UNSPECIFIED LATERALITY: ICD-10-CM

## 2024-05-16 DIAGNOSIS — K57.30 DIVERTICULOSIS OF COLON: ICD-10-CM

## 2024-05-16 DIAGNOSIS — Z23 NEED FOR SHINGLES VACCINE: ICD-10-CM

## 2024-05-16 DIAGNOSIS — G47.33 SEVERE OBSTRUCTIVE SLEEP APNEA: Primary | ICD-10-CM

## 2024-05-16 RX ORDER — ZOSTER VACCINE RECOMBINANT, ADJUVANTED 50 MCG/0.5
0.5 KIT INTRAMUSCULAR SEE ADMIN INSTRUCTIONS
Qty: 0.5 ML | Refills: 0 | Status: SHIPPED | OUTPATIENT
Start: 2024-05-16 | End: 2024-11-12

## 2024-05-16 ASSESSMENT — ENCOUNTER SYMPTOMS
ABDOMINAL PAIN: 0
WHEEZING: 0
TROUBLE SWALLOWING: 0
CHEST TIGHTNESS: 0
VOMITING: 0
COUGH: 0
BACK PAIN: 0
CONSTIPATION: 0
SHORTNESS OF BREATH: 0
BLOOD IN STOOL: 0
DIARRHEA: 0

## 2024-05-16 NOTE — PATIENT INSTRUCTIONS
The medication list included in this document is our record of what you are currently taking, including any changes that were made at today's visit.  If you find any differences when compared to your medications at home, or have any questions that were not answered at your visit, please contact the office.        schedule follow-up visit in 6 months and please have your lab work 1 to 2 weeks before follow-up visit

## 2024-07-17 ENCOUNTER — TELEPHONE (OUTPATIENT)
Dept: HEMATOLOGY | Age: 74
End: 2024-07-17

## 2024-07-17 NOTE — TELEPHONE ENCOUNTER
I called and reminded pt. of their appt. on 07/19/2024. Made pt. aware to arrive at appt. time & not lab appt. time. I also advised pt. to arrive no sooner than appt time. Pt voiced understanding and confirmed appt. date and time.

## 2024-07-18 DIAGNOSIS — D50.8 IRON DEFICIENCY ANEMIA SECONDARY TO INADEQUATE DIETARY IRON INTAKE: ICD-10-CM

## 2024-07-18 DIAGNOSIS — C50.811 MALIGNANT NEOPLASM OF OVERLAPPING SITES OF RIGHT FEMALE BREAST, UNSPECIFIED ESTROGEN RECEPTOR STATUS (HCC): Primary | ICD-10-CM

## 2024-07-18 NOTE — PROGRESS NOTES
Progress Note      Pt Name: Shabnam Rincon  YOB: 1950  MRN: [id number]    Date of evaluation: 07/19/2024  History Obtained From:  patient, electronic medical record    CHIEF COMPLAINT:    Chief Complaint   Patient presents with    Follow-up     Malignant neoplasm of overlapping sites of right female breast, unspecified estrogen receptor status (HCC)     HISTORY OF PRESENT ILLNESS:    Shabnam Rincon is a 73 y.o.  female who is currently being followed for a diagnosis of locally advanced HER-2 positive breast cancer diagnosed September 2019.  She obtained a complete pathologic response in the breast, unfortunately had residual disease in the axilla which was followed by adjuvant treatment to TDM 1 for 1 year.  Recommendation was given for adjuvant endocrine therapy, unfortunately she was intolerant to letrozole due to severe arthralgias, bone and chest pain.  She has had no known evidence of recurrent disease.  Shabnam returns today in scheduled follow-up for evaluation, side effect monitoring, lab monitoring and further recommendations.      Today's clinic visit to include physical assessment, review of systems, any lab or radiographic findings that were available and plan of care are documented below.      ONCOLOGIC HISTORY:   Diagnosis   HER-2 IDC right breast, September 2019  [id number]  ER 3%, DE 0%, HER-2/lea IHC 3+  Mammaprint high risk Basal-like  Osteopenia, Aug 2019-T score -1.5 lumbar spine     Treatment summary  10/18/2019 through  March 2020- 6 cycles of neoadjuvant Taxotere, Carboplatin and Herceptin and Perjeta  3/3/2020- right mastectomy/ lymph node dissection  3/20/2020-14 cycles of adjuvant TDM-1.   4/20/2020-6/4/2020 Completed adjuvant radiation 6040 cGy  4/10/2020-5/28/2022 adjuvant endocrine therapy with letrozole discontinued due to intolerance, severe bone and chest pain.  Declined trial of other adjuvant endocrine therapy    Ms Shabnam Rincon was first seen by

## 2024-07-19 ENCOUNTER — OFFICE VISIT (OUTPATIENT)
Dept: HEMATOLOGY | Age: 74
End: 2024-07-19
Payer: MEDICARE

## 2024-07-19 ENCOUNTER — HOSPITAL ENCOUNTER (OUTPATIENT)
Dept: INFUSION THERAPY | Age: 74
Discharge: HOME | End: 2024-07-19
Payer: MEDICARE

## 2024-07-19 VITALS
SYSTOLIC BLOOD PRESSURE: 132 MMHG | WEIGHT: 130.5 LBS | TEMPERATURE: 98.2 F | BODY MASS INDEX: 20.44 KG/M2 | OXYGEN SATURATION: 87 % | RESPIRATION RATE: 12 BRPM | DIASTOLIC BLOOD PRESSURE: 78 MMHG | HEART RATE: 60 BPM

## 2024-07-19 DIAGNOSIS — D50.8 IRON DEFICIENCY ANEMIA SECONDARY TO INADEQUATE DIETARY IRON INTAKE: ICD-10-CM

## 2024-07-19 DIAGNOSIS — C50.811 MALIGNANT NEOPLASM OF OVERLAPPING SITES OF RIGHT FEMALE BREAST, UNSPECIFIED ESTROGEN RECEPTOR STATUS (HCC): Primary | ICD-10-CM

## 2024-07-19 DIAGNOSIS — Z12.31 SCREENING MAMMOGRAM FOR HIGH-RISK PATIENT: ICD-10-CM

## 2024-07-19 DIAGNOSIS — C50.811 MALIGNANT NEOPLASM OF OVERLAPPING SITES OF RIGHT FEMALE BREAST, UNSPECIFIED ESTROGEN RECEPTOR STATUS (HCC): ICD-10-CM

## 2024-07-19 DIAGNOSIS — M85.89 OSTEOPENIA OF MULTIPLE SITES: ICD-10-CM

## 2024-07-19 DIAGNOSIS — I89.0 LYMPHEDEMA OF RIGHT ARM: ICD-10-CM

## 2024-07-19 DIAGNOSIS — D50.9 IRON DEFICIENCY ANEMIA, UNSPECIFIED IRON DEFICIENCY ANEMIA TYPE: ICD-10-CM

## 2024-07-19 DIAGNOSIS — D69.6 THROMBOCYTOPENIA (HCC): ICD-10-CM

## 2024-07-19 LAB
ALBUMIN SERPL-MCNC: 4.2 G/DL (ref 3.5–5.2)
ALP SERPL-CCNC: 66 U/L (ref 35–104)
ALT SERPL-CCNC: 24 U/L (ref 9–52)
ANION GAP SERPL CALCULATED.3IONS-SCNC: 9 MMOL/L (ref 7–19)
AST SERPL-CCNC: 37 U/L (ref 14–36)
BASOPHILS # BLD: 0.03 K/UL (ref 0.01–0.08)
BASOPHILS NFR BLD: 0.5 % (ref 0.1–1.2)
BILIRUB SERPL-MCNC: 0.8 MG/DL (ref 0.2–1.3)
BUN SERPL-MCNC: 33 MG/DL (ref 7–17)
CALCIUM SERPL-MCNC: 9.3 MG/DL (ref 8.4–10.2)
CHLORIDE SERPL-SCNC: 105 MMOL/L (ref 98–111)
CO2 SERPL-SCNC: 28 MMOL/L (ref 22–29)
CREAT SERPL-MCNC: 0.7 MG/DL (ref 0.5–1)
EOSINOPHIL # BLD: 0.27 K/UL (ref 0.04–0.54)
EOSINOPHIL NFR BLD: 4.7 % (ref 0.7–7)
ERYTHROCYTE [DISTWIDTH] IN BLOOD BY AUTOMATED COUNT: 14.1 % (ref 11.7–14.4)
FERRITIN SERPL-MCNC: 77 NG/ML (ref 13–150)
GLOBULIN: 2.7 G/DL
GLUCOSE SERPL-MCNC: 91 MG/DL (ref 74–106)
HCT VFR BLD AUTO: 34.8 % (ref 34.1–44.9)
HGB BLD-MCNC: 11.8 G/DL (ref 11.2–15.7)
IRON SATN MFR SERPL: 24 % (ref 14–50)
IRON SERPL-MCNC: 73 UG/DL (ref 37–145)
LYMPHOCYTES # BLD: 0.89 K/UL (ref 1.18–3.74)
LYMPHOCYTES NFR BLD: 15.5 % (ref 19.3–53.1)
MCH RBC QN AUTO: 33.8 PG (ref 25.6–32.2)
MCHC RBC AUTO-ENTMCNC: 33.9 G/DL (ref 32.3–35.5)
MCV RBC AUTO: 99.7 FL (ref 79.4–94.8)
MONOCYTES # BLD: 0.45 K/UL (ref 0.24–0.82)
MONOCYTES NFR BLD: 7.8 % (ref 4.7–12.5)
NEUTROPHILS # BLD: 4.11 K/UL (ref 1.56–6.13)
NEUTS SEG NFR BLD: 71.3 % (ref 34–71.1)
PLATELET # BLD AUTO: 127 K/UL (ref 182–369)
PMV BLD AUTO: 10.7 FL (ref 7.4–10.4)
POTASSIUM SERPL-SCNC: 3.2 MMOL/L (ref 3.5–5.1)
PROT SERPL-MCNC: 6.9 G/DL (ref 6.3–8.2)
RBC # BLD AUTO: 3.49 M/UL (ref 3.93–5.22)
SODIUM SERPL-SCNC: 142 MMOL/L (ref 137–145)
TIBC SERPL-MCNC: 298 UG/DL (ref 250–400)
WBC # BLD AUTO: 5.76 K/UL (ref 3.98–10.04)

## 2024-07-19 PROCEDURE — 99212 OFFICE O/P EST SF 10 MIN: CPT

## 2024-07-19 PROCEDURE — 80053 COMPREHEN METABOLIC PANEL: CPT

## 2024-07-19 PROCEDURE — 1036F TOBACCO NON-USER: CPT | Performed by: NURSE PRACTITIONER

## 2024-07-19 PROCEDURE — 3017F COLORECTAL CA SCREEN DOC REV: CPT | Performed by: NURSE PRACTITIONER

## 2024-07-19 PROCEDURE — G8399 PT W/DXA RESULTS DOCUMENT: HCPCS | Performed by: NURSE PRACTITIONER

## 2024-07-19 PROCEDURE — 85025 COMPLETE CBC W/AUTO DIFF WBC: CPT

## 2024-07-19 PROCEDURE — 36415 COLL VENOUS BLD VENIPUNCTURE: CPT

## 2024-07-19 PROCEDURE — G8420 CALC BMI NORM PARAMETERS: HCPCS | Performed by: NURSE PRACTITIONER

## 2024-07-19 PROCEDURE — 99214 OFFICE O/P EST MOD 30 MIN: CPT | Performed by: NURSE PRACTITIONER

## 2024-07-19 PROCEDURE — G8427 DOCREV CUR MEDS BY ELIG CLIN: HCPCS | Performed by: NURSE PRACTITIONER

## 2024-07-19 PROCEDURE — 1123F ACP DISCUSS/DSCN MKR DOCD: CPT | Performed by: NURSE PRACTITIONER

## 2024-07-19 PROCEDURE — 1090F PRES/ABSN URINE INCON ASSESS: CPT | Performed by: NURSE PRACTITIONER

## 2024-07-22 ASSESSMENT — ENCOUNTER SYMPTOMS
CONSTIPATION: 0
RESPIRATORY NEGATIVE: 1
DIARRHEA: 0
BLOOD IN STOOL: 0
ABDOMINAL PAIN: 0
COUGH: 0
VOMITING: 0
WHEEZING: 0
GASTROINTESTINAL NEGATIVE: 1
EYES NEGATIVE: 1
NAUSEA: 0
SHORTNESS OF BREATH: 0
EYE REDNESS: 0
SORE THROAT: 0
EYE PAIN: 0
BACK PAIN: 0
EYE DISCHARGE: 0

## 2024-07-23 ENCOUNTER — TELEPHONE (OUTPATIENT)
Dept: HEMATOLOGY | Age: 74
End: 2024-07-23

## 2024-07-23 NOTE — TELEPHONE ENCOUNTER
----- Message from GUNNER Garsia sent at 7/22/2024  6:52 AM CDT -----  Please call Shabnam and inform her that her potassium is low, she does have potassium supplement on her medication list, 20 mEq and taking 3 times a week, instruct her to take it daily until she has a repeat BMP.  Encouraged her to eat tomatoes!!  Please send labs to be PCP

## 2024-07-23 NOTE — TELEPHONE ENCOUNTER
Called patient and reviewed lab results, K+ 3.2.  Patient states that she stopped taking her potassium pill several months ago.  States that she still has \"a lot\" of pills and will start back on them.  Instructed to take one tablet daily and will need to follow up with her PCP.  Reviewed foods that are high in potassium for patient to increase in her diet. Patient v/u and is agreeable to plan.

## 2024-09-16 NOTE — TELEPHONE ENCOUNTER
----- Message from GUNNER Saini sent at 9/2/2023  9:00 AM CDT -----  Please call and discuss bone density study indicated osteopenia, at previous follow-up she was not taking supplement.   Strongly recommend taking calcium 1200 mg with vitamin D 1000 units daily no

## 2024-10-01 ENCOUNTER — HOSPITAL ENCOUNTER (OUTPATIENT)
Dept: WOMENS IMAGING | Age: 74
Discharge: HOME | End: 2024-10-01
Payer: MEDICARE

## 2024-10-01 VITALS — WEIGHT: 132 LBS | BODY MASS INDEX: 20.67 KG/M2

## 2024-10-01 DIAGNOSIS — Z12.31 ENCOUNTER FOR SCREENING MAMMOGRAM FOR HIGH-RISK PATIENT: ICD-10-CM

## 2024-10-01 PROCEDURE — 77063 BREAST TOMOSYNTHESIS BI: CPT

## 2024-11-18 PROBLEM — J47.9 BRONCHIECTASIS (HCC): Status: ACTIVE | Noted: 2024-11-18

## 2024-11-18 SDOH — ECONOMIC STABILITY: FOOD INSECURITY: WITHIN THE PAST 12 MONTHS, THE FOOD YOU BOUGHT JUST DIDN'T LAST AND YOU DIDN'T HAVE MONEY TO GET MORE.: NEVER TRUE

## 2024-11-18 SDOH — ECONOMIC STABILITY: INCOME INSECURITY: HOW HARD IS IT FOR YOU TO PAY FOR THE VERY BASICS LIKE FOOD, HOUSING, MEDICAL CARE, AND HEATING?: NOT HARD AT ALL

## 2024-11-18 SDOH — ECONOMIC STABILITY: FOOD INSECURITY: WITHIN THE PAST 12 MONTHS, YOU WORRIED THAT YOUR FOOD WOULD RUN OUT BEFORE YOU GOT MONEY TO BUY MORE.: NEVER TRUE

## 2024-11-18 NOTE — PROGRESS NOTES
Shabnam Rincon ( 1950) is a 74 y.o. female, Established , here for evaluation of the following chief complaint(s).  Medicare AWV and 6 Month Follow-Up      Patient was encouraged and advised to be compliant with all  medications leads an active lifestyle and promote maintaining a healthy weight, encouraged not to use cigarettes, laboratory results discussed and reviewed with patient's during this visit       Assessment & Plan  Bronchiectasis without complication (HCC)   Chronic, asymptomatic, agrees with repeat CT, getting her influenza vaccination today    Orders:    CT CHEST W CONTRAST; Future    CBC with Auto Differential; Standing    Thrombocytopenia (HCC)   Monitored by specialist- no acute findings meriting change in the plan  No mucosal bleeding, hemoptysis or melena    Orders:    CBC with Auto Differential; Standing    Need for prophylactic vaccination and inoculation against influenza    Influenza vaccine given today    Orders:    Influenza, FLUAD Trivalent, (age 65 y+), IM, Preservative Free, 0.5mL    Hypokalemia, inadequate intake   Chronic, at goal (stable), continue current treatment plan    Orders:    Comprehensive Metabolic Panel; Standing    Right bundle branch block (RBBB)   Chronic, at goal (stable), continue current treatment plan    Orders:    Comprehensive Metabolic Panel; Standing    Osteopenia, unspecified location    Patient will continue vitamin D    Orders:    Comprehensive Metabolic Panel; Standing    Screening for lipid disorders    Screening lipid panel ordered    Orders:    Lipid Panel; Standing    Medicare annual wellness visit, subsequent                     No data to display                    2024     7:36 AM 2024     7:39 AM 2023     7:51 AM 2023     8:23 AM 2022     8:02 AM   PHQ Scores   PHQ2 Score 0 0 0 0 0   PHQ9 Score 0 0 0 0 0       Results for orders placed or performed during the hospital encounter of 24   CBC with Auto Differential

## 2024-11-18 NOTE — ASSESSMENT & PLAN NOTE
Chronic, asymptomatic, agrees with repeat CT, getting her influenza vaccination today    Orders:    CT CHEST W CONTRAST; Future    CBC with Auto Differential; Standing

## 2024-11-18 NOTE — ASSESSMENT & PLAN NOTE
Monitored by specialist- no acute findings meriting change in the plan  No mucosal bleeding, hemoptysis or melena    Orders:    CBC with Auto Differential; Standing

## 2024-11-19 ENCOUNTER — OFFICE VISIT (OUTPATIENT)
Dept: PRIMARY CARE CLINIC | Age: 74
End: 2024-11-19
Payer: MEDICARE

## 2024-11-19 VITALS
HEIGHT: 67 IN | SYSTOLIC BLOOD PRESSURE: 118 MMHG | WEIGHT: 131 LBS | TEMPERATURE: 98.1 F | RESPIRATION RATE: 18 BRPM | OXYGEN SATURATION: 99 % | BODY MASS INDEX: 20.56 KG/M2 | DIASTOLIC BLOOD PRESSURE: 86 MMHG | HEART RATE: 79 BPM

## 2024-11-19 DIAGNOSIS — Z23 NEED FOR PROPHYLACTIC VACCINATION AND INOCULATION AGAINST INFLUENZA: ICD-10-CM

## 2024-11-19 DIAGNOSIS — M85.80 OSTEOPENIA, UNSPECIFIED LOCATION: ICD-10-CM

## 2024-11-19 DIAGNOSIS — I45.10 RIGHT BUNDLE BRANCH BLOCK (RBBB): ICD-10-CM

## 2024-11-19 DIAGNOSIS — J47.9 BRONCHIECTASIS WITHOUT COMPLICATION (HCC): Primary | ICD-10-CM

## 2024-11-19 DIAGNOSIS — E87.6 HYPOKALEMIA, INADEQUATE INTAKE: ICD-10-CM

## 2024-11-19 DIAGNOSIS — Z00.00 MEDICARE ANNUAL WELLNESS VISIT, SUBSEQUENT: ICD-10-CM

## 2024-11-19 DIAGNOSIS — D69.6 THROMBOCYTOPENIA (HCC): ICD-10-CM

## 2024-11-19 DIAGNOSIS — Z13.220 SCREENING FOR LIPID DISORDERS: ICD-10-CM

## 2024-11-19 PROCEDURE — 1123F ACP DISCUSS/DSCN MKR DOCD: CPT | Performed by: INTERNAL MEDICINE

## 2024-11-19 PROCEDURE — 90653 IIV ADJUVANT VACCINE IM: CPT | Performed by: INTERNAL MEDICINE

## 2024-11-19 PROCEDURE — G8482 FLU IMMUNIZE ORDER/ADMIN: HCPCS | Performed by: INTERNAL MEDICINE

## 2024-11-19 PROCEDURE — 1160F RVW MEDS BY RX/DR IN RCRD: CPT | Performed by: INTERNAL MEDICINE

## 2024-11-19 PROCEDURE — 1159F MED LIST DOCD IN RCRD: CPT | Performed by: INTERNAL MEDICINE

## 2024-11-19 PROCEDURE — G0008 ADMIN INFLUENZA VIRUS VAC: HCPCS | Performed by: INTERNAL MEDICINE

## 2024-11-19 SDOH — HEALTH STABILITY: PHYSICAL HEALTH: ON AVERAGE, HOW MANY DAYS PER WEEK DO YOU ENGAGE IN MODERATE TO STRENUOUS EXERCISE (LIKE A BRISK WALK)?: 7 DAYS

## 2024-11-19 SDOH — HEALTH STABILITY: PHYSICAL HEALTH: ON AVERAGE, HOW MANY MINUTES DO YOU ENGAGE IN EXERCISE AT THIS LEVEL?: 60 MIN

## 2024-11-19 ASSESSMENT — LIFESTYLE VARIABLES
HOW OFTEN DO YOU HAVE A DRINK CONTAINING ALCOHOL: 1
HOW MANY STANDARD DRINKS CONTAINING ALCOHOL DO YOU HAVE ON A TYPICAL DAY: 0
HOW OFTEN DO YOU HAVE SIX OR MORE DRINKS ON ONE OCCASION: 1
HOW MANY STANDARD DRINKS CONTAINING ALCOHOL DO YOU HAVE ON A TYPICAL DAY: PATIENT DOES NOT DRINK
HOW OFTEN DO YOU HAVE A DRINK CONTAINING ALCOHOL: NEVER

## 2024-11-19 ASSESSMENT — ENCOUNTER SYMPTOMS
SHORTNESS OF BREATH: 0
WHEEZING: 0
VOMITING: 0
BLOOD IN STOOL: 0
TROUBLE SWALLOWING: 0
RHINORRHEA: 0
BACK PAIN: 0
CHEST TIGHTNESS: 0
DIARRHEA: 0
COUGH: 0
CONSTIPATION: 0
SINUS PAIN: 0
ABDOMINAL PAIN: 0

## 2024-11-19 ASSESSMENT — PATIENT HEALTH QUESTIONNAIRE - PHQ9
2. FEELING DOWN, DEPRESSED OR HOPELESS: NOT AT ALL
SUM OF ALL RESPONSES TO PHQ QUESTIONS 1-9: 0
SUM OF ALL RESPONSES TO PHQ QUESTIONS 1-9: 0
1. LITTLE INTEREST OR PLEASURE IN DOING THINGS: NOT AT ALL
SUM OF ALL RESPONSES TO PHQ QUESTIONS 1-9: 0
SUM OF ALL RESPONSES TO PHQ9 QUESTIONS 1 & 2: 0
SUM OF ALL RESPONSES TO PHQ QUESTIONS 1-9: 0

## 2024-11-19 NOTE — ASSESSMENT & PLAN NOTE
Chronic, at goal (stable), continue current treatment plan    Orders:    Comprehensive Metabolic Panel; Standing

## 2024-12-16 ENCOUNTER — OFFICE VISIT (OUTPATIENT)
Dept: PULMONOLOGY | Age: 74
End: 2024-12-16
Payer: MEDICARE

## 2024-12-16 VITALS
HEART RATE: 70 BPM | WEIGHT: 132 LBS | DIASTOLIC BLOOD PRESSURE: 73 MMHG | BODY MASS INDEX: 20.72 KG/M2 | OXYGEN SATURATION: 94 % | RESPIRATION RATE: 20 BRPM | HEIGHT: 67 IN | SYSTOLIC BLOOD PRESSURE: 137 MMHG | TEMPERATURE: 97.5 F

## 2024-12-16 DIAGNOSIS — I45.10 RIGHT BUNDLE BRANCH BLOCK (RBBB): ICD-10-CM

## 2024-12-16 DIAGNOSIS — G47.33 SEVERE OBSTRUCTIVE SLEEP APNEA: Primary | ICD-10-CM

## 2024-12-16 DIAGNOSIS — G47.8 NON-RESTORATIVE SLEEP: ICD-10-CM

## 2024-12-16 DIAGNOSIS — R06.83 SNORING: ICD-10-CM

## 2024-12-16 PROCEDURE — G8427 DOCREV CUR MEDS BY ELIG CLIN: HCPCS | Performed by: INTERNAL MEDICINE

## 2024-12-16 PROCEDURE — 1159F MED LIST DOCD IN RCRD: CPT | Performed by: INTERNAL MEDICINE

## 2024-12-16 PROCEDURE — G8399 PT W/DXA RESULTS DOCUMENT: HCPCS | Performed by: INTERNAL MEDICINE

## 2024-12-16 PROCEDURE — 99213 OFFICE O/P EST LOW 20 MIN: CPT | Performed by: INTERNAL MEDICINE

## 2024-12-16 PROCEDURE — G8482 FLU IMMUNIZE ORDER/ADMIN: HCPCS | Performed by: INTERNAL MEDICINE

## 2024-12-16 PROCEDURE — 1123F ACP DISCUSS/DSCN MKR DOCD: CPT | Performed by: INTERNAL MEDICINE

## 2024-12-16 PROCEDURE — G8420 CALC BMI NORM PARAMETERS: HCPCS | Performed by: INTERNAL MEDICINE

## 2024-12-16 PROCEDURE — 1090F PRES/ABSN URINE INCON ASSESS: CPT | Performed by: INTERNAL MEDICINE

## 2024-12-16 PROCEDURE — 1036F TOBACCO NON-USER: CPT | Performed by: INTERNAL MEDICINE

## 2024-12-16 ASSESSMENT — ENCOUNTER SYMPTOMS
RHINORRHEA: 0
APNEA: 1
WHEEZING: 0
COUGH: 0
SHORTNESS OF BREATH: 0
ABDOMINAL PAIN: 0
ABDOMINAL DISTENTION: 0
CHEST TIGHTNESS: 0
ANAL BLEEDING: 0
BACK PAIN: 0

## 2024-12-16 NOTE — PROGRESS NOTES
Pulmonary and Sleep Medicine    Shabnam Rincon (:  1950) is a 74 y.o. female,Established patient, here for evaluation of the following chief complaint(s):  Follow-up (1 year Follow up for CARMELO /DME compliance Report uploaded 12/10/2024//-pt is requesting that her pressures are decreased on her DME)      Referring physician:  No referring provider defined for this encounter.     ASSESSMENT/PLAN:  1. Severe obstructive sleep apnea  2. Non-restorative sleep  3. Right bundle branch block (RBBB)  4. Snoring        Ask the DME to troubleshoot her CPAP machine.  Will reevaluate again in about 6 weeks.       Stoney Power MD, [facility], [facility]    Return in about 6 weeks (around 2025).    SUBJECTIVE/OBJECTIVE:        Patient is here for follow-up on obstructive sleep apnea.  Her CPAP download data was reviewed.  My interpretation of the download is that she is using the CPAP on average about 2.5 hours a night.  Her apnea-hypopnea index while on the CPAP is about 18 events per hour.  Compared to last year she is using the CPAP less and she is having definitely more events.  She says that the CPAP feels that it is blowing harder than it used to.  She was under the impression that the CPAP settings were changed however the CPAP setting is still the same and it set at 14.          Continue the following medications as reported by the patient:    Prior to Visit Medications    Medication Sig Taking? Authorizing Provider   VITAMIN D PO Take 1 tablet by mouth daily Pt unsure of dosage Yes Provider, Hermila, MD        Review of Systems   Constitutional:  Negative for activity change, appetite change, chills, diaphoresis and fatigue.   HENT:  Negative for congestion, dental problem, drooling, ear discharge, postnasal drip and rhinorrhea.    Eyes:  Negative for visual disturbance.   Respiratory:  Positive for apnea. Negative for cough, chest tightness, shortness of breath and wheezing.    Gastrointestinal:  Negative

## 2025-01-15 ENCOUNTER — TELEPHONE (OUTPATIENT)
Dept: HEMATOLOGY | Age: 75
End: 2025-01-15

## 2025-01-15 NOTE — TELEPHONE ENCOUNTER

## 2025-01-16 DIAGNOSIS — D50.8 IRON DEFICIENCY ANEMIA SECONDARY TO INADEQUATE DIETARY IRON INTAKE: Primary | ICD-10-CM

## 2025-01-16 DIAGNOSIS — D69.6 THROMBOCYTOPENIA (HCC): ICD-10-CM

## 2025-01-16 DIAGNOSIS — C50.811 MALIGNANT NEOPLASM OF OVERLAPPING SITES OF RIGHT FEMALE BREAST, UNSPECIFIED ESTROGEN RECEPTOR STATUS (HCC): ICD-10-CM

## 2025-01-16 NOTE — PROGRESS NOTES
Progress Note      Pt Name: Shabnam Rincon  YOB: 1950  MRN: [id number]    Date of evaluation: 01/17/2024  History Obtained From:  patient, electronic medical record    CHIEF COMPLAINT:    Chief Complaint   Patient presents with    Follow-up     Malignant neoplasm of overlapping sites of right female breast, unspecified estrogen receptor status (HCC)     HISTORY OF PRESENT ILLNESS:    Shabnam Rincon is a 74 y.o.  female who is currently being followed for a diagnosis of locally advanced HER-2 positive breast cancer diagnosed September 2019.  She also has macrocytic anemia and mild thrombocytopenia.  She obtained a complete pathologic response in the breast, unfortunately had residual disease in the axilla which was followed by adjuvant treatment to TDM 1 for 1 year.  Recommendation was given for adjuvant endocrine therapy, unfortunately she was intolerant to letrozole due to severe arthralgias, bone and chest pain.  She has had no known evidence of recurrent disease.  Shabnam returns today in scheduled follow-up for evaluation, side effect monitoring, lab monitoring and further recommendations.    History of Present Illness  She reports no new health concerns. She has no new left breast or right chest wall complaints.  She is not on iron supplementation but is taking vitamin D and calcium supplements.      Today's clinic visit to include physical assessment, review of systems, any lab or radiographic findings that were available and plan of care are documented below.      ONCOLOGIC HISTORY:   Diagnosis   HER-2 IDC right breast, September 2019  [id number]  ER 3%, SC 0%, HER-2/lea IHC 3+  Mammaprint high risk Basal-like  Osteopenia, Aug 2019-T score -1.5 lumbar spine     Treatment summary  10/18/2019 through  March 2020- 6 cycles of neoadjuvant Taxotere, Carboplatin and Herceptin and Perjeta  3/3/2020- right mastectomy/ lymph node dissection  3/20/2020-14 cycles of adjuvant TDM-1.

## 2025-01-17 ENCOUNTER — HOSPITAL ENCOUNTER (OUTPATIENT)
Dept: INFUSION THERAPY | Age: 75
Discharge: HOME | End: 2025-01-17
Payer: MEDICARE

## 2025-01-17 ENCOUNTER — OFFICE VISIT (OUTPATIENT)
Dept: HEMATOLOGY | Age: 75
End: 2025-01-17
Payer: MEDICARE

## 2025-01-17 VITALS
HEART RATE: 59 BPM | HEIGHT: 67 IN | BODY MASS INDEX: 20.56 KG/M2 | TEMPERATURE: 97.8 F | DIASTOLIC BLOOD PRESSURE: 70 MMHG | WEIGHT: 131 LBS | OXYGEN SATURATION: 97 % | SYSTOLIC BLOOD PRESSURE: 124 MMHG

## 2025-01-17 DIAGNOSIS — C50.811 MALIGNANT NEOPLASM OF OVERLAPPING SITES OF RIGHT FEMALE BREAST, UNSPECIFIED ESTROGEN RECEPTOR STATUS (HCC): ICD-10-CM

## 2025-01-17 DIAGNOSIS — M85.89 OSTEOPENIA OF MULTIPLE SITES: ICD-10-CM

## 2025-01-17 DIAGNOSIS — D69.6 THROMBOCYTOPENIA (HCC): ICD-10-CM

## 2025-01-17 DIAGNOSIS — C50.811 MALIGNANT NEOPLASM OF OVERLAPPING SITES OF RIGHT FEMALE BREAST, UNSPECIFIED ESTROGEN RECEPTOR STATUS (HCC): Primary | ICD-10-CM

## 2025-01-17 DIAGNOSIS — D50.8 IRON DEFICIENCY ANEMIA SECONDARY TO INADEQUATE DIETARY IRON INTAKE: ICD-10-CM

## 2025-01-17 LAB
ALBUMIN SERPL-MCNC: 4.2 G/DL (ref 3.5–5.2)
ALP SERPL-CCNC: 65 U/L (ref 35–104)
ALT SERPL-CCNC: 17 U/L (ref 5–33)
ANION GAP SERPL CALCULATED.3IONS-SCNC: 8 MMOL/L (ref 7–19)
AST SERPL-CCNC: 29 U/L (ref 5–32)
BASOPHILS # BLD: 0.03 K/UL (ref 0–0.2)
BASOPHILS NFR BLD: 0.5 % (ref 0–1)
BILIRUB SERPL-MCNC: 0.4 MG/DL (ref 0–1.2)
BUN SERPL-MCNC: 28 MG/DL (ref 8–23)
CALCIUM SERPL-MCNC: 9.4 MG/DL (ref 8.8–10.2)
CHLORIDE SERPL-SCNC: 102 MMOL/L (ref 98–107)
CO2 SERPL-SCNC: 30 MMOL/L (ref 22–29)
CREAT SERPL-MCNC: 1 MG/DL (ref 0.5–0.9)
EOSINOPHIL # BLD: 0.08 K/UL (ref 0–0.6)
EOSINOPHIL NFR BLD: 1.2 % (ref 0–5)
ERYTHROCYTE [DISTWIDTH] IN BLOOD BY AUTOMATED COUNT: 14 % (ref 11.5–14.5)
FERRITIN SERPL-MCNC: 82.9 NG/ML (ref 13–150)
GLUCOSE SERPL-MCNC: 90 MG/DL (ref 70–99)
HCT VFR BLD AUTO: 37.4 % (ref 37–47)
HGB BLD-MCNC: 12.7 G/DL (ref 12–16)
IRON SATN MFR SERPL: 47 % (ref 14–50)
IRON SERPL-MCNC: 131 UG/DL (ref 37–145)
LYMPHOCYTES # BLD: 1.07 K/UL (ref 1.1–4.5)
LYMPHOCYTES NFR BLD: 16.5 % (ref 20–40)
MCH RBC QN AUTO: 34 PG (ref 27–31)
MCHC RBC AUTO-ENTMCNC: 34 G/DL (ref 33–37)
MCV RBC AUTO: 100 FL (ref 81–99)
MONOCYTES # BLD: 0.43 K/UL (ref 0–0.9)
MONOCYTES NFR BLD: 6.6 % (ref 1–10)
NEUTROPHILS # BLD: 4.88 K/UL (ref 1.5–7.5)
NEUTS SEG NFR BLD: 75 % (ref 50–65)
PLATELET # BLD AUTO: 146 K/UL (ref 130–400)
PMV BLD AUTO: 11.2 FL (ref 9.4–12.3)
POTASSIUM SERPL-SCNC: 4 MMOL/L (ref 3.5–5.1)
PROT SERPL-MCNC: 6.5 G/DL (ref 6.4–8.3)
RBC # BLD AUTO: 3.74 M/UL (ref 4.2–5.4)
SODIUM SERPL-SCNC: 140 MMOL/L (ref 136–145)
TIBC SERPL-MCNC: 281 UG/DL (ref 250–400)
WBC # BLD AUTO: 6.5 K/UL (ref 4.8–10.8)

## 2025-01-17 PROCEDURE — 80053 COMPREHEN METABOLIC PANEL: CPT

## 2025-01-17 PROCEDURE — 1036F TOBACCO NON-USER: CPT | Performed by: NURSE PRACTITIONER

## 2025-01-17 PROCEDURE — G8427 DOCREV CUR MEDS BY ELIG CLIN: HCPCS | Performed by: NURSE PRACTITIONER

## 2025-01-17 PROCEDURE — 99214 OFFICE O/P EST MOD 30 MIN: CPT | Performed by: NURSE PRACTITIONER

## 2025-01-17 PROCEDURE — 36415 COLL VENOUS BLD VENIPUNCTURE: CPT

## 2025-01-17 PROCEDURE — G8420 CALC BMI NORM PARAMETERS: HCPCS | Performed by: NURSE PRACTITIONER

## 2025-01-17 PROCEDURE — G8399 PT W/DXA RESULTS DOCUMENT: HCPCS | Performed by: NURSE PRACTITIONER

## 2025-01-17 PROCEDURE — 1159F MED LIST DOCD IN RCRD: CPT | Performed by: NURSE PRACTITIONER

## 2025-01-17 PROCEDURE — 1123F ACP DISCUSS/DSCN MKR DOCD: CPT | Performed by: NURSE PRACTITIONER

## 2025-01-17 PROCEDURE — 85025 COMPLETE CBC W/AUTO DIFF WBC: CPT

## 2025-01-17 PROCEDURE — 1090F PRES/ABSN URINE INCON ASSESS: CPT | Performed by: NURSE PRACTITIONER

## 2025-01-17 PROCEDURE — 99212 OFFICE O/P EST SF 10 MIN: CPT

## 2025-01-17 PROCEDURE — 1126F AMNT PAIN NOTED NONE PRSNT: CPT | Performed by: NURSE PRACTITIONER

## 2025-01-22 ENCOUNTER — OFFICE VISIT (OUTPATIENT)
Dept: PULMONOLOGY | Age: 75
End: 2025-01-22
Payer: MEDICARE

## 2025-01-22 VITALS
DIASTOLIC BLOOD PRESSURE: 87 MMHG | WEIGHT: 136 LBS | TEMPERATURE: 98.4 F | SYSTOLIC BLOOD PRESSURE: 143 MMHG | BODY MASS INDEX: 21.3 KG/M2 | OXYGEN SATURATION: 95 % | HEART RATE: 63 BPM

## 2025-01-22 DIAGNOSIS — G47.8 NON-RESTORATIVE SLEEP: ICD-10-CM

## 2025-01-22 DIAGNOSIS — G47.33 SEVERE OBSTRUCTIVE SLEEP APNEA: Primary | ICD-10-CM

## 2025-01-22 DIAGNOSIS — J47.9 BRONCHIECTASIS WITHOUT COMPLICATION (HCC): ICD-10-CM

## 2025-01-22 DIAGNOSIS — I45.10 RIGHT BUNDLE BRANCH BLOCK (RBBB): ICD-10-CM

## 2025-01-22 PROCEDURE — G8399 PT W/DXA RESULTS DOCUMENT: HCPCS | Performed by: INTERNAL MEDICINE

## 2025-01-22 PROCEDURE — 1090F PRES/ABSN URINE INCON ASSESS: CPT | Performed by: INTERNAL MEDICINE

## 2025-01-22 PROCEDURE — G8427 DOCREV CUR MEDS BY ELIG CLIN: HCPCS | Performed by: INTERNAL MEDICINE

## 2025-01-22 PROCEDURE — 1123F ACP DISCUSS/DSCN MKR DOCD: CPT | Performed by: INTERNAL MEDICINE

## 2025-01-22 PROCEDURE — G8420 CALC BMI NORM PARAMETERS: HCPCS | Performed by: INTERNAL MEDICINE

## 2025-01-22 PROCEDURE — 1036F TOBACCO NON-USER: CPT | Performed by: INTERNAL MEDICINE

## 2025-01-22 PROCEDURE — 1159F MED LIST DOCD IN RCRD: CPT | Performed by: INTERNAL MEDICINE

## 2025-01-22 PROCEDURE — 99214 OFFICE O/P EST MOD 30 MIN: CPT | Performed by: INTERNAL MEDICINE

## 2025-01-22 ASSESSMENT — ENCOUNTER SYMPTOMS
BACK PAIN: 0
ABDOMINAL PAIN: 0
COUGH: 0
CHEST TIGHTNESS: 0
ANAL BLEEDING: 0
WHEEZING: 0
ABDOMINAL DISTENTION: 0
APNEA: 0
SHORTNESS OF BREATH: 0
RHINORRHEA: 0

## 2025-01-22 NOTE — PROGRESS NOTES
Pulmonary and Sleep Medicine    Shabnam Rincon (:  1950) is a 74 y.o. female,Established patient, here for evaluation of the following chief complaint(s):  Follow-up (6 week follow up )      Referring physician:  No referring provider defined for this encounter.     ASSESSMENT/PLAN:  1. Severe obstructive sleep apnea  2. Bronchiectasis without complication (HCC)  3. Non-restorative sleep  4. Right bundle branch block (RBBB)        Will change her CPAP to auto titrating between 14 and 20.  Reevaluate again in 6 weeks.  She says that her CPAP hose is popping off from the machine.  She probably needs a new hose and a new CPAP.       Stoney Power MD, [facility], [facility]    Return in about 6 weeks (around 3/5/2025).    SUBJECTIVE/OBJECTIVE:        The patient is here for evaluation and follow-up on obstructive sleep apnea.  Her CPAP download data was reviewed by myself.  My interpretation of the download is that she is using the CPAP on average about 3 hours a night.  Her apnea-hypopnea index while on the CPAP is about 17 events per hour.  She continues to be on CPAP at 14 cm pressure.          Continue the following medications as reported by the patient:    Prior to Visit Medications    Medication Sig Taking? Authorizing Provider   VITAMIN D PO Take 1 tablet by mouth daily Pt unsure of dosage  Provider, MD Hermila        Review of Systems   Constitutional:  Negative for activity change, appetite change, chills, diaphoresis and fatigue.   HENT:  Negative for congestion, dental problem, drooling, ear discharge, postnasal drip and rhinorrhea.    Eyes:  Negative for visual disturbance.   Respiratory:  Negative for apnea, cough, chest tightness, shortness of breath and wheezing.    Gastrointestinal:  Negative for abdominal distention, abdominal pain and anal bleeding.   Endocrine: Negative for cold intolerance, heat intolerance and polydipsia.   Genitourinary:  Negative for difficulty urinating, dysuria,

## 2025-02-26 ENCOUNTER — HOSPITAL ENCOUNTER (OUTPATIENT)
Dept: GENERAL RADIOLOGY | Age: 75
Discharge: HOME | End: 2025-02-26
Attending: INTERNAL MEDICINE
Payer: MEDICARE

## 2025-02-26 DIAGNOSIS — J47.9 BRONCHIECTASIS WITHOUT COMPLICATION (HCC): ICD-10-CM

## 2025-02-26 PROCEDURE — 71260 CT THORAX DX C+: CPT

## 2025-02-26 PROCEDURE — 6360000004 HC RX CONTRAST MEDICATION: Performed by: INTERNAL MEDICINE

## 2025-02-26 RX ORDER — IOPAMIDOL 755 MG/ML
100 INJECTION, SOLUTION INTRAVASCULAR
Status: COMPLETED | OUTPATIENT
Start: 2025-02-26 | End: 2025-02-26

## 2025-02-26 RX ADMIN — IOPAMIDOL 75 ML: 755 INJECTION, SOLUTION INTRAVENOUS at 10:02

## 2025-03-05 ENCOUNTER — OFFICE VISIT (OUTPATIENT)
Dept: PULMONOLOGY | Age: 75
End: 2025-03-05
Payer: MEDICARE

## 2025-03-05 VITALS
BODY MASS INDEX: 21.19 KG/M2 | WEIGHT: 135 LBS | SYSTOLIC BLOOD PRESSURE: 150 MMHG | RESPIRATION RATE: 16 BRPM | HEART RATE: 66 BPM | OXYGEN SATURATION: 94 % | TEMPERATURE: 97.7 F | DIASTOLIC BLOOD PRESSURE: 88 MMHG | HEIGHT: 67 IN

## 2025-03-05 DIAGNOSIS — I45.10 RIGHT BUNDLE BRANCH BLOCK (RBBB): ICD-10-CM

## 2025-03-05 DIAGNOSIS — G47.33 SEVERE OBSTRUCTIVE SLEEP APNEA: Primary | ICD-10-CM

## 2025-03-05 DIAGNOSIS — R06.83 SNORING: ICD-10-CM

## 2025-03-05 DIAGNOSIS — G47.8 NON-RESTORATIVE SLEEP: ICD-10-CM

## 2025-03-05 DIAGNOSIS — J47.9 BRONCHIECTASIS WITHOUT COMPLICATION (HCC): ICD-10-CM

## 2025-03-05 PROCEDURE — 1036F TOBACCO NON-USER: CPT | Performed by: INTERNAL MEDICINE

## 2025-03-05 PROCEDURE — 99214 OFFICE O/P EST MOD 30 MIN: CPT | Performed by: INTERNAL MEDICINE

## 2025-03-05 PROCEDURE — G8399 PT W/DXA RESULTS DOCUMENT: HCPCS | Performed by: INTERNAL MEDICINE

## 2025-03-05 PROCEDURE — G8427 DOCREV CUR MEDS BY ELIG CLIN: HCPCS | Performed by: INTERNAL MEDICINE

## 2025-03-05 PROCEDURE — G8420 CALC BMI NORM PARAMETERS: HCPCS | Performed by: INTERNAL MEDICINE

## 2025-03-05 PROCEDURE — 1123F ACP DISCUSS/DSCN MKR DOCD: CPT | Performed by: INTERNAL MEDICINE

## 2025-03-05 PROCEDURE — 1090F PRES/ABSN URINE INCON ASSESS: CPT | Performed by: INTERNAL MEDICINE

## 2025-03-05 PROCEDURE — 1159F MED LIST DOCD IN RCRD: CPT | Performed by: INTERNAL MEDICINE

## 2025-03-05 ASSESSMENT — ENCOUNTER SYMPTOMS
SHORTNESS OF BREATH: 0
WHEEZING: 0
CHEST TIGHTNESS: 0
COUGH: 0
RHINORRHEA: 0
ABDOMINAL DISTENTION: 0
APNEA: 0
BACK PAIN: 0
ANAL BLEEDING: 0
ABDOMINAL PAIN: 0

## 2025-03-05 NOTE — PROGRESS NOTES
Pulmonary and Sleep Medicine    Shabnam Rincon (:  1950) is a 74 y.o. female,Established patient, here for evaluation of the following chief complaint(s):  Follow-up (6 WEEK FU/PT ADVISED THE COMPANY WOULD NOT GIVE HER THE SUPPLIES DR Moore WANTED HER TO HAVE)      Referring physician:  No referring provider defined for this encounter.     ASSESSMENT/PLAN:  1. Severe obstructive sleep apnea  2. Bronchiectasis without complication (HCC)  3. Non-restorative sleep  4. Right bundle branch block (RBBB)  5. Snoring        Continue current management with the CPAP she is compliant with the CPAP she feels the CPAP helps.  She probably needs a CPAP machine replacement since this report is not coherent.  Despite the fact that she is set to titrate between the pressure of 14 and 20 CPAP is reporting median pressure of 8.9 and 95th percentile pressure 11.5.         Stoney Power MD, [facility], [facility]    Return in about 6 weeks (around 2025).    SUBJECTIVE/OBJECTIVE:        Patient is here for follow-up on obstructive sleep apnea.  Her CPAP compliance data was reviewed.  My interpretation of download is that the CPAP machine is set to titrate between 14 and 20 however the CPAP report indicates that despite the settings the machine median pressure is about 8.9 and 95th percentile pressure is about 11.5.  The patient feels better with the CPAP.  The patient's apnea-hypopnea index is 11.9 events per hour.          Continue the following medications as reported by the patient:    Prior to Visit Medications    Medication Sig Taking? Authorizing Provider   VITAMIN D PO Take 1 tablet by mouth daily Pt unsure of dosage Yes Provider, Historical, MD        Review of Systems   Constitutional:  Negative for activity change, appetite change, chills, diaphoresis and fatigue.   HENT:  Negative for congestion, dental problem, drooling, ear discharge, postnasal drip and rhinorrhea.    Eyes:  Negative for visual disturbance.

## 2025-03-19 ENCOUNTER — PATIENT MESSAGE (OUTPATIENT)
Dept: PULMONOLOGY | Age: 75
End: 2025-03-19

## 2025-04-01 ENCOUNTER — RESULTS FOLLOW-UP (OUTPATIENT)
Dept: PRIMARY CARE CLINIC | Age: 75
End: 2025-04-01

## 2025-04-01 DIAGNOSIS — J43.8 OTHER EMPHYSEMA (HCC): Primary | ICD-10-CM

## 2025-04-01 DIAGNOSIS — R91.1 PULMONARY NODULE: ICD-10-CM

## 2025-04-01 DIAGNOSIS — J47.9 BRONCHIECTASIS WITHOUT COMPLICATION (HCC): ICD-10-CM

## 2025-04-08 ENCOUNTER — OFFICE VISIT (OUTPATIENT)
Dept: PULMONOLOGY | Age: 75
End: 2025-04-08
Payer: MEDICARE

## 2025-04-08 VITALS
BODY MASS INDEX: 21.22 KG/M2 | OXYGEN SATURATION: 96 % | HEIGHT: 67 IN | TEMPERATURE: 97.9 F | WEIGHT: 135.2 LBS | HEART RATE: 68 BPM | SYSTOLIC BLOOD PRESSURE: 157 MMHG | RESPIRATION RATE: 20 BRPM | DIASTOLIC BLOOD PRESSURE: 83 MMHG

## 2025-04-08 DIAGNOSIS — G47.8 NON-RESTORATIVE SLEEP: ICD-10-CM

## 2025-04-08 DIAGNOSIS — R06.83 SNORING: ICD-10-CM

## 2025-04-08 DIAGNOSIS — G47.33 SEVERE OBSTRUCTIVE SLEEP APNEA: Primary | ICD-10-CM

## 2025-04-08 DIAGNOSIS — I45.10 RIGHT BUNDLE BRANCH BLOCK (RBBB): ICD-10-CM

## 2025-04-08 DIAGNOSIS — J47.9 BRONCHIECTASIS WITHOUT COMPLICATION (HCC): ICD-10-CM

## 2025-04-08 PROCEDURE — G8399 PT W/DXA RESULTS DOCUMENT: HCPCS | Performed by: INTERNAL MEDICINE

## 2025-04-08 PROCEDURE — G8427 DOCREV CUR MEDS BY ELIG CLIN: HCPCS | Performed by: INTERNAL MEDICINE

## 2025-04-08 PROCEDURE — G8420 CALC BMI NORM PARAMETERS: HCPCS | Performed by: INTERNAL MEDICINE

## 2025-04-08 PROCEDURE — 1090F PRES/ABSN URINE INCON ASSESS: CPT | Performed by: INTERNAL MEDICINE

## 2025-04-08 PROCEDURE — 99214 OFFICE O/P EST MOD 30 MIN: CPT | Performed by: INTERNAL MEDICINE

## 2025-04-08 PROCEDURE — 1159F MED LIST DOCD IN RCRD: CPT | Performed by: INTERNAL MEDICINE

## 2025-04-08 PROCEDURE — 1036F TOBACCO NON-USER: CPT | Performed by: INTERNAL MEDICINE

## 2025-04-08 PROCEDURE — 1123F ACP DISCUSS/DSCN MKR DOCD: CPT | Performed by: INTERNAL MEDICINE

## 2025-04-08 ASSESSMENT — ENCOUNTER SYMPTOMS
ABDOMINAL DISTENTION: 0
COUGH: 0
WHEEZING: 0
BACK PAIN: 0
SHORTNESS OF BREATH: 1
ANAL BLEEDING: 0
RHINORRHEA: 0
CHEST TIGHTNESS: 0
ABDOMINAL PAIN: 0
APNEA: 0

## 2025-04-08 NOTE — PROGRESS NOTES
tightness and wheezing.    Gastrointestinal:  Negative for abdominal distention, abdominal pain and anal bleeding.   Endocrine: Negative for cold intolerance, heat intolerance and polydipsia.   Genitourinary:  Negative for difficulty urinating, dysuria, enuresis and flank pain.   Musculoskeletal:  Negative for arthralgias, back pain and gait problem.   Allergic/Immunologic: Negative for environmental allergies.   Neurological:  Negative for dizziness, facial asymmetry, light-headedness and headaches.       Vitals:    04/08/25 1500 04/08/25 1504   BP: (!) 175/79 (!) 157/83   Pulse: 68    Resp: 20    Temp: 97.9 °F (36.6 °C)    SpO2: 96%    Weight: 61.3 kg (135 lb 3.2 oz)    Height: 1.702 m (5' 7\")       BMI Readings from Last 1 Encounters:   04/08/25 21.18 kg/m²         Physical Exam  Vitals reviewed.   Constitutional:       Appearance: Normal appearance.   HENT:      Head: Normocephalic and atraumatic.      Nose: Nose normal.   Eyes:      Extraocular Movements: Extraocular movements intact.      Conjunctiva/sclera: Conjunctivae normal.   Cardiovascular:      Rate and Rhythm: Normal rate and regular rhythm.      Heart sounds: No murmur heard.     No friction rub.   Pulmonary:      Effort: Pulmonary effort is normal. No respiratory distress.      Breath sounds: Normal breath sounds. No stridor. No wheezing, rhonchi or rales.   Abdominal:      General: There is no distension.      Palpations: There is no mass.      Tenderness: There is no abdominal tenderness. There is no guarding or rebound.   Musculoskeletal:      Cervical back: Normal range of motion and neck supple.   Neurological:      Mental Status: She is alert and oriented to person, place, and time.             This note was generated using a voice recognition software. Errors in voice recognition may have occurred.      An electronic signature was used to authenticate this note.    --Stoney Power MD

## 2025-05-16 SDOH — ECONOMIC STABILITY: INCOME INSECURITY: IN THE LAST 12 MONTHS, WAS THERE A TIME WHEN YOU WERE NOT ABLE TO PAY THE MORTGAGE OR RENT ON TIME?: NO

## 2025-05-16 SDOH — ECONOMIC STABILITY: TRANSPORTATION INSECURITY
IN THE PAST 12 MONTHS, HAS THE LACK OF TRANSPORTATION KEPT YOU FROM MEDICAL APPOINTMENTS OR FROM GETTING MEDICATIONS?: NO

## 2025-05-16 SDOH — ECONOMIC STABILITY: FOOD INSECURITY: WITHIN THE PAST 12 MONTHS, YOU WORRIED THAT YOUR FOOD WOULD RUN OUT BEFORE YOU GOT MONEY TO BUY MORE.: NEVER TRUE

## 2025-05-16 SDOH — ECONOMIC STABILITY: FOOD INSECURITY: WITHIN THE PAST 12 MONTHS, THE FOOD YOU BOUGHT JUST DIDN'T LAST AND YOU DIDN'T HAVE MONEY TO GET MORE.: NEVER TRUE

## 2025-05-16 ASSESSMENT — PATIENT HEALTH QUESTIONNAIRE - PHQ9
SUM OF ALL RESPONSES TO PHQ QUESTIONS 1-9: 0
SUM OF ALL RESPONSES TO PHQ QUESTIONS 1-9: 0
SUM OF ALL RESPONSES TO PHQ9 QUESTIONS 1 & 2: 0
1. LITTLE INTEREST OR PLEASURE IN DOING THINGS: NOT AT ALL
2. FEELING DOWN, DEPRESSED OR HOPELESS: NOT AT ALL
SUM OF ALL RESPONSES TO PHQ QUESTIONS 1-9: 0
SUM OF ALL RESPONSES TO PHQ QUESTIONS 1-9: 0
2. FEELING DOWN, DEPRESSED OR HOPELESS: NOT AT ALL
1. LITTLE INTEREST OR PLEASURE IN DOING THINGS: NOT AT ALL

## 2025-05-18 PROBLEM — Z85.3 HISTORY OF RIGHT BREAST CANCER: Status: ACTIVE | Noted: 2025-05-18

## 2025-05-18 PROBLEM — C50.811 MALIGNANT NEOPLASM OF OVERLAPPING SITES OF RIGHT FEMALE BREAST (HCC): Status: RESOLVED | Noted: 2019-09-24 | Resolved: 2025-05-18

## 2025-05-18 NOTE — PROGRESS NOTES
Shabnam Rincon ( 1950) is a 74 y.o. female, Established , here for evaluation of the following chief complaint(s).  6 Month Follow-Up      Patient was encouraged and advised to be compliant with all  medications leads an active lifestyle and promote maintaining a healthy weight, encouraged not to use cigarettes, laboratory results discussed and reviewed with patient's during this visit       Assessment & Plan  History of right breast cancer  At this time she is asymptomatic  Mammogram Result (most recent):  BRUCE DIGITAL SCREEN UNILATERAL LEFT 10/01/2024    Narrative  EXAM:  DIGITAL THE LEFT SCREENING MAMMOGRAM WITH 3-D TOMOSYNTHESIS    HISTORY:  Screening mammogram with history of right-sided breast cancer    COMPARISON: 2023    FINDINGS:  Left CC and MLO views of the breast were performed digitally and demonstrate scattered fibroglandular breast density.  There are benign breast calcifications.  There is been no interval change.    Impression  New or suspicious mass or calcification    RECOMMENDATION:    Annual screening mammogram    BIRADS category  2:  Benign findings        ______________________________________  Electronically signed by: TEGAN CID M.D.  Date:     10/01/2024  Time:    11:40  Performing Facility: [place] [address] Phone: [phone]            Bronchiectasis, uncomplicated (HCC)   Chronic, at goal (stable), currently asymptomatic         Other emphysema (HCC)   Chronic, at goal (stable), may have been exposed to passive cigarette smoking she is asymptomatic no wheezing, no cough no daily sputum production         Pulmonary nodule    Serial CT of the lungs have been normal         Severe obstructive sleep apnea   Chronic, at goal (stable), continues to be compliant with CPAP use         Thrombocytopenia   Chronic, at goal (stable), continue to monitor platelet count this time in January it was in normal range                  No data

## 2025-05-18 NOTE — ASSESSMENT & PLAN NOTE
At this time she is asymptomatic  Mammogram Result (most recent):  BRUCE DIGITAL SCREEN UNILATERAL LEFT 10/01/2024    Narrative  EXAM:  DIGITAL THE LEFT SCREENING MAMMOGRAM WITH 3-D TOMOSYNTHESIS    HISTORY:  Screening mammogram with history of right-sided breast cancer    COMPARISON: 08/20/2023    FINDINGS:  Left CC and MLO views of the breast were performed digitally and demonstrate scattered fibroglandular breast density.  There are benign breast calcifications.  There is been no interval change.    Impression  New or suspicious mass or calcification    RECOMMENDATION:    Annual screening mammogram    BIRADS category  2:  Benign findings        ______________________________________  Electronically signed by: TEGAN CID M.D.  Date:     10/01/2024  Time:    11:40  Performing Facility: [facility] [place] [address] Phone: [phone]

## 2025-05-19 ENCOUNTER — OFFICE VISIT (OUTPATIENT)
Dept: PRIMARY CARE CLINIC | Age: 75
End: 2025-05-19
Payer: MEDICARE

## 2025-05-19 VITALS
BODY MASS INDEX: 21.03 KG/M2 | HEART RATE: 72 BPM | WEIGHT: 134 LBS | RESPIRATION RATE: 18 BRPM | DIASTOLIC BLOOD PRESSURE: 80 MMHG | HEIGHT: 67 IN | TEMPERATURE: 97 F | SYSTOLIC BLOOD PRESSURE: 124 MMHG | OXYGEN SATURATION: 97 %

## 2025-05-19 DIAGNOSIS — Z85.3 HISTORY OF RIGHT BREAST CANCER: Primary | ICD-10-CM

## 2025-05-19 DIAGNOSIS — J43.8 OTHER EMPHYSEMA (HCC): ICD-10-CM

## 2025-05-19 DIAGNOSIS — D69.6 THROMBOCYTOPENIA: ICD-10-CM

## 2025-05-19 DIAGNOSIS — G47.33 SEVERE OBSTRUCTIVE SLEEP APNEA: ICD-10-CM

## 2025-05-19 DIAGNOSIS — R91.1 PULMONARY NODULE: ICD-10-CM

## 2025-05-19 DIAGNOSIS — J47.9 BRONCHIECTASIS, UNCOMPLICATED (HCC): ICD-10-CM

## 2025-05-19 PROCEDURE — 99214 OFFICE O/P EST MOD 30 MIN: CPT | Performed by: INTERNAL MEDICINE

## 2025-05-19 PROCEDURE — 3023F SPIROM DOC REV: CPT | Performed by: INTERNAL MEDICINE

## 2025-05-19 PROCEDURE — G8420 CALC BMI NORM PARAMETERS: HCPCS | Performed by: INTERNAL MEDICINE

## 2025-05-19 PROCEDURE — 1036F TOBACCO NON-USER: CPT | Performed by: INTERNAL MEDICINE

## 2025-05-19 PROCEDURE — 1123F ACP DISCUSS/DSCN MKR DOCD: CPT | Performed by: INTERNAL MEDICINE

## 2025-05-19 PROCEDURE — G8427 DOCREV CUR MEDS BY ELIG CLIN: HCPCS | Performed by: INTERNAL MEDICINE

## 2025-05-19 PROCEDURE — 1159F MED LIST DOCD IN RCRD: CPT | Performed by: INTERNAL MEDICINE

## 2025-05-19 PROCEDURE — 1090F PRES/ABSN URINE INCON ASSESS: CPT | Performed by: INTERNAL MEDICINE

## 2025-05-19 PROCEDURE — G8399 PT W/DXA RESULTS DOCUMENT: HCPCS | Performed by: INTERNAL MEDICINE

## 2025-05-19 RX ORDER — TRETINOIN 0.5 MG/G
CREAM TOPICAL NIGHTLY
COMMUNITY
Start: 2025-04-14

## 2025-05-19 ASSESSMENT — ENCOUNTER SYMPTOMS
SHORTNESS OF BREATH: 0
TROUBLE SWALLOWING: 0
COUGH: 0
RHINORRHEA: 0
BLOOD IN STOOL: 0
ABDOMINAL PAIN: 0
DIARRHEA: 0
BACK PAIN: 0
SINUS PAIN: 0
CONSTIPATION: 0
CHEST TIGHTNESS: 0
VOMITING: 0
WHEEZING: 0

## 2025-05-19 NOTE — ASSESSMENT & PLAN NOTE
Chronic, at goal (stable), continue to monitor platelet count this time in January it was in normal range

## 2025-05-19 NOTE — ASSESSMENT & PLAN NOTE
Chronic, at goal (stable), may have been exposed to passive cigarette smoking she is asymptomatic no wheezing, no cough no daily sputum production

## 2025-07-15 ENCOUNTER — TELEPHONE (OUTPATIENT)
Dept: HEMATOLOGY | Age: 75
End: 2025-07-15

## 2025-07-15 NOTE — TELEPHONE ENCOUNTER
I called patient and reminded patient of their appt on 07/18/2025 and patient confirmed they would be here. I also let patient know that we have moved into our new cancer facility and asked patient if they were aware of where we were now located, and patient voiced understanding of our new location. Patient knows NOT to arrive early (We CANNOT check anyone in early anymore) and that we will get labs at the time of the follow up appointment (That time was given to patient) and NOT the lab appointment time. I also made patient aware that our labs are not fasting, so they are allowed to eat but please drink plenty of water to hydrate properly before coming to these appointments because this will make their lab draw much easier. Patient voiced understanding to everything discussed. I advised patient that starting June 9th, our office will be implementing a phone tree system when they call our office to please listen to all prompts and select the prompt they need and leave a voicemail if no one answers and someone in office will return their call before the end of the business day. I also made them aware to please not leave multiple voicemails because this will cause more delay in returning their call in a timely manner.

## 2025-07-16 ENCOUNTER — HOSPITAL ENCOUNTER (EMERGENCY)
Age: 75
Discharge: HOME | End: 2025-07-16
Payer: MEDICARE

## 2025-07-16 ENCOUNTER — APPOINTMENT (OUTPATIENT)
Dept: CT IMAGING | Age: 75
End: 2025-07-16
Payer: MEDICARE

## 2025-07-16 VITALS
WEIGHT: 130 LBS | RESPIRATION RATE: 18 BRPM | DIASTOLIC BLOOD PRESSURE: 88 MMHG | OXYGEN SATURATION: 97 % | BODY MASS INDEX: 20.36 KG/M2 | HEART RATE: 65 BPM | TEMPERATURE: 98.1 F | SYSTOLIC BLOOD PRESSURE: 161 MMHG

## 2025-07-16 DIAGNOSIS — L97.101 VENOUS STASIS ULCER OF THIGH LIMITED TO BREAKDOWN OF SKIN WITH VARICOSE VEINS, UNSPECIFIED LATERALITY (HCC): Primary | ICD-10-CM

## 2025-07-16 DIAGNOSIS — I83.001 VENOUS STASIS ULCER OF THIGH LIMITED TO BREAKDOWN OF SKIN WITH VARICOSE VEINS, UNSPECIFIED LATERALITY (HCC): Primary | ICD-10-CM

## 2025-07-16 LAB
ALBUMIN SERPL-MCNC: 4.1 G/DL (ref 3.5–5.2)
ALP SERPL-CCNC: 65 U/L (ref 35–104)
ALT SERPL-CCNC: 18 U/L (ref 10–35)
ANION GAP SERPL CALCULATED.3IONS-SCNC: 14 MMOL/L (ref 8–16)
AST SERPL-CCNC: 33 U/L (ref 10–35)
BASOPHILS # BLD: 0.1 K/UL (ref 0–0.2)
BASOPHILS NFR BLD: 0.9 % (ref 0–1)
BILIRUB SERPL-MCNC: 0.3 MG/DL (ref 0.2–1.2)
BUN SERPL-MCNC: 28 MG/DL (ref 8–23)
CALCIUM SERPL-MCNC: 9.2 MG/DL (ref 8.8–10.2)
CHLORIDE SERPL-SCNC: 104 MMOL/L (ref 98–107)
CO2 SERPL-SCNC: 23 MMOL/L (ref 22–29)
CREAT SERPL-MCNC: 0.8 MG/DL (ref 0.5–0.9)
EOSINOPHIL # BLD: 0.2 K/UL (ref 0–0.6)
EOSINOPHIL NFR BLD: 3.2 % (ref 0–5)
ERYTHROCYTE [DISTWIDTH] IN BLOOD BY AUTOMATED COUNT: 14.2 % (ref 11.5–14.5)
GLUCOSE SERPL-MCNC: 99 MG/DL (ref 70–99)
HCT VFR BLD AUTO: 34.5 % (ref 37–47)
HGB BLD-MCNC: 11.3 G/DL (ref 12–16)
IMM GRANULOCYTES # BLD: 0 K/UL
LACTATE BLDV-SCNC: 0.5 MMOL/L (ref 0.5–1.9)
LYMPHOCYTES # BLD: 1.4 K/UL (ref 1.1–4.5)
LYMPHOCYTES NFR BLD: 24.6 % (ref 20–40)
MAGNESIUM SERPL-MCNC: 2.2 MG/DL (ref 1.6–2.4)
MCH RBC QN AUTO: 33.5 PG (ref 27–31)
MCHC RBC AUTO-ENTMCNC: 32.8 G/DL (ref 33–37)
MCV RBC AUTO: 102.4 FL (ref 81–99)
MONOCYTES # BLD: 0.5 K/UL (ref 0–0.9)
MONOCYTES NFR BLD: 8.8 % (ref 0–10)
NEUTROPHILS # BLD: 3.5 K/UL (ref 1.5–7.5)
NEUTS SEG NFR BLD: 62.3 % (ref 50–65)
PLATELET # BLD AUTO: 126 K/UL (ref 130–400)
PMV BLD AUTO: 10.8 FL (ref 9.4–12.3)
POTASSIUM SERPL-SCNC: 3.3 MMOL/L (ref 3.5–5)
PROT SERPL-MCNC: 6.5 G/DL (ref 6.4–8.3)
RBC # BLD AUTO: 3.37 M/UL (ref 4.2–5.4)
SODIUM SERPL-SCNC: 141 MMOL/L (ref 136–145)
WBC # BLD AUTO: 5.7 K/UL (ref 4.8–10.8)

## 2025-07-16 PROCEDURE — 83735 ASSAY OF MAGNESIUM: CPT

## 2025-07-16 PROCEDURE — 6360000002 HC RX W HCPCS: Performed by: PHYSICIAN ASSISTANT

## 2025-07-16 PROCEDURE — 36415 COLL VENOUS BLD VENIPUNCTURE: CPT

## 2025-07-16 PROCEDURE — 73701 CT LOWER EXTREMITY W/DYE: CPT

## 2025-07-16 PROCEDURE — 83605 ASSAY OF LACTIC ACID: CPT

## 2025-07-16 PROCEDURE — 6360000004 HC RX CONTRAST MEDICATION: Performed by: PHYSICIAN ASSISTANT

## 2025-07-16 PROCEDURE — 99285 EMERGENCY DEPT VISIT HI MDM: CPT

## 2025-07-16 PROCEDURE — 85025 COMPLETE CBC W/AUTO DIFF WBC: CPT

## 2025-07-16 PROCEDURE — 80053 COMPREHEN METABOLIC PANEL: CPT

## 2025-07-16 PROCEDURE — 2580000003 HC RX 258: Performed by: PHYSICIAN ASSISTANT

## 2025-07-16 PROCEDURE — 96365 THER/PROPH/DIAG IV INF INIT: CPT

## 2025-07-16 RX ORDER — SULFAMETHOXAZOLE AND TRIMETHOPRIM 800; 160 MG/1; MG/1
1 TABLET ORAL 2 TIMES DAILY
Qty: 20 TABLET | Refills: 0 | Status: SHIPPED | OUTPATIENT
Start: 2025-07-16 | End: 2025-07-26

## 2025-07-16 RX ORDER — IOPAMIDOL 755 MG/ML
90 INJECTION, SOLUTION INTRAVASCULAR
Status: COMPLETED | OUTPATIENT
Start: 2025-07-16 | End: 2025-07-16

## 2025-07-16 RX ADMIN — CEFEPIME 1000 MG: 1 INJECTION, POWDER, FOR SOLUTION INTRAMUSCULAR; INTRAVENOUS at 22:59

## 2025-07-16 RX ADMIN — IOPAMIDOL 90 ML: 755 INJECTION, SOLUTION INTRAVENOUS at 22:51

## 2025-07-16 ASSESSMENT — ENCOUNTER SYMPTOMS
ABDOMINAL DISTENTION: 0
BACK PAIN: 0
PHOTOPHOBIA: 0
APNEA: 0
SORE THROAT: 0
NAUSEA: 0
EYE PAIN: 0
COUGH: 0
SHORTNESS OF BREATH: 0
COLOR CHANGE: 0
RHINORRHEA: 0
ABDOMINAL PAIN: 0
EYE DISCHARGE: 0

## 2025-07-17 ENCOUNTER — CARE COORDINATION (OUTPATIENT)
Dept: CARE COORDINATION | Age: 75
End: 2025-07-17

## 2025-07-17 DIAGNOSIS — M85.89 OSTEOPENIA OF MULTIPLE SITES: Primary | ICD-10-CM

## 2025-07-17 DIAGNOSIS — D50.8 IRON DEFICIENCY ANEMIA SECONDARY TO INADEQUATE DIETARY IRON INTAKE: ICD-10-CM

## 2025-07-17 DIAGNOSIS — C50.811 MALIGNANT NEOPLASM OF OVERLAPPING SITES OF RIGHT FEMALE BREAST, UNSPECIFIED ESTROGEN RECEPTOR STATUS (HCC): ICD-10-CM

## 2025-07-17 NOTE — PROGRESS NOTES
Progress Note      Pt Name: Shabnam Rincon  YOB: 1950  MRN: [id number]    Date of evaluation: 07/18/2025  History Obtained From:  patient, electronic medical record    CHIEF COMPLAINT:    Chief Complaint   Patient presents with    Follow-up     Malignant neoplasm of overlapping sites of right female breast, unspecified estrogen receptor status (HCC)  Patient has no new issues to discuss      HISTORY OF PRESENT ILLNESS:    Shabnam Rincon is a 74 y.o.  female who is currently being followed for a diagnosis of locally advanced HER-2 positive breast cancer diagnosed September 2019.  She also has macrocytic anemia and mild thrombocytopenia.  She obtained a complete pathologic response in the breast, unfortunately had residual disease in the axilla which was followed by adjuvant treatment to TDM 1 for 1 year.  Recommendation was given for adjuvant endocrine therapy, unfortunately she was intolerant to letrozole due to severe arthralgias, bone and chest pain.  She has had no known evidence of recurrent disease.  Shabnam returns today in scheduled follow-up for evaluation, side effect monitoring, lab monitoring and further recommendations.    History of Present Illness  She has been performing self-examinations and reports no enlargement of her right arm due to lymphedema. She has not experienced any bleeding or excessive bruising. Her diet includes a significant amount of cheese, but she does not consume much milk. She has never required iron supplementation in the past.      Today's clinic visit to include physical assessment, review of systems, any lab or radiographic findings that were available and plan of care are documented below.      ONCOLOGIC HISTORY:   Diagnosis   HER-2 IDC right breast, September 2019  [id number]  ER 3%, VA 0%, HER-2/lea IHC 3+  Mammaprint high risk Basal-like  Osteopenia, Aug 2019-T score -1.5 lumbar spine     Treatment summary  10/18/2019 through  March 2020- 6  1200 mg of calcium a day if unable to achieve the diet will need some type of supplement  -Schedule bone density after 08/29/2025  -Vitamin D level today     3. Lymphedema of right arm, currently stable.  No signs of lymphedema in her right arm with exam today    4.  Macrocytic anemia, chronic dating back to January 2020.  Hemoglobin 12.7, hematocrit 37.4 with an MCV of 100.0 today    07/19/2024 Serology results  Iron 73  TIBC 298  Saturation 24%  Ferritin 77    01/17/2025 Serology results  Iron 131  TIBC 281  Saturation 47%  Ferritin 82.9    -Iron panel, ferritin, CMP, CBC    5.  Thrombocytopenia dating back to 2020, platelet count stable at 146,000 today.    Denied any bleeding to include melena, epistaxis, hemoptysis, hematuria or hematochezia.    -Will monitor conservatively, if platelet count drops below 100,000 persistently will need to consider completed bone marrow aspiration biopsy for further evaluation      I discussed all of the above findings included in the assessment and plan with the patient and the patient is in agreement to move forward with current recommendations/treatment.  I have addressed all of their questions and concerns that were verbalized.    FOLLOW UP:  Follow-up appointment given for 6 months, sooner if needed breast cancer, mild thrombocytopenia and anemia  Continue to follow with other medical providers as recommended  Labs at next visit: CBC, CMP, iron panel and ferritin    EMR Dragon/Transcription disclaimer:   Much of this encounter note is an electronic transcription/translation of spoken language to printed text. The electronic translation of spoken language may permit erroneous, or at times, nonsensical words or phrases to be inadvertently transcribed; although attempts have made to review the note for such errors, some may still exist.  Please excuse any unrecognized transcription errors and contact us if the error is unintelligible or needs documented correction.  Also,

## 2025-07-17 NOTE — CARE COORDINATION
Ambulatory Care Coordination Note     2025 10:59 AM     Patient Current Location:  [place]     This patient was received as a referral from Population health report .    ACM contacted the family by telephone. Verified name and  with family as identifiers. Provided introduction to self, and explanation of the ACM role.   Family accepted care management services at this time.          ACM: Evi Martinez RN     Challenges to be reviewed by the provider   Additional needs identified to be addressed with provider Yes  Follow up of wound to left leg             Method of communication with provider: Appt scheduled.    Utilization: Initial Call - Discharge Date: 25   Discharge Facility: [facility]  Reason for ED Visit: wound check LL leg  Visit Diagnosis: Venous stasis ulcer of thigh limited to breakdown of skin with varicose veins, unspecified laterality     Number of ED visits in the last 6 months: 0      Do you have any ongoing symptoms? Yes, there has been no change in my symptoms.   Current symptoms: redness, swelling at Left outer ankle.    Did you call your PCP prior to going to the ED? No, did not call the PCP office.     Review of Discharge Instructions:   [x] AVS discharge instructions  [x] Right Care, Right Place, Right Time document  [x] Medication changes  [x] Follow up appointments  [] Referral follow up   []        Care Summary Note: Unable to reach patient, unable to leave [facility]. Spoke to daughter who was with patient. Daughter reported picking up antibiotic from ED visit yesterday. Patient has dime sized wound of Left lower leg about 4 inches above outer ankle bone. The left ankle has some swelling and redness. The wound is covered with a dressing. Patient was advised to elevate her leg as much as possible for edema mgmt. She may take Tylenol if needed for pain. Pt/family to observe wound for increased redness, more swelling or fever and to call PCP or this ACM if sx should

## 2025-07-17 NOTE — ED PROVIDER NOTES
DISPOSITION CONDITION Stable           PATIENT REFERRED TO:  [facility]  [address] [address]  [phone]    If symptoms worsen    Ekaterina Freire MD  [address] [place] [address] [address]IVE  [facility] [address]  [facility] [address]  [phone]    Schedule an appointment as soon as possible for a visit in 2 days  For wound re-check      DISCHARGE MEDICATIONS:  New Prescriptions    SULFAMETHOXAZOLE-TRIMETHOPRIM (BACTRIM DS) 800-160 MG PER TABLET    Take 1 tablet by mouth 2 times daily for 10 days       (Please note that portions of this note were completed with a voice recognition program.  Efforts were made to edit the dictations but occasionallywords are mis-transcribed.)    ISABELL Dumont Derek, PA  07/16/25 1593

## 2025-07-18 ENCOUNTER — HOSPITAL ENCOUNTER (OUTPATIENT)
Dept: INFUSION THERAPY | Age: 75
Discharge: HOME | End: 2025-07-18
Payer: MEDICARE

## 2025-07-18 ENCOUNTER — OFFICE VISIT (OUTPATIENT)
Dept: HEMATOLOGY | Age: 75
End: 2025-07-18
Payer: MEDICARE

## 2025-07-18 VITALS
OXYGEN SATURATION: 97 % | WEIGHT: 130.5 LBS | DIASTOLIC BLOOD PRESSURE: 80 MMHG | HEIGHT: 67 IN | BODY MASS INDEX: 20.48 KG/M2 | TEMPERATURE: 97.5 F | SYSTOLIC BLOOD PRESSURE: 126 MMHG | HEART RATE: 71 BPM

## 2025-07-18 DIAGNOSIS — D69.6 THROMBOCYTOPENIA: ICD-10-CM

## 2025-07-18 DIAGNOSIS — D50.8 IRON DEFICIENCY ANEMIA SECONDARY TO INADEQUATE DIETARY IRON INTAKE: ICD-10-CM

## 2025-07-18 DIAGNOSIS — I89.0 LYMPHEDEMA OF RIGHT ARM: ICD-10-CM

## 2025-07-18 DIAGNOSIS — C50.811 MALIGNANT NEOPLASM OF OVERLAPPING SITES OF RIGHT FEMALE BREAST, UNSPECIFIED ESTROGEN RECEPTOR STATUS (HCC): ICD-10-CM

## 2025-07-18 DIAGNOSIS — C50.811 MALIGNANT NEOPLASM OF OVERLAPPING SITES OF RIGHT FEMALE BREAST, UNSPECIFIED ESTROGEN RECEPTOR STATUS (HCC): Primary | ICD-10-CM

## 2025-07-18 DIAGNOSIS — Z78.0 POSTMENOPAUSAL STATUS (AGE-RELATED) (NATURAL): ICD-10-CM

## 2025-07-18 DIAGNOSIS — M85.89 OSTEOPENIA OF MULTIPLE SITES: ICD-10-CM

## 2025-07-18 DIAGNOSIS — Z12.31 ENCOUNTER FOR SCREENING MAMMOGRAM FOR MALIGNANT NEOPLASM OF BREAST: ICD-10-CM

## 2025-07-18 LAB
25(OH)D3 SERPL-MCNC: 34.8 NG/ML
ALBUMIN SERPL-MCNC: 4.1 G/DL (ref 3.5–5.2)
ALP SERPL-CCNC: 62 U/L (ref 35–104)
ALT SERPL-CCNC: 20 U/L (ref 5–33)
ANION GAP SERPL CALCULATED.3IONS-SCNC: 11 MMOL/L (ref 7–19)
AST SERPL-CCNC: 33 U/L (ref 5–32)
BASOPHILS # BLD: 0.04 K/UL (ref 0–0.2)
BASOPHILS NFR BLD: 0.8 % (ref 0–1)
BILIRUB SERPL-MCNC: 0.3 MG/DL (ref 0–1.2)
BUN SERPL-MCNC: 27 MG/DL (ref 8–23)
CALCIUM SERPL-MCNC: 9.6 MG/DL (ref 8.8–10.2)
CHLORIDE SERPL-SCNC: 104 MMOL/L (ref 98–107)
CO2 SERPL-SCNC: 27 MMOL/L (ref 22–29)
CREAT SERPL-MCNC: 1.1 MG/DL (ref 0.5–0.9)
EOSINOPHIL # BLD: 0.11 K/UL (ref 0–0.6)
EOSINOPHIL NFR BLD: 2.1 % (ref 0–5)
ERYTHROCYTE [DISTWIDTH] IN BLOOD BY AUTOMATED COUNT: 14.2 % (ref 11.5–14.5)
FERRITIN SERPL-MCNC: 110 NG/ML (ref 13–150)
GLUCOSE SERPL-MCNC: 87 MG/DL (ref 70–99)
HCT VFR BLD AUTO: 34.9 % (ref 37–47)
HGB BLD-MCNC: 11.7 G/DL (ref 12–16)
IRON SATN MFR SERPL: 29 % (ref 15–50)
IRON SERPL-MCNC: 76 UG/DL (ref 37–145)
LYMPHOCYTES # BLD: 0.8 K/UL (ref 1.1–4.5)
LYMPHOCYTES NFR BLD: 15.6 % (ref 20–40)
MCH RBC QN AUTO: 34.1 PG (ref 27–31)
MCHC RBC AUTO-ENTMCNC: 33.5 G/DL (ref 33–37)
MCV RBC AUTO: 101.7 FL (ref 81–99)
MONOCYTES # BLD: 0.37 K/UL (ref 0–0.9)
MONOCYTES NFR BLD: 7.2 % (ref 1–10)
NEUTROPHILS # BLD: 3.81 K/UL (ref 1.5–7.5)
NEUTS SEG NFR BLD: 74.1 % (ref 50–65)
PLATELET # BLD AUTO: 137 K/UL (ref 130–400)
PMV BLD AUTO: 10.8 FL (ref 9.4–12.3)
POTASSIUM SERPL-SCNC: 3.7 MMOL/L (ref 3.5–5.1)
PROT SERPL-MCNC: 6.6 G/DL (ref 6.4–8.3)
RBC # BLD AUTO: 3.43 M/UL (ref 4.2–5.4)
SODIUM SERPL-SCNC: 142 MMOL/L (ref 136–145)
TIBC SERPL-MCNC: 260 UG/DL (ref 250–400)
WBC # BLD AUTO: 5.14 K/UL (ref 4.8–10.8)

## 2025-07-18 PROCEDURE — 1123F ACP DISCUSS/DSCN MKR DOCD: CPT | Performed by: NURSE PRACTITIONER

## 2025-07-18 PROCEDURE — 83540 ASSAY OF IRON: CPT

## 2025-07-18 PROCEDURE — G8427 DOCREV CUR MEDS BY ELIG CLIN: HCPCS | Performed by: NURSE PRACTITIONER

## 2025-07-18 PROCEDURE — 99212 OFFICE O/P EST SF 10 MIN: CPT

## 2025-07-18 PROCEDURE — 1126F AMNT PAIN NOTED NONE PRSNT: CPT | Performed by: NURSE PRACTITIONER

## 2025-07-18 PROCEDURE — 1090F PRES/ABSN URINE INCON ASSESS: CPT | Performed by: NURSE PRACTITIONER

## 2025-07-18 PROCEDURE — 82306 VITAMIN D 25 HYDROXY: CPT

## 2025-07-18 PROCEDURE — 1036F TOBACCO NON-USER: CPT | Performed by: NURSE PRACTITIONER

## 2025-07-18 PROCEDURE — 85025 COMPLETE CBC W/AUTO DIFF WBC: CPT

## 2025-07-18 PROCEDURE — 80053 COMPREHEN METABOLIC PANEL: CPT

## 2025-07-18 PROCEDURE — 82728 ASSAY OF FERRITIN: CPT

## 2025-07-18 PROCEDURE — 83550 IRON BINDING TEST: CPT

## 2025-07-18 PROCEDURE — G8399 PT W/DXA RESULTS DOCUMENT: HCPCS | Performed by: NURSE PRACTITIONER

## 2025-07-18 PROCEDURE — 36415 COLL VENOUS BLD VENIPUNCTURE: CPT

## 2025-07-18 PROCEDURE — 99214 OFFICE O/P EST MOD 30 MIN: CPT | Performed by: NURSE PRACTITIONER

## 2025-07-18 PROCEDURE — G8420 CALC BMI NORM PARAMETERS: HCPCS | Performed by: NURSE PRACTITIONER

## 2025-07-21 ENCOUNTER — CARE COORDINATION (OUTPATIENT)
Dept: CARE COORDINATION | Age: 75
End: 2025-07-21

## 2025-07-21 NOTE — CARE COORDINATION
Ambulatory Care Coordination Note     2025 3:16 PM     Patient Current Location:  [place] [address]     LPN CC contacted the family by telephone. Verified name and  with family as identifiers.         ACM: Abril Marquez LPN     Challenges to be reviewed by the provider   Additional needs identified to be addressed with provider No  no               Method of communication with provider: none.    Utilization: Has the patient been seen in the ED since your last call? no    Care Summary Note: Spoke with daughter Paula who reported that patient was doing good. Paula denied cp, sob, cough, dizziness, headache, n/v, diarrhea, abdominal pains, fever, or chills. Paula reported that patient's wound is healing well. Daughter stated that patient has an appointment with PCP on . Paula report that patient's appetite and fluid intake is good and denied any problems with bowel or bladder. Paula reported that patient is taking all medications as ordered. Paula denied any other needs at this time. Paula instructed to continue to monitor s/s, reporting any that may present to MD immediately for early intervention. Paula is agreeable to f/u calls.     Offered patient enrollment in the Remote Patient Monitoring (RPM) program for in-home monitoring: Yes, but did not enroll at this time: already monitoring with home equipment.     Assessments Completed:   No changes since last call    Medications Reviewed:   Patient denies any changes with medications and reports taking all medications as prescribed.    Advance Care Planning   The patient has the following advanced directives on file:  Advance Directives       Power of  Living Will ACP-Advance Directive ACP-Power of     Not on File Not on File Not on File Not on File            The patient has appointed the following active healthcare agents:    Primary Decision Maker: Paula Gardiner - Child - [phone]    Secondary

## 2025-07-24 ENCOUNTER — OFFICE VISIT (OUTPATIENT)
Dept: PRIMARY CARE CLINIC | Age: 75
End: 2025-07-24

## 2025-07-24 ENCOUNTER — HOSPITAL ENCOUNTER (OUTPATIENT)
Dept: GENERAL RADIOLOGY | Age: 75
Discharge: HOME | End: 2025-07-24
Payer: MEDICARE

## 2025-07-24 VITALS
TEMPERATURE: 97.8 F | SYSTOLIC BLOOD PRESSURE: 100 MMHG | WEIGHT: 128.6 LBS | BODY MASS INDEX: 20.18 KG/M2 | DIASTOLIC BLOOD PRESSURE: 72 MMHG | HEART RATE: 63 BPM | HEIGHT: 67 IN | OXYGEN SATURATION: 98 %

## 2025-07-24 DIAGNOSIS — S81.802D WOUND OF LEFT LEG, SUBSEQUENT ENCOUNTER: ICD-10-CM

## 2025-07-24 DIAGNOSIS — S81.802D WOUND OF LEFT LEG, SUBSEQUENT ENCOUNTER: Primary | ICD-10-CM

## 2025-07-24 PROCEDURE — 73610 X-RAY EXAM OF ANKLE: CPT

## 2025-07-24 ASSESSMENT — ENCOUNTER SYMPTOMS
VOICE CHANGE: 0
NAUSEA: 0
RHINORRHEA: 0
PHOTOPHOBIA: 0
COLOR CHANGE: 0
VOMITING: 0
SHORTNESS OF BREATH: 0
BACK PAIN: 0

## 2025-07-24 NOTE — PROGRESS NOTES
[facility]  [address]  [address]  [place] [address]  Dept: [phone]  Dept Fax: [phone]  Loc: [phone]    Shabnam Rincon is a 75 y.o. female who presents today for her medical conditions/complaints as noted below.  Shabnam Rincon is c/o of Wound Infection (Left leg wound.  She went to [facility] ER about a week ago because she has been septic before. This was 2-3 years ago.  The ER doctor thought this might be a reoccurring thing. She said it has gotten better.  )        HPI:     History of Present Illness  The patient presents for evaluation of a left leg injury.    They report an incident where they hit their left leg against a  but did not fall. Following this, they sought emergency care on 07/16/2025 due to swelling in their ankle. The ER physician suspected a possible bone infection due to the extent of the swelling. They were prescribed Bactrim as a preventive measure against infection and are currently on a 10-day course of the antibiotic, with 4 days remaining. A CT scan was performed, revealing a potential nondisplaced fracture of the lateral malleolus or an artifact from motion. They have been able to walk and apply pressure on the affected leg, and note a significant reduction in the swelling since the incident.    Additionally, they mention that the ER physician suggested the possibility of venous insufficiency and the need for wound care.       Past Medical History:   Diagnosis Date    Acute nonintractable headache 12/20/2021    Benign essential HTN 12/20/2021    Fatigue 08/21/2019    Malignant neoplasm of right breast in female, estrogen receptor positive (HCC) 03/20/2020    breast    Prediabetes 09/22/2020    Right arm cellulitis 06/28/2021    Sleep apnea     Snoring 08/23/2022      Past Surgical History:   Procedure Laterality Date    BREAST BIOPSY Right 09/05/2019    CATARACT EXTRACTION Left 01/17/2024    COLONOSCOPY N/A 10/01/2019

## 2025-07-25 ASSESSMENT — ENCOUNTER SYMPTOMS
SHORTNESS OF BREATH: 0
VOMITING: 0
NAUSEA: 0
COUGH: 0
EYE REDNESS: 0
WHEEZING: 0
DIARRHEA: 0
RESPIRATORY NEGATIVE: 1
SORE THROAT: 0
BLOOD IN STOOL: 0
ABDOMINAL PAIN: 0
GASTROINTESTINAL NEGATIVE: 1
BACK PAIN: 0
CONSTIPATION: 0
EYES NEGATIVE: 1
EYE PAIN: 0
EYE DISCHARGE: 0

## 2025-07-27 ENCOUNTER — HOSPITAL ENCOUNTER (EMERGENCY)
Age: 75
Discharge: HOME | End: 2025-07-27
Attending: EMERGENCY MEDICINE
Payer: MEDICARE

## 2025-07-27 ENCOUNTER — APPOINTMENT (OUTPATIENT)
Dept: CT IMAGING | Age: 75
End: 2025-07-27
Payer: MEDICARE

## 2025-07-27 VITALS
OXYGEN SATURATION: 97 % | RESPIRATION RATE: 18 BRPM | SYSTOLIC BLOOD PRESSURE: 121 MMHG | HEART RATE: 82 BPM | DIASTOLIC BLOOD PRESSURE: 85 MMHG | TEMPERATURE: 98 F

## 2025-07-27 DIAGNOSIS — R41.0 CONFUSION: ICD-10-CM

## 2025-07-27 DIAGNOSIS — N17.9 AKI (ACUTE KIDNEY INJURY): Primary | ICD-10-CM

## 2025-07-27 DIAGNOSIS — R21 RASH AND OTHER NONSPECIFIC SKIN ERUPTION: ICD-10-CM

## 2025-07-27 LAB
ALBUMIN SERPL-MCNC: 4.5 G/DL (ref 3.5–5.2)
ALP SERPL-CCNC: 69 U/L (ref 35–104)
ALT SERPL-CCNC: 22 U/L (ref 10–35)
ANION GAP SERPL CALCULATED.3IONS-SCNC: 14 MMOL/L (ref 8–16)
AST SERPL-CCNC: 32 U/L (ref 10–35)
BACTERIA URNS QL MICRO: NEGATIVE /HPF
BASOPHILS # BLD: 0 K/UL (ref 0–0.2)
BASOPHILS NFR BLD: 0.1 % (ref 0–1)
BILIRUB SERPL-MCNC: 0.4 MG/DL (ref 0.2–1.2)
BILIRUB UR QL STRIP: NEGATIVE
BUN SERPL-MCNC: 39 MG/DL (ref 8–23)
CALCIUM SERPL-MCNC: 9.5 MG/DL (ref 8.8–10.2)
CHLORIDE SERPL-SCNC: 99 MMOL/L (ref 98–107)
CLARITY UR: ABNORMAL
CO2 SERPL-SCNC: 23 MMOL/L (ref 22–29)
COLOR UR: YELLOW
CREAT SERPL-MCNC: 1.4 MG/DL (ref 0.5–0.9)
CRYSTALS URNS MICRO: ABNORMAL /HPF
EOSINOPHIL # BLD: 0.1 K/UL (ref 0–0.6)
EOSINOPHIL NFR BLD: 1.8 % (ref 0–5)
EPI CELLS #/AREA URNS AUTO: 17 /HPF (ref 0–5)
ERYTHROCYTE [DISTWIDTH] IN BLOOD BY AUTOMATED COUNT: 15 % (ref 11.5–14.5)
GLUCOSE BLD-MCNC: 122 MG/DL
GLUCOSE BLD-MCNC: 122 MG/DL (ref 70–99)
GLUCOSE SERPL-MCNC: 123 MG/DL (ref 70–99)
GLUCOSE UR STRIP.AUTO-MCNC: NEGATIVE MG/DL
HCT VFR BLD AUTO: 38 % (ref 37–47)
HGB BLD-MCNC: 12.6 G/DL (ref 12–16)
HGB UR STRIP.AUTO-MCNC: ABNORMAL MG/L
HYALINE CASTS #/AREA URNS AUTO: 9 /HPF (ref 0–8)
IMM GRANULOCYTES # BLD: 0 K/UL
KETONES UR STRIP.AUTO-MCNC: ABNORMAL MG/DL
LEUKOCYTE ESTERASE UR QL STRIP.AUTO: ABNORMAL
LYMPHOCYTES # BLD: 0.3 K/UL (ref 1.1–4.5)
LYMPHOCYTES NFR BLD: 3.7 % (ref 20–40)
MCH RBC QN AUTO: 33.9 PG (ref 27–31)
MCHC RBC AUTO-ENTMCNC: 33.2 G/DL (ref 33–37)
MCV RBC AUTO: 102.2 FL (ref 81–99)
MONOCYTES # BLD: 0.6 K/UL (ref 0–0.9)
MONOCYTES NFR BLD: 7.2 % (ref 0–10)
NEUTROPHILS # BLD: 6.6 K/UL (ref 1.5–7.5)
NEUTS SEG NFR BLD: 86.9 % (ref 50–65)
NITRITE UR QL STRIP.AUTO: NEGATIVE
PERFORMED ON: ABNORMAL
PH UR STRIP.AUTO: 5 [PH] (ref 5–8)
PLATELET # BLD AUTO: 110 K/UL (ref 130–400)
PMV BLD AUTO: 10.8 FL (ref 9.4–12.3)
POTASSIUM SERPL-SCNC: 4.6 MMOL/L (ref 3.5–5.1)
PROT SERPL-MCNC: 7.3 G/DL (ref 6.4–8.3)
PROT UR STRIP.AUTO-MCNC: 30 MG/DL
RBC # BLD AUTO: 3.72 M/UL (ref 4.2–5.4)
RBC #/AREA URNS AUTO: 6 /HPF (ref 0–4)
SODIUM SERPL-SCNC: 136 MMOL/L (ref 136–145)
SP GR UR STRIP.AUTO: 1.02 (ref 1–1.03)
UROBILINOGEN UR STRIP.AUTO-MCNC: 0.2 E.U./DL
WBC # BLD AUTO: 7.6 K/UL (ref 4.8–10.8)
WBC #/AREA URNS AUTO: 6 /HPF (ref 0–5)

## 2025-07-27 PROCEDURE — 80053 COMPREHEN METABOLIC PANEL: CPT

## 2025-07-27 PROCEDURE — 70450 CT HEAD/BRAIN W/O DYE: CPT

## 2025-07-27 PROCEDURE — 36415 COLL VENOUS BLD VENIPUNCTURE: CPT

## 2025-07-27 PROCEDURE — 99284 EMERGENCY DEPT VISIT MOD MDM: CPT

## 2025-07-27 PROCEDURE — 96360 HYDRATION IV INFUSION INIT: CPT

## 2025-07-27 PROCEDURE — 2580000003 HC RX 258: Performed by: EMERGENCY MEDICINE

## 2025-07-27 PROCEDURE — 81001 URINALYSIS AUTO W/SCOPE: CPT

## 2025-07-27 PROCEDURE — 85025 COMPLETE CBC W/AUTO DIFF WBC: CPT

## 2025-07-27 PROCEDURE — 82962 GLUCOSE BLOOD TEST: CPT

## 2025-07-27 RX ORDER — SODIUM CHLORIDE, SODIUM LACTATE, POTASSIUM CHLORIDE, AND CALCIUM CHLORIDE .6; .31; .03; .02 G/100ML; G/100ML; G/100ML; G/100ML
1000 INJECTION, SOLUTION INTRAVENOUS ONCE
Status: COMPLETED | OUTPATIENT
Start: 2025-07-27 | End: 2025-07-27

## 2025-07-27 RX ADMIN — SODIUM CHLORIDE, SODIUM LACTATE, POTASSIUM CHLORIDE, AND CALCIUM CHLORIDE 1000 ML: .6; .31; .03; .02 INJECTION, SOLUTION INTRAVENOUS at 19:44

## 2025-07-27 NOTE — ED PROVIDER NOTES
[facility] EMERGENCY DEPARTMENT  eMERGENCY dEPARTMENT eNCOUnter      Pt Name: Shabnam Rincon  MRN: [id number]  Birthdate 1950  Date of evaluation: 7/27/2025  Provider: CHEMO THOMAS MD    CHIEF COMPLAINT       Chief Complaint   Patient presents with    Altered Mental Status     Altered this afternoon with urinary symptoms also, family concerned for uti; currently on bactrim for leg wound         HISTORY OF PRESENT ILLNESS   (Location/Symptom, Timing/Onset,Context/Setting, Quality, Duration, Modifying Factors, Severity)  Note limiting factors.   Shabnam Rincon is a 75 y.o. female who presents to the emergency department for altered mental status.  Family reports that her with her Sabianist this morning and she told them she felt a little \"draggy\" but otherwise felt well.  This evening they were trying to text her and she was not texting back and seemed somewhat confused so when to go check on her.  She felt warm to touch but her house was 89 degrees.  They did at home UTI test and they report it was positive.  Today was supposed to be day 10 of Bactrim for left leg wound but there is still 5 pills in the bottle but leg wound is healing well.  Denies any falls or trauma.  She is alert and oriented here.  They also have noticed a mild rash on her arms and trunk which she reports is pruritic.    HPI    NursingNotes were reviewed.    REVIEW OF SYSTEMS    (2-9 systems for level 4, 10 or more for level 5)     Review of Systems   Constitutional:  Negative for chills and fever.   HENT:  Negative for rhinorrhea and sore throat.    Respiratory:  Negative for cough and shortness of breath.    Cardiovascular:  Negative for chest pain.   Gastrointestinal:  Negative for abdominal pain, diarrhea, nausea and vomiting.   Genitourinary:  Negative for dysuria and frequency.   Musculoskeletal:  Negative for back pain and neck pain.   Skin:  Positive for rash.   Neurological:  Negative for dizziness, facial asymmetry, speech difficulty,

## 2025-07-28 ENCOUNTER — CARE COORDINATION (OUTPATIENT)
Dept: CARE COORDINATION | Age: 75
End: 2025-07-28

## 2025-07-28 ENCOUNTER — TELEPHONE (OUTPATIENT)
Dept: PRIMARY CARE CLINIC | Age: 75
End: 2025-07-28

## 2025-07-28 NOTE — CARE COORDINATION
Ambulatory Care Coordination Note     2025 11:29 AM     Patient Current Location:  Home: [place] [address]     ACM contacted the family by telephone. Verified name and  with family as identifiers.         ACM: Evi Martinez RN     Challenges to be reviewed by the provider   Additional needs identified to be addressed with provider Yes  Generalized rash after taking Bactrim for leg wound  Pt stopped Bactrim yesterday, rash still present and very itchy.  Family applying Benadryl topical cream with little relief.             Method of communication with provider: chart routing.    Utilization: Has the patient been seen in the ED since your last call? Yes,   Discharge Date: 25   Discharge Facility: [facility]  Reason for ED Visit: AMS  Visit Diagnosis: ROSE, confusion, rash    Number of ED visits in the last 6 months: 1      Do you have any ongoing symptoms? Yes, my symptoms have worsened.   Current symptoms: rash.    Did you call your PCP prior to going to the ED? No, did not call the PCP office.     Review of Discharge Instructions:   [x] AVS discharge instructions  [x] Right Care, Right Place, Right Time document  [] Medication changes  [x] Follow up appointments  [] Referral follow up   []        Care Summary Note: Unable to reach patient. Spoke to daughterPaula. Daughter reported 2/2 to mild fever, confusion yesterday, pt was evaluated at the ED. She was noted also to have a rash and ED provider instructed to stop Bactrim (pt had been taking this for L leg wound). Patient is no longer confused but her generalized rash has spread and is very itchy per daughter. Family is encouraging pt to drink fluids for kidney health. They are applying Benadryl topical for rash but not much relief. Patient was scheduled for ED follow up 2/2 ROSE found at ED yesterday. Will route chart to PCP for rash management. Bactrim placed on patient's allergy list.    Offered patient

## 2025-07-28 NOTE — PROGRESS NOTES
Shabnam Rincon (:  1950) is a 75 y.o. female,Established patient, here for evaluation of the following chief complaint(s):  Follow-up (ER Follow Up. [facility] ER for allergic reaction to Bactrim. )      Assessment & Plan  Varicose veins of left lower extremity with inflammation, with ulcer of midfoot limited to breakdown of skin (HCC)   Acute condition, new, patient's wound looks healed there is no infection patient was instructed on wet-to-dry dressing and must keep leg elevated when possible         Allergic drug rash due to sulfonamide   Acute condition, new, rash much improved patient is not taking Benadryl she has been instructed to use over-the-counter Caladryl or oatmeal baths           No follow-ups on file.    Subjective       75-year-old female presenting for post ED follow-up  Patient was seen due to delirium and was also noted to have an ulcer in the left legwas treated with Bactrim  Patient presented to the ER after developing rash related to Bactrim which was has been stopped  Patient's rash has improved diminished in severity and intensity and she is not taking any Benadryl at the moment  Left leg vascular ulcer still present      Review of Systems   Constitutional: Negative.    HENT: Negative.     Eyes: Negative.    Respiratory: Negative.     Cardiovascular: Negative.    Gastrointestinal: Negative.    Skin:  Positive for rash and wound.               Objective     /60   Pulse 86   Temp 97.7 °F (36.5 °C) (Temporal)   Ht 1.702 m (5' 7\")   Wt 57.6 kg (127 lb)   SpO2 96%   BMI 19.89 kg/m²      Physical Exam  Constitutional:       General: She is not in acute distress.  HENT:      Mouth/Throat:      Mouth: Mucous membranes are moist.   Eyes:      Conjunctiva/sclera: Conjunctivae normal.   Cardiovascular:      Rate and Rhythm: Normal rate.      Pulses: Normal pulses.   Pulmonary:      Effort: Pulmonary effort is normal.   Abdominal:      Palpations: Abdomen is soft.   Musculoskeletal:

## 2025-07-28 NOTE — TELEPHONE ENCOUNTER
Called patient about her rash, patient says that her rash is better now that medication has been discontinued  Patient was advised to purchase over-the-counter Caladryl to apply to areas 2-3 times a day

## 2025-07-29 ENCOUNTER — OFFICE VISIT (OUTPATIENT)
Dept: PRIMARY CARE CLINIC | Age: 75
End: 2025-07-29

## 2025-07-29 VITALS
BODY MASS INDEX: 19.93 KG/M2 | HEART RATE: 86 BPM | HEIGHT: 67 IN | SYSTOLIC BLOOD PRESSURE: 102 MMHG | TEMPERATURE: 97.7 F | WEIGHT: 127 LBS | OXYGEN SATURATION: 96 % | DIASTOLIC BLOOD PRESSURE: 60 MMHG

## 2025-07-29 DIAGNOSIS — L97.421: Primary | ICD-10-CM

## 2025-07-29 DIAGNOSIS — T37.0X5A ALLERGIC DRUG RASH DUE TO SULFONAMIDE: ICD-10-CM

## 2025-07-29 DIAGNOSIS — L27.0 ALLERGIC DRUG RASH DUE TO SULFONAMIDE: ICD-10-CM

## 2025-07-29 DIAGNOSIS — I83.224: Primary | ICD-10-CM

## 2025-07-29 ASSESSMENT — ENCOUNTER SYMPTOMS
GASTROINTESTINAL NEGATIVE: 1
EYES NEGATIVE: 1
RESPIRATORY NEGATIVE: 1

## 2025-07-29 NOTE — ASSESSMENT & PLAN NOTE
Acute condition, new, rash much improved patient is not taking Benadryl she has been instructed to use over-the-counter Caladryl or oatmeal baths

## 2025-07-29 NOTE — ASSESSMENT & PLAN NOTE
Acute condition, new, patient's wound looks healed there is no infection patient was instructed on wet-to-dry dressing and must keep leg elevated when possible

## 2025-07-31 ENCOUNTER — CARE COORDINATION (OUTPATIENT)
Dept: CARE COORDINATION | Age: 75
End: 2025-07-31

## 2025-07-31 NOTE — CARE COORDINATION
Ambulatory Care Coordination Note     2025 1:04 PM     Patient Current Location:  [place]: [place] [address]     ACM contacted the family by telephone. Verified name and  with daughter as identifiers.         ACM: Evi Martinez RN     Challenges to be reviewed by the provider   Additional needs identified to be addressed with provider No               Method of communication with provider: none.    Utilization: Patient has not had any utilization since our last call.     Care Summary Note: Daughter reported wet to dry dressing changes ongoing per family assistance. Rash improving. Patient is stable, no other changes or acute concerns. Daughter agreeable to follow up with ACM next week.    Offered patient enrollment in the Remote Patient Monitoring (RPM) program for in-home monitoring: Yes, but did not enroll at this time: controlled chronic disease management.     Assessments Completed:   General Assessment    Do you have any symptoms that are causing concern?: Yes  Progression since Onset: Gradually Improving  Reported Symptoms: Rash, Other (Comment: left leg wound)          Medications Reviewed:   Patient denies any changes with medications and reports taking all medications as prescribed.    Advance Care Planning:   Not reviewed during this call     Care Planning:    Goals Addressed                   This Visit's Progress     Conditions and Symptoms   No change     I will schedule office visits, as directed by my provider.  I will keep my appointment or reschedule if I have to cancel.  I will notify my provider of any barriers to my plan of care.  I will follow my Zone Management tool to seek urgent or emergent care.  I will notify my provider of any symptoms that indicate a worsening of my condition.    Barriers: lack of education  Plan for overcoming my barriers:   Patient demonstrates compliance with self-monitoring, understanding of self-reporting for results outside of normal

## 2025-08-01 ENCOUNTER — CARE COORDINATION (OUTPATIENT)
Dept: CARE COORDINATION | Age: 75
End: 2025-08-01

## 2025-08-01 NOTE — CARE COORDINATION
Ambulatory Care Coordination Note     8/1/2025 1:57 PM     Patient outreach attempt by this ACM today to perform care management follow up . ACM was unable to reach the patient by telephone today;   voicemail full and unable to leave a message.      ACM: Evi Martinez RN     Care Summary Note: Patient left message late yesterday evening to call back today. ACM attempted to call pt back at number given but unable to reach as above. Will attempt follow up another time.    PCP/Specialist follow up:   Future Appointments         Provider Specialty Dept Phone    10/3/2025 10:00 AM (Arrive by 9:55 AM) [facility]  1 Radiology [phone]    10/3/2025 10:30 AM (Arrive by 10:25 AM) [facility]  1 Radiology [phone]    10/8/2025 9:15 AM Brenda Mckeon APRN - CNP Pulmonology [phone]    11/20/2025 8:00 AM Ekaterina Freire MD Primary Care [phone]    12/1/2025 7:30 AM Ekaterina Freire MD Primary Care [phone]    1/22/2026 10:00 AM Ina Yap APRN Hematology and Oncology [phone]    1/22/2026 10:00 AM SCHEDULE, [facility] MA Infusion Therapy [phone]            Follow Up:   Plan for next ACM outreach in approximately 1 week to complete:  - disease specific assessments.

## 2025-08-11 ENCOUNTER — CARE COORDINATION (OUTPATIENT)
Dept: CARE COORDINATION | Age: 75
End: 2025-08-11

## 2025-08-25 ENCOUNTER — CARE COORDINATION (OUTPATIENT)
Dept: CARE COORDINATION | Age: 75
End: 2025-08-25

## 2025-09-05 ENCOUNTER — CARE COORDINATION (OUTPATIENT)
Dept: CARE COORDINATION | Age: 75
End: 2025-09-05

## (undated) DEVICE — SUTURE PDS II SZ 2-0 L18IN ABSRB VLT SH L26MM 1/2 CIR TAPR Z775D

## (undated) DEVICE — SUTURE ETHLN SZ 2-0 L30IN NONABSORBABLE BLK L36MM FSLX 3/8 1674H

## (undated) DEVICE — C-ARM: Brand: UNBRANDED

## (undated) DEVICE — SUTURE VCRL SZ 2-0 L36IN ABSRB UD L36MM CT-1 1/2 CIR J945H

## (undated) DEVICE — SYRINGE, LUER LOCK, 10ML: Brand: MEDLINE

## (undated) DEVICE — BLANKET WRM W40.2XL55.9IN IORT LO BODY + MISTRAL AIR

## (undated) DEVICE — GLOVE SURG SZ 75 CRM LTX FREE POLYISOPRENE POLYMER BEAD ANTI

## (undated) DEVICE — SUREFIT, DUAL DISPERSIVE ELECTRODE, CONTACT QUALITY MONITOR: Brand: SUREFIT

## (undated) DEVICE — PACK,UNIVERSAL,NO GOWNS: Brand: MEDLINE

## (undated) DEVICE — SINGLE PORT MANIFOLD: Brand: NEPTUNE 2

## (undated) DEVICE — ADHESIVE SKIN CLSR 0.7ML TOP DERMBND ADV

## (undated) DEVICE — CLIP INT M L GRN TI TRNSVRS GRV CHEVRON SHP W/ PRECIS TIP

## (undated) DEVICE — SUTURE MCRYL SZ 4-0 L18IN ABSRB UD L19MM PS-2 3/8 CIR PRIM Y496G

## (undated) DEVICE — MINOR CDS: Brand: MEDLINE INDUSTRIES, INC.

## (undated) DEVICE — SKIN MARKER,REGULAR TIP WITH RULER: Brand: DEVON

## (undated) DEVICE — DECANTER FLD 9IN ST BG FOR ASEP TRNSF OF FLD

## (undated) DEVICE — AGENT HEMSTAT W4XL8IN OXIDIZED REGENERATED CELOS ABSRB

## (undated) DEVICE — 6 ML SYRINGE LUER-LOCK TIP: Brand: MONOJECT

## (undated) DEVICE — MAJOR BSIN SETUP PK

## (undated) DEVICE — CONTRAST IOTHALAMATE MEGLUMINE 60% 50 ML INJ CONRAY 60

## (undated) DEVICE — ENDO KIT,LOURDES HOSPITAL: Brand: MEDLINE INDUSTRIES, INC.

## (undated) DEVICE — SOLUTION IV IRRIG POUR BRL 0.9% SODIUM CHL 2F7124

## (undated) DEVICE — 3M™ IOBAN™ 2 ANTIMICROBIAL INCISE DRAPE 6650EZ: Brand: IOBAN™ 2

## (undated) DEVICE — SUTURE VCRL SZ 3-0 L27IN ABSRB UD L26MM SH 1/2 CIR J416H

## (undated) DEVICE — SUTURE PDS + SZ 2 0 L27IN ABSRB VLT L36MM CT 1 1 2 CIR PDP339H

## (undated) DEVICE — SNARE POLYP SM W13MMXL240CM SHTH DIA2.4MM OVL FLX DISP

## (undated) DEVICE — SUTURE PERMAHAND SZ 2-0 L18IN NONABSORBABLE BLK L26MM FS 685G

## (undated) DEVICE — BLADE SURG NO11 C STL RETRCT DISPOSABLE

## (undated) DEVICE — FORCEPS BX L240CM DIA2.4MM L NDL RAD JAW 4 133340

## (undated) DEVICE — MAJOR CDS

## (undated) DEVICE — GOWN,PREVENTION PLUS,2XL,ST,22/CS: Brand: MEDLINE

## (undated) DEVICE — STERILE LATEX POWDER FREE SURGICAL GLOVES WITH HYDROGEL COATING: Brand: PROTEXIS

## (undated) DEVICE — SUTURE MNCRYL STRATAFIX PS 4-0 30CM

## (undated) DEVICE — SEALER TISS L20CM DIA13MM ADV BPLR L CRV JAW OPN APPRCH

## (undated) DEVICE — GLOVE SURG SZ 85 L12IN FNGR ORTHO 126MIL CRM LTX FREE

## (undated) DEVICE — AEGIS 1" DISK 4MM HOLE, PEEL OPEN: Brand: MEDLINE

## (undated) DEVICE — GAUZE,SPONGE,FLUFF,6"X6.75",STRL,10/TRAY: Brand: MEDLINE

## (undated) DEVICE — JACKSON-PRATT 100CC BULB RESERVOIR: Brand: CARDINAL HEALTH

## (undated) DEVICE — GOWN,PREVENTION PLUS,XL,ST,24/CS: Brand: MEDLINE

## (undated) DEVICE — THREE QUARTER SHEET: Brand: CONVERTORS

## (undated) DEVICE — CANNULA NSL AD L7FT DIV O2 CO2 W/ M LUERLOCK TRMPT CONN

## (undated) DEVICE — CONMED GOLDLINE ELECTROSURGICAL HANDPIECE, HAND CONTROLLED WITH BLADE ELECTRODE, BUTTON SWITCH, SAFETY HOLSTER AND 10 FT (3 M) CABLE: Brand: CONMED GOLDLINE

## (undated) DEVICE — SUTURE PERMAHAND SZ 2-0 L18IN NONABSORBABLE BLK L26MM SH C012D

## (undated) DEVICE — TELFA NON-ADHERENT ABSORBENT DRESSING: Brand: TELFA

## (undated) DEVICE — SHEET,DRAPE,53X77,STERILE: Brand: MEDLINE

## (undated) DEVICE — DRAIN JACKSON PRATT ROUND 15FR: Brand: CARDINAL HEALTH

## (undated) DEVICE — CHLORAPREP 26ML ORANGE